# Patient Record
Sex: FEMALE | Race: WHITE | NOT HISPANIC OR LATINO | Employment: FULL TIME | URBAN - METROPOLITAN AREA
[De-identification: names, ages, dates, MRNs, and addresses within clinical notes are randomized per-mention and may not be internally consistent; named-entity substitution may affect disease eponyms.]

---

## 2017-01-06 ENCOUNTER — HOSPITAL ENCOUNTER (OUTPATIENT)
Dept: ULTRASOUND IMAGING | Facility: CLINIC | Age: 32
Discharge: HOME | End: 2017-01-06
Payer: COMMERCIAL

## 2017-01-06 DIAGNOSIS — N63.0 BREAST LUMP: ICD-10-CM

## 2017-01-06 PROCEDURE — 76642 ULTRASOUND BREAST LIMITED: CPT

## 2017-01-10 ENCOUNTER — GENERIC CONVERSION - ENCOUNTER (OUTPATIENT)
Dept: OTHER | Facility: OTHER | Age: 32
End: 2017-01-10

## 2017-01-11 ENCOUNTER — GENERIC CONVERSION - ENCOUNTER (OUTPATIENT)
Dept: OTHER | Facility: OTHER | Age: 32
End: 2017-01-11

## 2017-01-12 ENCOUNTER — GENERIC CONVERSION - ENCOUNTER (OUTPATIENT)
Dept: OTHER | Facility: OTHER | Age: 32
End: 2017-01-12

## 2017-02-16 ENCOUNTER — ALLSCRIPTS OFFICE VISIT (OUTPATIENT)
Dept: OTHER | Facility: OTHER | Age: 32
End: 2017-02-16

## 2017-09-08 ENCOUNTER — ALLSCRIPTS OFFICE VISIT (OUTPATIENT)
Dept: OTHER | Facility: OTHER | Age: 32
End: 2017-09-08

## 2017-11-03 ENCOUNTER — ALLSCRIPTS OFFICE VISIT (OUTPATIENT)
Dept: OTHER | Facility: OTHER | Age: 32
End: 2017-11-03

## 2017-11-08 NOTE — PROGRESS NOTES
Assessment  1  Acute upper respiratory infection (465 9) (J06 9)   2  Acute gastroenteritis (558 9) (K52 9)    Plan  Acute upper respiratory infection    · Symbicort 160-4 5 MCG/ACT Inhalation Aerosol; INHALE 2 PUFFS TWICE DAILY  RINSE MOUTH AFTER USE    Discussion/Summary    38-year-old female presenting today for GI and upper respiratory symptoms with GI symptoms starting 1st  It appears she may have acute viral gastroenteritis with recent Co infection of a viral upper respiratory infection  She does smoke and has some lower respiratory symptoms but nothing concerning for pneumonia  No symptoms consistent with influenza  Low-grade temp of 99 4 degrees Fahrenheit today without any fever reducers  At this point I recommended avoiding antibiotics as there is no clear sign on exam of infection needing them, especially with her GI symptoms I would like to avoid at present  I recommended rest, increase clear fluids for hydration and following a BRAT diet as tolerated  For respiratory symptoms I provided her with a sample of Symbicort with 1st dose in office to ensure proper use to help with cough and chest congestion/tightness  I recommended she continue Mucinex DM  We will see how she does through the course of the weekend or early next week with work excuse provided to her  She will call if symptoms persist or worsen and I will determine if any further treatment is needed at that time  Possible side effects of new medications were reviewed with the patient/guardian today  The treatment plan was reviewed with the patient/guardian  The patient/guardian understands and agrees with the treatment plan      Chief Complaint  Pt c/o cough, congestions, diarrhea and vomiting, and headache x 2 days  History of Present Illness  HPI: 34y/o female here today for GI sxs and URI sxs  states started with GI sxs, N/V, diarrhea for past 2 days  And now chest heaviness, chest congestion  not coughing much   fever at 102  nasal congestion and pounding headache  pt taking tylenol/ibuprofen, mucinex  URI sxs since yesterday  Fever about 3 days ago  Review of Systems    Constitutional: as noted in HPI    ENT: as noted in HPI  Cardiovascular: no complaints of slow or fast heart rate, no chest pain, no palpitations, no leg claudication or lower extremity edema  Respiratory: as noted in HPI  Gastrointestinal: as noted in HPI  Active Problems  1  Abnormal thyroid function test (794 5) (R94 6)   2  Atypical nevus (216 9) (D22 9)   3  Atypical nevus (216 9) (D22 9)   4  Generalized anxiety disorder (300 02) (F41 1)   5  GERD without esophagitis (530 81) (K21 9)   6  Insomnia (780 52) (G47 00)   7  Left breast lump (611 72) (N63 20)   8  Lumbago (724 2) (M54 5)   9  Screening for depression (V79 0) (Z13 89)    Past Medical History  1  History of Anxiety (300 00) (F41 9)   2  History of Flank pain (789 09) (R10 9)   3  History of Flu vaccine need (V04 81) (Z23)   4  History of acute bacterial sinusitis (V12 69) (Z87 09)   5  History of acute bronchitis (V12 69) (Z87 09)   6  History of acute sinusitis (V12 69) (Z87 09)   7  History of urinary tract infection (V13 02) (Z87 440)   8  History of Panic attacks (300 01) (F41 0)    Family History  Mother    1  No pertinent family history  Father    2  No pertinent family history    Social History   · Current every day smoker (305 1) (F17 200)  The social history was reviewed and updated today  Surgical History  1  History of Hallux Valgus (Bunion) Correction   2  History of Tonsillectomy    Current Meds   1  ClonazePAM 0 5 MG Oral Tablet; TAKE 1 AND 1/2 TABLETS EVERY 12 HOURS AS   NEEDED; Therapy: 10BNL3593 to (Pura Yañez)  Requested for: 75TKR3143; Last   Rx:22Oct2017 Ordered   2  Escitalopram Oxalate 20 MG Oral Tablet; Take 1 tablet daily; Therapy: 12EOE3923 to (August Mis)  Requested for: 67Tsf4404; Last   Rx:92Dfg3522 Ordered   3   Omeprazole 20 MG Oral Capsule Delayed Release; TAKE 1 CAPSULE DAILY EVERY   MORNING BEFORE BREAKFAST; Therapy: 54QCV6043 to (468 2603)  Requested for: 01Sep2016; Last   Rx:60Vre5437 Ordered   4  TraZODone HCl - 50 MG Oral Tablet; TAKE 1 TABLET AT BEDTIME AS NEEDED; Therapy: 29Edx3284 to (Evaluate:72Wdu8989)  Requested for: 95Jio9160; Last   Rx:61Whd6825 Ordered    The medication list was reviewed and updated today  Allergies  1  Latex Exam Gloves MISC   2  Levaquin TABS    Vitals   Recorded: 97SJV0681 01:36PM   Temperature 99 4 F, Tympanic   Heart Rate 96   Respiration Quality Normal   Respiration 17   Systolic 143, LUE, Sitting   Diastolic 74, LUE, Sitting   Height 5 ft 3 2 in   Weight 124 lb 9 oz   BMI Calculated 21 93   BSA Calculated 1 58   O2 Saturation 98, RA   LMP 16Gky2417   Pain Scale 6     Physical Exam    Constitutional   General appearance: Abnormal   acutely ill,-- comfortable,-- within normal limits of ideal weight,-- appears tired-- and-- appearance reflects stated age  Eyes   Conjunctiva and lids: No swelling, erythema or discharge  Ears, Nose, Mouth, and Throat   External inspection of ears and nose: Normal     Otoscopic examination: Tympanic membranes translucent with normal light reflex  Canals patent without erythema  Nasal mucosa, septum, and turbinates: Abnormal   normal nasal turbinates  There was a mucoid discharge from both nares  The bilateral nasal mucosa was boggy  Oropharynx: Abnormal  -- PND  Pulmonary   Respiratory effort: Abnormal  -- dry cough  Auscultation of lungs: Abnormal  -- mild distant BS throughout, mild coarse BS  Cardiovascular   Auscultation of heart: Normal rate and rhythm, normal S1 and S2, without murmurs  Abdomen   Abdomen: Abnormal   The abdomen was flat  Bowel sounds were normal  There was mild tenderness that was diffuse  The abdomen was not firm  No rebound tenderness  No guarding  Lymphatic   Palpation of lymph nodes in neck: No lymphadenopathy  Psychiatric   Orientation to person, place, and time: Normal     Mood and affect: Normal     Additional Exam:  vitals reviewed          Signatures   Electronically signed by : Diogo Franks [facility]; Nov  3 2017  2:11PM EST                       (Author)    Electronically signed by : Yeimi Baptiste DO; Nov 8 2017  4:21AM EST                       (Co-author)

## 2017-12-29 ENCOUNTER — ALLSCRIPTS OFFICE VISIT (OUTPATIENT)
Dept: OTHER | Facility: OTHER | Age: 32
End: 2017-12-29

## 2018-01-03 NOTE — PROGRESS NOTES
Assessment   1  Fever, unspecified fever cause (780 60) (R50 9)   2  Cough (786 2) (R05)    Plan   Cough, Fever, unspecified fever cause    · Oseltamivir Phosphate 75 MG Oral Capsule (Tamiflu); TAKE 1 CAPSULE TWICE    DAILY    Discussion/Summary      58-year-old female here today for acute constitutional and upper respiratory symptoms that sound consistent with influenza  Based on clinical suspicion I will start her on Tamiflu immediately  Patient revealed to me that she has been trying to get pregnant for many years and tried testing for pregnancy yesterday and saw a âfaint lineâ  I will treat patient as if she is pregnant, and discussed appropriate symptomatic treatment to include Tylenol or acetaminophen for pain or temperature control and also to consider trying Mucinex DM for cough and mucus  I advised against any other over-the-counter cold medications if there is a chance that she might be pregnant  Rest and fluids for hydration were advised  Patient to monitor symptoms and call with any concerns  Patient will have OB care if she is pregnant as she states that she has been having fertility issues and issues getting pregnant for long period of time  I told her to call with any concerns  Possible side effects of new medications were reviewed with the patient/guardian today  The treatment plan was reviewed with the patient/guardian  The patient/guardian understands and agrees with the treatment plan      Chief Complaint   Pt c/o body aches, chills, cough, an congestion x 1 day  History of Present Illness   HPI: 32y/o female here today for acute URI and constitutional sxs since yesterday  feeling like hit by a [employer] truck  body aches, weakness, fatigue  sore throat, nasal congestion, runny nose, sneezing, cough  Vomiting x 1  temp < 100  taking robitussin DM  she states she has been trying to get pregnant for many years and just checked pregnancy test yesterday and states saw a faint line   She has OB care and is on prenatals  Review of Systems        Constitutional: as noted in HPI       ENT: as noted in HPI  Cardiovascular: no complaints of slow or fast heart rate, no chest pain, no palpitations, no leg claudication or lower extremity edema  Respiratory: as noted in HPI  Gastrointestinal: as noted in HPI  Active Problems   1  Abnormal thyroid function test (794 5) (R94 6)   2  Generalized anxiety disorder (300 02) (F41 1)   3  GERD without esophagitis (530 81) (K21 9)   4  Insomnia (780 52) (G47 00)   5  Lumbago (724 2) (M54 5)    Past Medical History   1  History of Acute gastroenteritis (558 9) (K52 9)   2  History of Acute upper respiratory infection (465 9) (J06 9)   3  History of Anxiety (300 00) (F41 9)   4  History of Flank pain (789 09) (R10 9)   5  History of Flu vaccine need (V04 81) (Z23)   6  History of acute bacterial sinusitis (V12 69) (Z87 09)   7  History of acute bronchitis (V12 69) (Z87 09)   8  History of acute sinusitis (V12 69) (Z87 09)   9  History of atypical nevus (V13 3) (Z87 2)   10  History of atypical nevus (V13 3) (Z87 2)   11  History of lump of left breast (V13 89) (Z87 898)   12  History of urinary tract infection (V13 02) (Z87 440)   13  History of Panic attacks (300 01) (F41 0)   14  History of Screening for depression (V79 0) (Z13 89)    Family History   Mother    1  No pertinent family history  Father    2  No pertinent family history    Social History    · Current every day smoker (305 1) (F17 200)  The social history was reviewed and updated today  Surgical History   1  History of Hallux Valgus (Bunion) Correction   2  History of Tonsillectomy    Current Meds    1  ClonazePAM 0 5 MG Oral Tablet; TAKE 1 AND 1/2 TABLET BY MOUTH EVERY 12     HOURS AS NEEDED; Therapy: 48MRQ1858 to (Evaluate:32Qlk3473)  Requested for: 02Dec2017; Last     Rx:28Nov2017 Ordered   2  Escitalopram Oxalate 20 MG Oral Tablet; Take 1 tablet daily;      Therapy: 15DXZ7118 to (Saroj Valles)  Requested for: 08Sep2017; Last     Rx:22Lhe8163 Ordered   3  Multi For Her Oral Tablet; TAKE 1 TABLET DAILY; Therapy: 61XBO2406 to Recorded   4  TraZODone HCl - 50 MG Oral Tablet; TAKE 1 TABLET AT BEDTIME AS NEEDED; Therapy: 08Sep2017 to (Evaluate:60Bwd9415)  Requested for: 23Eix4706; Last     Rx:28Noq1831 Ordered     The medication list was reviewed and updated today  Allergies   1  Latex Exam Gloves MISC   2  Levaquin TABS    Vitals    Recorded: 31FXB5410 02:54PM   Temperature 98 9 F, Tympanic   Heart Rate 72   Respiration Quality Normal   Respiration 16   Systolic 783, LUE, Sitting   Diastolic 78, LUE, Sitting   Height 5 ft 3 in   Weight 124 lb 5 oz   BMI Calculated 22 02   BSA Calculated 1 58   O2 Saturation 99, RA   Pain Scale 7     Physical Exam        Constitutional      General appearance: Abnormal   acutely ill,-- uncomfortable,-- within normal limits of ideal weight,-- appears tired-- and-- appearance reflects stated age  Ears, Nose, Mouth, and Throat      External inspection of ears and nose: Normal        Otoscopic examination: Tympanic membranes translucent with normal light reflex  Canals patent without erythema  Nasal mucosa, septum, and turbinates: Abnormal   normal nasal turbinates  There was clear rhinorrhea from both nares  The bilateral nasal mucosa was boggy, but-- not edematous,-- not excoriated-- and-- not red  Oropharynx: Normal with no erythema, edema, exudate or lesions  Pulmonary      Respiratory effort: Abnormal  -- dry, hacking cough  Auscultation of lungs: Clear to auscultation  Cardiovascular      Auscultation of heart: Normal rate and rhythm, normal S1 and S2, without murmurs  Lymphatic      Palpation of lymph nodes in neck: No lymphadenopathy  Psychiatric      Orientation to person, place, and time: Normal        Mood and affect: Normal        Additional Exam:  vitals reviewed - afebrile  Signatures    Electronically signed by : Acacia Blanco, [facility]; Dec 29 2017  5:17PM EST                       (Author)     Electronically signed by : Larry Goldstein DO; Jan 2 2018  8:56PM EST                       (Co-author)

## 2018-01-12 VITALS
HEIGHT: 65 IN | RESPIRATION RATE: 16 BRPM | HEART RATE: 78 BPM | DIASTOLIC BLOOD PRESSURE: 72 MMHG | TEMPERATURE: 97.5 F | WEIGHT: 121.19 LBS | SYSTOLIC BLOOD PRESSURE: 126 MMHG | BODY MASS INDEX: 20.19 KG/M2

## 2018-01-12 VITALS
RESPIRATION RATE: 16 BRPM | SYSTOLIC BLOOD PRESSURE: 116 MMHG | DIASTOLIC BLOOD PRESSURE: 70 MMHG | WEIGHT: 123.44 LBS | TEMPERATURE: 98.6 F | HEART RATE: 92 BPM | BODY MASS INDEX: 21.87 KG/M2 | OXYGEN SATURATION: 98 % | HEIGHT: 63 IN

## 2018-01-13 VITALS
HEIGHT: 63 IN | BODY MASS INDEX: 22.07 KG/M2 | RESPIRATION RATE: 17 BRPM | HEART RATE: 96 BPM | TEMPERATURE: 99.4 F | WEIGHT: 124.56 LBS | DIASTOLIC BLOOD PRESSURE: 74 MMHG | SYSTOLIC BLOOD PRESSURE: 120 MMHG | OXYGEN SATURATION: 98 %

## 2018-01-14 NOTE — MISCELLANEOUS
To whom it May concern: This letter is to notify you that Martin Casiano is a current patient at our office and was evaluated on 11/03 for upper respiratory and GI symptoms diagnosed as viral gastroenteritis and acute upper  respiratory infection  She is considered contagious and we ask that you excuse her from 11/01 at onset of symptoms through 11/3 during this time of infection  Rest and medication treatment has been discussed with patient  She should be cleared to return  back to work as of 11/6  Thank you for your attention with this matter        Sincerely,            Javed Lucas PA-C      Electronically signed Daniel Hobson [facility]  Nov  3 2017  2:02PM EST        Electronically signed by:Geni Raymond [facility]  Nov  3 2017  2:02PM EST

## 2018-01-15 NOTE — RESULT NOTES
Message   Patient was called twice with no response  Please call patient, informed her that her recent breast ultrasound was positive for complicated cysts  Please check to see that she received these results  At this time, recommendations were made to repeat ultrasound in 6 months  Thank you     Verified Results  *US BREAST LEFT LIMITED (DIAGNOSTIC) [id number] 08:42AM Orlando Lindsay Order Number: [id number]    - Patient Instructions: To schedule this appointment, please contact Central Scheduling at [phone]  Test Name Result Flag Reference   US BREAST LEFT LIMITED (Report)     Reason for exam: clinical finding  24-year-old female with left breast pain and lower outer palpable   abnormality  US Breast Left Limited: January 6, 2017 - Check In #: [de-identified]   Technologist: Edgar Pope RDMS   No prior studies available for comparison  A uniformly echogenic layer of fibroglandular tissue  Gray scale   sonography of the left breast was performed in multiple    projections using real time imaging  Evaluation of the left breast in the area of pain reveals the    presence of dense fibroglandular tissue  There is a 0 4 x 0 3 x 0 5 cm simple cyst at the 2 o'clock    position, 2 cm from the nipple  A 2nd subcentimeter simple cyst is identified at the 3 o'clock    position, 5 cm from the nipple  At the 3 o'clock position, 3 cm the nipple, there is a minimally    complicated 0 5 x 0 6 x 0 3 cm cyst      At the 5 o'clock position, 5 cm from the nipple, there is a    well-defined hypoechoic nodule with a small peripheral cystic    space, measuring 0 7 x 0 4 x 0 4 cm  There is no internal    vascularity and mild posterior enhancement  The differential    diagnosis includes solid nodule such as fibroadenoma or    complicated cyst, among other entities  At the 6 o'clock position, 2 cm from the nipple, there is a    probable complicated cyst measuring 0 5 x 0 4 x 0 7 cm       No other cystic or solid nodules are identified  1  There is a 7 mm hypoechoic nodule at the 5 o'clock position    of the left breast, 5 cm from nipple, probable complicated cyst     This should be reevaluated with repeat ultrasound in 6 months  2  Probable complicated cyst is seen at the 6 o'clock position,    2 cm from the nipple which also should be reevaluated with repeat   ultrasound in 6 months  3  Management of any clinical symptoms and findings must be    based on clinical grounds  ASSESSMENT: BiRad:3 - Probably Benign     Recommendation:   Clinical management  Ultrasound in 6 months       Transcription Location: [facility] 98: [id number]     Risk Value(s):   Nathan-Magdy 10 Year: 0 674%, Rogerer-Magdy Lifetime: 11 857%,    Myriad Table: 1 5%   Signed by:   Florecita Rai MD   1/6/17

## 2018-01-23 VITALS
WEIGHT: 124.31 LBS | RESPIRATION RATE: 16 BRPM | BODY MASS INDEX: 22.03 KG/M2 | OXYGEN SATURATION: 99 % | TEMPERATURE: 98.9 F | SYSTOLIC BLOOD PRESSURE: 116 MMHG | HEIGHT: 63 IN | DIASTOLIC BLOOD PRESSURE: 78 MMHG | HEART RATE: 72 BPM

## 2018-01-23 NOTE — MISCELLANEOUS
To Whom this may concern: This letter is to notify you that Rodolfo Robin was seen in our office on 12/29/17 and will be starting treatment for Influenza  She will start treatment on 12/29 and have advised she rest over  the weekend through 1/1/18  She should be clear to return to work pending improvement of symptoms on 1/2/18         Sincerely,            Lala Conrad PA-C      Electronically signed by:eGni Celaya [facility]  Dec 29 2017  3:22PM EST

## 2018-02-11 DIAGNOSIS — F41.1 GAD (GENERALIZED ANXIETY DISORDER): Primary | ICD-10-CM

## 2018-02-15 RX ORDER — CLONAZEPAM 0.5 MG/1
TABLET ORAL
Qty: 90 TABLET | Refills: 0 | Status: SHIPPED | OUTPATIENT
Start: 2018-02-15 | End: 2018-03-05 | Stop reason: SDUPTHER

## 2018-02-15 NOTE — TELEPHONE ENCOUNTER
Pt's written rx for Clonazepam 0 5 MG written on 2/15/18 was shredded since rx was called into the [facility] in [place]

## 2018-02-15 NOTE — TELEPHONE ENCOUNTER
Pt's rx Clonazepam 0 5 MG I called the [facility] in [address] per pt's request and left a verbal on the rx line on 2/15/18 at 12:11pm  I did leave all information and our office's call back number

## 2018-03-05 DIAGNOSIS — F41.1 GAD (GENERALIZED ANXIETY DISORDER): ICD-10-CM

## 2018-03-05 DIAGNOSIS — F41.1 GENERALIZED ANXIETY DISORDER: Primary | ICD-10-CM

## 2018-03-09 RX ORDER — CLONAZEPAM 0.5 MG/1
0.75 TABLET ORAL EVERY 12 HOURS PRN
Qty: 90 TABLET | Refills: 0 | Status: SHIPPED | OUTPATIENT
Start: 2018-03-09 | End: 2018-03-17 | Stop reason: SDUPTHER

## 2018-03-17 DIAGNOSIS — F41.1 GAD (GENERALIZED ANXIETY DISORDER): ICD-10-CM

## 2018-03-21 RX ORDER — CLONAZEPAM 0.5 MG/1
0.75 TABLET ORAL EVERY 12 HOURS PRN
Qty: 90 TABLET | Refills: 0 | Status: SHIPPED | OUTPATIENT
Start: 2018-03-21 | End: 2018-04-13 | Stop reason: SDUPTHER

## 2018-03-23 DIAGNOSIS — F41.1 GENERALIZED ANXIETY DISORDER: Primary | ICD-10-CM

## 2018-03-23 RX ORDER — ESCITALOPRAM OXALATE 20 MG/1
TABLET ORAL
Qty: 30 TABLET | Refills: 5 | Status: SHIPPED | OUTPATIENT
Start: 2018-03-23 | End: 2018-04-13 | Stop reason: SDUPTHER

## 2018-04-05 ENCOUNTER — TELEPHONE (OUTPATIENT)
Dept: FAMILY MEDICINE CLINIC | Facility: CLINIC | Age: 33
End: 2018-04-05

## 2018-04-05 NOTE — TELEPHONE ENCOUNTER
----- Message from Slime Andrade sent at 4/5/2018  1:57 AM EDT -----  Regarding: Visit Follow-Up Question  Contact: [phone]  I'm waiting for my health insurance to kick in before I can make an appointment  Unfortunately paying 120  00 per office visit is not always easy   I was fired from my last job for being out with the GI bug and influenza which I was treated at the office for and had dr notes  I have a balance there now that I'm trying to take care of

## 2018-04-13 DIAGNOSIS — F41.1 GENERALIZED ANXIETY DISORDER: ICD-10-CM

## 2018-04-13 DIAGNOSIS — F41.1 GAD (GENERALIZED ANXIETY DISORDER): ICD-10-CM

## 2018-04-15 DIAGNOSIS — F41.1 GAD (GENERALIZED ANXIETY DISORDER): ICD-10-CM

## 2018-04-17 RX ORDER — ESCITALOPRAM OXALATE 20 MG/1
20 TABLET ORAL DAILY
Qty: 30 TABLET | Refills: 0 | Status: SHIPPED | OUTPATIENT
Start: 2018-04-17 | End: 2018-05-11

## 2018-04-17 RX ORDER — CLONAZEPAM 0.5 MG/1
TABLET ORAL
Qty: 90 TABLET | Refills: 0 | Status: SHIPPED | OUTPATIENT
Start: 2018-04-17 | End: 2018-05-11 | Stop reason: SDUPTHER

## 2018-04-17 RX ORDER — CLONAZEPAM 0.5 MG/1
0.75 TABLET ORAL EVERY 12 HOURS PRN
Qty: 90 TABLET | Refills: 0 | Status: SHIPPED | OUTPATIENT
Start: 2018-04-17 | End: 2018-05-11 | Stop reason: SDUPTHER

## 2018-05-11 ENCOUNTER — OFFICE VISIT (OUTPATIENT)
Dept: FAMILY MEDICINE CLINIC | Facility: CLINIC | Age: 33
End: 2018-05-11
Payer: COMMERCIAL

## 2018-05-11 VITALS
DIASTOLIC BLOOD PRESSURE: 74 MMHG | HEIGHT: 63 IN | RESPIRATION RATE: 17 BRPM | OXYGEN SATURATION: 98 % | TEMPERATURE: 98.1 F | SYSTOLIC BLOOD PRESSURE: 118 MMHG | WEIGHT: 121.5 LBS | HEART RATE: 77 BPM | BODY MASS INDEX: 21.53 KG/M2

## 2018-05-11 DIAGNOSIS — G89.29 CHRONIC BILATERAL LOW BACK PAIN WITH LEFT-SIDED SCIATICA: ICD-10-CM

## 2018-05-11 DIAGNOSIS — F41.1 GENERALIZED ANXIETY DISORDER: Primary | ICD-10-CM

## 2018-05-11 DIAGNOSIS — M54.42 CHRONIC BILATERAL LOW BACK PAIN WITH LEFT-SIDED SCIATICA: ICD-10-CM

## 2018-05-11 PROBLEM — M54.50 LOW BACK PAIN: Status: ACTIVE | Noted: 2017-02-16

## 2018-05-11 PROCEDURE — 99214 OFFICE O/P EST MOD 30 MIN: CPT | Performed by: FAMILY MEDICINE

## 2018-05-11 RX ORDER — OMEPRAZOLE 20 MG/1
1 CAPSULE, DELAYED RELEASE ORAL DAILY
COMMUNITY
Start: 2016-06-30 | End: 2018-06-07 | Stop reason: SDUPTHER

## 2018-05-11 RX ORDER — CLONAZEPAM 0.5 MG/1
0.75 TABLET ORAL EVERY 12 HOURS PRN
Qty: 90 TABLET | Refills: 0 | Status: SHIPPED | OUTPATIENT
Start: 2018-05-11 | End: 2018-06-19 | Stop reason: SDUPTHER

## 2018-05-11 RX ORDER — CYCLOBENZAPRINE HCL 5 MG
5 TABLET ORAL 2 TIMES DAILY
COMMUNITY
Start: 2017-02-13 | End: 2018-05-11 | Stop reason: SDUPTHER

## 2018-05-11 RX ORDER — CYCLOBENZAPRINE HCL 5 MG
5 TABLET ORAL 2 TIMES DAILY
Qty: 60 TABLET | Refills: 1 | Status: SHIPPED | OUTPATIENT
Start: 2018-05-11 | End: 2018-07-09 | Stop reason: SDUPTHER

## 2018-05-11 NOTE — PROGRESS NOTES
Assessment/Plan:   1  Generalized anxiety disorder  Reviewed patient's symptoms with her  It appears that her anxiety disorder has not been very well controlled  Will discontinue her citalopram   Will switch her to Zoloft 50 mg daily  She was instructed on the proper weaning schedule for her previous medication  She may continue with her clonazepam as well  - sertraline (ZOLOFT) 50 mg tablet; Take 1 tablet (50 mg total) by mouth daily  Dispense: 30 tablet; Refill: 1  - clonazePAM (KlonoPIN) 0 5 mg tablet; Take 1 5 tablets (0 75 mg total) by mouth every 12 (twelve) hours as needed for anxiety  Dispense: 90 tablet; Refill: 0    2  Chronic bilateral low back pain with left-sided sciatica  Patient's symptoms have been persistent  She believes that lifting may be a contributing factor to this problem  She has been using her Flexeril for symptom relief  At this time, will refer patient to PT for further evaluation  Follow-up in 6 months  - cyclobenzaprine (FLEXERIL) 5 mg tablet; Take 1 tablet (5 mg total) by mouth 2 (two) times a day  Dispense: 60 tablet; Refill: 1  - Ambulatory referral to Physical Therapy; Future     Diagnoses and all orders for this visit:    Generalized anxiety disorder    Other orders  -     cyclobenzaprine (FLEXERIL) 5 mg tablet; Take 5 mg by mouth 2 (two) times a day  -     omeprazole (PRILOSEC) 20 mg delayed release capsule; Take 1 capsule by mouth daily  -     cyclobenzaprine (FLEXERIL) 5 mg tablet; Take 1 tablet (5 mg total) by mouth 2 (two) times a day          Subjective:    Chief Complaint   Patient presents with    Follow-up        Patient ID: Dottie Haines is a 26 y o  female  Patient is a 68-year-old female presents today for follow-up on her chronic conditions  She has generalized anxiety disorder as well as chronic low back pain mainly on the left  She states that she has been taking her medications regularly and tolerating them very well    Denies adverse reactions with her medications  She does not believe that her citalopram has been effective for her  She would like to consider other treatment options as she believes that this medication has lost its effectiveness  She denies SI or HI today  Review of Systems   Constitutional: Negative for activity change, chills, fatigue and fever  HENT: Negative for congestion, ear pain, sinus pressure and sore throat  Eyes: Negative for redness, itching and visual disturbance  Respiratory: Negative for cough and shortness of breath  Cardiovascular: Negative for chest pain and palpitations  Gastrointestinal: Negative for abdominal pain, diarrhea and nausea  Endocrine: Negative for cold intolerance and heat intolerance  Genitourinary: Negative for dysuria, flank pain and frequency  Musculoskeletal: Negative for arthralgias, back pain, gait problem and myalgias  Skin: Negative for color change  Allergic/Immunologic: Negative for environmental allergies  Neurological: Negative for dizziness, numbness and headaches  Psychiatric/Behavioral: Negative for behavioral problems and sleep disturbance  The following portions of the patient's history were reviewed and updated as appropriate : past family history, past medical history, past social history and past surgical history  Objective:    Vitals:    05/11/18 1532   BP: 118/74   BP Location: Left arm   Patient Position: Sitting   Cuff Size: Adult   Pulse: 77   Resp: 17   Temp: 98 1 °F (36 7 °C)   TempSrc: Tympanic   SpO2: 98%   Weight: 55 1 kg (121 lb 8 oz)   Height: 5' 2 5" (1 588 m)        Physical Exam   Constitutional: She is oriented to person, place, and time  She appears well-developed and well-nourished  HENT:   Head: Normocephalic and atraumatic  Nose: Nose normal    Mouth/Throat: No oropharyngeal exudate  Eyes: Pupils are equal, round, and reactive to light  Right eye exhibits no discharge  Left eye exhibits no discharge     Neck: Normal range of motion  Neck supple  No tracheal deviation present  Cardiovascular: Normal rate, regular rhythm and intact distal pulses  Exam reveals no gallop and no friction rub  No murmur heard  Pulses:       Dorsalis pedis pulses are 2+ on the right side, and 2+ on the left side  Posterior tibial pulses are 2+ on the right side, and 2+ on the left side  Pulmonary/Chest: Effort normal and breath sounds normal  No respiratory distress  She has no wheezes  She has no rales  Abdominal: Soft  Bowel sounds are normal  She exhibits no distension  There is no tenderness  There is no rebound and no guarding  Musculoskeletal: Normal range of motion  She exhibits no edema  Lymphadenopathy:        Head (right side): No submental and no submandibular adenopathy present  Head (left side): No submental and no submandibular adenopathy present  She has no cervical adenopathy  Right cervical: No superficial cervical, no deep cervical and no posterior cervical adenopathy present  Left cervical: No superficial cervical, no deep cervical and no posterior cervical adenopathy present  Neurological: She is alert and oriented to person, place, and time  No cranial nerve deficit or sensory deficit  Skin: Skin is warm, dry and intact  Psychiatric: Her speech is normal and behavior is normal  Judgment normal  Her mood appears not anxious  Cognition and memory are normal  She does not exhibit a depressed mood  Vitals reviewed

## 2018-05-16 ENCOUNTER — OFFICE VISIT (OUTPATIENT)
Dept: FAMILY MEDICINE CLINIC | Facility: CLINIC | Age: 33
End: 2018-05-16
Payer: COMMERCIAL

## 2018-05-16 VITALS
BODY MASS INDEX: 21.41 KG/M2 | SYSTOLIC BLOOD PRESSURE: 110 MMHG | WEIGHT: 125.4 LBS | HEART RATE: 66 BPM | RESPIRATION RATE: 16 BRPM | DIASTOLIC BLOOD PRESSURE: 70 MMHG | OXYGEN SATURATION: 97 % | HEIGHT: 64 IN | TEMPERATURE: 99.2 F

## 2018-05-16 DIAGNOSIS — R39.9 URINARY TRACT INFECTION SYMPTOMS: Primary | ICD-10-CM

## 2018-05-16 LAB
SL AMB  POCT GLUCOSE, UA: NEGATIVE
SL AMB LEUKOCYTE ESTERASE,UA: NEGATIVE
SL AMB POCT BILIRUBIN,UA: NEGATIVE
SL AMB POCT BLOOD,UA: NEGATIVE
SL AMB POCT CLARITY,UA: CLEAR
SL AMB POCT COLOR,UA: YELLOW
SL AMB POCT KETONES,UA: NEGATIVE
SL AMB POCT NITRITE,UA: NEGATIVE
SL AMB POCT PH,UA: 8.5
SL AMB POCT SPECIFIC GRAVITY,UA: 1.01
SL AMB POCT URINE PROTEIN: NEGATIVE
SL AMB POCT UROBILINOGEN: 0.2

## 2018-05-16 PROCEDURE — 81003 URINALYSIS AUTO W/O SCOPE: CPT | Performed by: PHYSICIAN ASSISTANT

## 2018-05-16 PROCEDURE — 87086 URINE CULTURE/COLONY COUNT: CPT | Performed by: PHYSICIAN ASSISTANT

## 2018-05-16 PROCEDURE — 99213 OFFICE O/P EST LOW 20 MIN: CPT | Performed by: PHYSICIAN ASSISTANT

## 2018-05-16 RX ORDER — SULFAMETHOXAZOLE AND TRIMETHOPRIM 800; 160 MG/1; MG/1
1 TABLET ORAL EVERY 12 HOURS SCHEDULED
Qty: 14 TABLET | Refills: 0 | Status: SHIPPED | OUTPATIENT
Start: 2018-05-16 | End: 2018-05-23

## 2018-05-16 RX ORDER — ESCITALOPRAM OXALATE 20 MG/1
TABLET ORAL
COMMUNITY
Start: 2018-05-16 | End: 2018-06-07

## 2018-05-16 NOTE — PROGRESS NOTES
Assessment/Plan:      Diagnoses and all orders for this visit:    Urinary tract infection symptoms  -     sulfamethoxazole-trimethoprim (BACTRIM DS) 800-160 mg per tablet; Take 1 tablet by mouth every 12 (twelve) hours for 7 days  -     POCT urine dip auto non-scope  -     Urine culture    Other orders  -     escitalopram (LEXAPRO) 20 mg tablet;         10-year-old female presenting today for concern of urinary symptoms as well as some lower pelvic discomfort wrapping to her low back for the past few days  Her exam is relatively unremarkable aside from some mild pelvic tenderness to palpation  She is due for her menstrual cycle in about 8 days so I do question  Mittels[place]merz versus urinary infection  She has no risk for PID or STD no GI symptoms  There is no blood in her urine dip to consider kidney stone  Also question cystitis  She has a history of UTI that feel similar, she states, so I will place her on a course of Bactrim  She was advised Tylenol or Motrin as needed for the discomfort as well as heating pad  She was advised to increase water intake and could also consider probiotic  We will send out urine for culture to confirm we will call her with results  In the meantime she was encouraged to call sooner with any new or worsening symptoms  Chief Complaint   Patient presents with    Urinary Tract Infection     Kidney stones according to pt        Subjective:     Patient ID: Monae Heath is a 26 y o  female     34y/o female here today for frequent urination past few days, lower pelvic discomfort wrapping around to her low back  Also burning with urination  NO GI sxs, no N/V, no fevers, no gross blood  States hx of UTI and similar sxs  No vaginal sxs, abnormal discharge or vaginal pain  Review of Systems   Constitutional: Negative  Respiratory: Negative  Cardiovascular: Negative  Gastrointestinal: Negative  Genitourinary:        As in HPI   Psychiatric/Behavioral: Negative  The following portions of the patient's history were reviewed and updated as appropriate: allergies, current medications, past family history, past medical history, past social history, past surgical history and problem list       Objective:     Physical Exam   Constitutional: She is oriented to person, place, and time  She appears well-developed and well-nourished  No distress  Neck: Neck supple  Cardiovascular: Normal rate, regular rhythm and normal heart sounds  Pulmonary/Chest: Effort normal and breath sounds normal    Abdominal: Normal appearance and bowel sounds are normal  There is tenderness (mild) in the suprapubic area  There is no rebound, no guarding and no CVA tenderness  Musculoskeletal:   Normal gait and gross normal ROM   Neurological: She is alert and oriented to person, place, and time  Psychiatric: She has a normal mood and affect  Vitals reviewed        Vitals:    05/16/18 1439   BP: 110/70   Pulse: 66   Resp: 16   Temp: 99 2 °F (37 3 °C)   TempSrc: Tympanic   SpO2: 97%   Weight: 56 9 kg (125 lb 6 4 oz)   Height: 5' 4 17" (1 63 m)

## 2018-05-16 NOTE — LETTER
May 16, 2018     Patient: Soraya Talavera   YOB: 1985   Date of Visit: 5/16/2018       To Whom it May Concern:    Soraya Talavera is under my professional care  She was seen in my office on 5/16/2018  She may return to work on 5/17/18  If you have any questions or concerns, please don't hesitate to call           Sincerely,          Dipak Denny PA-C        CC: No Recipients

## 2018-05-17 LAB — BACTERIA UR CULT: NORMAL

## 2018-05-18 ENCOUNTER — TELEPHONE (OUTPATIENT)
Dept: FAMILY MEDICINE CLINIC | Facility: CLINIC | Age: 33
End: 2018-05-18

## 2018-05-18 DIAGNOSIS — R10.2 PELVIC PAIN: Primary | ICD-10-CM

## 2018-05-18 DIAGNOSIS — Z91.89 AT RISK FOR COMPLICATION OF PREGNANCY: ICD-10-CM

## 2018-05-18 NOTE — TELEPHONE ENCOUNTER
I spoke to patient in detail regarding her symptoms  She states she has no better on the Bactrim which she has been taking for 3 days  I explained her culture did not show any clear bacteria but just mixed contaminants from genital elizabeth  She states that her and her  have been trying to get pregnant for years and he has a problem with low sperm count  She is due for her  Now in about 6 days but she has been sexually active  I will order the ultrasound to further evaluate her ongoing left lower pelvic pain and low back discomfort  I also will check an HCG quant level for her  She will get this done at [facility] end over the weekend and she will call [facility]'s central scheduling to schedule appointment for the ultrasound  We will keep in touch  Patient was  Encouraged to call sooner with any concerns

## 2018-05-18 NOTE — TELEPHONE ENCOUNTER
----- Message from Darshan Bennett sent at 5/18/2018 12:04 PM EDT -----  Regarding: RE: RE: Test Results Question  Contact: [phone]    I have pain still that's more on my left side ovary area and still radiates to my back   I did call obgyn they can't see me until June 8th but suggested I get a ultrasound done   They are mailing me a script   I'm very uncomfortable and working is making it worse at this point   I just hope to figure out what's going on soon   ----- Message -----  From: Gordon Painting  Sent: 5/18/18 9:24 AM  To: Darshan Bennett  Subject: RE: Test Results Question    Reymundo Hernández,    Mixed contaminants often times happens when the patient does not appropriately collect the urine sample and normal genital elizabeth (normal bacteria) fall in to the urine with the sample causing contamination  A true urine culture is not able to be run  This happens a lot  We just dont know if you had UTI or not  Court Sous, how are you feeling since being seen last?    Thanks,  Chele Bowman      ----- Message -----     From: Darshan Bennett     Sent: 5/17/2018  8:18 PM EDT       To: Burton Delgado PA-C  Subject: Test Results Question    I have a question about URINE CULTURE resulted on 5/17/18 at 6:13 PM  What does this mean negative or positive?

## 2018-05-18 NOTE — TELEPHONE ENCOUNTER
Mixed contaminants often times happens when the patient does not appropriately collect the urine sample and normal genital elizabeth (normal bacteria) fall in to the urine with the sample contaminating  A true urine culture is not able to be run  This happens a lot  We just dont know if she had UTI or not   Carmen Bahena, how are you feeling since being seen last?

## 2018-05-18 NOTE — TELEPHONE ENCOUNTER
----- Message from Elen Galindo sent at 5/17/2018  8:18 PM EDT -----  Regarding: Test Results Question  Contact: [phone]  I have a question about URINE CULTURE resulted on 5/17/18 at 6:13 PM  What does this mean negative or positive?

## 2018-05-19 ENCOUNTER — APPOINTMENT (OUTPATIENT)
Dept: LAB | Facility: MEDICAL CENTER | Age: 33
End: 2018-05-19
Payer: COMMERCIAL

## 2018-05-19 ENCOUNTER — TRANSCRIBE ORDERS (OUTPATIENT)
Dept: ADMINISTRATIVE | Facility: HOSPITAL | Age: 33
End: 2018-05-19

## 2018-05-19 DIAGNOSIS — R10.2 PELVIC PAIN: ICD-10-CM

## 2018-05-19 LAB — B-HCG SERPL-ACNC: <2 MIU/ML

## 2018-05-19 PROCEDURE — 36415 COLL VENOUS BLD VENIPUNCTURE: CPT

## 2018-05-19 PROCEDURE — 84702 CHORIONIC GONADOTROPIN TEST: CPT

## 2018-05-22 ENCOUNTER — TELEPHONE (OUTPATIENT)
Dept: FAMILY MEDICINE CLINIC | Facility: CLINIC | Age: 33
End: 2018-05-22

## 2018-05-22 NOTE — TELEPHONE ENCOUNTER
FYI: Patient was given results of pregnancy test per Pranay Moore  Patient has her US scheduled this Friday 5/25/18 at 10:00 am at [facility]

## 2018-05-25 ENCOUNTER — HOSPITAL ENCOUNTER (OUTPATIENT)
Dept: ULTRASOUND IMAGING | Facility: MEDICAL CENTER | Age: 33
Discharge: HOME | End: 2018-05-25
Payer: COMMERCIAL

## 2018-05-25 DIAGNOSIS — R10.2 PELVIC PAIN: ICD-10-CM

## 2018-05-25 PROCEDURE — 76700 US EXAM ABDOM COMPLETE: CPT

## 2018-05-25 PROCEDURE — 76856 US EXAM PELVIC COMPLETE: CPT

## 2018-05-25 PROCEDURE — 76830 TRANSVAGINAL US NON-OB: CPT

## 2018-05-31 ENCOUNTER — TELEPHONE (OUTPATIENT)
Dept: FAMILY MEDICINE CLINIC | Facility: CLINIC | Age: 33
End: 2018-05-31

## 2018-06-01 NOTE — TELEPHONE ENCOUNTER
I spoke to patient directly regarding the ultrasounds that were unremarkable and not suggestive of any abnormality that would be causing her symptoms  She still has some mild pain that is fairly constant and at times intermittently worsens like a sharp stabbing pain  She has no other associated urinary or GI symptoms and she cannot attribute the worsening pain with movement  She had no blood in her urine dip at her last appointment so I was not concerned about a stone and she still does not visualize any gross blood  It is unclear what exactly could be causing her pain but I do also question musculoskeletal   She has no other GI symptoms  Patient has an appointment with Dr Itz Hernandez  Next Friday  I told patient to schedule follow-up with me after if gyn does not feel this is related and she needs further workup  Patient requested we fax office note and ultrasound results to her  **MA: please fax my OV note, urine/HCG testing and abdominal and pelvic U/S results to Dr Itz Hernandez in [place]   Thanks

## 2018-06-04 ENCOUNTER — TELEPHONE (OUTPATIENT)
Dept: FAMILY MEDICINE CLINIC | Facility: CLINIC | Age: 33
End: 2018-06-04

## 2018-06-04 NOTE — TELEPHONE ENCOUNTER
Pt called and stated she started zolft yesterday and started to feel dizzy and nausea, feels like she will pass out  She is currently at work, and works in a nursing home  Please advise  Thank you!

## 2018-06-05 NOTE — TELEPHONE ENCOUNTER
Spoke with patient, medication adjustments were made  Please write her a work note for yesterday June 4th as well as June 5th  Please call her once completed and she will     Thank you

## 2018-06-07 ENCOUNTER — OFFICE VISIT (OUTPATIENT)
Dept: FAMILY MEDICINE CLINIC | Facility: CLINIC | Age: 33
End: 2018-06-07
Payer: COMMERCIAL

## 2018-06-07 VITALS
OXYGEN SATURATION: 98 % | TEMPERATURE: 99.3 F | HEART RATE: 94 BPM | DIASTOLIC BLOOD PRESSURE: 78 MMHG | WEIGHT: 123.8 LBS | HEIGHT: 64 IN | BODY MASS INDEX: 21.13 KG/M2 | SYSTOLIC BLOOD PRESSURE: 120 MMHG

## 2018-06-07 DIAGNOSIS — R51.9 ACUTE INTRACTABLE HEADACHE, UNSPECIFIED HEADACHE TYPE: Primary | ICD-10-CM

## 2018-06-07 DIAGNOSIS — R42 DIZZINESS: ICD-10-CM

## 2018-06-07 DIAGNOSIS — R11.0 NAUSEA: ICD-10-CM

## 2018-06-07 PROCEDURE — 99214 OFFICE O/P EST MOD 30 MIN: CPT | Performed by: PHYSICIAN ASSISTANT

## 2018-06-07 RX ORDER — OMEPRAZOLE 20 MG/1
20 CAPSULE, DELAYED RELEASE ORAL DAILY
Qty: 30 CAPSULE | Refills: 0 | Status: SHIPPED | OUTPATIENT
Start: 2018-06-07 | End: 2018-07-04 | Stop reason: SDUPTHER

## 2018-06-07 RX ORDER — ONDANSETRON 8 MG/1
8 TABLET, ORALLY DISINTEGRATING ORAL EVERY 8 HOURS PRN
Qty: 30 TABLET | Refills: 0 | Status: SHIPPED | OUTPATIENT
Start: 2018-06-07 | End: 2018-12-04

## 2018-06-07 RX ORDER — MECLIZINE HYDROCHLORIDE 25 MG/1
25 TABLET ORAL EVERY 8 HOURS PRN
Qty: 30 TABLET | Refills: 0 | Status: SHIPPED | OUTPATIENT
Start: 2018-06-07 | End: 2018-12-04

## 2018-06-07 RX ORDER — IBUPROFEN 600 MG/1
600 TABLET ORAL EVERY 6 HOURS PRN
Qty: 30 TABLET | Refills: 0 | Status: SHIPPED | OUTPATIENT
Start: 2018-06-07 | End: 2018-12-04

## 2018-06-07 RX ORDER — FLUTICASONE PROPIONATE 50 MCG
2 SPRAY, SUSPENSION (ML) NASAL DAILY
Qty: 16 G | Refills: 1 | Status: SHIPPED | OUTPATIENT
Start: 2018-06-07 | End: 2018-08-23 | Stop reason: SDUPTHER

## 2018-06-07 NOTE — LETTER
June 7, 2018     Patient: Julius Rodriguez   YOB: 1985   Date of Visit: 6/7/2018       To Whom it May Concern:    Julius Rodriguez is under my professional care  She was seen in my office on 6/7/2018  She may return to work on 6/9/18  If you have any questions or concerns, please don't hesitate to call           Sincerely,          Audie Chin PA-C        CC: No Recipients

## 2018-06-07 NOTE — PROGRESS NOTES
Assessment/Plan:      Diagnoses and all orders for this visit:    Acute intractable headache, unspecified headache type  -     ibuprofen (MOTRIN) 600 mg tablet; Take 1 tablet (600 mg total) by mouth every 6 (six) hours as needed for headaches Take with food  -     fluticasone (FLONASE) 50 mcg/act nasal spray; 2 sprays into each nostril daily For nasal congestion    Nausea  -     omeprazole (PRILOSEC) 20 mg delayed release capsule; Take 1 capsule (20 mg total) by mouth daily  -     ondansetron (ZOFRAN-ODT) 8 mg disintegrating tablet; Take 1 tablet (8 mg total) by mouth every 8 (eight) hours as needed for nausea or vomiting  -     meclizine (ANTIVERT) 25 mg tablet; Take 1 tablet (25 mg total) by mouth every 8 (eight) hours as needed for dizziness    Dizziness  -     meclizine (ANTIVERT) 25 mg tablet; Take 1 tablet (25 mg total) by mouth every 8 (eight) hours as needed for dizziness        33-year-old female here today for multiple symptoms for the past 3 days including dizziness / lightheadedness, frontal headache, nausea for the past of unclear etiology  Her exam is relatively unremarkable today aside from some mild epigastric tenderness  She had discontinued her Lexapro 20 mg 3 days ago after taking it every other day to wean off to be switched to Zoloft  She had been on the Lexapro for years  She took 25 mg Zoloft for the past 3 days  I am uncertain at this time if her symptoms could be from discontinuing the Lexapro or if it could be from side effects to the   Zoloft  She also had complained of some nasal congestion and runny nose as well as an intermittent dry cough so it is unclear if this could be sinus related or if she could be having a migraine causing the dizziness and the nausea as secondary symptoms  Her ENT exam was unremarkable and lungs were clear  At this time being that there is an unclear etiology I have advised she discontinue all antidepressants for now    I will treat symptomatically starting with ibuprofen 600 mg Q 6-8 hours with food to help headache and pain but I will also have her use omeprazole 20 mg a day to protect her stomach  I will also have her start Zofran and meclizine for nausea and dizziness  I will also put her on fluticasone nasal spray to help with nasal congestion if this could be headache secondary to sinus  Patient was advised to rest and hydrate as much as possible  She should monitor her symptoms over the next few days with work excuse given  She is to call or follow up with any worsening or new concerning symptoms  Otherwise if she is getting better she should call sometime next week for an update to determine if a different antidepressant should be started  She also has ongoing lower pelvic pain that has been ongoing for months and had a relatively normal ultrasound of the abdomen and pelvis  She has appointment with her gynecologist tomorrow  Chief Complaint   Patient presents with    Dizziness    Headache    Nausea     X 1 week       Subjective:     Patient ID: Toya Lu is a [address] y o  female     32y/o female here today for multiple sxs for past 3 days  states had stopped the lexapro after taking it every other day to ween off for 2 weeks then this past Sunday started 1/2 tab zoloft  Since then has felt dizzy, lightheaded, nausea, feeling in space, dry heavy  No diarrhea/constipation or blood in the stool  Stomach is upset overall  She also has a pounding headache  She denies drug or ETOH use or change in her routine  She had been on lexapro for years  She does admit to occasional cough, runny nose and some nasal congestion  Headache in frontal region  Review of Systems   Constitutional: Positive for activity change, appetite change and fatigue  Negative for fever  Respiratory: Negative  Cardiovascular: Negative  Gastrointestinal:        Ongoing lower abdominal pain that is pre-existing   Epigastric discomfort new   Genitourinary: Negative  Neurological: Positive for dizziness and light-headedness  Psychiatric/Behavioral: Negative  The following portions of the patient's history were reviewed and updated as appropriate: allergies, current medications, past family history, past medical history, past social history, past surgical history and problem list       Objective:     Physical Exam   Constitutional: She is oriented to person, place, and time  She appears well-developed  She appears ill  No distress  fatigued   HENT:   Head: Normocephalic  Right Ear: Tympanic membrane normal    Left Ear: Tympanic membrane normal    Nose: Nose normal    Mouth/Throat: Oropharynx is clear and moist    Neck: Neck supple  Cardiovascular: Normal rate, regular rhythm, normal heart sounds and normal pulses  Pulmonary/Chest: Effort normal and breath sounds normal    Abdominal: Normal appearance and bowel sounds are normal  There is tenderness (mild, epigastric)  Lymphadenopathy:     She has no cervical adenopathy  Neurological: She is alert and oriented to person, place, and time  No cranial nerve deficit or sensory deficit  Coordination and gait normal    Psychiatric: She has a normal mood and affect  Vitals reviewed        Vitals:    06/07/18 1504   BP: 120/78   BP Location: Left arm   Patient Position: Sitting   Pulse: 94   Temp: 99 3 °F (37 4 °C)   TempSrc: Tympanic   SpO2: 98%   Weight: 56 2 kg (123 lb 12 8 oz)   Height: 5' 4 3" (1 633 m)

## 2018-06-08 ENCOUNTER — HOSPITAL ENCOUNTER (EMERGENCY)
Facility: HOSPITAL | Age: 33
Discharge: HOME | End: 2018-06-09
Attending: EMERGENCY MEDICINE | Admitting: EMERGENCY MEDICINE
Payer: COMMERCIAL

## 2018-06-08 ENCOUNTER — APPOINTMENT (EMERGENCY)
Dept: CT IMAGING | Facility: HOSPITAL | Age: 33
End: 2018-06-08
Payer: COMMERCIAL

## 2018-06-08 DIAGNOSIS — R42 VERTIGO: ICD-10-CM

## 2018-06-08 DIAGNOSIS — R07.9 CHEST PAIN: Primary | ICD-10-CM

## 2018-06-08 LAB
BASOPHILS # BLD AUTO: 0.04 THOUSANDS/ΜL (ref 0–0.1)
BASOPHILS NFR BLD AUTO: 1 % (ref 0–1)
BILIRUB UR QL STRIP: NEGATIVE
CLARITY UR: CLEAR
COLOR UR: YELLOW
COLOR, POC: YELLOW
EOSINOPHIL # BLD AUTO: 0.19 THOUSAND/ΜL (ref 0–0.61)
EOSINOPHIL NFR BLD AUTO: 2 % (ref 0–6)
ERYTHROCYTE [DISTWIDTH] IN BLOOD BY AUTOMATED COUNT: 12.3 % (ref 11.6–15.1)
EXT PREG TEST URINE: NEGATIVE
GLUCOSE UR STRIP-MCNC: NEGATIVE MG/DL
HCT VFR BLD AUTO: 36.5 % (ref 34.8–46.1)
HGB BLD-MCNC: 12 G/DL (ref 11.5–15.4)
HGB UR QL STRIP.AUTO: NEGATIVE
KETONES UR STRIP-MCNC: NEGATIVE MG/DL
LEUKOCYTE ESTERASE UR QL STRIP: NEGATIVE
LYMPHOCYTES # BLD AUTO: 3.84 THOUSANDS/ΜL (ref 0.6–4.47)
LYMPHOCYTES NFR BLD AUTO: 45 % (ref 14–44)
MCH RBC QN AUTO: 30.2 PG (ref 26.8–34.3)
MCHC RBC AUTO-ENTMCNC: 32.9 G/DL (ref 31.4–37.4)
MCV RBC AUTO: 92 FL (ref 82–98)
MONOCYTES # BLD AUTO: 0.59 THOUSAND/ΜL (ref 0.17–1.22)
MONOCYTES NFR BLD AUTO: 7 % (ref 4–12)
NEUTROPHILS # BLD AUTO: 3.84 THOUSANDS/ΜL (ref 1.85–7.62)
NEUTS SEG NFR BLD AUTO: 45 % (ref 43–75)
NITRITE UR QL STRIP: NEGATIVE
NRBC BLD AUTO-RTO: 0 /100 WBCS
PH UR STRIP.AUTO: 7 [PH] (ref 4.5–8)
PLATELET # BLD AUTO: 222 THOUSANDS/UL (ref 149–390)
PMV BLD AUTO: 9.1 FL (ref 8.9–12.7)
PROT UR STRIP-MCNC: NEGATIVE MG/DL
RBC # BLD AUTO: 3.97 MILLION/UL (ref 3.81–5.12)
SP GR UR STRIP.AUTO: 1.01 (ref 1–1.03)
UROBILINOGEN UR QL STRIP.AUTO: 0.2 E.U./DL
WBC # BLD AUTO: 8.5 THOUSAND/UL (ref 4.31–10.16)

## 2018-06-08 PROCEDURE — 96374 THER/PROPH/DIAG INJ IV PUSH: CPT

## 2018-06-08 PROCEDURE — 36415 COLL VENOUS BLD VENIPUNCTURE: CPT | Performed by: EMERGENCY MEDICINE

## 2018-06-08 PROCEDURE — 85025 COMPLETE CBC W/AUTO DIFF WBC: CPT | Performed by: EMERGENCY MEDICINE

## 2018-06-08 PROCEDURE — 96361 HYDRATE IV INFUSION ADD-ON: CPT

## 2018-06-08 PROCEDURE — 70450 CT HEAD/BRAIN W/O DYE: CPT

## 2018-06-08 PROCEDURE — 96375 TX/PRO/DX INJ NEW DRUG ADDON: CPT

## 2018-06-08 PROCEDURE — 80053 COMPREHEN METABOLIC PANEL: CPT | Performed by: EMERGENCY MEDICINE

## 2018-06-08 PROCEDURE — 84484 ASSAY OF TROPONIN QUANT: CPT | Performed by: EMERGENCY MEDICINE

## 2018-06-08 PROCEDURE — 93005 ELECTROCARDIOGRAM TRACING: CPT

## 2018-06-08 PROCEDURE — 81003 URINALYSIS AUTO W/O SCOPE: CPT

## 2018-06-08 PROCEDURE — 81025 URINE PREGNANCY TEST: CPT | Performed by: EMERGENCY MEDICINE

## 2018-06-08 RX ORDER — METOCLOPRAMIDE HYDROCHLORIDE 5 MG/ML
10 INJECTION INTRAMUSCULAR; INTRAVENOUS ONCE
Status: COMPLETED | OUTPATIENT
Start: 2018-06-08 | End: 2018-06-08

## 2018-06-08 RX ORDER — DIPHENHYDRAMINE HYDROCHLORIDE 50 MG/ML
12.5 INJECTION INTRAMUSCULAR; INTRAVENOUS ONCE
Status: COMPLETED | OUTPATIENT
Start: 2018-06-08 | End: 2018-06-08

## 2018-06-08 RX ORDER — KETOROLAC TROMETHAMINE 30 MG/ML
15 INJECTION, SOLUTION INTRAMUSCULAR; INTRAVENOUS ONCE
Status: COMPLETED | OUTPATIENT
Start: 2018-06-08 | End: 2018-06-08

## 2018-06-08 RX ADMIN — SODIUM CHLORIDE 1000 ML: 0.9 INJECTION, SOLUTION INTRAVENOUS at 23:58

## 2018-06-08 RX ADMIN — DIPHENHYDRAMINE HYDROCHLORIDE 12.5 MG: 50 INJECTION, SOLUTION INTRAMUSCULAR; INTRAVENOUS at 23:58

## 2018-06-08 RX ADMIN — KETOROLAC TROMETHAMINE 15 MG: 30 INJECTION, SOLUTION INTRAMUSCULAR at 23:58

## 2018-06-08 RX ADMIN — METOCLOPRAMIDE 10 MG: 5 INJECTION, SOLUTION INTRAMUSCULAR; INTRAVENOUS at 23:58

## 2018-06-09 VITALS
RESPIRATION RATE: 18 BRPM | HEART RATE: 81 BPM | DIASTOLIC BLOOD PRESSURE: 61 MMHG | BODY MASS INDEX: 21.11 KG/M2 | OXYGEN SATURATION: 100 % | SYSTOLIC BLOOD PRESSURE: 128 MMHG | WEIGHT: 124.12 LBS | TEMPERATURE: 97.9 F

## 2018-06-09 DIAGNOSIS — G47.00 INSOMNIA, UNSPECIFIED TYPE: Primary | ICD-10-CM

## 2018-06-09 LAB
ALBUMIN SERPL BCP-MCNC: 3.8 G/DL (ref 3.5–5)
ALP SERPL-CCNC: 55 U/L (ref 46–116)
ALT SERPL W P-5'-P-CCNC: 18 U/L (ref 12–78)
ANION GAP SERPL CALCULATED.3IONS-SCNC: 10 MMOL/L (ref 4–13)
AST SERPL W P-5'-P-CCNC: 8 U/L (ref 5–45)
BILIRUB SERPL-MCNC: 0.21 MG/DL (ref 0.2–1)
BUN SERPL-MCNC: 10 MG/DL (ref 5–25)
CALCIUM SERPL-MCNC: 9.1 MG/DL (ref 8.3–10.1)
CHLORIDE SERPL-SCNC: 106 MMOL/L (ref 100–108)
CO2 SERPL-SCNC: 26 MMOL/L (ref 21–32)
CREAT SERPL-MCNC: 0.72 MG/DL (ref 0.6–1.3)
GFR SERPL CREATININE-BSD FRML MDRD: 109 ML/MIN/1.73SQ M
GLUCOSE SERPL-MCNC: 106 MG/DL (ref 65–140)
POTASSIUM SERPL-SCNC: 3.9 MMOL/L (ref 3.5–5.3)
PROT SERPL-MCNC: 7.3 G/DL (ref 6.4–8.2)
SODIUM SERPL-SCNC: 142 MMOL/L (ref 136–145)
TROPONIN I SERPL-MCNC: <0.02 NG/ML
TROPONIN I SERPL-MCNC: <0.02 NG/ML

## 2018-06-09 PROCEDURE — 96375 TX/PRO/DX INJ NEW DRUG ADDON: CPT

## 2018-06-09 PROCEDURE — 36415 COLL VENOUS BLD VENIPUNCTURE: CPT | Performed by: EMERGENCY MEDICINE

## 2018-06-09 PROCEDURE — 93005 ELECTROCARDIOGRAM TRACING: CPT

## 2018-06-09 PROCEDURE — 84484 ASSAY OF TROPONIN QUANT: CPT | Performed by: EMERGENCY MEDICINE

## 2018-06-09 PROCEDURE — 99285 EMERGENCY DEPT VISIT HI MDM: CPT

## 2018-06-09 PROCEDURE — 96361 HYDRATE IV INFUSION ADD-ON: CPT

## 2018-06-09 RX ORDER — DIAZEPAM 5 MG/ML
2 INJECTION, SOLUTION INTRAMUSCULAR; INTRAVENOUS ONCE
Status: COMPLETED | OUTPATIENT
Start: 2018-06-09 | End: 2018-06-09

## 2018-06-09 RX ADMIN — Medication 2 MG: at 00:46

## 2018-06-09 NOTE — ED PROCEDURE NOTE
PROCEDURE  ECG 12 Lead Documentation  Date/Time: 6/8/2018 11:32 PM  Performed by: Andi Shah  Authorized by: Andi Shah     Indications / Diagnosis:   cp  ECG reviewed by me, the ED Provider: yes    Patient location:  ED  Previous ECG:     Previous ECG:  Unavailable  Interpretation:     Interpretation: non-specific    Rate:     ECG rate:  80    ECG rate assessment: normal    Rhythm:     Rhythm: sinus rhythm    Ectopy:     Ectopy: none    QRS:     QRS axis:  Normal  Conduction:     Conduction: normal    ST segments:     ST segments:  Abnormal    Elevation:  AVF    Depression:  I and II  T waves:     T waves: normal           Claudean Brandt, DO  06/08/18 1631

## 2018-06-09 NOTE — ED PROCEDURE NOTE
PROCEDURE  ECG 12 Lead Documentation  Date/Time: 6/9/2018 3:04 AM  Performed by: Alex Carrera  Authorized by: Alex Carrera     Indications / Diagnosis:  Chest pain, repeat  Patient location:  ED  Previous ECG:     Previous ECG:  Compared to current    Comparison ECG info:  Current EKG is improved  ST depressions in lead 1 and 2 are now gone      Similarity:  Changes noted  Interpretation:     Interpretation: normal    Rate:     ECG rate assessment: normal    Rhythm:     Rhythm: sinus rhythm    Ectopy:     Ectopy: none    QRS:     QRS axis:  Normal    QRS intervals:  Normal  Conduction:     Conduction: normal    ST segments:     ST segments:  Normal  T waves:     T waves: normal           Mook Medellin DO  06/09/18 [id number]

## 2018-06-09 NOTE — ED PROVIDER NOTES
History  Chief Complaint   Patient presents with    Chest Pain     pt reports dizziness, chest pressure since yesterday, intermittent numbness/tingling in left arm  was seen at family doctor yesterday for same, symptoms worse now  75-year-old female with past medical history significant for anxiety, seasonal allergies, depression presents to the emergency department with a 2 day history of dizziness which she does describe his vertigo, generalized weakness, frontal headache and episodes of chest pain which seemed to occur when she has her vertigo symptoms  Patient states that the vertigo is definitely worse with any motion or eye movement  She has episodes of dry heaving when she gets the episodes of vertigo  She states she has chest tightness when she gets the episodes of vertigo and numbness down the left arm  The headache has been constant since yesterday and is frontal   No prior history of migraine headaches  No recent URI  No fevers or chills  No neck stiffness  She recently switched from Lexapro to Zoloft and states since starting the Zoloft she has not been feeling well  She saw her family doctor yesterday for the symptoms and was attributed to the possibility of allergies verses medication side effects  She was given Antivert and Zofran  She states she has been taking these medications and feels her symptoms are worse          History provided by:  Patient   used: No    Chest Pain   Pain location:  L chest  Pain quality: tightness    Pain radiates to:  L arm  Pain radiates to the back: no    Pain severity:  Unable to specify  Onset quality:  Gradual  Duration:  1 day  Timing:  Intermittent  Progression:  Unchanged  Chronicity:  New  Context: at rest    Context comment:  Associated with vertigo  Relieved by:  Nothing  Worsened by:  Certain positions  Associated symptoms: dizziness, headache, nausea and weakness    Associated symptoms: no abdominal pain, no altered mental status, no anorexia, no anxiety, no back pain, no claudication, no cough, no diaphoresis, no dysphagia, no fatigue, no fever, no heartburn, no lower extremity edema, no near-syncope, no numbness, no orthopnea, no palpitations, no PND, no shortness of breath, no syncope and not vomiting    Headaches:     Severity:  Unable to specify    Onset quality:  Gradual    Duration:  1 day    Timing:  Constant    Progression:  Worsening    Chronicity:  New  Nausea: Onset quality:  Gradual    Duration:  1 day    Timing:  Intermittent  Weakness:     Severity:  Unable to specify    Onset quality:  Gradual    Duration:  2 days    Chronicity:  New    Timing:  Constant    Progression:  Unchanged  Risk factors: smoking    Risk factors: no birth control, no coronary artery disease, no diabetes mellitus, no high cholesterol, no hypertension, no immobilization, not obese, not pregnant, no prior DVT/PE and no surgery    Dizziness   Quality:  Vertigo  Severity:  Unable to specify  Onset quality:  Gradual  Duration:  1 day  Timing:  Intermittent  Progression:  Unchanged  Chronicity:  New  Context: eye movement and head movement    Relieved by:  Nothing  Worsened by:  Eye movement and movement  Ineffective treatments: Antivert  Associated symptoms: chest pain, headaches, nausea and weakness    Associated symptoms: no palpitations, no shortness of breath, no syncope and no vomiting    Risk factors: new medications    Risk factors: no anemia, no heart disease, no hx of stroke, no hx of vertigo, no Meniere's disease and no multiple medications        Prior to Admission Medications   Prescriptions Last Dose Informant Patient Reported? Taking?    Multiple Vitamins-Minerals (MULTIVITAMIN ADULT PO)  Self Yes No   Sig: Take 1 tablet by mouth daily   clonazePAM (KlonoPIN) 0 5 mg tablet   No No   Sig: Take 1 5 tablets (0 75 mg total) by mouth every 12 (twelve) hours as needed for anxiety   cyclobenzaprine (FLEXERIL) 5 mg tablet   No No   Sig: Take 1 tablet (5 mg total) by mouth 2 (two) times a day   fluticasone (FLONASE) 50 mcg/act nasal spray   No No   Si sprays into each nostril daily For nasal congestion   ibuprofen (MOTRIN) 600 mg tablet   No No   Sig: Take 1 tablet (600 mg total) by mouth every 6 (six) hours as needed for headaches Take with food   meclizine (ANTIVERT) 25 mg tablet   No No   Sig: Take 1 tablet (25 mg total) by mouth every 8 (eight) hours as needed for dizziness   omeprazole (PRILOSEC) 20 mg delayed release capsule   No No   Sig: Take 1 capsule (20 mg total) by mouth daily   ondansetron (ZOFRAN-ODT) 8 mg disintegrating tablet   No No   Sig: Take 1 tablet (8 mg total) by mouth every 8 (eight) hours as needed for nausea or vomiting      Facility-Administered Medications: None       Past Medical History:   Diagnosis Date    Psychiatric disorder        Past Surgical History:   Procedure Laterality Date    NO PAST SURGERIES         History reviewed  No pertinent family history  I have reviewed and agree with the history as documented  Social History   Substance Use Topics    Smoking status: Current Every Day Smoker     Types: Cigarettes    Smokeless tobacco: Never Used    Alcohol use No        Review of Systems   Constitutional: Negative for chills, diaphoresis, fatigue and fever  HENT: Negative  Negative for congestion, rhinorrhea, sore throat and trouble swallowing  Eyes: Negative  Negative for discharge, redness and itching  Respiratory: Negative  Negative for apnea, cough, chest tightness, shortness of breath and wheezing  Cardiovascular: Positive for chest pain  Negative for palpitations, orthopnea, claudication, leg swelling, syncope, PND and near-syncope  Gastrointestinal: Positive for nausea  Negative for abdominal pain, anorexia, heartburn and vomiting  Endocrine: Negative  Genitourinary: Negative  Negative for flank pain, frequency and urgency  Musculoskeletal: Negative  Negative for back pain  Skin: Negative  Allergic/Immunologic: Negative  Neurological: Positive for dizziness, weakness and headaches  Negative for tremors, seizures, syncope, facial asymmetry, speech difficulty, light-headedness and numbness  Hematological: Negative  All other systems reviewed and are negative  Physical Exam  Physical Exam   Constitutional: She is oriented to person, place, and time  She appears well-developed and well-nourished  Non-toxic appearance  She does not have a sickly appearance  She does not appear ill  No distress  HENT:   Head: Normocephalic and atraumatic  Right Ear: Tympanic membrane and external ear normal    Left Ear: Tympanic membrane and external ear normal    Nose: Nose normal    Mouth/Throat: Oropharynx is clear and moist  No oropharyngeal exudate  Eyes: Conjunctivae and EOM are normal  Pupils are equal, round, and reactive to light  Right eye exhibits no discharge  Left eye exhibits no discharge  No scleral icterus  No nystagmus   Neck: Normal range of motion  Neck supple  No JVD present  Cardiovascular: Normal rate, regular rhythm and normal heart sounds  Exam reveals no gallop and no friction rub  No murmur heard  Pulmonary/Chest: Effort normal and breath sounds normal  No stridor  No respiratory distress  She has no wheezes  She has no rales  She exhibits no tenderness  Abdominal: Soft  Bowel sounds are normal  She exhibits no distension and no mass  There is no tenderness  No hernia  Musculoskeletal: Normal range of motion  She exhibits no edema, tenderness or deformity  Lymphadenopathy:     She has no cervical adenopathy  Neurological: She is alert and oriented to person, place, and time  She has normal reflexes  She displays normal reflexes  No cranial nerve deficit  She exhibits normal muscle tone  Coordination normal    No truncal ataxia  Skin: Skin is warm and dry  No rash noted  She is not diaphoretic  No erythema  No pallor     Psychiatric: She has a normal mood and affect  Nursing note and vitals reviewed  Vital Signs  ED Triage Vitals   Temperature Pulse Respirations Blood Pressure SpO2   06/08/18 2307 06/08/18 2303 06/08/18 2303 06/08/18 2303 06/08/18 2303   97 9 °F (36 6 °C) 90 16 130/66 100 %      Temp Source Heart Rate Source Patient Position - Orthostatic VS BP Location FiO2 (%)   06/08/18 2307 06/08/18 2303 06/08/18 2303 06/08/18 2303 --   Oral Monitor Lying Right arm       Pain Score       06/09/18 0105       3           Vitals:    06/08/18 2303 06/09/18 0105 06/09/18 0205 06/09/18 0344   BP: 130/66 126/61 131/66 128/61   Pulse: 90 78 84 81   Patient Position - Orthostatic VS: Lying          Visual Acuity      ED Medications  Medications   sodium chloride 0 9 % bolus 1,000 mL (0 mL Intravenous Stopped 6/9/18 0135)   ketorolac (TORADOL) injection 15 mg (15 mg Intravenous Given 6/8/18 2358)   metoclopramide (REGLAN) injection 10 mg (10 mg Intravenous Given 6/8/18 2358)   diphenhydrAMINE (BENADRYL) injection 12 5 mg (12 5 mg Intravenous Given 6/8/18 2358)   diazepam (VALIUM) injection 2 mg (2 mg Intravenous Given 6/9/18 0046)       Diagnostic Studies  Results Reviewed     Procedure Component Value Units Date/Time    Troponin I [[id number]]  (Normal) Collected:  06/09/18 0255    Lab Status:  Final result Specimen:  Blood from Arm, Right Updated:  06/09/18 0322     Troponin I <0 02 ng/mL     Narrative:         [employer] Chemistry analyzer 99% cutoff is > 0 04 ng/mL in network labs    o cTnI 99% cutoff is useful only when applied to patients in the clinical setting of myocardial ischemia  o cTnI 99% cutoff should be interpreted in the context of clinical history, ECG findings and possibly cardiac imaging to establish correct diagnosis  o cTnI 99% cutoff may be suggestive but clearly not indicative of a coronary event without the clinical setting of myocardial ischemia      Troponin I [[id number]]  (Normal) Collected:  06/08/18 2344    Lab Status:  Final result Specimen:  Blood from Arm, Right Updated:  06/09/18 0015     Troponin I <0 02 ng/mL     Narrative:         [employer] Chemistry analyzer 99% cutoff is > 0 04 ng/mL in network labs    o cTnI 99% cutoff is useful only when applied to patients in the clinical setting of myocardial ischemia  o cTnI 99% cutoff should be interpreted in the context of clinical history, ECG findings and possibly cardiac imaging to establish correct diagnosis  o cTnI 99% cutoff may be suggestive but clearly not indicative of a coronary event without the clinical setting of myocardial ischemia  Comprehensive metabolic panel [[id number]] Collected:  06/08/18 2344    Lab Status:  Final result Specimen:  Blood from Arm, Right Updated:  06/09/18 0013     Sodium 142 mmol/L      Potassium 3 9 mmol/L      Chloride 106 mmol/L      CO2 26 mmol/L      Anion Gap 10 mmol/L      BUN 10 mg/dL      Creatinine 0 72 mg/dL      Glucose 106 mg/dL      Calcium 9 1 mg/dL      AST 8 U/L      ALT 18 U/L      Alkaline Phosphatase 55 U/L      Total Protein 7 3 g/dL      Albumin 3 8 g/dL      Total Bilirubin 0 21 mg/dL      eGFR 109 ml/min/1 73sq m     Narrative:         National Kidney Disease Education Program recommendations are as follows:  GFR calculation is accurate only with a steady state creatinine  Chronic Kidney disease less than 60 ml/min/1 73 sq  meters  Kidney failure less than 15 ml/min/1 73 sq  meters      POCT urinalysis dipstick [[id number]]  (Normal) Resulted:  06/08/18 2358    Lab Status:  Final result Specimen:  Urine Updated:  06/08/18 2358     Color, UA yellow    POCT pregnancy, urine [[id number]]  (Normal) Resulted:  06/08/18 2358    Lab Status:  Final result Updated:  06/08/18 2358     EXT PREG TEST UR (Ref: Negative) negative    ED Urine Macroscopic [[id number]] Collected:  06/08/18 2356    Lab Status:  Final result Specimen:  Urine Updated:  06/08/18 2353     Color, UA Yellow     Clarity, UA Clear     pH, UA 7 0     Leukocytes, UA Negative Nitrite, UA Negative     Protein, UA Negative mg/dl      Glucose, UA Negative mg/dl      Ketones, UA Negative mg/dl      Urobilinogen, UA 0 2 E U /dl      Bilirubin, UA Negative     Blood, UA Negative     Specific Gravity, UA 1 010    Narrative:       CLINITEK RESULT    CBC and differential [[id number]]  (Abnormal) Collected:  06/08/18 2344    Lab Status:  Final result Specimen:  Blood from Arm, Right Updated:  06/08/18 2350     WBC 8 50 Thousand/uL      RBC 3 97 Million/uL      Hemoglobin 12 0 g/dL      Hematocrit 36 5 %      MCV 92 fL      MCH 30 2 pg      MCHC 32 9 g/dL      RDW 12 3 %      MPV 9 1 fL      Platelets 692 Thousands/uL      nRBC 0 /100 WBCs      Neutrophils Relative 45 %      Lymphocytes Relative 45 (H) %      Monocytes Relative 7 %      Eosinophils Relative 2 %      Basophils Relative 1 %      Neutrophils Absolute 3 84 Thousands/µL      Lymphocytes Absolute 3 84 Thousands/µL      Monocytes Absolute 0 59 Thousand/µL      Eosinophils Absolute 0 19 Thousand/µL      Basophils Absolute 0 04 Thousands/µL                  CT head without contrast   Final Result by Jak Hermosillo MD (06/09 0015)      No acute intracranial abnormality  Workstation performed: [id number]                    Procedures  Procedures       Phone Contacts  ED Phone Contact    ED Course  ED Course as of Jun 09 1405   Sat Jun 09, 2018   0027 Patient states headache is starting to ease up but she still has episodes of vertigo  She was updated regarding results of testing thus far  Because she has had chest pain and abnormal EKG will do delta troponin and EKG  Patient is aware that she will be here for a few more hours  Will try Valium for vertigo and reassess            HEART Risk Score      Most Recent Value   History  0 Filed at: 06/08/2018 2346   ECG  1 Filed at: 06/08/2018 2346   Age  0 Filed at: 06/08/2018 2346   Risk Factors  0 Filed at: 06/08/2018 2346   Troponin  0 Filed at: 06/08/2018 2346   Heart Score Risk Calculator   History  0 Filed at: 06/08/2018 2346   ECG  1 Filed at: 06/08/2018 2346   Age  0 Filed at: 06/08/2018 2346   Risk Factors  0 Filed at: 06/08/2018 2346   Troponin  0 Filed at: 06/08/2018 2346   HEART Score  1 Filed at: 06/08/2018 2346   HEART Score  1 Filed at: 06/08/2018 2346                            [facility]  Number of Diagnoses or Management Options  Diagnosis management comments: 33-year-old female presents to the emergency department with a 1 day history of vertigo, headache   She states that when she gets episodes of vertigo she has dry heaving and chest tightness associated with left arm numbness  No recent URI  The headache has been constant  No fevers  She recently has been weaning off Lexapro and starting Zoloft and states since starting the Zoloft she has not been feeling well  She has been taking Motrin, Antivert and Zofran without relief  On exam she is awake and alert and in no acute distress  Her exam here is normal including her cerebellar exam   Given that her symptoms are worsening will check CBC, CMP to rule out infections/anemia/electrolyte abnormality  Will check urine to rule out UTI/pregnancy  Will CT head given new onset headache and vertigo  Will check troponin  EKG is abnormal without an old EKG to compare to  If all other studies are negative will do delta troponin and EKG   Will treat with migraine cocktail and reassess         Amount and/or Complexity of Data Reviewed  Clinical lab tests: reviewed and ordered  Tests in the radiology section of CPT®: ordered and reviewed  Review and summarize past medical records: yes  Independent visualization of images, tracings, or specimens: yes      CritCare Time    Disposition  Final diagnoses:   Chest pain   Vertigo     Time reflects when diagnosis was documented in both MDM as applicable and the Disposition within this note     Time User Action Codes Description Comment    6/9/2018  3:35 AM Billy Morenoer Add [R07 9] Chest pain     6/9/2018  3:35 AM Edgar Sellers Add [R42] Vertigo       ED Disposition     ED Disposition Condition Comment    Discharge  Dwmaureen Breaker discharge to home/self care  Condition at discharge: Good        Follow-up Information     Follow up With Specialties Details Why Contact Yony Chairez DO Family Medicine Schedule an appointment as soon as possible for a visit in 2 days If symptoms persist [address] [facility]  [address] [facility]  [phone]            Discharge Medication List as of 6/9/2018  3:35 AM      CONTINUE these medications which have NOT CHANGED    Details   clonazePAM (KlonoPIN) 0 5 mg tablet Take 1 5 tablets (0 75 mg total) by mouth every 12 (twelve) hours as needed for anxiety, Starting Fri 5/11/2018, Print      cyclobenzaprine (FLEXERIL) 5 mg tablet Take 1 tablet (5 mg total) by mouth 2 (two) times a day, Starting Fri 5/11/2018, Normal      fluticasone (FLONASE) 50 mcg/act nasal spray 2 sprays into each nostril daily For nasal congestion, Starting Thu 6/7/2018, Normal      ibuprofen (MOTRIN) 600 mg tablet Take 1 tablet (600 mg total) by mouth every 6 (six) hours as needed for headaches Take with food, Starting Thu 6/7/2018, Normal      meclizine (ANTIVERT) 25 mg tablet Take 1 tablet (25 mg total) by mouth every 8 (eight) hours as needed for dizziness, Starting Thu 6/7/2018, Normal      Multiple Vitamins-Minerals (MULTIVITAMIN ADULT PO) Take 1 tablet by mouth daily, Starting Fri 12/29/2017, Historical Med      omeprazole (PRILOSEC) 20 mg delayed release capsule Take 1 capsule (20 mg total) by mouth daily, Starting Thu 6/7/2018, Normal      ondansetron (ZOFRAN-ODT) 8 mg disintegrating tablet Take 1 tablet (8 mg total) by mouth every 8 (eight) hours as needed for nausea or vomiting, Starting Thu 6/7/2018, Normal           No discharge procedures on file      ED Provider  Electronically Signed by           Deisy Morales DO  06/09/18 8494

## 2018-06-09 NOTE — ED RE-EVALUATION NOTE
Patient signed out to me earlier  I was asked to follow up on her repeat troponin and EKG  EKG shows improvement of her ST depressions that she had in leads 1 and 2  Patient has no chest pain at this time  She has slight dizziness still but overall is improved  I discussed with her about follow-up with her doctor and ENT if symptoms are persisting  She agrees with this plan of care  She does feel comfortable going home       Ileana Ross DO  06/09/18 0709

## 2018-06-09 NOTE — DISCHARGE INSTRUCTIONS
Chest Pain   WHAT YOU NEED TO KNOW:   Chest pain can be caused by a range of conditions, from not serious to life-threatening  Chest pain can be a symptom of a digestive problem, such as acid reflux or a stomach ulcer  An anxiety attack or a strong emotion, such as anger, can also cause chest pain  Infection, inflammation, or a fracture in the bones or cartilage in your chest can cause pain or discomfort  Sometimes chest pain or pressure is caused by poor blood flow to your heart (angina)  Chest pain may also be caused by life-threatening conditions such as a heart attack or blood clot in your lungs  DISCHARGE INSTRUCTIONS:   Call 911 if:   · You have any of the following signs of a heart attack:      ¨ Squeezing, pressure, or pain in your chest that lasts longer than 5 minutes or returns    ¨ Discomfort or pain in your back, neck, jaw, stomach, or arm     ¨ Trouble breathing    ¨ Nausea or vomiting    ¨ Lightheadedness or a sudden cold sweat, especially with chest pain or trouble breathing    Return to the emergency department if:   · You have chest discomfort that gets worse, even with medicine  · You cough or vomit blood  · Your bowel movements are black or bloody  · You cannot stop vomiting, or it hurts to swallow  Contact your healthcare provider if:   · You have questions or concerns about your condition or care  Medicines:   · Medicines  may be given to treat the cause of your chest pain  Examples include pain medicine, anxiety medicine, or medicines to increase blood flow to your heart  · Do not take certain medicines without asking your healthcare provider first   These include NSAIDs, herbal or vitamin supplements, or hormones (estrogen or progestin)  · Take your medicine as directed  Contact your healthcare provider if you think your medicine is not helping or if you have side effects  Tell him or her if you are allergic to any medicine   Keep a list of the medicines, vitamins, and herbs you take  Include the amounts, and when and why you take them  Bring the list or the pill bottles to follow-up visits  Carry your medicine list with you in case of an emergency  Follow up with your healthcare provider within 72 hours, or as directed: You may need to return for more tests to find the cause of your chest pain  You may be referred to a specialist, such as a cardiologist or gastroenterologist  Write down your questions so you remember to ask them during your visits  Healthy living tips: The following are general healthy guidelines  If your chest pain is caused by a heart problem, your healthcare provider will give you specific guidelines to follow  · Do not smoke  Nicotine and other chemicals in cigarettes and cigars can cause lung and heart damage  Ask your healthcare provider for information if you currently smoke and need help to quit  E-cigarettes or smokeless tobacco still contain nicotine  Talk to your healthcare provider before you use these products  · Eat a variety of healthy, low-fat foods  Healthy foods include fruits, vegetables, whole-grain breads, low-fat dairy products, beans, lean meats, and fish  Ask for more information about a heart healthy diet  · Ask about activity  Your healthcare provider will tell you which activities to limit or avoid  Ask when you can drive, return to work, and have sex  Ask about the best exercise plan for you  · Maintain a healthy weight  Ask your healthcare provider how much you should weigh  Ask him or her to help you create a weight loss plan if you are overweight  · Get the flu and pneumonia vaccines  All adults should get the influenza (flu) vaccine  Get it every year as soon as it becomes available  The pneumococcal vaccine is given to adults aged 72 years or older  The vaccine is given every 5 years to prevent pneumococcal disease, such as pneumonia    © 2017 [employer] Information is for End User's use only and may not be sold, redistributed or otherwise used for commercial purposes  All illustrations and images included in CareNotes® are the copyrighted property of A D A M [employer] Inc  or Genaro Mina  The above information is an  only  It is not intended as medical advice for individual conditions or treatments  Talk to your doctor, nurse or pharmacist before following any medical regimen to see if it is safe and effective for you  Chest Pain   WHAT YOU NEED TO KNOW:   What causes chest pain? Chest pain can be caused by a range of conditions, from not serious to life-threatening  Chest pain can be a symptom of a digestive problem, such as acid reflux or a stomach ulcer  An anxiety attack or a strong emotion, such as anger, can also cause chest pain  Infection, inflammation, or a fracture in the bones or cartilage in your chest can cause pain or discomfort  Sometimes chest pain or pressure is caused by poor blood flow to your heart (angina)  Chest pain may also be caused by life-threatening conditions such as a heart attack or blood clot in your lungs  What other symptoms might I have with chest pain? · A burning feeling behind your breastbone    · A racing or slow heartbeat     · Fever or sweating     · Nausea or vomiting     · Shortness of breath     · Discomfort or pressure that spreads from your chest to your back, jaw, or arm     · Feeling weak, tired, or faint  How is the cause of chest pain diagnosed? Your healthcare provider will examine you  Describe your chest pain in as much detail as possible  Tell him or her where your pain is and when it began  Tell the provider if you notice anything that makes the pain worse or better  Tell him or her if it is constant or comes and goes  Your healthcare provider will ask about any medicines you use and medical conditions you have  He or she will also examine you   You may also need any of the following tests:  · An EKG  is a test that records your heart's electrical activity  · Blood tests  check for heart damage and signs of a heart attack  · An echocardiogram  uses sound waves to see if blood is flowing normally through your heart  · An ultrasound, x-ray, CT, or MRI scan  may show the cause of your chest pain  You may be given contrast liquid to help your heart show up better in the pictures  Tell the healthcare provider if you have ever had an allergic reaction to contrast liquid  Do not enter the MRI room with anything metal  Metal can cause serious injury  Tell the healthcare provider if you have any metal in or on your body  · An endoscopy  may be done to check for ulcers or problem with your esophagus  How is chest pain treated? Medicines may be given to treat the cause of your chest pain  Examples include pain medicine, anxiety medicine, or medicines to increase blood flow to your heart  Do not  take certain medicines without asking your healthcare provider first  These include NSAIDs, herbal or vitamin supplements, or hormones (estrogen or progestin)  What are some healthy living tips? The following are general healthy guidelines  If your chest pain is caused by a heart problem, your healthcare provider will give you specific guidelines to follow  · Do not smoke  Nicotine and other chemicals in cigarettes and cigars can cause lung and heart damage  Ask your healthcare provider for information if you currently smoke and need help to quit  E-cigarettes or smokeless tobacco still contain nicotine  Talk to your healthcare provider before you use these products  · Eat a variety of healthy, low-fat foods  Healthy foods include fruits, vegetables, whole-grain breads, low-fat dairy products, beans, lean meats, and fish  Ask for more information about a heart healthy diet  · Ask about activity  Your healthcare provider will tell you which activities to limit or avoid   Ask when you can drive, return to work, and have sex  Ask about the best exercise plan for you  · Maintain a healthy weight  Ask your healthcare provider how much you should weigh  Ask him or her to help you create a weight loss plan if you are overweight  Call 911 if:   · You have any of the following signs of a heart attack:      ¨ Squeezing, pressure, or pain in your chest that lasts longer than 5 minutes or returns    ¨ Discomfort or pain in your back, neck, jaw, stomach, or arm     ¨ Trouble breathing    ¨ Nausea or vomiting    ¨ Lightheadedness or a sudden cold sweat, especially with chest pain or trouble breathing    When should I seek immediate care? · You have chest discomfort that gets worse, even with medicine  · You cough or vomit blood  · Your bowel movements are black or bloody  · You cannot stop vomiting, or it hurts to swallow  When should I contact my healthcare provider? · You have questions or concerns about your condition or care  CARE AGREEMENT:   You have the right to help plan your care  Learn about your health condition and how it may be treated  Discuss treatment options with your caregivers to decide what care you want to receive  You always have the right to refuse treatment  The above information is an  only  It is not intended as medical advice for individual conditions or treatments  Talk to your doctor, nurse or pharmacist before following any medical regimen to see if it is safe and effective for you  © 2017 [address] Information is for End User's use only and may not be sold, redistributed or otherwise used for commercial purposes  All illustrations and images included in CareNotes® are the copyrighted property of [employer]  or Genaro Keeley  Dizziness   WHAT YOU NEED TO KNOW:   Dizziness is a feeling of being off balance or unsteady  Common causes of dizziness are an inner ear fluid imbalance or a lack of oxygen in your blood   Dizziness may be acute (lasts 3 days or less) or chronic (lasts longer than 3 days)  You may have dizzy spells that last from seconds to a few hours  DISCHARGE INSTRUCTIONS:   Return to the emergency department if:   · You have a headache and a stiff neck  · You have shaking chills and a fever  · You vomit over and over with no relief  · Your vomit or bowel movements are red or black  · You have pain in your chest, back, or abdomen  · You have numbness, especially in your face, arms, or legs  · You have trouble moving your arms or legs  · You are confused  Contact your healthcare provider if:   · You have a fever  · Your symptoms do not get better with treatment  · You have questions or concerns about your condition or care  Manage your symptoms:   · Do not drive  or operate heavy machinery when you are dizzy  · Get up slowly  from sitting or lying down  · Drink plenty of liquids  Liquids help prevent dehydration  Ask how much liquid to drink each day and which liquids are best for you  Follow up with your healthcare provider as directed:  Write down your questions so you remember to ask them during your visits  © 2017 [address] [facility] Information is for End User's use only and may not be sold, redistributed or otherwise used for commercial purposes  All illustrations and images included in CareNotes® are the copyrighted property of A D A M , Inc  or Genaro Mina  The above information is an  only  It is not intended as medical advice for individual conditions or treatments  Talk to your doctor, nurse or pharmacist before following any medical regimen to see if it is safe and effective for you  Lightheadedness   AMBULATORY CARE:   Lightheadedness  is the feeling that you may faint, but you do not  Your heartbeat may be fast or feel like it flutters   Lightheadedness may occur when you take certain medicines, such as medicine to lower your blood pressure  Dehydration, low sodium, low blood sugar, an abnormal heart rhythm, and anxiety are other common causes  Seek care immediately if:   · You have sudden chest pain  · You have trouble breathing or shortness of breath  · You have vision changes, are sweating, and have nausea while you are sitting or lying down  · You feel flushed and your heart is fluttering  · You pass out  Contact your healthcare provider if:   · You feel lightheaded often  · Your heart beats faster or slower than usual      · You have questions or concerns about your condition or care  Manage your symptoms:  Talk with your healthcare provider about these and other ways to manage your symptoms:  · Lie down  when you feel lightheaded, your throat gets tight, or your vision changes  Raise your legs above the level of your heart  · Stand up slowly  Sit on the side of the bed or couch for a few minutes before you stand up  · Take slow, deep breaths when you feel lightheaded  This can help decrease the feeling that you might faint  · Ask if you need to avoid hot baths and saunas  These may make your symptoms worse  Watch for signs of low blood sugar: These include hunger, nervousness, sweating, and fast or fluttery heartbeats  Talk with your healthcare provider about ways to keep your blood sugar level steady  Check your blood pressure often:  You should do this especially if you take medicine to lower your blood pressure  Check your blood pressure when you are lying down and when you are standing  Ask how often to check during the day  Keep a record of your blood pressure numbers  Your healthcare provider may use the record to help plan your treatment  Keep a record of your lightheadedness episodes:  Include your symptoms and your activity before and after the episode  The record can help your healthcare provider find the cause of your lightheadedness and help you manage episodes    Follow up with your healthcare provider as directed: You may need more tests to help find the cause of your lightheadedness  The tests will help healthcare providers plan the best treatment for you  Write down your questions so you remember to ask them during your visits  © 2017 [address] Information is for End User's use only and may not be sold, redistributed or otherwise used for commercial purposes  All illustrations and images included in CareNotes® are the copyrighted property of A D A M [employer] Inc  or Genaro Mina  The above information is an  only  It is not intended as medical advice for individual conditions or treatments  Talk to your doctor, nurse or pharmacist before following any medical regimen to see if it is safe and effective for you  Vertigo   AMBULATORY CARE:   Vertigo  is a condition that causes you to feel dizzy  You may feel that you or everything around you is moving or spinning  You may also feel like you are being pulled down or toward your side  Common symptoms that may happen with vertigo:   · Nausea or vomiting    · Trouble with your balance    · Sensitivity to light or sound    · Weakness, slurred speech, problems seeing or moving, or increased sleepiness    · Facial weakness and headache    · Hearing loss, ear fullness or pain, or hearing ringing sounds    · Fast, uncontrolled movement of your eyes  Seek care immediately if:   · You have a headache and a stiff neck  · You have shaking chills and a fever  · You vomit over and over with no relief  · You have blood, pus, or fluid coming out of your ears  · You are confused  Contact your healthcare provider if:   · Your symptoms do not get better with treatment  · You have questions about your condition or care  Treatment for vertigo  may include resting in bed or avoid certain activities for a time  You may need to decrease or stop taking medicines that are causing your vertigo  Medicines may also be prescribed to help relieve your symptoms  Manage your symptoms:   · Do not drive , walk without help, or operate heavy machinery when you are dizzy  · Move slowly  when you move from one position to another position  Get up slowly from sitting or lying down  Sit or lie down right away if you feel dizzy  · Drink plenty of liquids  Liquids help prevent dehydration  Ask how much liquid to drink each day and which liquids are best for you  · Vestibular and balance rehabilitation therapy (VBRT)  is used to teach you exercises to improve your balance and strength  These exercises may help decrease your vertigo and improve your balance  Ask for more information about this therapy  Follow up with your healthcare provider as directed:  Write down your questions so you remember to ask them during your visits  © 2017 [address] [facility] Information is for End User's use only and may not be sold, redistributed or otherwise used for commercial purposes  All illustrations and images included in CareNotes® are the copyrighted property of A D A M , Inc  or Genaro Mina  The above information is an  only  It is not intended as medical advice for individual conditions or treatments  Talk to your doctor, nurse or pharmacist before following any medical regimen to see if it is safe and effective for you

## 2018-06-10 LAB
ATRIAL RATE: 72 BPM
ATRIAL RATE: 80 BPM
P AXIS: 54 DEGREES
P AXIS: 66 DEGREES
PR INTERVAL: 134 MS
PR INTERVAL: 144 MS
QRS AXIS: 70 DEGREES
QRS AXIS: 75 DEGREES
QRSD INTERVAL: 82 MS
QRSD INTERVAL: 84 MS
QT INTERVAL: 350 MS
QT INTERVAL: 372 MS
QTC INTERVAL: 403 MS
QTC INTERVAL: 407 MS
T WAVE AXIS: 20 DEGREES
T WAVE AXIS: 35 DEGREES
VENTRICULAR RATE: 72 BPM
VENTRICULAR RATE: 80 BPM

## 2018-06-10 PROCEDURE — 93010 ELECTROCARDIOGRAM REPORT: CPT | Performed by: INTERNAL MEDICINE

## 2018-06-12 PROBLEM — G47.00 INSOMNIA: Status: ACTIVE | Noted: 2017-09-08

## 2018-06-12 RX ORDER — TRAZODONE HYDROCHLORIDE 50 MG/1
TABLET ORAL
Qty: 30 TABLET | Refills: 2 | Status: SHIPPED | OUTPATIENT
Start: 2018-06-12 | End: 2018-09-05 | Stop reason: SDUPTHER

## 2018-06-14 ENCOUNTER — OFFICE VISIT (OUTPATIENT)
Dept: FAMILY MEDICINE CLINIC | Facility: CLINIC | Age: 33
End: 2018-06-14
Payer: COMMERCIAL

## 2018-06-14 VITALS
WEIGHT: 123.4 LBS | HEART RATE: 107 BPM | TEMPERATURE: 99.2 F | OXYGEN SATURATION: 98 % | DIASTOLIC BLOOD PRESSURE: 72 MMHG | BODY MASS INDEX: 20.98 KG/M2 | SYSTOLIC BLOOD PRESSURE: 120 MMHG

## 2018-06-14 DIAGNOSIS — H81.13 BENIGN PAROXYSMAL POSITIONAL VERTIGO DUE TO BILATERAL VESTIBULAR DISORDER: ICD-10-CM

## 2018-06-14 DIAGNOSIS — F41.1 GENERALIZED ANXIETY DISORDER: Primary | ICD-10-CM

## 2018-06-14 PROCEDURE — 99214 OFFICE O/P EST MOD 30 MIN: CPT | Performed by: FAMILY MEDICINE

## 2018-06-14 RX ORDER — DULOXETIN HYDROCHLORIDE 30 MG/1
30 CAPSULE, DELAYED RELEASE ORAL DAILY
Qty: 30 CAPSULE | Refills: 1 | Status: SHIPPED | OUTPATIENT
Start: 2018-06-14 | End: 2018-07-10 | Stop reason: SDUPTHER

## 2018-06-14 NOTE — PROGRESS NOTES
Assessment/Plan:   1  Generalized anxiety disorder  Reviewed patient's symptoms today  Her anxiety has been uncontrolled  She is hesitant to start her Zoloft again  She has been using her Cymbalta b i d  and this has been helping her symptoms  At this time, will switch her off SSRI treatment  Will switch her to Cymbalta 30 mg daily  Follow-up in 1 month to reassess symptom control  If any of her symptoms should worsen, she was advised to call  - DULoxetine (CYMBALTA) 30 mg delayed release capsule; Take 1 capsule (30 mg total) by mouth daily  Dispense: 30 capsule; Refill: 1    2  Benign paroxysmal positional vertigo due to bilateral vestibular disorder  The patient's dizziness appears likely secondary to vertigo  After interviewing the patient, it appears that her symptoms are highly positional   She may continue with her meclizine for symptom relief  Will refer patient to PT for vestibular therapy  Follow-up in 1 month  - Ambulatory referral to Physical Therapy; Future     Diagnoses and all orders for this visit:    Generalized anxiety disorder  -     DULoxetine (CYMBALTA) 30 mg delayed release capsule; Take 1 capsule (30 mg total) by mouth daily    Benign paroxysmal positional vertigo due to bilateral vestibular disorder  -     Ambulatory referral to Physical Therapy; Future          Subjective:    Chief Complaint   Patient presents with    Follow-up     ER follow up        Patient ID: Julius Rodriguez is a 26 y o  female  Patient is a 58-year-old female presents today for an ED follow-up  She states that she was in the ED yesterday secondary to acute onset chest pain  She states that she was noticing persistent dizziness for the past week  She has been weaning off her Lexapro and started her Zoloft  She states that her symptoms started during this process  She did discontinue her Zoloft 1 week ago however has been having persistent symptoms  Her anxiety has been uncontrolled    She would like to consider starting some Venofer anxiety  She describes this dizziness as positional   She notices this when she turns her head or gets up from seated position  Has been taking meclizine with minimal relief  Review of Systems   Constitutional: Negative for activity change, chills, fatigue and fever  HENT: Negative for congestion, ear pain, sinus pressure and sore throat  Eyes: Negative for redness, itching and visual disturbance  Respiratory: Negative for cough and shortness of breath  Cardiovascular: Negative for chest pain and palpitations  Gastrointestinal: Negative for abdominal pain, diarrhea and nausea  Endocrine: Negative for cold intolerance and heat intolerance  Genitourinary: Negative for dysuria, flank pain and frequency  Musculoskeletal: Negative for arthralgias, back pain, gait problem and myalgias  Skin: Negative for color change  Allergic/Immunologic: Negative for environmental allergies  Neurological: Positive for dizziness  Negative for numbness and headaches  Psychiatric/Behavioral: Negative for behavioral problems and sleep disturbance  The following portions of the patient's history were reviewed and updated as appropriate : past family history, past medical history, past social history and past surgical history        Current Outpatient Prescriptions:     clonazePAM (KlonoPIN) 0 5 mg tablet, Take 1 5 tablets (0 75 mg total) by mouth every 12 (twelve) hours as needed for anxiety, Disp: 90 tablet, Rfl: 0    cyclobenzaprine (FLEXERIL) 5 mg tablet, Take 1 tablet (5 mg total) by mouth 2 (two) times a day, Disp: 60 tablet, Rfl: 1    fluticasone (FLONASE) 50 mcg/act nasal spray, 2 sprays into each nostril daily For nasal congestion, Disp: 16 g, Rfl: 1    ibuprofen (MOTRIN) 600 mg tablet, Take 1 tablet (600 mg total) by mouth every 6 (six) hours as needed for headaches Take with food, Disp: 30 tablet, Rfl: 0    meclizine (ANTIVERT) 25 mg tablet, Take 1 tablet (25 mg total) by mouth every 8 (eight) hours as needed for dizziness, Disp: 30 tablet, Rfl: 0    Multiple Vitamins-Minerals (MULTIVITAMIN ADULT PO), Take 1 tablet by mouth daily, Disp: , Rfl:     omeprazole (PRILOSEC) 20 mg delayed release capsule, Take 1 capsule (20 mg total) by mouth daily, Disp: 30 capsule, Rfl: 0    ondansetron (ZOFRAN-ODT) 8 mg disintegrating tablet, Take 1 tablet (8 mg total) by mouth every 8 (eight) hours as needed for nausea or vomiting, Disp: 30 tablet, Rfl: 0    traZODone (DESYREL) 50 mg tablet, TAKE 1 TABLET AT BEDTIME AS NEEDED , Disp: 30 tablet, Rfl: 2    DULoxetine (CYMBALTA) 30 mg delayed release capsule, Take 1 capsule (30 mg total) by mouth daily, Disp: 30 capsule, Rfl: 1    Objective:    Vitals:    06/14/18 1354   BP: 120/72   BP Location: Left arm   Patient Position: Sitting   Pulse: (!) 107   Temp: 99 2 °F (37 3 °C)   TempSrc: Tympanic   SpO2: 98%   Weight: 56 kg (123 lb 6 4 oz)        Physical Exam   Constitutional: Vital signs are normal  She appears well-developed and well-nourished  She is cooperative  She does not appear ill  No distress  She is sedated  HENT:   Head: Normocephalic and atraumatic  Right Ear: Hearing and external ear normal    Left Ear: Hearing and external ear normal    Nose: Nose normal  No nasal deformity or septal deviation  Mouth/Throat: Oropharynx is clear and moist and mucous membranes are normal    Eyes: EOM and lids are normal  Pupils are equal, round, and reactive to light  Neck: Trachea normal and normal range of motion  Neck supple  No edema and no erythema present  No thyromegaly present  Cardiovascular: Normal rate and regular rhythm  Exam reveals no gallop and no friction rub  No murmur heard  Pulmonary/Chest: Effort normal and breath sounds normal  No respiratory distress  She has no wheezes  She has no rales  Abdominal: Normal appearance  There is no guarding  Musculoskeletal: Normal range of motion          Right shoulder: She exhibits no pain  Neurological: She is alert  She has normal strength  No cranial nerve deficit or sensory deficit  Skin: Skin is warm and dry  Psychiatric: She has a normal mood and affect   Her speech is normal and behavior is normal  Judgment and thought content normal  Cognition and memory are normal

## 2018-06-19 DIAGNOSIS — F41.1 GENERALIZED ANXIETY DISORDER: ICD-10-CM

## 2018-06-19 RX ORDER — CLONAZEPAM 0.5 MG/1
0.75 TABLET ORAL EVERY 12 HOURS PRN
Qty: 90 TABLET | Refills: 2 | Status: SHIPPED | OUTPATIENT
Start: 2018-06-19 | End: 2018-07-10 | Stop reason: SDUPTHER

## 2018-06-19 RX ORDER — CLONAZEPAM 0.5 MG/1
TABLET ORAL
Qty: 90 TABLET | Refills: 0 | Status: SHIPPED | OUTPATIENT
Start: 2018-06-19 | End: 2018-10-16 | Stop reason: SDUPTHER

## 2018-07-04 DIAGNOSIS — R11.0 NAUSEA: ICD-10-CM

## 2018-07-05 RX ORDER — OMEPRAZOLE 20 MG/1
CAPSULE, DELAYED RELEASE ORAL
Qty: 30 CAPSULE | Refills: 0 | Status: SHIPPED | OUTPATIENT
Start: 2018-07-05 | End: 2020-02-25

## 2018-07-09 DIAGNOSIS — G89.29 CHRONIC BILATERAL LOW BACK PAIN WITH LEFT-SIDED SCIATICA: ICD-10-CM

## 2018-07-09 DIAGNOSIS — M54.42 CHRONIC BILATERAL LOW BACK PAIN WITH LEFT-SIDED SCIATICA: ICD-10-CM

## 2018-07-09 RX ORDER — CYCLOBENZAPRINE HCL 5 MG
TABLET ORAL
Qty: 60 TABLET | Refills: 1 | Status: SHIPPED | OUTPATIENT
Start: 2018-07-09 | End: 2018-09-13 | Stop reason: SDUPTHER

## 2018-07-10 DIAGNOSIS — F41.1 GENERALIZED ANXIETY DISORDER: ICD-10-CM

## 2018-07-10 RX ORDER — CLONAZEPAM 0.5 MG/1
0.75 TABLET ORAL EVERY 12 HOURS PRN
Qty: 90 TABLET | Refills: 0 | Status: SHIPPED | OUTPATIENT
Start: 2018-07-10 | End: 2018-08-20 | Stop reason: SDUPTHER

## 2018-07-10 RX ORDER — DULOXETIN HYDROCHLORIDE 30 MG/1
30 CAPSULE, DELAYED RELEASE ORAL DAILY
Qty: 30 CAPSULE | Refills: 0 | Status: SHIPPED | OUTPATIENT
Start: 2018-07-10 | End: 2018-12-04 | Stop reason: SDUPTHER

## 2018-07-24 ENCOUNTER — TELEPHONE (OUTPATIENT)
Dept: FAMILY MEDICINE CLINIC | Facility: CLINIC | Age: 33
End: 2018-07-24

## 2018-07-24 ENCOUNTER — OFFICE VISIT (OUTPATIENT)
Dept: FAMILY MEDICINE CLINIC | Facility: CLINIC | Age: 33
End: 2018-07-24
Payer: COMMERCIAL

## 2018-07-24 VITALS
HEART RATE: 85 BPM | WEIGHT: 125.3 LBS | OXYGEN SATURATION: 98 % | RESPIRATION RATE: 15 BRPM | BODY MASS INDEX: 21.39 KG/M2 | DIASTOLIC BLOOD PRESSURE: 70 MMHG | SYSTOLIC BLOOD PRESSURE: 112 MMHG | TEMPERATURE: 98.8 F | HEIGHT: 64 IN

## 2018-07-24 DIAGNOSIS — K52.9 GASTROENTERITIS: Primary | ICD-10-CM

## 2018-07-24 DIAGNOSIS — M79.675 GREAT TOE PAIN, LEFT: ICD-10-CM

## 2018-07-24 PROCEDURE — 99214 OFFICE O/P EST MOD 30 MIN: CPT | Performed by: FAMILY MEDICINE

## 2018-07-24 RX ORDER — DICYCLOMINE HCL 20 MG
20 TABLET ORAL 3 TIMES DAILY PRN
Qty: 30 TABLET | Refills: 0 | Status: SHIPPED | OUTPATIENT
Start: 2018-07-24 | End: 2020-04-28 | Stop reason: SDUPTHER

## 2018-07-24 NOTE — TELEPHONE ENCOUNTER
I called and spoke to the  at Dr Cuevas's office in [place]  I informed them that Lety Meade would be a new pt she is being seen for to check her left toe it seems to be drifting apart form her other toes  Pt is scheduled for an appointment on 8/15/2018 at 4:15 pm she is to arrive at 4 pm to our office where she will be seeing Dr Cuevas   Confirmed that she does not need an insurance referral

## 2018-07-24 NOTE — PROGRESS NOTES
Assessment/Plan:   1  Gastroenteritis  Symptoms appear likely secondary to gastroenteritis  At this time, continue with supportive care  Maintain hydration  She may start a very bland diet and slowly advance as tolerated  She is given a prescription for dicyclomine to take t i d  p r n  for symptom relief  She also has Zofran at home which she may for nausea  If her symptoms are persisting past 1 full week, will consider stool studies  - dicyclomine (BENTYL) 20 mg tablet; Take 1 tablet (20 mg total) by mouth 3 (three) times a day as needed (for abdominal cramping)  Dispense: 30 tablet; Refill: 0    2  Great toe pain, left  Reviewed patient's symptoms today  At this time, she previously did have left hallux surgery per due to the pain that she is having, will refer patient to Podiatry for further evaluation   - Ambulatory referral to Podiatry; Future     There are no diagnoses linked to this encounter  Subjective:    Chief Complaint   Patient presents with    Abdominal Pain     Pt c/o of periumbilical abd pain x3 days  Pt also has nausea and vomiting followed by diarrhea  Pt has a loss of appetite followed by a headache  Patient ID: Dar Clarke is a 26 y o  female  Patient is a 70-year-old female presents today with a CC of abdominal discomfort as well as nausea vomiting  She states that she has noticed this for the past 3 days  His symptoms started gradually  She denies any sick contacts  She denies any other specific cause for symptoms  She has been having frequent loose bowel movements  Denies melena hematochezia  She also has had associated nausea with 2 episodes of vomiting  She has been feeling moderate fatigue  Denies any fevers or chills  She states that she has not been taking anything for her current symptoms  Patient also has complaints today of left great toe pain  She states that she previously had bunion corrective surgery when she was younger    She has been having moderate pain in this area  She believes that her great toe is moving medially  Review of Systems   Constitutional: Positive for fatigue  Negative for activity change, chills and fever  HENT: Negative for congestion, ear pain, sinus pressure and sore throat  Eyes: Negative for redness, itching and visual disturbance  Respiratory: Negative for cough and shortness of breath  Cardiovascular: Negative for chest pain and palpitations  Gastrointestinal: Positive for abdominal pain  Negative for diarrhea and nausea  Endocrine: Negative for cold intolerance and heat intolerance  Genitourinary: Negative for dysuria, flank pain and frequency  Musculoskeletal: Negative for arthralgias, back pain, gait problem and myalgias  Skin: Negative for color change  Allergic/Immunologic: Negative for environmental allergies  Neurological: Positive for headaches  Negative for dizziness and numbness  Psychiatric/Behavioral: Negative for behavioral problems and sleep disturbance  The following portions of the patient's history were reviewed and updated as appropriate : past family history, past medical history, past social history and past surgical history        Current Outpatient Prescriptions:     clonazePAM (KlonoPIN) 0 5 mg tablet, TAKE 1 AND 1/2 TABLETS BY MOUTH EVERY 12 HOURS AS NEEDED FOR ANXIETY, Disp: 90 tablet, Rfl: 0    clonazePAM (KlonoPIN) 0 5 mg tablet, Take 1 5 tablets (0 75 mg total) by mouth every 12 (twelve) hours as needed for anxiety, Disp: 90 tablet, Rfl: 0    cyclobenzaprine (FLEXERIL) 5 mg tablet, TAKE 1 TABLET BY MOUTH TWICE A DAY, Disp: 60 tablet, Rfl: 1    DULoxetine (CYMBALTA) 30 mg delayed release capsule, Take 1 capsule (30 mg total) by mouth daily, Disp: 30 capsule, Rfl: 0    fluticasone (FLONASE) 50 mcg/act nasal spray, 2 sprays into each nostril daily For nasal congestion, Disp: 16 g, Rfl: 1    ibuprofen (MOTRIN) 600 mg tablet, Take 1 tablet (600 mg total) by mouth every 6 (six) hours as needed for headaches Take with food, Disp: 30 tablet, Rfl: 0    meclizine (ANTIVERT) 25 mg tablet, Take 1 tablet (25 mg total) by mouth every 8 (eight) hours as needed for dizziness, Disp: 30 tablet, Rfl: 0    Multiple Vitamins-Minerals (MULTIVITAMIN ADULT PO), Take 1 tablet by mouth daily, Disp: , Rfl:     omeprazole (PriLOSEC) 20 mg delayed release capsule, TAKE 1 CAPSULE BY MOUTH EVERY DAY, Disp: 30 capsule, Rfl: 0    ondansetron (ZOFRAN-ODT) 8 mg disintegrating tablet, Take 1 tablet (8 mg total) by mouth every 8 (eight) hours as needed for nausea or vomiting, Disp: 30 tablet, Rfl: 0    traZODone (DESYREL) 50 mg tablet, TAKE 1 TABLET AT BEDTIME AS NEEDED , Disp: 30 tablet, Rfl: 2    Objective:    Vitals:    07/24/18 1516   BP: 112/70   BP Location: Right arm   Patient Position: Sitting   Cuff Size: Adult   Pulse: 85   Resp: 15   Temp: 98 8 °F (37 1 °C)   TempSrc: Tympanic   SpO2: 98%   Weight: 56 8 kg (125 lb 4 8 oz)   Height: 5' 4" (1 626 m)        Physical Exam   Constitutional: She is oriented to person, place, and time  She appears well-developed and well-nourished  HENT:   Head: Normocephalic and atraumatic  Nose: Nose normal    Mouth/Throat: No oropharyngeal exudate  Eyes: Pupils are equal, round, and reactive to light  Right eye exhibits no discharge  Left eye exhibits no discharge  Neck: Normal range of motion  Neck supple  No tracheal deviation present  Cardiovascular: Normal rate, regular rhythm and intact distal pulses  Exam reveals no gallop and no friction rub  No murmur heard  Pulses:       Dorsalis pedis pulses are 2+ on the right side, and 2+ on the left side  Posterior tibial pulses are 2+ on the right side, and 2+ on the left side  Pulmonary/Chest: Effort normal and breath sounds normal  No respiratory distress  She has no wheezes  She has no rales  Abdominal: Soft  Bowel sounds are normal  She exhibits no distension   There is no tenderness  There is no rebound and no guarding  Musculoskeletal: Normal range of motion  She exhibits no edema  Lymphadenopathy:        Head (right side): No submental and no submandibular adenopathy present  Head (left side): No submental and no submandibular adenopathy present  She has no cervical adenopathy  Right cervical: No superficial cervical, no deep cervical and no posterior cervical adenopathy present  Left cervical: No superficial cervical, no deep cervical and no posterior cervical adenopathy present  Neurological: She is alert and oriented to person, place, and time  No cranial nerve deficit or sensory deficit  Skin: Skin is warm, dry and intact  Psychiatric: Her speech is normal and behavior is normal  Judgment normal  Her mood appears not anxious  Cognition and memory are normal  She does not exhibit a depressed mood  Vitals reviewed

## 2018-07-29 DIAGNOSIS — R11.0 NAUSEA: ICD-10-CM

## 2018-07-31 RX ORDER — OMEPRAZOLE 20 MG/1
CAPSULE, DELAYED RELEASE ORAL
Qty: 30 CAPSULE | Refills: 0 | OUTPATIENT
Start: 2018-07-31

## 2018-07-31 NOTE — TELEPHONE ENCOUNTER
Received auto refill request from her pharmacy for omeprazole I gave pt when she was having issues last month  This was prescribed short term  I just wanted to make sure pt still doesn't feel she needs it   Otherwise I wont prescribe it

## 2018-08-01 ENCOUNTER — TELEPHONE (OUTPATIENT)
Dept: FAMILY MEDICINE CLINIC | Facility: CLINIC | Age: 33
End: 2018-08-01

## 2018-08-01 NOTE — TELEPHONE ENCOUNTER
Pt called back  Asked her if she felt she still needed the omeprazole and if she is feeling better  Pt stated she doesn't feel she needs it  Dr Darlin Rolle prescribed previously and then last time she was in [place] gave for temp  She said she had insurance at that time that wouldn't cover it  Since she just changed insurances now and it is covered again she was going to get it filled  Advised if she needs it she will need to come back in for a follow up  Pt said she doesn't need an appointment at this time or the rx

## 2018-08-07 DIAGNOSIS — F41.1 GENERALIZED ANXIETY DISORDER: ICD-10-CM

## 2018-08-07 RX ORDER — DULOXETIN HYDROCHLORIDE 30 MG/1
30 CAPSULE, DELAYED RELEASE ORAL DAILY
Qty: 30 CAPSULE | Refills: 0 | Status: CANCELLED | OUTPATIENT
Start: 2018-08-07

## 2018-08-07 RX ORDER — CLONAZEPAM 0.5 MG/1
0.75 TABLET ORAL EVERY 12 HOURS PRN
Qty: 90 TABLET | Refills: 0 | Status: CANCELLED | OUTPATIENT
Start: 2018-08-07

## 2018-08-08 DIAGNOSIS — F41.1 GENERALIZED ANXIETY DISORDER: ICD-10-CM

## 2018-08-09 RX ORDER — DULOXETIN HYDROCHLORIDE 30 MG/1
CAPSULE, DELAYED RELEASE ORAL
Qty: 30 CAPSULE | Refills: 1 | Status: SHIPPED | OUTPATIENT
Start: 2018-08-09 | End: 2018-10-03 | Stop reason: SDUPTHER

## 2018-08-13 ENCOUNTER — OFFICE VISIT (OUTPATIENT)
Dept: FAMILY MEDICINE CLINIC | Facility: CLINIC | Age: 33
End: 2018-08-13
Payer: COMMERCIAL

## 2018-08-13 VITALS
HEART RATE: 75 BPM | WEIGHT: 126.2 LBS | TEMPERATURE: 97.9 F | SYSTOLIC BLOOD PRESSURE: 126 MMHG | BODY MASS INDEX: 21.54 KG/M2 | DIASTOLIC BLOOD PRESSURE: 76 MMHG | OXYGEN SATURATION: 98 % | RESPIRATION RATE: 16 BRPM | HEIGHT: 64 IN

## 2018-08-13 DIAGNOSIS — M54.42 CHRONIC LEFT-SIDED LOW BACK PAIN WITH LEFT-SIDED SCIATICA: Primary | ICD-10-CM

## 2018-08-13 DIAGNOSIS — G89.29 CHRONIC LEFT-SIDED LOW BACK PAIN WITH LEFT-SIDED SCIATICA: Primary | ICD-10-CM

## 2018-08-13 PROCEDURE — 99214 OFFICE O/P EST MOD 30 MIN: CPT | Performed by: FAMILY MEDICINE

## 2018-08-13 RX ORDER — NAPROXEN 500 MG/1
1 TABLET ORAL
COMMUNITY
Start: 2018-08-11 | End: 2018-12-04

## 2018-08-13 RX ORDER — PREDNISONE 20 MG/1
60 TABLET ORAL DAILY
Qty: 15 TABLET | Refills: 0 | Status: SHIPPED | OUTPATIENT
Start: 2018-08-13 | End: 2018-12-04

## 2018-08-13 NOTE — PROGRESS NOTES
Assessment/Plan:   1  Chronic left-sided low back pain with left-sided sciatica  Unclear as to the exact cause of patient's symptoms today  There was no injury to elicit her severe back pain  At this time, will check x-ray of lumbar spine to further rule out her chronic back pain  She was advised the different treatment options  Will place patient on light like duty with minimal weight restrictions 20 lb  Will start treatment with prednisone burst   She was advised to take her Flexeril at bedtime  Will consider further evaluation with physical therapy if her symptoms are worsening or persisting  Follow-up in 2 weeks  - XR spine lumbar minimum 4 views non injury; Future  - predniSONE 20 mg tablet; Take 3 tablets (60 mg total) by mouth daily  Dispense: 15 tablet; Refill: 0     There are no diagnoses linked to this encounter  Subjective:    Chief Complaint   Patient presents with    Follow-up     Pt presents for a f/u due to LBP  Pt was seen at the urgent care 8/11  Pt states she was working in yard on Friday Saturday woke up with LBP  [facility] prescribed Flexirol and Naproxen with no relief  Pt denies any injury  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  She will like to be on light duty until LBP is relieved  Patient ID: Abraham Negrete is a 26 y o  female  Patient is a 25-year-old female presents today with the CC of left-sided low back pain  She states she developed this pain this past Friday into Saturday  She was mowing her grass on Friday however denies any specific injury  She describes pain as a sharp burning pain  She does have radiation of the pain down her left leg  She states the pain is constant however is exacerbated  She was seen at urgent care and was placed on naproxen however this has been effective for her  She states that she does take her Flexeril however this caused significant sedation for as well          Review of Systems Constitutional: Negative for activity change, chills, fatigue and fever  HENT: Negative for congestion, ear pain, sinus pressure and sore throat  Eyes: Negative for redness, itching and visual disturbance  Respiratory: Negative for cough and shortness of breath  Cardiovascular: Negative for chest pain and palpitations  Gastrointestinal: Negative for abdominal pain, diarrhea and nausea  Endocrine: Negative for cold intolerance and heat intolerance  Genitourinary: Negative for dysuria, flank pain and frequency  Musculoskeletal: Negative for arthralgias, back pain, gait problem and myalgias  Skin: Negative for color change  Allergic/Immunologic: Negative for environmental allergies  Neurological: Negative for dizziness, numbness and headaches  Psychiatric/Behavioral: Negative for behavioral problems and sleep disturbance  The following portions of the patient's history were reviewed and updated as appropriate : past family history, past medical history, past social history and past surgical history        Current Outpatient Prescriptions:     clonazePAM (KlonoPIN) 0 5 mg tablet, TAKE 1 AND 1/2 TABLETS BY MOUTH EVERY 12 HOURS AS NEEDED FOR ANXIETY, Disp: 90 tablet, Rfl: 0    cyclobenzaprine (FLEXERIL) 5 mg tablet, TAKE 1 TABLET BY MOUTH TWICE A DAY, Disp: 60 tablet, Rfl: 1    dicyclomine (BENTYL) 20 mg tablet, Take 1 tablet (20 mg total) by mouth 3 (three) times a day as needed (for abdominal cramping), Disp: 30 tablet, Rfl: 0    DULoxetine (CYMBALTA) 30 mg delayed release capsule, Take 1 capsule (30 mg total) by mouth daily, Disp: 30 capsule, Rfl: 0    fluticasone (FLONASE) 50 mcg/act nasal spray, 2 sprays into each nostril daily For nasal congestion, Disp: 16 g, Rfl: 1    ibuprofen (MOTRIN) 600 mg tablet, Take 1 tablet (600 mg total) by mouth every 6 (six) hours as needed for headaches Take with food, Disp: 30 tablet, Rfl: 0    Multiple Vitamins-Minerals (MULTIVITAMIN ADULT PO), Take 1 tablet by mouth daily, Disp: , Rfl:     traZODone (DESYREL) 50 mg tablet, TAKE 1 TABLET AT BEDTIME AS NEEDED , Disp: 30 tablet, Rfl: 2    clonazePAM (KlonoPIN) 0 5 mg tablet, Take 1 5 tablets (0 75 mg total) by mouth every 12 (twelve) hours as needed for anxiety, Disp: 90 tablet, Rfl: 0    DULoxetine (CYMBALTA) 30 mg delayed release capsule, TAKE 1 CAPSULE BY MOUTH EVERY DAY, Disp: 30 capsule, Rfl: 1    meclizine (ANTIVERT) 25 mg tablet, Take 1 tablet (25 mg total) by mouth every 8 (eight) hours as needed for dizziness, Disp: 30 tablet, Rfl: 0    naproxen (NAPROSYN) 500 mg tablet, Take 1 tablet by mouth 2 (two) times a day, Disp: , Rfl:     omeprazole (PriLOSEC) 20 mg delayed release capsule, TAKE 1 CAPSULE BY MOUTH EVERY DAY, Disp: 30 capsule, Rfl: 0    ondansetron (ZOFRAN-ODT) 8 mg disintegrating tablet, Take 1 tablet (8 mg total) by mouth every 8 (eight) hours as needed for nausea or vomiting, Disp: 30 tablet, Rfl: 0    Objective:    Vitals:    08/13/18 1548   BP: 126/76   BP Location: Left arm   Patient Position: Sitting   Cuff Size: Adult   Pulse: 75   Resp: 16   Temp: 97 9 °F (36 6 °C)   TempSrc: Tympanic   SpO2: 98%   Weight: 57 2 kg (126 lb 3 2 oz)   Height: 5' 4" (1 626 m)        Physical Exam   Constitutional: She is oriented to person, place, and time  Vital signs are normal  She appears well-developed and well-nourished  She is cooperative  She does not appear ill  No distress  She is sedated  HENT:   Head: Normocephalic and atraumatic  Right Ear: Hearing and external ear normal    Left Ear: Hearing and external ear normal    Nose: Nose normal  No nasal deformity or septal deviation  Mouth/Throat: Oropharynx is clear and moist and mucous membranes are normal    Eyes: EOM and lids are normal  Pupils are equal, round, and reactive to light  Neck: Trachea normal and normal range of motion  Neck supple  No edema and no erythema present  No thyromegaly present     Cardiovascular: Normal rate  Pulmonary/Chest: Effort normal  No respiratory distress  Abdominal: Normal appearance  There is no guarding  Musculoskeletal:        Right shoulder: She exhibits no pain  Lumbar back: She exhibits decreased range of motion, tenderness, bony tenderness, pain and spasm  She exhibits no swelling, no edema, no deformity and no laceration  Neurological: She is alert and oriented to person, place, and time  She has normal strength and normal reflexes  No cranial nerve deficit or sensory deficit  Coordination normal    Skin: Skin is warm and dry  Psychiatric: She has a normal mood and affect   Her speech is normal and behavior is normal  Judgment and thought content normal  Cognition and memory are normal

## 2018-08-19 DIAGNOSIS — F41.1 GENERALIZED ANXIETY DISORDER: ICD-10-CM

## 2018-08-19 RX ORDER — CLONAZEPAM 0.5 MG/1
0.75 TABLET ORAL EVERY 12 HOURS PRN
Qty: 90 TABLET | Refills: 0 | OUTPATIENT
Start: 2018-08-19

## 2018-08-20 DIAGNOSIS — F41.1 GENERALIZED ANXIETY DISORDER: ICD-10-CM

## 2018-08-20 RX ORDER — CLONAZEPAM 0.5 MG/1
0.75 TABLET ORAL EVERY 12 HOURS PRN
Qty: 90 TABLET | Refills: 0 | Status: SHIPPED | OUTPATIENT
Start: 2018-08-20 | End: 2018-09-09 | Stop reason: SDUPTHER

## 2018-08-20 NOTE — TELEPHONE ENCOUNTER
From: Wendy Hamm  Sent: 8/20/2018 1:09 AM EDT  Subject: Medication Renewal Request    Wendy Hamm would like a refill of the following medications:     clonazePAM (KlonoPIN) 0 5 mg tablet Wilfrid Saenz DO]    Preferred pharmacy: [facility]ARMACY #[id number] : [id number]    Comment:  I have requested this 3 times ? now I'm out of the medication

## 2018-08-23 DIAGNOSIS — R51.9 ACUTE INTRACTABLE HEADACHE, UNSPECIFIED HEADACHE TYPE: ICD-10-CM

## 2018-08-23 RX ORDER — FLUTICASONE PROPIONATE 50 MCG
2 SPRAY, SUSPENSION (ML) NASAL DAILY
Qty: 1 BOTTLE | Refills: 1 | Status: SHIPPED | OUTPATIENT
Start: 2018-08-23 | End: 2018-10-16 | Stop reason: SDUPTHER

## 2018-09-05 DIAGNOSIS — G47.00 INSOMNIA, UNSPECIFIED TYPE: ICD-10-CM

## 2018-09-05 RX ORDER — TRAZODONE HYDROCHLORIDE 50 MG/1
TABLET ORAL
Qty: 30 TABLET | Refills: 2 | Status: SHIPPED | OUTPATIENT
Start: 2018-09-05 | End: 2018-11-13 | Stop reason: SDUPTHER

## 2018-09-09 DIAGNOSIS — F41.1 GENERALIZED ANXIETY DISORDER: ICD-10-CM

## 2018-09-10 RX ORDER — CLONAZEPAM 0.5 MG/1
0.75 TABLET ORAL EVERY 12 HOURS PRN
Qty: 90 TABLET | Refills: 0 | Status: SHIPPED | OUTPATIENT
Start: 2018-09-14 | End: 2018-10-08 | Stop reason: SDUPTHER

## 2018-09-10 NOTE — TELEPHONE ENCOUNTER
From: Piotr Cazares  Sent: 9/9/2018 2:32 PM EDT  Subject: Medication Renewal Request    Piotr Cazares would like a refill of the following medications:     clonazePAM (KlonoPIN) 0 5 mg tablet Sunday DO Geovanni]    Preferred pharmacy: [facility]/PHARMACY #[id number] : [id number]    Comment:

## 2018-09-13 DIAGNOSIS — G89.29 CHRONIC BILATERAL LOW BACK PAIN WITH LEFT-SIDED SCIATICA: ICD-10-CM

## 2018-09-13 DIAGNOSIS — M54.42 CHRONIC BILATERAL LOW BACK PAIN WITH LEFT-SIDED SCIATICA: ICD-10-CM

## 2018-09-13 RX ORDER — CYCLOBENZAPRINE HCL 5 MG
TABLET ORAL
Qty: 60 TABLET | Refills: 1 | Status: SHIPPED | OUTPATIENT
Start: 2018-09-13 | End: 2020-02-25 | Stop reason: SDUPTHER

## 2018-09-23 ENCOUNTER — OFFICE VISIT (OUTPATIENT)
Dept: FAMILY MEDICINE CLINIC | Facility: CLINIC | Age: 33
End: 2018-09-23
Payer: COMMERCIAL

## 2018-09-23 VITALS
OXYGEN SATURATION: 98 % | SYSTOLIC BLOOD PRESSURE: 122 MMHG | DIASTOLIC BLOOD PRESSURE: 72 MMHG | HEIGHT: 63 IN | TEMPERATURE: 99.1 F | BODY MASS INDEX: 22.54 KG/M2 | WEIGHT: 127.2 LBS | RESPIRATION RATE: 15 BRPM | HEART RATE: 98 BPM

## 2018-09-23 DIAGNOSIS — B00.9 HERPES SIMPLEX INFECTION: Primary | ICD-10-CM

## 2018-09-23 DIAGNOSIS — J06.9 VIRAL UPPER RESPIRATORY INFECTION: ICD-10-CM

## 2018-09-23 PROCEDURE — 99213 OFFICE O/P EST LOW 20 MIN: CPT | Performed by: PHYSICIAN ASSISTANT

## 2018-09-23 RX ORDER — VALACYCLOVIR HYDROCHLORIDE 1 G/1
TABLET, FILM COATED ORAL
Qty: 12 TABLET | Refills: 3 | Status: SHIPPED | OUTPATIENT
Start: 2018-09-23 | End: 2018-12-04

## 2018-09-23 RX ORDER — ACYCLOVIR 50 MG/G
OINTMENT TOPICAL EVERY 4 HOURS
Qty: 15 G | Refills: 3 | Status: SHIPPED | OUTPATIENT
Start: 2018-09-23

## 2018-09-23 NOTE — PROGRESS NOTES
Assessment/Plan:         Diagnoses and all orders for this visit:    Herpes simplex infection  -     acyclovir (ZOVIRAX) 5 % ointment; Apply topically every 4 (four) hours  -     valACYclovir (VALTREX) 1,000 mg tablet; Take 2 tablets every 12 hours for two doses at first onset of cold sores  Viral upper respiratory infection       Discussed condition with patient in detail  She appears to have cold sores on the right side of her mouth and face and has been getting them intermittently and also appears to have an acute viral upper respiratory infection  I will prescribe her topical acyclovir ointment as well as Valtrex to take intermittently in the event of recurrent outbreaks  She is to hydrate, rest, observe, and discussed over-the-counter cold medications with her  She is to follow up if symptoms persist/ worsen  Chief Complaint   Patient presents with    Mouth Lesions     Pt c/o mouth sores x4 days  Pt states she does mild pain in her mouth       Subjective:      Patient ID: Carlos Mclain is a 26 y o  female  Pt presents 4 days after breaking out with cold sores around her mouth/right side of face  She reports she has been getting them frequently but has been sick frequently this year  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  She also reports over the past day symptoms of fatigue, chills, body aches, ST and B/L ear pain, cough, congestion  Denies N/V/D  She has taken Robitussin  The following portions of the patient's history were reviewed and updated as appropriate: She  has a past medical history of Psychiatric disorder    She   Patient Active Problem List    Diagnosis Date Noted    Herpes simplex infection 09/23/2018    Benign paroxysmal positional vertigo due to bilateral vestibular disorder 06/14/2018    Insomnia 09/08/2017    Chronic left-sided low back pain with left-sided sciatica 02/16/2017    Left breast lump 12/08/2016    GERD without esophagitis 11/23/2015  Generalized anxiety disorder 10/22/2015     She  has a past surgical history that includes No past surgeries  Her family history is not on file  She  reports that she has been smoking Cigarettes  She has never used smokeless tobacco  She reports that she does not drink alcohol or use drugs    Current Outpatient Prescriptions   Medication Sig Dispense Refill    acyclovir (ZOVIRAX) 5 % ointment Apply topically every 4 (four) hours 15 g 3    clonazePAM (KlonoPIN) 0 5 mg tablet TAKE 1 AND 1/2 TABLETS BY MOUTH EVERY 12 HOURS AS NEEDED FOR ANXIETY 90 tablet 0    clonazePAM (KlonoPIN) 0 5 mg tablet Take 1 5 tablets (0 75 mg total) by mouth every 12 (twelve) hours as needed for anxiety 90 tablet 0    cyclobenzaprine (FLEXERIL) 5 mg tablet TAKE 1 TABLET BY MOUTH TWICE A DAY 60 tablet 1    dicyclomine (BENTYL) 20 mg tablet Take 1 tablet (20 mg total) by mouth 3 (three) times a day as needed (for abdominal cramping) 30 tablet 0    DULoxetine (CYMBALTA) 30 mg delayed release capsule Take 1 capsule (30 mg total) by mouth daily 30 capsule 0    DULoxetine (CYMBALTA) 30 mg delayed release capsule TAKE 1 CAPSULE BY MOUTH EVERY DAY 30 capsule 1    fluticasone (FLONASE) 50 mcg/act nasal spray 2 SPRAYS INTO EACH NOSTRIL DAILY FOR NASAL CONGESTION 1 Bottle 1    ibuprofen (MOTRIN) 600 mg tablet Take 1 tablet (600 mg total) by mouth every 6 (six) hours as needed for headaches Take with food 30 tablet 0    meclizine (ANTIVERT) 25 mg tablet Take 1 tablet (25 mg total) by mouth every 8 (eight) hours as needed for dizziness 30 tablet 0    Multiple Vitamins-Minerals (MULTIVITAMIN ADULT PO) Take 1 tablet by mouth daily      naproxen (NAPROSYN) 500 mg tablet Take 1 tablet by mouth 2 (two) times a day      omeprazole (PriLOSEC) 20 mg delayed release capsule TAKE 1 CAPSULE BY MOUTH EVERY DAY 30 capsule 0    ondansetron (ZOFRAN-ODT) 8 mg disintegrating tablet Take 1 tablet (8 mg total) by mouth every 8 (eight) hours as needed for nausea or vomiting 30 tablet 0    predniSONE 20 mg tablet Take 3 tablets (60 mg total) by mouth daily 15 tablet 0    traZODone (DESYREL) 50 mg tablet TAKE 1 TABLET BY MOUTH EVERY DAY AT BEDTIME AS NEEDED 30 tablet 2    valACYclovir (VALTREX) 1,000 mg tablet Take 2 tablets every 12 hours for two doses at first onset of cold sores  12 tablet 3     No current facility-administered medications for this visit        Current Outpatient Prescriptions on File Prior to Visit   Medication Sig    clonazePAM (KlonoPIN) 0 5 mg tablet TAKE 1 AND 1/2 TABLETS BY MOUTH EVERY 12 HOURS AS NEEDED FOR ANXIETY    clonazePAM (KlonoPIN) 0 5 mg tablet Take 1 5 tablets (0 75 mg total) by mouth every 12 (twelve) hours as needed for anxiety    cyclobenzaprine (FLEXERIL) 5 mg tablet TAKE 1 TABLET BY MOUTH TWICE A DAY    dicyclomine (BENTYL) 20 mg tablet Take 1 tablet (20 mg total) by mouth 3 (three) times a day as needed (for abdominal cramping)    DULoxetine (CYMBALTA) 30 mg delayed release capsule Take 1 capsule (30 mg total) by mouth daily    DULoxetine (CYMBALTA) 30 mg delayed release capsule TAKE 1 CAPSULE BY MOUTH EVERY DAY    fluticasone (FLONASE) 50 mcg/act nasal spray 2 SPRAYS INTO EACH NOSTRIL DAILY FOR NASAL CONGESTION    ibuprofen (MOTRIN) 600 mg tablet Take 1 tablet (600 mg total) by mouth every 6 (six) hours as needed for headaches Take with food    meclizine (ANTIVERT) 25 mg tablet Take 1 tablet (25 mg total) by mouth every 8 (eight) hours as needed for dizziness    Multiple Vitamins-Minerals (MULTIVITAMIN ADULT PO) Take 1 tablet by mouth daily    naproxen (NAPROSYN) 500 mg tablet Take 1 tablet by mouth 2 (two) times a day    omeprazole (PriLOSEC) 20 mg delayed release capsule TAKE 1 CAPSULE BY MOUTH EVERY DAY    ondansetron (ZOFRAN-ODT) 8 mg disintegrating tablet Take 1 tablet (8 mg total) by mouth every 8 (eight) hours as needed for nausea or vomiting    predniSONE 20 mg tablet Take 3 tablets (60 mg total) by mouth daily    traZODone (DESYREL) 50 mg tablet TAKE 1 TABLET BY MOUTH EVERY DAY AT BEDTIME AS NEEDED     No current facility-administered medications on file prior to visit  She is allergic to latex; levofloxacin; and quinolones       Review of Systems   Constitutional: Positive for chills and fatigue  Negative for fever  HENT: Positive for congestion and sore throat  Respiratory: Positive for cough  Musculoskeletal: Positive for myalgias  Skin:        Cold sores right side of mouth/face         Objective:      /72 (BP Location: Left arm, Patient Position: Sitting, Cuff Size: Standard)   Pulse 98   Temp 99 1 °F (37 3 °C) (Tympanic)   Resp 15   Ht 5' 3 39" (1 61 m)   Wt 57 7 kg (127 lb 3 2 oz)   LMP 09/01/2018 (Approximate)   SpO2 98%   BMI 22 26 kg/m²          Physical Exam   Constitutional: She is oriented to person, place, and time  She appears well-developed and well-nourished  No distress  HENT:   Right Ear: Hearing, tympanic membrane, external ear and ear canal normal    Left Ear: Hearing, tympanic membrane, external ear and ear canal normal    Nose: Mucosal edema (  Bilateral boggy turbinates) present  Mouth/Throat: Mucous membranes are normal  Posterior oropharyngeal erythema ( PND) present  No oropharyngeal exudate  Neck: Neck supple  Cardiovascular: Normal rate, regular rhythm and normal heart sounds  Pulmonary/Chest: Effort normal and breath sounds normal    Lymphadenopathy:     She has no cervical adenopathy  Neurological: She is alert and oriented to person, place, and time  Skin:   Right perioral region into right cheek with localized area/cluster of cold sores present  There is no active drainage noted  Psychiatric: She has a normal mood and affect  Vitals reviewed

## 2018-09-23 NOTE — LETTER
September 23, 2018     Patient: Kashif Muñoz   YOB: 1985   Date of Visit: 9/23/2018       To Whom it May Concern:    Kashif Muñoz is under my professional care  She was seen in my office on 9/23/2018  She may return to work on 9/25/2018  She is to be excused for dates of 9/22/2018-9/23/2018  If you have any questions or concerns, please don't hesitate to call           Sincerely,          Carlos Velasquez PA-C        CC: No Recipients

## 2018-10-02 DIAGNOSIS — R51.9 ACUTE INTRACTABLE HEADACHE, UNSPECIFIED HEADACHE TYPE: ICD-10-CM

## 2018-10-03 DIAGNOSIS — F41.1 GENERALIZED ANXIETY DISORDER: ICD-10-CM

## 2018-10-03 RX ORDER — DULOXETIN HYDROCHLORIDE 30 MG/1
CAPSULE, DELAYED RELEASE ORAL
Qty: 30 CAPSULE | Refills: 1 | Status: SHIPPED | OUTPATIENT
Start: 2018-10-03 | End: 2018-10-08 | Stop reason: SDUPTHER

## 2018-10-03 RX ORDER — FLUTICASONE PROPIONATE 50 MCG
2 SPRAY, SUSPENSION (ML) NASAL DAILY
Qty: 1 BOTTLE | Refills: 1 | Status: SHIPPED | OUTPATIENT
Start: 2018-10-03 | End: 2020-08-06 | Stop reason: SDUPTHER

## 2018-10-08 DIAGNOSIS — F41.1 GENERALIZED ANXIETY DISORDER: ICD-10-CM

## 2018-10-08 NOTE — TELEPHONE ENCOUNTER
From: Nellie Martinez  Sent: 10/8/2018 11:49 AM EDT  Subject: Medication Renewal Request    Nellie Martinez would like a refill of the following medications:     clonazePAM (KlonoPIN) 0 5 mg tablet [Marilyn Pepe DO]     DULoxetine (CYMBALTA) 30 mg delayed release capsule Bonnie Rodriguez DO]    Preferred pharmacy: [facility]/PHARMACY #[id number] : [id number]    Comment:  I will be scheduling an appointment for my med review and flu shot

## 2018-10-09 RX ORDER — CLONAZEPAM 0.5 MG/1
0.75 TABLET ORAL EVERY 12 HOURS PRN
Qty: 90 TABLET | Refills: 0 | Status: SHIPPED | OUTPATIENT
Start: 2018-10-09 | End: 2018-10-16 | Stop reason: SDUPTHER

## 2018-10-09 RX ORDER — DULOXETIN HYDROCHLORIDE 30 MG/1
30 CAPSULE, DELAYED RELEASE ORAL DAILY
Qty: 90 CAPSULE | Refills: 0 | Status: SHIPPED | OUTPATIENT
Start: 2018-10-09 | End: 2018-10-16 | Stop reason: SDUPTHER

## 2018-10-11 DIAGNOSIS — F41.1 GENERALIZED ANXIETY DISORDER: ICD-10-CM

## 2018-10-11 RX ORDER — CLONAZEPAM 0.5 MG/1
0.75 TABLET ORAL EVERY 12 HOURS PRN
Qty: 90 TABLET | Refills: 0 | Status: SHIPPED | OUTPATIENT
Start: 2018-10-15 | End: 2018-12-04 | Stop reason: SDUPTHER

## 2018-11-13 DIAGNOSIS — G47.00 INSOMNIA, UNSPECIFIED TYPE: ICD-10-CM

## 2018-11-13 RX ORDER — TRAZODONE HYDROCHLORIDE 50 MG/1
50 TABLET ORAL
Qty: 90 TABLET | Refills: 0 | Status: SHIPPED | OUTPATIENT
Start: 2018-11-13 | End: 2019-02-26 | Stop reason: SDUPTHER

## 2018-12-04 ENCOUNTER — OFFICE VISIT (OUTPATIENT)
Dept: FAMILY MEDICINE CLINIC | Facility: CLINIC | Age: 33
End: 2018-12-04
Payer: COMMERCIAL

## 2018-12-04 VITALS
OXYGEN SATURATION: 93 % | RESPIRATION RATE: 16 BRPM | DIASTOLIC BLOOD PRESSURE: 80 MMHG | BODY MASS INDEX: 21.7 KG/M2 | HEIGHT: 64 IN | WEIGHT: 127.1 LBS | SYSTOLIC BLOOD PRESSURE: 116 MMHG | TEMPERATURE: 98.9 F | HEART RATE: 92 BPM

## 2018-12-04 DIAGNOSIS — F41.1 GENERALIZED ANXIETY DISORDER: ICD-10-CM

## 2018-12-04 DIAGNOSIS — R05.9 COUGH: ICD-10-CM

## 2018-12-04 DIAGNOSIS — K52.9 GASTROENTERITIS: ICD-10-CM

## 2018-12-04 DIAGNOSIS — R52 BODY ACHES: ICD-10-CM

## 2018-12-04 DIAGNOSIS — R50.9 FEVER, UNSPECIFIED FEVER CAUSE: Primary | ICD-10-CM

## 2018-12-04 PROCEDURE — 87804 INFLUENZA ASSAY W/OPTIC: CPT | Performed by: PHYSICIAN ASSISTANT

## 2018-12-04 PROCEDURE — 99213 OFFICE O/P EST LOW 20 MIN: CPT | Performed by: PHYSICIAN ASSISTANT

## 2018-12-04 PROCEDURE — 3008F BODY MASS INDEX DOCD: CPT | Performed by: PHYSICIAN ASSISTANT

## 2018-12-04 RX ORDER — CLONAZEPAM 0.5 MG/1
0.75 TABLET ORAL EVERY 12 HOURS PRN
Qty: 90 TABLET | Refills: 0 | Status: CANCELLED | OUTPATIENT
Start: 2018-12-04 | End: 2019-01-03

## 2018-12-04 RX ORDER — OSELTAMIVIR PHOSPHATE 75 MG/1
75 CAPSULE ORAL EVERY 12 HOURS SCHEDULED
Qty: 10 CAPSULE | Refills: 0 | Status: SHIPPED | OUTPATIENT
Start: 2018-12-04 | End: 2018-12-09

## 2018-12-04 RX ORDER — CLONAZEPAM 0.5 MG/1
0.75 TABLET ORAL EVERY 12 HOURS PRN
Qty: 90 TABLET | Refills: 0 | Status: SHIPPED | OUTPATIENT
Start: 2018-12-04 | End: 2018-12-04 | Stop reason: SDUPTHER

## 2018-12-04 RX ORDER — DULOXETIN HYDROCHLORIDE 30 MG/1
30 CAPSULE, DELAYED RELEASE ORAL DAILY
Qty: 30 CAPSULE | Refills: 0 | Status: SHIPPED | OUTPATIENT
Start: 2018-12-04 | End: 2018-12-04 | Stop reason: SDUPTHER

## 2018-12-04 NOTE — PROGRESS NOTES
Assessment/Plan:      Diagnoses and all orders for this visit:    Fever, unspecified fever cause  -     POCT rapid flu A and B  -     oseltamivir (TAMIFLU) 75 mg capsule; Take 1 capsule (75 mg total) by mouth every 12 (twelve) hours for 5 days    Body aches  -     POCT rapid flu A and B  -     oseltamivir (TAMIFLU) 75 mg capsule; Take 1 capsule (75 mg total) by mouth every 12 (twelve) hours for 5 days    Cough  -     POCT rapid flu A and B  -     oseltamivir (TAMIFLU) 75 mg capsule; Take 1 capsule (75 mg total) by mouth every 12 (twelve) hours for 5 days    Other orders  -     Cancel: clonazePAM (KlonoPIN) 0 5 mg tablet; Take 1 5 tablets (0 75 mg total) by mouth every 12 (twelve) hours as needed for anxiety for up to 30 days      Patient is a 38-year-old female presenting today for sudden onset constitutional an URI symptoms  She did have the flu shot  She does not have a fever at present though states that her fever last night was up to 102  Rapid flu test in the office today was negative  However she does work in a medical facility and there is still clinical suspicion for influenza  Therefore I will treat her with a course of Tamiflu 1 capsule twice a day for 5 days  Symptomatic treatment also discussed with patient where she can certainly use DayQuil and NyQuil for symptomatic relief in addition to alternating with Motrin to help with pain and fever control  Rest and fluids, saltwater gargles  Recommended  Germ precautions also discussed and I will keep patient out of work for the rest of the week with work note given  She is to monitor symptoms and should call at any time with any concerns  Chief Complaint   Patient presents with    Cold Like Symptoms    Cough    Fatigue    Generalized Body Aches       Subjective:     Patient ID: Mariza Rhodes is a 26 y o  female     34y/o female here today for acute onset chills, body aches and fatigue  Persistent coughing, sneezing, sore throat and ear pain  Not feeling well past 1-2 days but worsened today  Nasal congestion and runny nose  Some chest tightness, mild SOB with major exertion  Fever at home - almost 102 earlier  Using cough drops  Tried tylenol  Had flu vaccine  Works in nursing home  Review of Systems   Constitutional: Positive for activity change, appetite change, chills, fatigue and fever  HENT: Positive for congestion, ear pain, rhinorrhea, sneezing and sore throat  Respiratory: Positive for cough (dry)  Cardiovascular: Negative  Gastrointestinal: Negative  Psychiatric/Behavioral: Negative  The following portions of the patient's history were reviewed and updated as appropriate: allergies, current medications, past family history, past medical history, past social history, past surgical history and problem list       Objective:     Physical Exam   Constitutional: She is oriented to person, place, and time  She appears ill  HENT:   Head: Normocephalic  Right Ear: Tympanic membrane and ear canal normal    Left Ear: Tympanic membrane and ear canal normal    Nose: Nose normal    Mouth/Throat: Oropharynx is clear and moist    Neck: Neck supple  Cardiovascular: Normal rate, regular rhythm and normal heart sounds  Pulmonary/Chest: Effort normal and breath sounds normal    Lymphadenopathy:     She has no cervical adenopathy  Neurological: She is alert and oriented to person, place, and time  Skin: Skin is intact  Psychiatric: She has a normal mood and affect  Vitals reviewed        Vitals:    12/04/18 1510   BP: 116/80   BP Location: Left arm   Patient Position: Sitting   Cuff Size: Adult   Pulse: 92   Resp: 16   Temp: 98 9 °F (37 2 °C)   TempSrc: Tympanic   SpO2: 93%   Weight: 57 7 kg (127 lb 1 6 oz)   Height: 5' 3 5" (1 613 m)

## 2018-12-04 NOTE — LETTER
December 4, 2018     Patient: Pita Fabian   YOB: 1985   Date of Visit: 12/4/2018       To Whom it May Concern:    Pita Fbaian is under my professional care  She was seen in my office on 12/4/2018  She may return to work on 12/10/18  If you have any questions or concerns, please don't hesitate to call           Sincerely,          Uche Vilchis PA-C        CC: No Recipients

## 2018-12-05 LAB
SL AMB POCT RAPID FLU A: NEGATIVE
SL AMB POCT RAPID FLU B: NEGATIVE

## 2018-12-05 RX ORDER — DULOXETIN HYDROCHLORIDE 30 MG/1
30 CAPSULE, DELAYED RELEASE ORAL DAILY
Qty: 30 CAPSULE | Refills: 0 | Status: SHIPPED | OUTPATIENT
Start: 2018-12-05 | End: 2019-01-31 | Stop reason: SDUPTHER

## 2018-12-05 RX ORDER — CLONAZEPAM 0.5 MG/1
0.75 TABLET ORAL EVERY 12 HOURS PRN
Qty: 90 TABLET | Refills: 0 | Status: SHIPPED | OUTPATIENT
Start: 2018-12-05 | End: 2018-12-27 | Stop reason: SDUPTHER

## 2018-12-05 NOTE — TELEPHONE ENCOUNTER
From: Mariza Rhodes  Sent: 12/4/2018 11:49 PM EST  Subject: Medication Renewal Request    Mariza Rhodes would like a refill of the following medications:     DULoxetine (CYMBALTA) 30 mg delayed release capsule [Marilyn Pepe DO]     clonazePAM (KlonoPIN) 0 5 mg tablet Sandip Tinoco DO]    Preferred pharmacy: [facility]/PHARMACY #[id number] : [id number]    Comment:

## 2018-12-06 DIAGNOSIS — H92.09 EARACHE: Primary | ICD-10-CM

## 2018-12-06 RX ORDER — IBUPROFEN 600 MG/1
600 TABLET ORAL EVERY 6 HOURS PRN
Qty: 30 TABLET | Refills: 2 | Status: SHIPPED | OUTPATIENT
Start: 2018-12-06 | End: 2019-01-31 | Stop reason: SDUPTHER

## 2018-12-27 DIAGNOSIS — F41.1 GENERALIZED ANXIETY DISORDER: ICD-10-CM

## 2018-12-27 RX ORDER — CLONAZEPAM 0.5 MG/1
TABLET ORAL
Qty: 90 TABLET | Refills: 0 | Status: SHIPPED | OUTPATIENT
Start: 2018-12-27 | End: 2019-01-31 | Stop reason: SDUPTHER

## 2019-01-31 DIAGNOSIS — H92.09 EARACHE: ICD-10-CM

## 2019-01-31 DIAGNOSIS — F41.1 GENERALIZED ANXIETY DISORDER: ICD-10-CM

## 2019-01-31 RX ORDER — IBUPROFEN 600 MG/1
600 TABLET ORAL EVERY 6 HOURS PRN
Qty: 30 TABLET | Refills: 0 | Status: SHIPPED | OUTPATIENT
Start: 2019-01-31 | End: 2020-08-06 | Stop reason: SDUPTHER

## 2019-01-31 RX ORDER — CLONAZEPAM 0.5 MG/1
0.75 TABLET ORAL 2 TIMES DAILY PRN
Qty: 90 TABLET | Refills: 0 | Status: SHIPPED | OUTPATIENT
Start: 2019-01-31 | End: 2019-02-26 | Stop reason: SDUPTHER

## 2019-01-31 RX ORDER — DULOXETIN HYDROCHLORIDE 30 MG/1
30 CAPSULE, DELAYED RELEASE ORAL DAILY
Qty: 30 CAPSULE | Refills: 5 | Status: SHIPPED | OUTPATIENT
Start: 2019-01-31 | End: 2019-02-04 | Stop reason: SDUPTHER

## 2019-02-01 NOTE — TELEPHONE ENCOUNTER
From: Britt Ravi  Sent: 1/31/2019 7:34 PM EST  Subject: Medication Renewal Request    Britt Ravi would like a refill of the following medications:     ibuprofen (MOTRIN) 600 mg tablet Britt Leonard PA-C]    Preferred pharmacy: [facility]/PHARMACY #[id number] : [id number]    Comment:      Medication renewals requested in this message routed separately:     DULoxetine (CYMBALTA) 30 mg delayed release capsule [Marilyn Pepe, DO]     clonazePAM (KlonoPIN) 0 5 mg tablet [Ihab Pepe, DO]

## 2019-02-01 NOTE — TELEPHONE ENCOUNTER
From: Martin Escamilla  Sent: 1/31/2019 7:34 PM EST  Subject: Medication Renewal Request    Martin Escamilla would like a refill of the following medications:     DULoxetine (CYMBALTA) 30 mg delayed release capsule [Marilyn Pepe DO]     clonazePAM (KlonoPIN) 0 5 mg tablet José Luis Lara DO]    Preferred pharmacy: [facility]/PHARMACY #[id number] : [id number]    Comment:      Medication renewals requested in this message routed separately:     ibuprofen (MOTRIN) 600 mg tablet GISELLE Kearney

## 2019-02-04 DIAGNOSIS — F41.1 GENERALIZED ANXIETY DISORDER: ICD-10-CM

## 2019-02-04 RX ORDER — DULOXETIN HYDROCHLORIDE 30 MG/1
30 CAPSULE, DELAYED RELEASE ORAL DAILY
Qty: 30 CAPSULE | Refills: 5 | Status: SHIPPED | OUTPATIENT
Start: 2019-02-04 | End: 2019-02-26 | Stop reason: SDUPTHER

## 2019-02-04 NOTE — TELEPHONE ENCOUNTER
Patient ask if this RX can be send to [address] [place] [address], reason is because patient does not have insurance and is more convenient  for her  Next appt 02/06/19  I will call to cancel RX for cvc esteban

## 2019-02-06 ENCOUNTER — TELEPHONE (OUTPATIENT)
Dept: FAMILY MEDICINE CLINIC | Facility: CLINIC | Age: 34
End: 2019-02-06

## 2019-02-06 NOTE — TELEPHONE ENCOUNTER
Negro Aguero called and left a message at 11:06 am asking if her medication Duloxetine 30 mg was sent to the [facility]  Pt has not received  A call from our office and or the pharmacy she would like to make sure it was sent in  Calling [facility] on [address] [place] [address] to see if pt's rx was received  Received and ready for   I will call pt to inform  I called Negro Aguero and left message on her cell phone consent ok that her script is ready for  at [facility] any questions she is to call

## 2019-02-26 ENCOUNTER — OFFICE VISIT (OUTPATIENT)
Dept: FAMILY MEDICINE CLINIC | Facility: CLINIC | Age: 34
End: 2019-02-26

## 2019-02-26 VITALS
BODY MASS INDEX: 23.09 KG/M2 | HEIGHT: 63 IN | TEMPERATURE: 97.6 F | HEART RATE: 90 BPM | RESPIRATION RATE: 16 BRPM | OXYGEN SATURATION: 98 % | SYSTOLIC BLOOD PRESSURE: 102 MMHG | DIASTOLIC BLOOD PRESSURE: 80 MMHG | WEIGHT: 130.3 LBS

## 2019-02-26 DIAGNOSIS — J01.90 ACUTE SINUSITIS, RECURRENCE NOT SPECIFIED, UNSPECIFIED LOCATION: Primary | ICD-10-CM

## 2019-02-26 DIAGNOSIS — G47.00 INSOMNIA, UNSPECIFIED TYPE: ICD-10-CM

## 2019-02-26 DIAGNOSIS — F41.1 GENERALIZED ANXIETY DISORDER: ICD-10-CM

## 2019-02-26 PROCEDURE — 99213 OFFICE O/P EST LOW 20 MIN: CPT | Performed by: FAMILY MEDICINE

## 2019-02-26 RX ORDER — TRAZODONE HYDROCHLORIDE 50 MG/1
50 TABLET ORAL
Qty: 90 TABLET | Refills: 0 | Status: SHIPPED | OUTPATIENT
Start: 2019-02-26 | End: 2020-01-24 | Stop reason: SDUPTHER

## 2019-02-26 RX ORDER — DULOXETIN HYDROCHLORIDE 30 MG/1
30 CAPSULE, DELAYED RELEASE ORAL DAILY
Qty: 30 CAPSULE | Refills: 5 | Status: SHIPPED | OUTPATIENT
Start: 2019-02-26 | End: 2019-12-22 | Stop reason: SDUPTHER

## 2019-02-26 RX ORDER — CLONAZEPAM 0.5 MG/1
0.75 TABLET ORAL 2 TIMES DAILY PRN
Qty: 90 TABLET | Refills: 0 | Status: SHIPPED | OUTPATIENT
Start: 2019-02-26 | End: 2019-03-28 | Stop reason: SDUPTHER

## 2019-02-26 RX ORDER — AMOXICILLIN AND CLAVULANATE POTASSIUM 875; 125 MG/1; MG/1
1 TABLET, FILM COATED ORAL EVERY 12 HOURS SCHEDULED
Qty: 14 TABLET | Refills: 0 | Status: SHIPPED | OUTPATIENT
Start: 2019-02-26 | End: 2019-03-05

## 2019-02-26 NOTE — PROGRESS NOTES
Assessment/Plan:   1  Acute sinusitis, recurrence not specified, unspecified location  Start supportive care  Maintain hydration  Take over-the-counter Mucinex  Will start treatment with Augmentin b i d  For 7 days  Follow up if any symptoms persist   - amoxicillin-clavulanate (AUGMENTIN) 875-125 mg per tablet; Take 1 tablet by mouth every 12 (twelve) hours for 7 days  Dispense: 14 tablet; Refill: 0    2  Generalized anxiety disorder  Patient's symptoms appears stable today  At this time, will continue with her Cymbalta as well as her clonazepam   She was advised that she may highly benefit from seeing a psychologist   As she does not have insurance at this time, will refer her to behavioral health  for further evaluation   - DULoxetine (CYMBALTA) 30 mg delayed release capsule; Take 1 capsule (30 mg total) by mouth daily  Dispense: 30 capsule; Refill: 5  - clonazePAM (KlonoPIN) 0 5 mg tablet; Take 1 5 tablets (0 75 mg total) by mouth 2 (two) times a day as needed for anxiety  Dispense: 90 tablet; Refill: 0  - Ambulatory referral to Social Work; Future    3  Insomnia, unspecified type  Symptoms appears stable today  Will continue with her current treatment of trazodone  Follow up with patient in 6 months  - traZODone (DESYREL) 50 mg tablet; Take 1 tablet (50 mg total) by mouth daily at bedtime as needed for sleep  Dispense: 90 tablet; Refill: 0     Diagnoses and all orders for this visit:    Generalized anxiety disorder  -     DULoxetine (CYMBALTA) 30 mg delayed release capsule; Take 1 capsule (30 mg total) by mouth daily  -     clonazePAM (KlonoPIN) 0 5 mg tablet; Take 1 5 tablets (0 75 mg total) by mouth 2 (two) times a day as needed for anxiety          Subjective:    Chief Complaint   Patient presents with    Follow-up     pt here for her f/u appt and is also complaining of a headache/sneezing runnynose/fever/upset stomach        Patient ID: Aga Izquierdo is a 37 y o  female    Presents today for follow-up on chronic conditions  She has generalized anxiety disorder as well as insomnia  She also takes omeprazole for her reflux  She has been tolerating her medications very well  She denies adverse reactions with her medications  URI    This is a new problem  The current episode started yesterday  The problem has been unchanged  The maximum temperature recorded prior to her arrival was 102 - 102 9 F  Associated symptoms include congestion, coughing, ear pain, headaches, joint pain, rhinorrhea and sinus pain  Pertinent negatives include no abdominal pain, chest pain, diarrhea, dysuria, nausea or sore throat  She has tried acetaminophen and NSAIDs for the symptoms  The treatment provided mild relief  Review of Systems   Constitutional: Negative for activity change, chills, fatigue and fever  HENT: Positive for congestion, ear pain, rhinorrhea and sinus pain  Negative for sinus pressure and sore throat  Eyes: Negative for redness, itching and visual disturbance  Respiratory: Positive for cough  Negative for shortness of breath  Cardiovascular: Negative for chest pain and palpitations  Gastrointestinal: Negative for abdominal pain, diarrhea and nausea  Endocrine: Negative for cold intolerance and heat intolerance  Genitourinary: Negative for dysuria, flank pain and frequency  Musculoskeletal: Positive for joint pain  Negative for arthralgias, back pain, gait problem and myalgias  Skin: Negative for color change  Allergic/Immunologic: Negative for environmental allergies  Neurological: Positive for headaches  Negative for dizziness and numbness  Psychiatric/Behavioral: Negative for behavioral problems and sleep disturbance  The following portions of the patient's history were reviewed and updated as appropriate : past family history, past medical history, past social history and past surgical history        Current Outpatient Medications:     acyclovir (ZOVIRAX) 5 % ointment, Apply topically every 4 (four) hours, Disp: 15 g, Rfl: 3    clonazePAM (KlonoPIN) 0 5 mg tablet, Take 1 5 tablets (0 75 mg total) by mouth 2 (two) times a day as needed for anxiety, Disp: 90 tablet, Rfl: 0    cyclobenzaprine (FLEXERIL) 5 mg tablet, TAKE 1 TABLET BY MOUTH TWICE A DAY, Disp: 60 tablet, Rfl: 1    dicyclomine (BENTYL) 20 mg tablet, Take 1 tablet (20 mg total) by mouth 3 (three) times a day as needed (for abdominal cramping), Disp: 30 tablet, Rfl: 0    DULoxetine (CYMBALTA) 30 mg delayed release capsule, Take 1 capsule (30 mg total) by mouth daily, Disp: 30 capsule, Rfl: 5    fluticasone (FLONASE) 50 mcg/act nasal spray, 2 SPRAYS INTO EACH NOSTRIL DAILY FOR NASAL CONGESTION, Disp: 1 Bottle, Rfl: 1    ibuprofen (MOTRIN) 600 mg tablet, Take 1 tablet (600 mg total) by mouth every 6 (six) hours as needed for mild pain, moderate pain or fever, Disp: 30 tablet, Rfl: 0    Multiple Vitamins-Minerals (MULTIVITAMIN ADULT PO), Take 1 tablet by mouth daily, Disp: , Rfl:     omeprazole (PriLOSEC) 20 mg delayed release capsule, TAKE 1 CAPSULE BY MOUTH EVERY DAY, Disp: 30 capsule, Rfl: 0    traZODone (DESYREL) 50 mg tablet, Take 1 tablet (50 mg total) by mouth daily at bedtime as needed for sleep, Disp: 90 tablet, Rfl: 0    Objective:    Vitals:    02/26/19 1843   BP: 102/80   BP Location: Left arm   Patient Position: Sitting   Cuff Size: Adult   Pulse: 90   Resp: 16   Temp: 97 6 °F (36 4 °C)   TempSrc: Tympanic   SpO2: 98%   Weight: 59 1 kg (130 lb 4 8 oz)   Height: 5' 3 4" (1 61 m)        Physical Exam   Constitutional: She is oriented to person, place, and time  She appears well-developed and well-nourished  HENT:   Head: Normocephalic and atraumatic  Nose: Nose normal    Mouth/Throat: No oropharyngeal exudate  Eyes: Pupils are equal, round, and reactive to light  Right eye exhibits no discharge  Left eye exhibits no discharge  Neck: Normal range of motion  Neck supple   No tracheal deviation present  Cardiovascular: Normal rate, regular rhythm and intact distal pulses  Exam reveals no gallop and no friction rub  No murmur heard  Pulses:       Dorsalis pedis pulses are 2+ on the right side, and 2+ on the left side  Posterior tibial pulses are 2+ on the right side, and 2+ on the left side  Pulmonary/Chest: Effort normal and breath sounds normal  No respiratory distress  She has no wheezes  She has no rales  Abdominal: Soft  Bowel sounds are normal  She exhibits no distension  There is no tenderness  There is no rebound and no guarding  Musculoskeletal: Normal range of motion  She exhibits no edema  Lymphadenopathy:        Head (right side): No submental and no submandibular adenopathy present  Head (left side): No submental and no submandibular adenopathy present  She has no cervical adenopathy  Right cervical: No superficial cervical, no deep cervical and no posterior cervical adenopathy present  Left cervical: No superficial cervical, no deep cervical and no posterior cervical adenopathy present  Neurological: She is alert and oriented to person, place, and time  No cranial nerve deficit or sensory deficit  Skin: Skin is warm, dry and intact  Psychiatric: Her speech is normal and behavior is normal  Judgment normal  Her mood appears not anxious  Cognition and memory are normal  She does not exhibit a depressed mood  Vitals reviewed

## 2019-03-07 DIAGNOSIS — G47.00 INSOMNIA, UNSPECIFIED TYPE: ICD-10-CM

## 2019-03-07 RX ORDER — TRAZODONE HYDROCHLORIDE 50 MG/1
50 TABLET ORAL
Qty: 90 TABLET | Refills: 0 | Status: SHIPPED | OUTPATIENT
Start: 2019-03-07 | End: 2019-08-20 | Stop reason: SDUPTHER

## 2019-03-13 ENCOUNTER — OFFICE VISIT (OUTPATIENT)
Dept: FAMILY MEDICINE CLINIC | Facility: CLINIC | Age: 34
End: 2019-03-13

## 2019-03-13 VITALS
BODY MASS INDEX: 21.73 KG/M2 | WEIGHT: 130.4 LBS | TEMPERATURE: 98.7 F | HEART RATE: 86 BPM | DIASTOLIC BLOOD PRESSURE: 84 MMHG | RESPIRATION RATE: 17 BRPM | SYSTOLIC BLOOD PRESSURE: 112 MMHG | HEIGHT: 65 IN | OXYGEN SATURATION: 99 %

## 2019-03-13 DIAGNOSIS — K52.9 GASTROENTERITIS: Primary | ICD-10-CM

## 2019-03-13 PROCEDURE — 99213 OFFICE O/P EST LOW 20 MIN: CPT | Performed by: PHYSICIAN ASSISTANT

## 2019-03-13 NOTE — LETTER
March 13, 2019     Patient: Casey Araujo   YOB: 1985   Date of Visit: 3/13/2019       To Whom it May Concern:    Casey Araujo is under my professional care  She was seen in my office on 3/13/2019  She may return to work on 3/15/19  If you have any questions or concerns, please don't hesitate to call           Sincerely,          Meet Thompson PA-C        CC: No Recipients

## 2019-03-13 NOTE — PROGRESS NOTES
Assessment/Plan:      Diagnoses and all orders for this visit:    Gastroenteritis      33y/o female here today for acute GI sxs consistent with gastroenteritis  BRAT diet, hydration with clear fluids, rest all advised  Risk for dehydration discussed  Germ precautions discussed, work note given  She has zofran and dicyclomine at home and can use prn  Tylenol prn achiness or pain control  Heating pad to abdomen  Pt to call if sxs persist or worsen despite time and treatment  Chief Complaint   Patient presents with    Vomiting     pt complains of chills/vomiting/diarrhia/fatigue that started yesterday    Abdominal Pain    diarrhia       Subjective:     Patient ID: Gaudencio Ramirez is a 37 y o  female     33y/o female here today for acute GI sxs x 2-3 days  Reports weakness and fatigue, achiness, N/V/D, upset stomach and cramping  She remains sick  No appetite  She is drinking small sips of water, ginger ale  Last vomiting episode this AM  No blood in vomit or diarrhea  Review of Systems   Constitutional: Positive for activity change, appetite change, chills and fatigue  Negative for fever  HENT: Negative  Respiratory: Negative  Cardiovascular: Negative  Gastrointestinal:        As in HPI   Genitourinary: Negative  Neurological: Negative  The following portions of the patient's history were reviewed and updated as appropriate: allergies, current medications, past family history, past medical history, past social history, past surgical history and problem list       Objective:     Physical Exam   Constitutional: She is oriented to person, place, and time  She appears well-developed  She appears ill  fatigue   Neck: Neck supple  Cardiovascular: Normal rate, regular rhythm and normal heart sounds  Pulmonary/Chest: Effort normal and breath sounds normal    Abdominal: Soft  Normal appearance  Bowel sounds are increased  There is generalized tenderness   There is no rigidity, no rebound and no guarding  Lymphadenopathy:     She has no cervical adenopathy  Neurological: She is alert and oriented to person, place, and time  Psychiatric: She has a normal mood and affect  Vitals reviewed        Vitals:    03/13/19 0933   BP: 112/84   BP Location: Left arm   Patient Position: Sitting   Cuff Size: Adult   Pulse: 86   Resp: 17   Temp: 98 7 °F (37 1 °C)   TempSrc: Tympanic   SpO2: 99%   Weight: 59 1 kg (130 lb 6 4 oz)   Height: 5' 4 5" (1 638 m)

## 2019-03-28 DIAGNOSIS — F41.1 GENERALIZED ANXIETY DISORDER: ICD-10-CM

## 2019-03-28 RX ORDER — CLONAZEPAM 0.5 MG/1
0.75 TABLET ORAL 2 TIMES DAILY PRN
Qty: 90 TABLET | Refills: 0 | Status: SHIPPED | OUTPATIENT
Start: 2019-03-28 | End: 2019-05-21 | Stop reason: SDUPTHER

## 2019-04-01 ENCOUNTER — TELEPHONE (OUTPATIENT)
Dept: FAMILY MEDICINE CLINIC | Facility: CLINIC | Age: 34
End: 2019-04-01

## 2019-04-26 DIAGNOSIS — F41.1 GENERALIZED ANXIETY DISORDER: ICD-10-CM

## 2019-04-26 RX ORDER — CLONAZEPAM 0.5 MG/1
0.75 TABLET ORAL 2 TIMES DAILY PRN
Qty: 90 TABLET | Refills: 0 | Status: CANCELLED | OUTPATIENT
Start: 2019-04-26

## 2019-05-21 DIAGNOSIS — F41.1 GENERALIZED ANXIETY DISORDER: ICD-10-CM

## 2019-05-21 RX ORDER — CLONAZEPAM 0.5 MG/1
0.75 TABLET ORAL 2 TIMES DAILY PRN
Qty: 90 TABLET | Refills: 0 | Status: CANCELLED | OUTPATIENT
Start: 2019-05-21

## 2019-05-21 RX ORDER — CLONAZEPAM 0.5 MG/1
0.75 TABLET ORAL 2 TIMES DAILY PRN
Qty: 90 TABLET | Refills: 0 | Status: SHIPPED | OUTPATIENT
Start: 2019-05-21 | End: 2019-06-25 | Stop reason: SDUPTHER

## 2019-05-25 RX ORDER — CLONAZEPAM 0.5 MG/1
0.75 TABLET ORAL 2 TIMES DAILY PRN
Qty: 90 TABLET | Refills: 0 | OUTPATIENT
Start: 2019-05-25

## 2019-06-25 DIAGNOSIS — F41.1 GENERALIZED ANXIETY DISORDER: ICD-10-CM

## 2019-06-26 RX ORDER — CLONAZEPAM 0.5 MG/1
0.75 TABLET ORAL 2 TIMES DAILY PRN
Qty: 90 TABLET | Refills: 0 | Status: SHIPPED | OUTPATIENT
Start: 2019-06-26 | End: 2019-08-20 | Stop reason: SDUPTHER

## 2019-08-20 ENCOUNTER — OFFICE VISIT (OUTPATIENT)
Dept: FAMILY MEDICINE CLINIC | Facility: CLINIC | Age: 34
End: 2019-08-20

## 2019-08-20 VITALS
RESPIRATION RATE: 16 BRPM | SYSTOLIC BLOOD PRESSURE: 110 MMHG | HEART RATE: 70 BPM | WEIGHT: 122.4 LBS | TEMPERATURE: 99 F | DIASTOLIC BLOOD PRESSURE: 72 MMHG | HEIGHT: 65 IN | BODY MASS INDEX: 20.39 KG/M2 | OXYGEN SATURATION: 97 %

## 2019-08-20 DIAGNOSIS — G47.00 INSOMNIA, UNSPECIFIED TYPE: ICD-10-CM

## 2019-08-20 DIAGNOSIS — F41.1 GENERALIZED ANXIETY DISORDER: ICD-10-CM

## 2019-08-20 DIAGNOSIS — M50.30 DEGENERATION OF CERVICAL INTERVERTEBRAL DISC: ICD-10-CM

## 2019-08-20 DIAGNOSIS — R51.9 CHRONIC NONINTRACTABLE HEADACHE, UNSPECIFIED HEADACHE TYPE: Primary | ICD-10-CM

## 2019-08-20 DIAGNOSIS — G89.29 CHRONIC NONINTRACTABLE HEADACHE, UNSPECIFIED HEADACHE TYPE: Primary | ICD-10-CM

## 2019-08-20 PROCEDURE — 99213 OFFICE O/P EST LOW 20 MIN: CPT | Performed by: FAMILY MEDICINE

## 2019-08-20 RX ORDER — GABAPENTIN 100 MG/1
200 CAPSULE ORAL
Qty: 60 CAPSULE | Refills: 2 | Status: SHIPPED | OUTPATIENT
Start: 2019-08-20 | End: 2020-10-03 | Stop reason: ALTCHOICE

## 2019-08-20 RX ORDER — TRAZODONE HYDROCHLORIDE 50 MG/1
50 TABLET ORAL
Qty: 90 TABLET | Refills: 0 | Status: SHIPPED | OUTPATIENT
Start: 2019-08-20 | End: 2020-01-23 | Stop reason: SDUPTHER

## 2019-08-20 RX ORDER — CLONAZEPAM 0.5 MG/1
0.75 TABLET ORAL 2 TIMES DAILY PRN
Qty: 90 TABLET | Refills: 0 | Status: SHIPPED | OUTPATIENT
Start: 2019-08-20 | End: 2019-09-19 | Stop reason: SDUPTHER

## 2019-08-20 NOTE — PROGRESS NOTES
Assessment/Plan:   1  Generalized anxiety disorder  Reviewed patient's symptoms today  At this time, her anxiety appears stable  Will continue with her current treatment of Cymbalta as well as her clonazepam   - clonazePAM (KlonoPIN) 0 5 mg tablet; Take 1 5 tablets (0 75 mg total) by mouth 2 (two) times a day as needed for anxiety  Dispense: 90 tablet; Refill: 0    2  Insomnia, unspecified type  Stable today as well  She denies any recent exacerbations of her sleep  Will continue with her trazodone 50 mg daily  - traZODone (DESYREL) 50 mg tablet; Take 1 tablet (50 mg total) by mouth daily at bedtime as needed for sleep  Dispense: 90 tablet; Refill: 0    3  Chronic nonintractable headache, unspecified headache type  Reviewed patient's symptoms today  At this time, given her chronic cervical DJD, as well as her recent headaches, she may highly benefit from starting treatment with gabapentin 100 mg q h s  P r n   If any symptoms should worsen, she was advised to follow up recall   - gabapentin (NEURONTIN) 100 mg capsule; Take 2 capsules (200 mg total) by mouth daily at bedtime for 30 days  Dispense: 60 capsule; Refill: 2    4  Degeneration of cervical intervertebral disc  Patient has been having persistent problems with her cervical neck pain  Start treatment with gabapentin  She may benefit from seeing a spinal specialist   She was advised to continue to work on receiving insurance  - gabapentin (NEURONTIN) 100 mg capsule; Take 2 capsules (200 mg total) by mouth daily at bedtime for 30 days  Dispense: 60 capsule; Refill: 2     Diagnoses and all orders for this visit:    Generalized anxiety disorder  -     clonazePAM (KlonoPIN) 0 5 mg tablet; Take 1 5 tablets (0 75 mg total) by mouth 2 (two) times a day as needed for anxiety    Insomnia, unspecified type  -     traZODone (DESYREL) 50 mg tablet;  Take 1 tablet (50 mg total) by mouth daily at bedtime as needed for sleep          Subjective:    Chief Complaint Patient presents with    Follow-up     med check         Patient ID: Dottie Haines is a 37 y o  female  HPI    Review of Systems   Constitutional: Negative for activity change, chills, fatigue and fever  HENT: Negative for congestion, ear pain, sinus pressure and sore throat  Eyes: Negative for redness, itching and visual disturbance  Respiratory: Negative for cough and shortness of breath  Cardiovascular: Negative for chest pain and palpitations  Gastrointestinal: Negative for abdominal pain, diarrhea and nausea  Endocrine: Negative for cold intolerance and heat intolerance  Genitourinary: Negative for dysuria, flank pain and frequency  Musculoskeletal: Negative for arthralgias, back pain, gait problem and myalgias  Skin: Negative for color change  Allergic/Immunologic: Negative for environmental allergies  Neurological: Negative for dizziness, numbness and headaches  Psychiatric/Behavioral: Negative for behavioral problems and sleep disturbance  The following portions of the patient's history were reviewed and updated as appropriate : past family history, past medical history, past social history and past surgical history        Current Outpatient Medications:     acyclovir (ZOVIRAX) 5 % ointment, Apply topically every 4 (four) hours, Disp: 15 g, Rfl: 3    clonazePAM (KlonoPIN) 0 5 mg tablet, Take 1 5 tablets (0 75 mg total) by mouth 2 (two) times a day as needed for anxiety, Disp: 90 tablet, Rfl: 0    cyclobenzaprine (FLEXERIL) 5 mg tablet, TAKE 1 TABLET BY MOUTH TWICE A DAY, Disp: 60 tablet, Rfl: 1    dicyclomine (BENTYL) 20 mg tablet, Take 1 tablet (20 mg total) by mouth 3 (three) times a day as needed (for abdominal cramping), Disp: 30 tablet, Rfl: 0    DULoxetine (CYMBALTA) 30 mg delayed release capsule, Take 1 capsule (30 mg total) by mouth daily, Disp: 30 capsule, Rfl: 5    fluticasone (FLONASE) 50 mcg/act nasal spray, 2 SPRAYS INTO EACH NOSTRIL DAILY FOR NASAL CONGESTION, Disp: 1 Bottle, Rfl: 1    ibuprofen (MOTRIN) 600 mg tablet, Take 1 tablet (600 mg total) by mouth every 6 (six) hours as needed for mild pain, moderate pain or fever, Disp: 30 tablet, Rfl: 0    Multiple Vitamins-Minerals (MULTIVITAMIN ADULT PO), Take 1 tablet by mouth daily, Disp: , Rfl:     omeprazole (PriLOSEC) 20 mg delayed release capsule, TAKE 1 CAPSULE BY MOUTH EVERY DAY, Disp: 30 capsule, Rfl: 0    traZODone (DESYREL) 50 mg tablet, Take 1 tablet (50 mg total) by mouth daily at bedtime as needed for sleep, Disp: 90 tablet, Rfl: 0    traZODone (DESYREL) 50 mg tablet, TAKE 1 TABLET (50 MG TOTAL) BY MOUTH DAILY AT BEDTIME AS NEEDED FOR SLEEP, Disp: 90 tablet, Rfl: 0    Objective:    Vitals:    08/20/19 1747   BP: 110/72   BP Location: Right arm   Patient Position: Sitting   Cuff Size: Adult   Pulse: 70   Resp: 16   Temp: 99 °F (37 2 °C)   TempSrc: Tympanic   SpO2: 97%   Weight: 55 5 kg (122 lb 6 4 oz)   Height: 5' 4 5" (1 638 m)        Physical Exam   Constitutional: She is oriented to person, place, and time  She appears well-developed and well-nourished  HENT:   Head: Normocephalic and atraumatic  Nose: Nose normal    Mouth/Throat: No oropharyngeal exudate  Eyes: Pupils are equal, round, and reactive to light  Right eye exhibits no discharge  Left eye exhibits no discharge  Neck: Normal range of motion  Neck supple  No tracheal deviation present  Cardiovascular: Normal rate, regular rhythm and intact distal pulses  Exam reveals no gallop and no friction rub  No murmur heard  Pulses:       Dorsalis pedis pulses are 2+ on the right side, and 2+ on the left side  Posterior tibial pulses are 2+ on the right side, and 2+ on the left side  Pulmonary/Chest: Effort normal and breath sounds normal  No respiratory distress  She has no wheezes  She has no rales  Abdominal: Soft  Bowel sounds are normal  She exhibits no distension  There is no tenderness   There is no rebound and no guarding  Musculoskeletal: Normal range of motion  She exhibits no edema  Lymphadenopathy:        Head (right side): No submental and no submandibular adenopathy present  Head (left side): No submental and no submandibular adenopathy present  She has no cervical adenopathy  Right cervical: No superficial cervical, no deep cervical and no posterior cervical adenopathy present  Left cervical: No superficial cervical, no deep cervical and no posterior cervical adenopathy present  Neurological: She is alert and oriented to person, place, and time  No cranial nerve deficit or sensory deficit  Skin: Skin is warm, dry and intact  Psychiatric: Her speech is normal and behavior is normal  Judgment normal  Her mood appears not anxious  Cognition and memory are normal  She does not exhibit a depressed mood  Vitals reviewed

## 2019-09-19 DIAGNOSIS — F41.1 GENERALIZED ANXIETY DISORDER: ICD-10-CM

## 2019-09-24 RX ORDER — CLONAZEPAM 0.5 MG/1
0.75 TABLET ORAL 2 TIMES DAILY PRN
Qty: 90 TABLET | Refills: 0 | Status: SHIPPED | OUTPATIENT
Start: 2019-09-24 | End: 2019-09-25 | Stop reason: SDUPTHER

## 2019-09-24 NOTE — TELEPHONE ENCOUNTER
PDMP checked, last filled 8/31/2019  See pharmacy note; do not fill until 9/30/2019  Last appt: 8/20/2019  Next appt: none scheduled

## 2019-09-25 DIAGNOSIS — F41.1 GENERALIZED ANXIETY DISORDER: ICD-10-CM

## 2019-09-25 RX ORDER — CLONAZEPAM 0.5 MG/1
0.75 TABLET ORAL 2 TIMES DAILY PRN
Qty: 90 TABLET | Refills: 0 | Status: SHIPPED | OUTPATIENT
Start: 2019-09-25 | End: 2019-10-01 | Stop reason: SDUPTHER

## 2019-09-25 NOTE — TELEPHONE ENCOUNTER
Did this patient want her prescription printed? If not, please place order again and I will authorized to be transmitted electronically

## 2019-10-01 DIAGNOSIS — F41.1 GENERALIZED ANXIETY DISORDER: ICD-10-CM

## 2019-10-01 RX ORDER — CLONAZEPAM 0.5 MG/1
TABLET ORAL
Qty: 90 TABLET | Refills: 0 | Status: SHIPPED | OUTPATIENT
Start: 2019-10-01 | End: 2019-10-31 | Stop reason: SDUPTHER

## 2019-10-01 NOTE — TELEPHONE ENCOUNTER
Please send to [facility] [place] instead of [facility], sent to wrong pharmacy   PDMP checked, pt did not  RX at [facility]

## 2019-10-31 DIAGNOSIS — F41.1 GENERALIZED ANXIETY DISORDER: ICD-10-CM

## 2019-11-01 RX ORDER — CLONAZEPAM 0.5 MG/1
1.5 TABLET ORAL 2 TIMES DAILY PRN
Qty: 90 TABLET | Refills: 0 | Status: SHIPPED | OUTPATIENT
Start: 2019-11-01 | End: 2019-12-01 | Stop reason: SDUPTHER

## 2019-12-01 DIAGNOSIS — F41.1 GENERALIZED ANXIETY DISORDER: ICD-10-CM

## 2019-12-03 RX ORDER — CLONAZEPAM 0.5 MG/1
1.5 TABLET ORAL 2 TIMES DAILY PRN
Qty: 90 TABLET | Refills: 0 | Status: SHIPPED | OUTPATIENT
Start: 2019-12-03 | End: 2019-12-22 | Stop reason: SDUPTHER

## 2019-12-22 DIAGNOSIS — F41.1 GENERALIZED ANXIETY DISORDER: ICD-10-CM

## 2019-12-23 RX ORDER — DULOXETIN HYDROCHLORIDE 30 MG/1
30 CAPSULE, DELAYED RELEASE ORAL DAILY
Qty: 30 CAPSULE | Refills: 0 | Status: SHIPPED | OUTPATIENT
Start: 2019-12-23 | End: 2020-02-19

## 2019-12-23 RX ORDER — CLONAZEPAM 0.5 MG/1
1.5 TABLET ORAL 2 TIMES DAILY PRN
Qty: 90 TABLET | Refills: 0 | Status: SHIPPED | OUTPATIENT
Start: 2019-12-23 | End: 2020-01-23 | Stop reason: SDUPTHER

## 2020-01-23 DIAGNOSIS — F41.1 GENERALIZED ANXIETY DISORDER: ICD-10-CM

## 2020-01-23 DIAGNOSIS — G47.00 INSOMNIA, UNSPECIFIED TYPE: ICD-10-CM

## 2020-01-23 RX ORDER — CLONAZEPAM 0.5 MG/1
1.5 TABLET ORAL 2 TIMES DAILY PRN
Qty: 90 TABLET | Refills: 0 | Status: SHIPPED | OUTPATIENT
Start: 2020-01-23 | End: 2020-02-21 | Stop reason: SDUPTHER

## 2020-01-23 RX ORDER — TRAZODONE HYDROCHLORIDE 50 MG/1
50 TABLET ORAL
Qty: 90 TABLET | Refills: 0 | Status: SHIPPED | OUTPATIENT
Start: 2020-01-23 | End: 2020-02-25 | Stop reason: SDUPTHER

## 2020-01-24 RX ORDER — TRAZODONE HYDROCHLORIDE 50 MG/1
50 TABLET ORAL
Qty: 90 TABLET | Refills: 0 | Status: SHIPPED | OUTPATIENT
Start: 2020-01-24 | End: 2020-04-21

## 2020-02-13 ENCOUNTER — TELEPHONE (OUTPATIENT)
Dept: FAMILY MEDICINE CLINIC | Facility: CLINIC | Age: 35
End: 2020-02-13

## 2020-02-18 DIAGNOSIS — F41.1 GENERALIZED ANXIETY DISORDER: ICD-10-CM

## 2020-02-19 RX ORDER — DULOXETIN HYDROCHLORIDE 30 MG/1
CAPSULE, DELAYED RELEASE ORAL
Qty: 30 CAPSULE | Refills: 0 | Status: SHIPPED | OUTPATIENT
Start: 2020-02-19 | End: 2020-02-25 | Stop reason: SDUPTHER

## 2020-02-21 DIAGNOSIS — F41.1 GENERALIZED ANXIETY DISORDER: ICD-10-CM

## 2020-02-21 RX ORDER — CLONAZEPAM 0.5 MG/1
1.5 TABLET ORAL 2 TIMES DAILY PRN
Qty: 90 TABLET | Refills: 0 | Status: SHIPPED | OUTPATIENT
Start: 2020-02-21 | End: 2020-03-19 | Stop reason: SDUPTHER

## 2020-02-25 ENCOUNTER — OFFICE VISIT (OUTPATIENT)
Dept: FAMILY MEDICINE CLINIC | Facility: CLINIC | Age: 35
End: 2020-02-25
Payer: COMMERCIAL

## 2020-02-25 VITALS
RESPIRATION RATE: 16 BRPM | SYSTOLIC BLOOD PRESSURE: 106 MMHG | BODY MASS INDEX: 19.79 KG/M2 | HEART RATE: 76 BPM | WEIGHT: 118.8 LBS | OXYGEN SATURATION: 98 % | HEIGHT: 65 IN | TEMPERATURE: 97.8 F | DIASTOLIC BLOOD PRESSURE: 64 MMHG

## 2020-02-25 DIAGNOSIS — N80.9 ENDOMETRIOSIS: ICD-10-CM

## 2020-02-25 DIAGNOSIS — M50.30 DEGENERATION OF CERVICAL INTERVERTEBRAL DISC: ICD-10-CM

## 2020-02-25 DIAGNOSIS — F41.1 GENERALIZED ANXIETY DISORDER: Primary | ICD-10-CM

## 2020-02-25 DIAGNOSIS — Z13.0 SCREENING FOR IRON DEFICIENCY ANEMIA: ICD-10-CM

## 2020-02-25 DIAGNOSIS — G89.29 CHRONIC BILATERAL LOW BACK PAIN WITH LEFT-SIDED SCIATICA: ICD-10-CM

## 2020-02-25 DIAGNOSIS — Z13.1 SCREENING FOR DIABETES MELLITUS: ICD-10-CM

## 2020-02-25 DIAGNOSIS — G47.00 INSOMNIA, UNSPECIFIED TYPE: ICD-10-CM

## 2020-02-25 DIAGNOSIS — Z13.220 SCREENING FOR CHOLESTEROL LEVEL: ICD-10-CM

## 2020-02-25 DIAGNOSIS — R92.8 ABNORMAL MAMMOGRAM: ICD-10-CM

## 2020-02-25 DIAGNOSIS — Z13.29 SCREENING FOR THYROID DISORDER: ICD-10-CM

## 2020-02-25 DIAGNOSIS — M54.42 CHRONIC BILATERAL LOW BACK PAIN WITH LEFT-SIDED SCIATICA: ICD-10-CM

## 2020-02-25 DIAGNOSIS — K21.9 GERD WITHOUT ESOPHAGITIS: ICD-10-CM

## 2020-02-25 PROBLEM — R94.6 ABNORMAL FINDING ON THYROID FUNCTION TEST: Status: ACTIVE | Noted: 2020-02-25

## 2020-02-25 PROBLEM — D22.9 ATYPICAL NEVUS: Status: ACTIVE | Noted: 2020-02-25

## 2020-02-25 PROCEDURE — 3008F BODY MASS INDEX DOCD: CPT | Performed by: FAMILY MEDICINE

## 2020-02-25 PROCEDURE — 99215 OFFICE O/P EST HI 40 MIN: CPT | Performed by: FAMILY MEDICINE

## 2020-02-25 RX ORDER — CYCLOBENZAPRINE HCL 5 MG
5 TABLET ORAL 2 TIMES DAILY
Qty: 60 TABLET | Refills: 1 | Status: SHIPPED | OUTPATIENT
Start: 2020-02-25 | End: 2020-03-19 | Stop reason: SDUPTHER

## 2020-02-25 RX ORDER — DULOXETIN HYDROCHLORIDE 60 MG/1
60 CAPSULE, DELAYED RELEASE ORAL DAILY
Qty: 30 CAPSULE | Refills: 2 | Status: SHIPPED | OUTPATIENT
Start: 2020-02-25 | End: 2020-04-21 | Stop reason: SDUPTHER

## 2020-02-25 NOTE — PROGRESS NOTES
Assessment/Plan:   1  Generalized anxiety disorder  Reviewed patient's symptoms today  At this time, her anxiety appears persistent  This appears most likely multifactorial   She is stress work as well as with her chronic pain  Will continue at this time with current treatment of Cymbalta as well as her clonazepam   - DULoxetine (CYMBALTA) 60 mg delayed release capsule; Take 1 capsule (60 mg total) by mouth daily  Dispense: 30 capsule; Refill: 2  - CBC; Future  - Comprehensive metabolic panel; Future  - Lipid Panel with Direct LDL reflex; Future  - TSH, 3rd generation with Free T4 reflex; Future  - Hemoglobin A1C W/Refl To Glycomark(R); Future    2  Chronic bilateral low back pain with left-sided sciatica  Reviewed patient's back pain with her  At this time she has not completed her previous lumbar x-rays  Back pain may likely be secondary to her endometriosis  Continue with regular follow-up with her gyn  She may continue with her Flexeril p r n  For symptom relief  - cyclobenzaprine (FLEXERIL) 5 mg tablet; Take 1 tablet (5 mg total) by mouth 2 (two) times a day  Dispense: 60 tablet; Refill: 1  - CBC; Future  - Comprehensive metabolic panel; Future  - Lipid Panel with Direct LDL reflex; Future  - TSH, 3rd generation with Free T4 reflex; Future  - Hemoglobin A1C W/Refl To Glycomark(R); Future    3  Insomnia, unspecified type  Patient's family may likely be secondary to her anxiety as well as her chronic pelvic pain  At this time, she has tried trazodone however this was ineffective  Continue with routine home monitoring  If any symptoms should worsen, she was advised to call or follow up  - CBC; Future  - Comprehensive metabolic panel; Future  - Lipid Panel with Direct LDL reflex; Future  - TSH, 3rd generation with Free T4 reflex; Future  - Hemoglobin A1C W/Refl To Glycomark(R); Future    4  GERD without esophagitis  Stable today  At this time, continue with dietary trigger avoidance  - CBC;  Future  - Comprehensive metabolic panel; Future  - Lipid Panel with Direct LDL reflex; Future  - TSH, 3rd generation with Free T4 reflex; Future  - Hemoglobin A1C W/Refl To Glycomark(R); Future    5  Endometriosis  Patient has been having persistent pelvic pain  Continue with her regular follow-up with her gyn  She will be scheduled for diagnostic laparoscopy  She has been on multiple medications in the past for this problem  Follow-up with patient's 6 months   - CBC; Future  - Comprehensive metabolic panel; Future  - Lipid Panel with Direct LDL reflex; Future  - TSH, 3rd generation with Free T4 reflex; Future  - Hemoglobin A1C W/Refl To Glycomark(R); Future    6  Degeneration of cervical intervertebral disc  - XR spine cervical complete 4 or 5 vw non injury; Future    7  Abnormal mammogram  - US breast left limited (diagnostic); Future           Diagnoses and all orders for this visit:    GERD without esophagitis    Generalized anxiety disorder    Chronic bilateral low back pain with left-sided sciatica  -     cyclobenzaprine (FLEXERIL) 5 mg tablet; Take 1 tablet (5 mg total) by mouth 2 (two) times a day    Chronic left-sided low back pain with left-sided sciatica    Insomnia, unspecified type          Subjective:       Chief Complaint   Patient presents with    Follow-up     6 month       Patient ID: Pedro Ohara is a 38 y o  female  Patient is a 18-year-old female presents today for follow-up on her chronic conditions  She has generalized anxiety disorder, chronic low back pain, insomnia, GERD as well as endometriosis  She has been taking medications regularly  She denies adverse reactions with her medications  She has been having persistent back problems  She has been following up with her gyn regularly  She will be scheduled next month for diagnostic laparoscopy  It appears that she has been having persistent pelvic pain  This has been severely affecting her sleep        Review of Systems Constitutional: Negative for activity change, chills, fatigue and fever  HENT: Negative for congestion, ear pain, sinus pressure and sore throat  Eyes: Negative for redness, itching and visual disturbance  Respiratory: Negative for cough and shortness of breath  Cardiovascular: Negative for chest pain and palpitations  Gastrointestinal: Negative for abdominal pain, diarrhea and nausea  Endocrine: Negative for cold intolerance and heat intolerance  Genitourinary: Negative for dysuria, flank pain and frequency  Musculoskeletal: Negative for arthralgias, back pain, gait problem and myalgias  Skin: Negative for color change  Allergic/Immunologic: Negative for environmental allergies  Neurological: Negative for dizziness, numbness and headaches  Psychiatric/Behavioral: Negative for behavioral problems and sleep disturbance  The following portions of the patient's history were reviewed and updated as appropriate : past family history, past medical history, past social history and past surgical history      Current Outpatient Medications:     acyclovir (ZOVIRAX) 5 % ointment, Apply topically every 4 (four) hours, Disp: 15 g, Rfl: 3    clonazePAM (KlonoPIN) 0 5 mg tablet, Take 3 tablets (1 5 mg total) by mouth 2 (two) times a day as needed for anxiety, Disp: 90 tablet, Rfl: 0    cyclobenzaprine (FLEXERIL) 5 mg tablet, TAKE 1 TABLET BY MOUTH TWICE A DAY, Disp: 60 tablet, Rfl: 1    dicyclomine (BENTYL) 20 mg tablet, Take 1 tablet (20 mg total) by mouth 3 (three) times a day as needed (for abdominal cramping), Disp: 30 tablet, Rfl: 0    DULoxetine (CYMBALTA) 30 mg delayed release capsule, TAKE 1 CAPSULE BY MOUTH EVERY DAY, Disp: 30 capsule, Rfl: 0    fluticasone (FLONASE) 50 mcg/act nasal spray, 2 SPRAYS INTO EACH NOSTRIL DAILY FOR NASAL CONGESTION, Disp: 1 Bottle, Rfl: 1    ibuprofen (MOTRIN) 600 mg tablet, Take 1 tablet (600 mg total) by mouth every 6 (six) hours as needed for mild pain, moderate pain or fever, Disp: 30 tablet, Rfl: 0    Multiple Vitamins-Minerals (MULTIVITAMIN ADULT PO), Take 1 tablet by mouth daily, Disp: , Rfl:     omeprazole (PriLOSEC) 20 mg delayed release capsule, TAKE 1 CAPSULE BY MOUTH EVERY DAY, Disp: 30 capsule, Rfl: 0    traZODone (DESYREL) 50 mg tablet, Take 1 tablet (50 mg total) by mouth daily at bedtime as needed for sleep, Disp: 90 tablet, Rfl: 0    gabapentin (NEURONTIN) 100 mg capsule, Take 2 capsules (200 mg total) by mouth daily at bedtime for 30 days, Disp: 60 capsule, Rfl: 2         Objective:         Vitals:    02/25/20 1112   BP: 106/64   BP Location: Left arm   Patient Position: Sitting   Cuff Size: Adult   Pulse: 76   Resp: 16   Temp: 97 8 °F (36 6 °C)   TempSrc: Tympanic   SpO2: 98%   Weight: 53 9 kg (118 lb 12 8 oz)   Height: 5' 4 5" (1 638 m)     Physical Exam   Constitutional: She is oriented to person, place, and time  She appears well-developed and well-nourished  HENT:   Head: Normocephalic and atraumatic  Nose: Nose normal    Mouth/Throat: No oropharyngeal exudate  Eyes: Pupils are equal, round, and reactive to light  Right eye exhibits no discharge  Left eye exhibits no discharge  Neck: Normal range of motion  Neck supple  No tracheal deviation present  Cardiovascular: Normal rate, regular rhythm and intact distal pulses  Exam reveals no gallop and no friction rub  No murmur heard  Pulses:       Dorsalis pedis pulses are 2+ on the right side, and 2+ on the left side  Posterior tibial pulses are 2+ on the right side, and 2+ on the left side  Pulmonary/Chest: Effort normal and breath sounds normal  No respiratory distress  She has no wheezes  She has no rales  Abdominal: Soft  Bowel sounds are normal  She exhibits no distension  There is no tenderness  There is no rebound and no guarding  Musculoskeletal: Normal range of motion  She exhibits no edema     Lymphadenopathy:        Head (right side): No submental and no submandibular adenopathy present  Head (left side): No submental and no submandibular adenopathy present  She has no cervical adenopathy  Right cervical: No superficial cervical, no deep cervical and no posterior cervical adenopathy present  Left cervical: No superficial cervical, no deep cervical and no posterior cervical adenopathy present  Neurological: She is alert and oriented to person, place, and time  No cranial nerve deficit or sensory deficit  Skin: Skin is warm, dry and intact  Psychiatric: Her speech is normal and behavior is normal  Judgment normal  Her mood appears not anxious  Cognition and memory are normal  She does not exhibit a depressed mood  Vitals reviewed

## 2020-03-19 DIAGNOSIS — M54.42 CHRONIC BILATERAL LOW BACK PAIN WITH LEFT-SIDED SCIATICA: ICD-10-CM

## 2020-03-19 DIAGNOSIS — F41.1 GENERALIZED ANXIETY DISORDER: ICD-10-CM

## 2020-03-19 DIAGNOSIS — G89.29 CHRONIC BILATERAL LOW BACK PAIN WITH LEFT-SIDED SCIATICA: ICD-10-CM

## 2020-03-19 RX ORDER — CLONAZEPAM 0.5 MG/1
1.5 TABLET ORAL 2 TIMES DAILY PRN
Qty: 90 TABLET | Refills: 0 | Status: SHIPPED | OUTPATIENT
Start: 2020-03-19 | End: 2020-04-21 | Stop reason: SDUPTHER

## 2020-03-19 RX ORDER — CYCLOBENZAPRINE HCL 5 MG
5 TABLET ORAL 2 TIMES DAILY
Qty: 60 TABLET | Refills: 0 | Status: SHIPPED | OUTPATIENT
Start: 2020-03-19 | End: 2020-06-08

## 2020-03-26 ENCOUNTER — PATIENT MESSAGE (OUTPATIENT)
Dept: FAMILY MEDICINE CLINIC | Facility: CLINIC | Age: 35
End: 2020-03-26

## 2020-03-27 ENCOUNTER — TELEPHONE (OUTPATIENT)
Dept: FAMILY MEDICINE CLINIC | Facility: CLINIC | Age: 35
End: 2020-03-27

## 2020-03-27 NOTE — TELEPHONE ENCOUNTER
----- Message from Katy Quesada DO sent at 3/27/2020  8:58 AM EDT -----  Regarding: FW: Non-Urgent Medical Question  Contact: [phone]    Please call patient, yes, she may take the supplements  Thank you  ----- Message -----  From: Caren Sutton LPN  Sent: 6/29/5469   8:43 AM EDT  To:  Katy Quesada DO  Subject: FW: Non-Urgent Medical Question                      ----- Message -----  From: Jerman Poe  Sent: 3/26/2020  11:01 PM EDT  To: [facility]  Subject: Non-Urgent Medical Question                      I was wondering if it would be ok to start taking Turmeric Curcumin 500mg with 50mg of sanjuanita powder with my daily medication I am on ?

## 2020-04-06 ENCOUNTER — OFFICE VISIT (OUTPATIENT)
Dept: URGENT CARE | Facility: MEDICAL CENTER | Age: 35
End: 2020-04-06
Payer: COMMERCIAL

## 2020-04-06 ENCOUNTER — DOCUMENTATION (OUTPATIENT)
Dept: URGENT CARE | Facility: MEDICAL CENTER | Age: 35
End: 2020-04-06

## 2020-04-06 ENCOUNTER — TELEMEDICINE (OUTPATIENT)
Dept: FAMILY MEDICINE CLINIC | Facility: CLINIC | Age: 35
End: 2020-04-06
Payer: COMMERCIAL

## 2020-04-06 DIAGNOSIS — Z20.828 EXPOSURE TO SARS-ASSOCIATED CORONAVIRUS: ICD-10-CM

## 2020-04-06 DIAGNOSIS — Z20.828 EXPOSURE TO SARS-ASSOCIATED CORONAVIRUS: Primary | ICD-10-CM

## 2020-04-06 PROCEDURE — 99213 OFFICE O/P EST LOW 20 MIN: CPT | Performed by: FAMILY MEDICINE

## 2020-04-06 PROCEDURE — 87635 SARS-COV-2 COVID-19 AMP PRB: CPT | Performed by: PHYSICIAN ASSISTANT

## 2020-04-07 LAB — SARS-COV-2 RNA SPEC QL NAA+PROBE: NOT DETECTED

## 2020-04-09 ENCOUNTER — TELEPHONE (OUTPATIENT)
Dept: FAMILY MEDICINE CLINIC | Facility: CLINIC | Age: 35
End: 2020-04-09

## 2020-04-09 DIAGNOSIS — J01.90 ACUTE SINUSITIS, RECURRENCE NOT SPECIFIED, UNSPECIFIED LOCATION: Primary | ICD-10-CM

## 2020-04-09 RX ORDER — DOXYCYCLINE 100 MG/1
100 TABLET ORAL 2 TIMES DAILY
Qty: 14 TABLET | Refills: 0 | Status: SHIPPED | OUTPATIENT
Start: 2020-04-09 | End: 2020-04-16

## 2020-04-19 DIAGNOSIS — G47.00 INSOMNIA, UNSPECIFIED TYPE: ICD-10-CM

## 2020-04-21 DIAGNOSIS — F41.1 GENERALIZED ANXIETY DISORDER: ICD-10-CM

## 2020-04-21 RX ORDER — TRAZODONE HYDROCHLORIDE 50 MG/1
TABLET ORAL
Qty: 90 TABLET | Refills: 0 | Status: SHIPPED | OUTPATIENT
Start: 2020-04-21 | End: 2020-07-15

## 2020-04-22 DIAGNOSIS — F41.1 GENERALIZED ANXIETY DISORDER: ICD-10-CM

## 2020-04-22 RX ORDER — DULOXETIN HYDROCHLORIDE 60 MG/1
60 CAPSULE, DELAYED RELEASE ORAL DAILY
Qty: 30 CAPSULE | Refills: 0 | Status: SHIPPED | OUTPATIENT
Start: 2020-04-22 | End: 2020-04-22

## 2020-04-22 RX ORDER — DULOXETIN HYDROCHLORIDE 60 MG/1
CAPSULE, DELAYED RELEASE ORAL
Qty: 90 CAPSULE | Refills: 0 | Status: SHIPPED | OUTPATIENT
Start: 2020-04-22 | End: 2020-06-14 | Stop reason: SDUPTHER

## 2020-04-22 RX ORDER — CLONAZEPAM 0.5 MG/1
1.5 TABLET ORAL 2 TIMES DAILY PRN
Qty: 90 TABLET | Refills: 0 | Status: SHIPPED | OUTPATIENT
Start: 2020-04-22 | End: 2020-05-23 | Stop reason: SDUPTHER

## 2020-04-28 ENCOUNTER — TELEMEDICINE (OUTPATIENT)
Dept: FAMILY MEDICINE CLINIC | Facility: CLINIC | Age: 35
End: 2020-04-28
Payer: COMMERCIAL

## 2020-04-28 DIAGNOSIS — K52.9 GASTROENTERITIS: ICD-10-CM

## 2020-04-28 PROCEDURE — 99213 OFFICE O/P EST LOW 20 MIN: CPT | Performed by: FAMILY MEDICINE

## 2020-04-28 RX ORDER — DICYCLOMINE HCL 20 MG
20 TABLET ORAL 3 TIMES DAILY PRN
Qty: 30 TABLET | Refills: 0 | Status: SHIPPED | OUTPATIENT
Start: 2020-04-28 | End: 2022-03-04 | Stop reason: SDUPTHER

## 2020-04-28 RX ORDER — ONDANSETRON 4 MG/1
4 TABLET, FILM COATED ORAL EVERY 8 HOURS PRN
Qty: 20 TABLET | Refills: 0 | Status: SHIPPED | OUTPATIENT
Start: 2020-04-28 | End: 2021-02-24 | Stop reason: ALTCHOICE

## 2020-05-07 ENCOUNTER — TELEMEDICINE (OUTPATIENT)
Dept: FAMILY MEDICINE CLINIC | Facility: CLINIC | Age: 35
End: 2020-05-07
Payer: COMMERCIAL

## 2020-05-07 DIAGNOSIS — Z20.828 EXPOSURE TO SARS-ASSOCIATED CORONAVIRUS: ICD-10-CM

## 2020-05-07 DIAGNOSIS — H10.33 ACUTE CONJUNCTIVITIS OF BOTH EYES, UNSPECIFIED ACUTE CONJUNCTIVITIS TYPE: Primary | ICD-10-CM

## 2020-05-07 PROCEDURE — 99214 OFFICE O/P EST MOD 30 MIN: CPT | Performed by: FAMILY MEDICINE

## 2020-05-07 PROCEDURE — U0003 INFECTIOUS AGENT DETECTION BY NUCLEIC ACID (DNA OR RNA); SEVERE ACUTE RESPIRATORY SYNDROME CORONAVIRUS 2 (SARS-COV-2) (CORONAVIRUS DISEASE [COVID-19]), AMPLIFIED PROBE TECHNIQUE, MAKING USE OF HIGH THROUGHPUT TECHNOLOGIES AS DESCRIBED BY CMS-2020-01-R: HCPCS

## 2020-05-07 RX ORDER — TOBRAMYCIN 3 MG/ML
1 SOLUTION/ DROPS OPHTHALMIC
Qty: 5 ML | Refills: 0 | Status: SHIPPED | OUTPATIENT
Start: 2020-05-07

## 2020-05-08 LAB — SARS-COV-2 RNA SPEC QL NAA+PROBE: NOT DETECTED

## 2020-05-11 ENCOUNTER — TELEPHONE (OUTPATIENT)
Dept: FAMILY MEDICINE CLINIC | Facility: CLINIC | Age: 35
End: 2020-05-11

## 2020-05-11 DIAGNOSIS — J20.9 ACUTE BRONCHITIS, UNSPECIFIED ORGANISM: Primary | ICD-10-CM

## 2020-05-11 RX ORDER — DOXYCYCLINE 100 MG/1
100 TABLET ORAL 2 TIMES DAILY
Qty: 14 TABLET | Refills: 0 | Status: SHIPPED | OUTPATIENT
Start: 2020-05-11 | End: 2020-05-18

## 2020-05-23 DIAGNOSIS — F41.1 GENERALIZED ANXIETY DISORDER: ICD-10-CM

## 2020-05-26 RX ORDER — CLONAZEPAM 0.5 MG/1
1.5 TABLET ORAL 2 TIMES DAILY PRN
Qty: 90 TABLET | Refills: 0 | Status: SHIPPED | OUTPATIENT
Start: 2020-05-26 | End: 2020-06-28 | Stop reason: SDUPTHER

## 2020-06-07 DIAGNOSIS — M54.42 CHRONIC BILATERAL LOW BACK PAIN WITH LEFT-SIDED SCIATICA: ICD-10-CM

## 2020-06-07 DIAGNOSIS — G89.29 CHRONIC BILATERAL LOW BACK PAIN WITH LEFT-SIDED SCIATICA: ICD-10-CM

## 2020-06-08 RX ORDER — CYCLOBENZAPRINE HCL 5 MG
TABLET ORAL
Qty: 60 TABLET | Refills: 0 | Status: SHIPPED | OUTPATIENT
Start: 2020-06-08 | End: 2020-07-15

## 2020-06-14 DIAGNOSIS — F41.1 GENERALIZED ANXIETY DISORDER: ICD-10-CM

## 2020-06-15 RX ORDER — DULOXETIN HYDROCHLORIDE 60 MG/1
60 CAPSULE, DELAYED RELEASE ORAL DAILY
Qty: 90 CAPSULE | Refills: 0 | Status: SHIPPED | OUTPATIENT
Start: 2020-06-15 | End: 2020-10-19

## 2020-06-28 DIAGNOSIS — F41.1 GENERALIZED ANXIETY DISORDER: ICD-10-CM

## 2020-06-29 RX ORDER — CLONAZEPAM 0.5 MG/1
1.5 TABLET ORAL 2 TIMES DAILY PRN
Qty: 90 TABLET | Refills: 0 | Status: SHIPPED | OUTPATIENT
Start: 2020-06-29 | End: 2020-08-06 | Stop reason: SDUPTHER

## 2020-07-15 DIAGNOSIS — G47.00 INSOMNIA, UNSPECIFIED TYPE: ICD-10-CM

## 2020-07-15 DIAGNOSIS — G89.29 CHRONIC BILATERAL LOW BACK PAIN WITH LEFT-SIDED SCIATICA: ICD-10-CM

## 2020-07-15 DIAGNOSIS — M54.42 CHRONIC BILATERAL LOW BACK PAIN WITH LEFT-SIDED SCIATICA: ICD-10-CM

## 2020-07-15 RX ORDER — CYCLOBENZAPRINE HCL 5 MG
TABLET ORAL
Qty: 60 TABLET | Refills: 0 | Status: SHIPPED | OUTPATIENT
Start: 2020-07-15 | End: 2020-08-17

## 2020-07-15 RX ORDER — TRAZODONE HYDROCHLORIDE 50 MG/1
TABLET ORAL
Qty: 90 TABLET | Refills: 0 | Status: SHIPPED | OUTPATIENT
Start: 2020-07-15 | End: 2020-09-23 | Stop reason: SDUPTHER

## 2020-08-06 DIAGNOSIS — R51.9 ACUTE INTRACTABLE HEADACHE, UNSPECIFIED HEADACHE TYPE: ICD-10-CM

## 2020-08-06 DIAGNOSIS — H92.09 EARACHE: ICD-10-CM

## 2020-08-06 DIAGNOSIS — F41.1 GENERALIZED ANXIETY DISORDER: ICD-10-CM

## 2020-08-07 RX ORDER — CLONAZEPAM 0.5 MG/1
1.5 TABLET ORAL 2 TIMES DAILY PRN
Qty: 90 TABLET | Refills: 0 | Status: SHIPPED | OUTPATIENT
Start: 2020-08-07 | End: 2020-09-02 | Stop reason: SDUPTHER

## 2020-08-07 RX ORDER — FLUTICASONE PROPIONATE 50 MCG
2 SPRAY, SUSPENSION (ML) NASAL DAILY
Qty: 1 BOTTLE | Refills: 0 | Status: SHIPPED | OUTPATIENT
Start: 2020-08-07 | End: 2020-09-02

## 2020-08-07 RX ORDER — IBUPROFEN 600 MG/1
600 TABLET ORAL EVERY 6 HOURS PRN
Qty: 30 TABLET | Refills: 0 | Status: SHIPPED | OUTPATIENT
Start: 2020-08-07 | End: 2021-07-27 | Stop reason: SDUPTHER

## 2020-08-16 DIAGNOSIS — M54.42 CHRONIC BILATERAL LOW BACK PAIN WITH LEFT-SIDED SCIATICA: ICD-10-CM

## 2020-08-16 DIAGNOSIS — G89.29 CHRONIC BILATERAL LOW BACK PAIN WITH LEFT-SIDED SCIATICA: ICD-10-CM

## 2020-08-17 RX ORDER — CYCLOBENZAPRINE HCL 5 MG
TABLET ORAL
Qty: 60 TABLET | Refills: 0 | Status: SHIPPED | OUTPATIENT
Start: 2020-08-17 | End: 2020-10-04

## 2020-09-02 DIAGNOSIS — F41.1 GENERALIZED ANXIETY DISORDER: ICD-10-CM

## 2020-09-02 DIAGNOSIS — R51.9 ACUTE INTRACTABLE HEADACHE, UNSPECIFIED HEADACHE TYPE: ICD-10-CM

## 2020-09-02 RX ORDER — FLUTICASONE PROPIONATE 50 MCG
2 SPRAY, SUSPENSION (ML) NASAL DAILY
Qty: 16 ML | Refills: 0 | Status: SHIPPED | OUTPATIENT
Start: 2020-09-02 | End: 2020-09-09

## 2020-09-03 RX ORDER — CLONAZEPAM 0.5 MG/1
1.5 TABLET ORAL 2 TIMES DAILY PRN
Qty: 90 TABLET | Refills: 0 | Status: SHIPPED | OUTPATIENT
Start: 2020-09-03 | End: 2020-10-08 | Stop reason: SDUPTHER

## 2020-09-09 DIAGNOSIS — R51.9 ACUTE INTRACTABLE HEADACHE, UNSPECIFIED HEADACHE TYPE: ICD-10-CM

## 2020-09-09 RX ORDER — FLUTICASONE PROPIONATE 50 MCG
2 SPRAY, SUSPENSION (ML) NASAL DAILY
Qty: 48 ML | Refills: 1 | Status: SHIPPED | OUTPATIENT
Start: 2020-09-09 | End: 2020-10-08 | Stop reason: SDUPTHER

## 2020-09-10 ENCOUNTER — TELEMEDICINE (OUTPATIENT)
Dept: FAMILY MEDICINE CLINIC | Facility: CLINIC | Age: 35
End: 2020-09-10
Payer: COMMERCIAL

## 2020-09-10 DIAGNOSIS — J01.90 ACUTE SINUSITIS, RECURRENCE NOT SPECIFIED, UNSPECIFIED LOCATION: Primary | ICD-10-CM

## 2020-09-10 PROCEDURE — 99214 OFFICE O/P EST MOD 30 MIN: CPT | Performed by: FAMILY MEDICINE

## 2020-09-10 RX ORDER — PREDNISONE 20 MG/1
40 TABLET ORAL DAILY
Qty: 10 TABLET | Refills: 0 | Status: SHIPPED | OUTPATIENT
Start: 2020-09-10 | End: 2020-09-15

## 2020-09-10 RX ORDER — ACETAMINOPHEN AND CODEINE PHOSPHATE 120; 12 MG/5ML; MG/5ML
0.35 SOLUTION ORAL DAILY
COMMUNITY
Start: 2020-03-17 | End: 2020-10-04 | Stop reason: ALTCHOICE

## 2020-09-10 RX ORDER — FLUCONAZOLE 150 MG/1
TABLET ORAL
COMMUNITY
Start: 2020-07-20 | End: 2020-10-04 | Stop reason: ALTCHOICE

## 2020-09-10 RX ORDER — TRAMADOL HYDROCHLORIDE 50 MG/1
50 TABLET ORAL EVERY 6 HOURS PRN
COMMUNITY
Start: 2020-07-01 | End: 2020-10-03 | Stop reason: ALTCHOICE

## 2020-09-10 RX ORDER — ACETAMINOPHEN 500 MG
1000 TABLET ORAL EVERY 6 HOURS PRN
COMMUNITY
End: 2020-11-30

## 2020-09-10 RX ORDER — CEPHALEXIN 500 MG/1
CAPSULE ORAL
COMMUNITY
Start: 2020-07-13 | End: 2020-10-04 | Stop reason: ALTCHOICE

## 2020-09-10 RX ORDER — AMOXICILLIN AND CLAVULANATE POTASSIUM 875; 125 MG/1; MG/1
1 TABLET, FILM COATED ORAL EVERY 12 HOURS SCHEDULED
Qty: 14 TABLET | Refills: 0 | Status: SHIPPED | OUTPATIENT
Start: 2020-09-10 | End: 2020-09-17

## 2020-09-10 NOTE — PROGRESS NOTES
Virtual Regular Visit      Assessment/Plan:  1  Acute sinusitis, recurrence not specified, unspecified location  Reviewed patient's symptoms today  At this time, symptoms appear likely secondary to acute sinusitis  Start supportive care  Maintain hydration  Take over-the-counter Mucinex  Will start treatment with Augmentin as well as prednisone burst   Follow-up if any symptoms persist   - amoxicillin-clavulanate (AUGMENTIN) 875-125 mg per tablet; Take 1 tablet by mouth every 12 (twelve) hours for 7 days  Dispense: 14 tablet; Refill: 0  - predniSONE 20 mg tablet; Take 2 tablets (40 mg total) by mouth daily for 5 days  Dispense: 10 tablet; Refill: 0    Problem List Items Addressed This Visit     None               Reason for visit is   Chief Complaint   Patient presents with    Virtual Regular Visit        Encounter provider Terese Agrawal DO    Provider located at [place] [id number]  [address] [place] [address] [address] [place] [address] 83  [phone]      Recent Visits  No visits were found meeting these conditions  Showing recent visits within past 7 days and meeting all other requirements     Today's Visits  Date Type Provider Dept   09/10/20 Telemedicine Marilyn Pepe DO [facility]   Showing today's visits and meeting all other requirements     Future Appointments  No visits were found meeting these conditions  Showing future appointments within next 150 days and meeting all other requirements        The patient was identified by name and date of birth  Britt Ravi was informed that this is a telemedicine visit and that the visit is being conducted through [facility] and patient was informed that this is a secure, HIPAA-compliant platform  She agrees to proceed     My office door was closed  No one else was in the room  She acknowledged consent and understanding of privacy and security of the video platform   The patient has agreed to participate and understands they can discontinue the visit at any time  Patient is aware this is a billable service  Iam Bhat is a 38 y o  female presents today via [employer] for her virtual visit         Sinus Problem   This is a new problem  The current episode started in the past 7 days  The problem is unchanged  The maximum temperature recorded prior to her arrival was 103 - 104 F  The fever has been present for less than 1 day  The pain is mild  Associated symptoms include congestion, coughing, headaches, sinus pressure and a sore throat  Pertinent negatives include no chills, ear pain or shortness of breath  Treatments tried: Flonase and claratin  The treatment provided no relief          Past Medical History:   Diagnosis Date    Psychiatric disorder        Past Surgical History:   Procedure Laterality Date    NO PAST SURGERIES         Current Outpatient Medications   Medication Sig Dispense Refill    fluconazole (DIFLUCAN) 150 mg tablet May repeat after 48 hours if needed      hydrocortisone 2 5 % ointment Apply topically 2 (two) times a day      norethindrone (MICRONOR) 0 35 MG tablet Take 0 35 mg by mouth daily      traMADol (ULTRAM) 50 mg tablet Take 50 mg by mouth every 6 (six) hours as needed      acetaminophen (TYLENOL) 500 mg tablet Take 1,000 mg by mouth every 6 (six) hours as needed      acyclovir (ZOVIRAX) 5 % ointment Apply topically every 4 (four) hours 15 g 3    cephalexin (KEFLEX) 500 mg capsule TAKE 1 CAPSULE BY MOUTH TWICE A DAY FOR 7 DAYS      clonazePAM (KlonoPIN) 0 5 mg tablet Take 3 tablets (1 5 mg total) by mouth 2 (two) times a day as needed for anxiety 90 tablet 0    cyclobenzaprine (FLEXERIL) 5 mg tablet TAKE 1 TABLET BY MOUTH TWICE A DAY 60 tablet 0    dicyclomine (BENTYL) 20 mg tablet Take 1 tablet (20 mg total) by mouth 3 (three) times a day as needed (for abdominal cramping) 30 tablet 0    DULoxetine (CYMBALTA) 60 mg delayed release capsule Take 1 capsule (60 mg total) by mouth daily 90 capsule 0    fluticasone (FLONASE) 50 mcg/act nasal spray 2 SPRAYS INTO EACH NOSTRIL DAILY FOR NASAL CONGESTION 48 mL 1    gabapentin (NEURONTIN) 100 mg capsule Take 2 capsules (200 mg total) by mouth daily at bedtime for 30 days 60 capsule 2    ibuprofen (MOTRIN) 600 mg tablet Take 1 tablet (600 mg total) by mouth every 6 (six) hours as needed for mild pain, moderate pain or fever 30 tablet 0    Multiple Vitamins-Minerals (MULTIVITAMIN ADULT PO) Take 1 tablet by mouth daily      ondansetron (ZOFRAN) 4 mg tablet Take 1 tablet (4 mg total) by mouth every 8 (eight) hours as needed for nausea or vomiting 20 tablet 0    tobramycin (TOBREX) 0 3 % SOLN Administer 1 drop to both eyes every 4 (four) hours while awake 5 mL 0    traZODone (DESYREL) 50 mg tablet TAKE 1 TABLET BY MOUTH AT BEDTIME AS NEEDED FOR SLEEP 90 tablet 0     No current facility-administered medications for this visit  Allergies   Allergen Reactions    Latex Other (See Comments)     Latex allergy-rashes    Levofloxacin Hives    Quinolones Rash       Review of Systems   Constitutional: Negative for activity change, chills, fatigue and fever  HENT: Positive for congestion, sinus pressure and sore throat  Negative for ear pain  Eyes: Negative for redness, itching and visual disturbance  Respiratory: Positive for cough  Negative for shortness of breath  Cardiovascular: Negative for chest pain and palpitations  Gastrointestinal: Negative for abdominal pain, diarrhea and nausea  Endocrine: Negative for cold intolerance and heat intolerance  Genitourinary: Negative for dysuria, flank pain and frequency  Musculoskeletal: Negative for arthralgias, back pain, gait problem and myalgias  Skin: Negative for color change  Allergic/Immunologic: Negative for environmental allergies  Neurological: Positive for headaches  Negative for dizziness and numbness     Psychiatric/Behavioral: Negative for behavioral problems and sleep disturbance  Video Exam    There were no vitals filed for this visit  Physical Exam  Constitutional:       General: She is not in acute distress  Appearance: She is well-developed  She is not ill-appearing or toxic-appearing  HENT:      Head: Normocephalic and atraumatic  Not macrocephalic and not microcephalic  Eyes:      General: Lids are normal       Conjunctiva/sclera: Conjunctivae normal       Pupils: Pupils are equal, round, and reactive to light  Neck:      Musculoskeletal: Normal range of motion  Pulmonary:      Effort: Pulmonary effort is normal  No respiratory distress  Breath sounds: Normal breath sounds  Musculoskeletal: Normal range of motion  Skin:     General: Skin is dry  Findings: No erythema  Neurological:      Cranial Nerves: No cranial nerve deficit  Psychiatric:         Behavior: Behavior normal          Thought Content: Thought content normal          Judgment: Judgment normal                 VIRTUAL VISIT DISCLAIMER    Debbie Paulson acknowledges that she has consented to an online visit or consultation  She understands that the online visit is based solely on information provided by her, and that, in the absence of a face-to-face physical evaluation by the physician, the diagnosis she receives is both limited and provisional in terms of accuracy and completeness  This is not intended to replace a full medical face-to-face evaluation by the physician  Debbie Paulson understands and accepts these terms

## 2020-09-10 NOTE — PROGRESS NOTES
Assessment/Plan:            There are no diagnoses linked to this encounter  Subjective:       No chief complaint on file  Patient ID: Casey Araujo is a 38 y o  female  HPI    Review of Systems    The following portions of the patient's history were reviewed and updated as appropriate : past family history, past medical history, past social history and past surgical history      Current Outpatient Medications:     fluconazole (DIFLUCAN) 150 mg tablet, May repeat after 48 hours if needed, Disp: , Rfl:     hydrocortisone 2 5 % ointment, Apply topically 2 (two) times a day, Disp: , Rfl:     norethindrone (MICRONOR) 0 35 MG tablet, Take 0 35 mg by mouth daily, Disp: , Rfl:     traMADol (ULTRAM) 50 mg tablet, Take 50 mg by mouth every 6 (six) hours as needed, Disp: , Rfl:     acetaminophen (TYLENOL) 500 mg tablet, Take 1,000 mg by mouth every 6 (six) hours as needed, Disp: , Rfl:     acyclovir (ZOVIRAX) 5 % ointment, Apply topically every 4 (four) hours, Disp: 15 g, Rfl: 3    cephalexin (KEFLEX) 500 mg capsule, TAKE 1 CAPSULE BY MOUTH TWICE A DAY FOR 7 DAYS, Disp: , Rfl:     clonazePAM (KlonoPIN) 0 5 mg tablet, Take 3 tablets (1 5 mg total) by mouth 2 (two) times a day as needed for anxiety, Disp: 90 tablet, Rfl: 0    cyclobenzaprine (FLEXERIL) 5 mg tablet, TAKE 1 TABLET BY MOUTH TWICE A DAY, Disp: 60 tablet, Rfl: 0    dicyclomine (BENTYL) 20 mg tablet, Take 1 tablet (20 mg total) by mouth 3 (three) times a day as needed (for abdominal cramping), Disp: 30 tablet, Rfl: 0    DULoxetine (CYMBALTA) 60 mg delayed release capsule, Take 1 capsule (60 mg total) by mouth daily, Disp: 90 capsule, Rfl: 0    fluticasone (FLONASE) 50 mcg/act nasal spray, 2 SPRAYS INTO EACH NOSTRIL DAILY FOR NASAL CONGESTION, Disp: 48 mL, Rfl: 1    gabapentin (NEURONTIN) 100 mg capsule, Take 2 capsules (200 mg total) by mouth daily at bedtime for 30 days, Disp: 60 capsule, Rfl: 2    ibuprofen (MOTRIN) 600 mg tablet, Take 1 tablet (600 mg total) by mouth every 6 (six) hours as needed for mild pain, moderate pain or fever, Disp: 30 tablet, Rfl: 0    Multiple Vitamins-Minerals (MULTIVITAMIN ADULT PO), Take 1 tablet by mouth daily, Disp: , Rfl:     ondansetron (ZOFRAN) 4 mg tablet, Take 1 tablet (4 mg total) by mouth every 8 (eight) hours as needed for nausea or vomiting, Disp: 20 tablet, Rfl: 0    tobramycin (TOBREX) 0 3 % SOLN, Administer 1 drop to both eyes every 4 (four) hours while awake, Disp: 5 mL, Rfl: 0    traZODone (DESYREL) 50 mg tablet, TAKE 1 TABLET BY MOUTH AT BEDTIME AS NEEDED FOR SLEEP, Disp: 90 tablet, Rfl: 0         Objective: There were no vitals filed for this visit  Physical Exam      Total encounter time was  {Time; 15 min - 1 hour:19605}  Where greater than 50% of the time was spent counseling

## 2020-09-11 ENCOUNTER — TELEPHONE (OUTPATIENT)
Dept: FAMILY MEDICINE CLINIC | Facility: CLINIC | Age: 35
End: 2020-09-11

## 2020-09-11 NOTE — TELEPHONE ENCOUNTER
Pt called stating she is feeling better and would like a note to return to work  First day out was 9/8 and will be returning tomorrow 9/12  Would like note faxed to work Attn: Tiffanie Curry [phone]

## 2020-09-12 ENCOUNTER — TELEPHONE (OUTPATIENT)
Dept: FAMILY MEDICINE CLINIC | Facility: CLINIC | Age: 35
End: 2020-09-12

## 2020-09-12 NOTE — TELEPHONE ENCOUNTER
Yes, it would be best for her to be evaluated today at [facility] urgent care or the ED  I will review any encounters that she has   Thank you

## 2020-09-12 NOTE — TELEPHONE ENCOUNTER
Pt called looking for an appointment we were fully booked today said blood is dripping out of Her rectum did advise her to go to e r or urgent care  Told pt after going to one of the 2 make a follow up with dr Melyssa Astorga  She just wanted dr yousif to be notified of what was going on as well

## 2020-09-23 DIAGNOSIS — G47.00 INSOMNIA, UNSPECIFIED TYPE: ICD-10-CM

## 2020-09-24 RX ORDER — TRAZODONE HYDROCHLORIDE 50 MG/1
50 TABLET ORAL
Qty: 90 TABLET | Refills: 0 | Status: SHIPPED | OUTPATIENT
Start: 2020-09-24 | End: 2020-11-30

## 2020-10-02 ENCOUNTER — NURSE TRIAGE (OUTPATIENT)
Dept: OTHER | Facility: OTHER | Age: 35
End: 2020-10-02

## 2020-10-03 ENCOUNTER — TELEMEDICINE (OUTPATIENT)
Dept: FAMILY MEDICINE CLINIC | Facility: CLINIC | Age: 35
End: 2020-10-03
Payer: COMMERCIAL

## 2020-10-03 DIAGNOSIS — M54.42 CHRONIC BILATERAL LOW BACK PAIN WITH LEFT-SIDED SCIATICA: ICD-10-CM

## 2020-10-03 DIAGNOSIS — G89.29 CHRONIC BILATERAL LOW BACK PAIN WITH LEFT-SIDED SCIATICA: ICD-10-CM

## 2020-10-03 DIAGNOSIS — J01.41 ACUTE RECURRENT PANSINUSITIS: Primary | ICD-10-CM

## 2020-10-03 PROCEDURE — 99214 OFFICE O/P EST MOD 30 MIN: CPT | Performed by: FAMILY MEDICINE

## 2020-10-03 RX ORDER — PREDNISONE 10 MG/1
TABLET ORAL
Qty: 20 TABLET | Refills: 0 | Status: SHIPPED | OUTPATIENT
Start: 2020-10-03 | End: 2020-11-19

## 2020-10-03 RX ORDER — SULFAMETHOXAZOLE AND TRIMETHOPRIM 800; 160 MG/1; MG/1
1 TABLET ORAL EVERY 12 HOURS SCHEDULED
Qty: 20 TABLET | Refills: 0 | Status: SHIPPED | OUTPATIENT
Start: 2020-10-03 | End: 2020-10-06 | Stop reason: ALTCHOICE

## 2020-10-04 RX ORDER — CYCLOBENZAPRINE HCL 5 MG
5 TABLET ORAL 2 TIMES DAILY PRN
Qty: 60 TABLET | Refills: 0
Start: 2020-10-04 | End: 2020-10-19

## 2020-10-06 ENCOUNTER — TELEPHONE (OUTPATIENT)
Dept: FAMILY MEDICINE CLINIC | Facility: CLINIC | Age: 35
End: 2020-10-06

## 2020-10-06 DIAGNOSIS — J01.91 ACUTE RECURRENT SINUSITIS, UNSPECIFIED LOCATION: Primary | ICD-10-CM

## 2020-10-07 ENCOUNTER — TELEPHONE (OUTPATIENT)
Dept: FAMILY MEDICINE CLINIC | Facility: CLINIC | Age: 35
End: 2020-10-07

## 2020-10-08 DIAGNOSIS — F41.1 GENERALIZED ANXIETY DISORDER: ICD-10-CM

## 2020-10-08 DIAGNOSIS — R51.9 ACUTE INTRACTABLE HEADACHE, UNSPECIFIED HEADACHE TYPE: ICD-10-CM

## 2020-10-08 DIAGNOSIS — J01.91 ACUTE RECURRENT SINUSITIS, UNSPECIFIED LOCATION: Primary | ICD-10-CM

## 2020-10-08 RX ORDER — DOXYCYCLINE HYCLATE 100 MG/1
100 CAPSULE ORAL EVERY 12 HOURS SCHEDULED
Qty: 14 CAPSULE | Refills: 0 | Status: SHIPPED | OUTPATIENT
Start: 2020-10-08 | End: 2020-10-15

## 2020-10-09 RX ORDER — FLUTICASONE PROPIONATE 50 MCG
2 SPRAY, SUSPENSION (ML) NASAL DAILY
Qty: 48 ML | Refills: 0 | Status: SHIPPED | OUTPATIENT
Start: 2020-10-09 | End: 2020-12-09 | Stop reason: SDUPTHER

## 2020-10-12 RX ORDER — CLONAZEPAM 0.5 MG/1
1.5 TABLET ORAL 2 TIMES DAILY PRN
Qty: 90 TABLET | Refills: 0 | Status: SHIPPED | OUTPATIENT
Start: 2020-10-12 | End: 2020-11-19 | Stop reason: SDUPTHER

## 2020-10-15 ENCOUNTER — HOSPITAL ENCOUNTER (EMERGENCY)
Facility: HOSPITAL | Age: 35
Discharge: HOME | End: 2020-10-16
Attending: EMERGENCY MEDICINE | Admitting: EMERGENCY MEDICINE
Payer: COMMERCIAL

## 2020-10-15 ENCOUNTER — APPOINTMENT (EMERGENCY)
Dept: RADIOLOGY | Facility: HOSPITAL | Age: 35
End: 2020-10-15
Payer: COMMERCIAL

## 2020-10-15 VITALS
OXYGEN SATURATION: 98 % | TEMPERATURE: 98.6 F | HEART RATE: 95 BPM | SYSTOLIC BLOOD PRESSURE: 145 MMHG | RESPIRATION RATE: 16 BRPM | DIASTOLIC BLOOD PRESSURE: 65 MMHG

## 2020-10-15 DIAGNOSIS — U07.1 CLINICAL DIAGNOSIS OF COVID-19: Primary | ICD-10-CM

## 2020-10-15 PROCEDURE — 71045 X-RAY EXAM CHEST 1 VIEW: CPT

## 2020-10-15 PROCEDURE — U0003 INFECTIOUS AGENT DETECTION BY NUCLEIC ACID (DNA OR RNA); SEVERE ACUTE RESPIRATORY SYNDROME CORONAVIRUS 2 (SARS-COV-2) (CORONAVIRUS DISEASE [COVID-19]), AMPLIFIED PROBE TECHNIQUE, MAKING USE OF HIGH THROUGHPUT TECHNOLOGIES AS DESCRIBED BY CMS-2020-01-R: HCPCS | Performed by: PHYSICIAN ASSISTANT

## 2020-10-15 PROCEDURE — 99284 EMERGENCY DEPT VISIT MOD MDM: CPT | Performed by: EMERGENCY MEDICINE

## 2020-10-15 PROCEDURE — 93005 ELECTROCARDIOGRAM TRACING: CPT

## 2020-10-15 PROCEDURE — 99285 EMERGENCY DEPT VISIT HI MDM: CPT

## 2020-10-16 ENCOUNTER — TELEMEDICINE (OUTPATIENT)
Dept: FAMILY MEDICINE CLINIC | Facility: CLINIC | Age: 35
End: 2020-10-16
Payer: COMMERCIAL

## 2020-10-16 DIAGNOSIS — Z20.828 EXPOSURE TO SARS-ASSOCIATED CORONAVIRUS: ICD-10-CM

## 2020-10-16 DIAGNOSIS — J01.90 ACUTE SINUSITIS, RECURRENCE NOT SPECIFIED, UNSPECIFIED LOCATION: Primary | ICD-10-CM

## 2020-10-16 LAB
ATRIAL RATE: 92 BPM
P AXIS: 69 DEGREES
PR INTERVAL: 128 MS
QRS AXIS: 76 DEGREES
QRSD INTERVAL: 76 MS
QT INTERVAL: 344 MS
QTC INTERVAL: 425 MS
T WAVE AXIS: 45 DEGREES
VENTRICULAR RATE: 92 BPM

## 2020-10-16 PROCEDURE — 99214 OFFICE O/P EST MOD 30 MIN: CPT | Performed by: FAMILY MEDICINE

## 2020-10-16 PROCEDURE — 93010 ELECTROCARDIOGRAM REPORT: CPT

## 2020-10-16 RX ORDER — DOXYCYCLINE 100 MG/1
100 TABLET ORAL 2 TIMES DAILY
Qty: 14 TABLET | Refills: 0 | Status: SHIPPED | OUTPATIENT
Start: 2020-10-16 | End: 2020-10-23

## 2020-10-17 DIAGNOSIS — G89.29 CHRONIC BILATERAL LOW BACK PAIN WITH LEFT-SIDED SCIATICA: ICD-10-CM

## 2020-10-17 DIAGNOSIS — F41.1 GENERALIZED ANXIETY DISORDER: ICD-10-CM

## 2020-10-17 DIAGNOSIS — M54.42 CHRONIC BILATERAL LOW BACK PAIN WITH LEFT-SIDED SCIATICA: ICD-10-CM

## 2020-10-17 LAB — SARS-COV-2 RNA SPEC QL NAA+PROBE: NOT DETECTED

## 2020-10-19 ENCOUNTER — TELEPHONE (OUTPATIENT)
Dept: FAMILY MEDICINE CLINIC | Facility: CLINIC | Age: 35
End: 2020-10-19

## 2020-10-19 ENCOUNTER — TELEMEDICINE (OUTPATIENT)
Dept: FAMILY MEDICINE CLINIC | Facility: CLINIC | Age: 35
End: 2020-10-19
Payer: COMMERCIAL

## 2020-10-19 DIAGNOSIS — Z20.828 EXPOSURE TO SARS-ASSOCIATED CORONAVIRUS: ICD-10-CM

## 2020-10-19 DIAGNOSIS — J01.90 ACUTE SINUSITIS, RECURRENCE NOT SPECIFIED, UNSPECIFIED LOCATION: Primary | ICD-10-CM

## 2020-10-19 PROCEDURE — U0003 INFECTIOUS AGENT DETECTION BY NUCLEIC ACID (DNA OR RNA); SEVERE ACUTE RESPIRATORY SYNDROME CORONAVIRUS 2 (SARS-COV-2) (CORONAVIRUS DISEASE [COVID-19]), AMPLIFIED PROBE TECHNIQUE, MAKING USE OF HIGH THROUGHPUT TECHNOLOGIES AS DESCRIBED BY CMS-2020-01-R: HCPCS | Performed by: FAMILY MEDICINE

## 2020-10-19 PROCEDURE — 99214 OFFICE O/P EST MOD 30 MIN: CPT | Performed by: FAMILY MEDICINE

## 2020-10-19 RX ORDER — DULOXETIN HYDROCHLORIDE 60 MG/1
CAPSULE, DELAYED RELEASE ORAL
Qty: 90 CAPSULE | Refills: 0 | Status: SHIPPED | OUTPATIENT
Start: 2020-10-19 | End: 2020-11-30

## 2020-10-19 RX ORDER — CYCLOBENZAPRINE HCL 5 MG
TABLET ORAL
Qty: 60 TABLET | Refills: 0 | Status: SHIPPED | OUTPATIENT
Start: 2020-10-19 | End: 2021-04-26 | Stop reason: SDUPTHER

## 2020-10-20 ENCOUNTER — TELEPHONE (OUTPATIENT)
Dept: FAMILY MEDICINE CLINIC | Facility: CLINIC | Age: 35
End: 2020-10-20

## 2020-10-20 LAB — SARS-COV-2 RNA SPEC QL NAA+PROBE: NOT DETECTED

## 2020-11-19 ENCOUNTER — TELEMEDICINE (OUTPATIENT)
Dept: FAMILY MEDICINE CLINIC | Facility: CLINIC | Age: 35
End: 2020-11-19
Payer: COMMERCIAL

## 2020-11-19 DIAGNOSIS — J01.90 ACUTE SINUSITIS, RECURRENCE NOT SPECIFIED, UNSPECIFIED LOCATION: Primary | ICD-10-CM

## 2020-11-19 DIAGNOSIS — F41.1 GENERALIZED ANXIETY DISORDER: ICD-10-CM

## 2020-11-19 DIAGNOSIS — G47.00 INSOMNIA, UNSPECIFIED TYPE: ICD-10-CM

## 2020-11-19 PROCEDURE — 99214 OFFICE O/P EST MOD 30 MIN: CPT | Performed by: FAMILY MEDICINE

## 2020-11-19 RX ORDER — DOXYCYCLINE 100 MG/1
100 TABLET ORAL 2 TIMES DAILY
Qty: 14 TABLET | Refills: 0 | Status: SHIPPED | OUTPATIENT
Start: 2020-11-19 | End: 2020-11-26

## 2020-11-19 RX ORDER — CLONAZEPAM 0.5 MG/1
1.5 TABLET ORAL 2 TIMES DAILY PRN
Qty: 90 TABLET | Refills: 0 | Status: SHIPPED | OUTPATIENT
Start: 2020-11-19 | End: 2020-12-09 | Stop reason: SDUPTHER

## 2020-11-19 RX ORDER — PREDNISONE 10 MG/1
TABLET ORAL
Qty: 33 TABLET | Refills: 0 | Status: SHIPPED | OUTPATIENT
Start: 2020-11-19 | End: 2021-02-12 | Stop reason: ALTCHOICE

## 2020-11-30 ENCOUNTER — TELEMEDICINE (OUTPATIENT)
Dept: FAMILY MEDICINE CLINIC | Facility: CLINIC | Age: 35
End: 2020-11-30
Payer: COMMERCIAL

## 2020-11-30 DIAGNOSIS — G47.00 INSOMNIA, UNSPECIFIED TYPE: ICD-10-CM

## 2020-11-30 DIAGNOSIS — B96.89 ACUTE BACTERIAL SINUSITIS: Primary | ICD-10-CM

## 2020-11-30 DIAGNOSIS — F41.1 GENERALIZED ANXIETY DISORDER: ICD-10-CM

## 2020-11-30 DIAGNOSIS — J01.90 ACUTE BACTERIAL SINUSITIS: Primary | ICD-10-CM

## 2020-11-30 PROCEDURE — 99213 OFFICE O/P EST LOW 20 MIN: CPT | Performed by: FAMILY MEDICINE

## 2020-11-30 RX ORDER — DOXYCYCLINE HYCLATE 100 MG
100 TABLET ORAL 2 TIMES DAILY
Qty: 14 TABLET | Refills: 0 | Status: SHIPPED | OUTPATIENT
Start: 2020-11-30 | End: 2020-12-07

## 2020-11-30 RX ORDER — TRAZODONE HYDROCHLORIDE 50 MG/1
50 TABLET ORAL
Qty: 90 TABLET | Refills: 0 | Status: SHIPPED | OUTPATIENT
Start: 2020-11-30 | End: 2021-03-01

## 2020-11-30 RX ORDER — DULOXETIN HYDROCHLORIDE 60 MG/1
CAPSULE, DELAYED RELEASE ORAL
Qty: 90 CAPSULE | Refills: 0 | Status: SHIPPED | OUTPATIENT
Start: 2020-11-30 | End: 2021-02-11 | Stop reason: SDUPTHER

## 2020-12-03 ENCOUNTER — TELEPHONE (OUTPATIENT)
Dept: FAMILY MEDICINE CLINIC | Facility: CLINIC | Age: 35
End: 2020-12-03

## 2020-12-09 DIAGNOSIS — R51.9 ACUTE INTRACTABLE HEADACHE, UNSPECIFIED HEADACHE TYPE: ICD-10-CM

## 2020-12-09 DIAGNOSIS — F41.1 GENERALIZED ANXIETY DISORDER: ICD-10-CM

## 2020-12-09 RX ORDER — CLONAZEPAM 0.5 MG/1
1.5 TABLET ORAL 2 TIMES DAILY PRN
Qty: 90 TABLET | Refills: 0 | Status: SHIPPED | OUTPATIENT
Start: 2020-12-09 | End: 2021-01-05 | Stop reason: SDUPTHER

## 2020-12-09 RX ORDER — FLUTICASONE PROPIONATE 50 MCG
2 SPRAY, SUSPENSION (ML) NASAL DAILY
Qty: 48 ML | Refills: 0 | Status: SHIPPED | OUTPATIENT
Start: 2020-12-09 | End: 2021-04-20

## 2020-12-11 ENCOUNTER — TELEMEDICINE (OUTPATIENT)
Dept: FAMILY MEDICINE CLINIC | Facility: CLINIC | Age: 35
End: 2020-12-11
Payer: COMMERCIAL

## 2020-12-11 DIAGNOSIS — J01.91 ACUTE RECURRENT SINUSITIS, UNSPECIFIED LOCATION: Primary | ICD-10-CM

## 2020-12-11 PROCEDURE — 99214 OFFICE O/P EST MOD 30 MIN: CPT | Performed by: FAMILY MEDICINE

## 2020-12-11 RX ORDER — PREDNISONE 20 MG/1
40 TABLET ORAL DAILY
Qty: 10 TABLET | Refills: 0 | Status: SHIPPED | OUTPATIENT
Start: 2020-12-11 | End: 2020-12-16

## 2020-12-11 RX ORDER — CEFDINIR 300 MG/1
300 CAPSULE ORAL EVERY 12 HOURS SCHEDULED
Qty: 14 CAPSULE | Refills: 0 | Status: SHIPPED | OUTPATIENT
Start: 2020-12-11 | End: 2020-12-18

## 2020-12-31 ENCOUNTER — TELEMEDICINE (OUTPATIENT)
Dept: FAMILY MEDICINE CLINIC | Facility: CLINIC | Age: 35
End: 2020-12-31
Payer: COMMERCIAL

## 2020-12-31 DIAGNOSIS — Z20.822 EXPOSURE TO COVID-19 VIRUS: ICD-10-CM

## 2020-12-31 DIAGNOSIS — Z20.822 EXPOSURE TO COVID-19 VIRUS: Primary | ICD-10-CM

## 2020-12-31 PROCEDURE — U0003 INFECTIOUS AGENT DETECTION BY NUCLEIC ACID (DNA OR RNA); SEVERE ACUTE RESPIRATORY SYNDROME CORONAVIRUS 2 (SARS-COV-2) (CORONAVIRUS DISEASE [COVID-19]), AMPLIFIED PROBE TECHNIQUE, MAKING USE OF HIGH THROUGHPUT TECHNOLOGIES AS DESCRIBED BY CMS-2020-01-R: HCPCS | Performed by: NURSE PRACTITIONER

## 2020-12-31 PROCEDURE — 99214 OFFICE O/P EST MOD 30 MIN: CPT | Performed by: NURSE PRACTITIONER

## 2021-01-01 LAB — SARS-COV-2 RNA SPEC QL NAA+PROBE: NOT DETECTED

## 2021-01-05 DIAGNOSIS — F41.1 GENERALIZED ANXIETY DISORDER: ICD-10-CM

## 2021-01-07 DIAGNOSIS — F41.1 GENERALIZED ANXIETY DISORDER: ICD-10-CM

## 2021-01-07 RX ORDER — CLONAZEPAM 0.5 MG/1
1.5 TABLET ORAL 2 TIMES DAILY PRN
Qty: 90 TABLET | Refills: 0 | Status: SHIPPED | OUTPATIENT
Start: 2021-01-07 | End: 2021-01-07 | Stop reason: SDUPTHER

## 2021-01-08 RX ORDER — CLONAZEPAM 0.5 MG/1
1.5 TABLET ORAL 2 TIMES DAILY PRN
Qty: 90 TABLET | Refills: 0 | Status: SHIPPED | OUTPATIENT
Start: 2021-01-08 | End: 2021-02-12 | Stop reason: SDUPTHER

## 2021-02-03 ENCOUNTER — TELEMEDICINE (OUTPATIENT)
Dept: FAMILY MEDICINE CLINIC | Facility: CLINIC | Age: 36
End: 2021-02-03

## 2021-02-03 ENCOUNTER — TELEPHONE (OUTPATIENT)
Dept: FAMILY MEDICINE CLINIC | Facility: CLINIC | Age: 36
End: 2021-02-03

## 2021-02-03 DIAGNOSIS — Z20.822 EXPOSURE TO COVID-19 VIRUS: ICD-10-CM

## 2021-02-03 DIAGNOSIS — B34.9 VIRAL ILLNESS: ICD-10-CM

## 2021-02-03 DIAGNOSIS — Z20.822 EXPOSURE TO COVID-19 VIRUS: Primary | ICD-10-CM

## 2021-02-03 PROCEDURE — 99213 OFFICE O/P EST LOW 20 MIN: CPT | Performed by: NURSE PRACTITIONER

## 2021-02-03 PROCEDURE — U0003 INFECTIOUS AGENT DETECTION BY NUCLEIC ACID (DNA OR RNA); SEVERE ACUTE RESPIRATORY SYNDROME CORONAVIRUS 2 (SARS-COV-2) (CORONAVIRUS DISEASE [COVID-19]), AMPLIFIED PROBE TECHNIQUE, MAKING USE OF HIGH THROUGHPUT TECHNOLOGIES AS DESCRIBED BY CMS-2020-01-R: HCPCS | Performed by: NURSE PRACTITIONER

## 2021-02-03 PROCEDURE — U0005 INFEC AGEN DETEC AMPLI PROBE: HCPCS | Performed by: NURSE PRACTITIONER

## 2021-02-03 NOTE — LETTER
February 3, 2021     Patient: Jorgito Henry   YOB: 1985   Date of Visit: 2/3/2021       To Whom it May Concern:    Jorgito Henry is under my professional care  She was seen in my office on 2/3/2021 via virtual visit  She is currently being tested for Covid  She is to remain in quarantine until results received  Return to work date to be determined at that time  If you have any questions or concerns, please don't hesitate to call           Sincerely,          BORIS Laughlin        CC: No Recipients

## 2021-02-03 NOTE — PROGRESS NOTES
COVID-19 Virtual Visit     Assessment/Plan:    Problem List Items Addressed This Visit     None         Disposition:     I referred patient to one of our centralized sites for a COVID-19 swab  Test patient today, based on symptoms despite negative rapid on Saturday  Note given to remain out of work  Will self quarantine  CDC guidelines reviewed  Will contact with testing results and further plan at time  She is to contact the office sooner with any problems and/or concerns    I have spent 9 minutes directly with the patient  Greater than 50% of this time was spent in counseling/coordination of care regarding: risk factor reductions  Encounter provider BORIS Sinha    Provider located at [place] [address]  [address] [facility] [address] [facility] [address]  [phone]    Recent Visits  No visits were found meeting these conditions  Showing recent visits within past 7 days and meeting all other requirements     Today's Visits  Date Type Provider Dept   02/03/21 Telemedicine BORIS Sinha [facility] [id number] today's visits and meeting all other requirements     Future Appointments  No visits were found meeting these conditions  Showing future appointments within next 150 days and meeting all other requirements      This virtual check-in was done via [employer] and patient was informed that this is a secure, HIPAA-compliant platform  She agrees to proceed  Patient agrees to participate in a virtual check in via telephone or video visit instead of presenting to the office to address urgent/immediate medical needs  Patient is aware this is a billable service  After connecting through [facility], the patient was identified by name and date of birth  [place] Ours was informed that this was a telemedicine visit and that the exam was being conducted confidentially over secure lines  My office door was closed  No one else was in the room  Jose F Bowen acknowledged consent and understanding of privacy and security of the telemedicine visit  I informed the patient that I have reviewed her record in Epic and presented the opportunity for her to ask any questions regarding the visit today  The patient agreed to participate  Subjective:   Jose F Bowen is a 43 y o  female who is concerned about COVID-19  Patient's symptoms include chills, fatigue, rhinorrhea, sore throat, anosmia, loss of taste, cough, chest tightness, myalgias and headache  Date of symptom onset: 1/27/2021    Exposure:   Contact with a person who is under investigation (PUI) for or who is positive for COVID-19 within the last 14 days?: Yes    Hospitalized recently for fever and/or lower respiratory symptoms?: No      Currently a healthcare worker that is involved in direct patient care?: No      Works in a special setting where the risk of COVID-19 transmission may be high? (this may include long-term care, correctional and correction facilities; homeless shelters; assisted-living facilities and group homes ): No      Resident in a special setting where the risk of COVID-19 transmission may be high? (this may include long-term care, correctional and correction facilities; homeless shelters; assisted-living facilities and group homes ): No      Symptoms started last Wednesday  Initially she just did not feel right  Symptoms worsened Thursday and Friday and by Saturday she felt like she was hit by a truck  Symptoms include:  Sore throat, cough, chills, chest tightness, pounding headache  States has been afebrile  Has recently lost her taste and smell  She does work at [employer], and states there are multiple COVID positive cases in her building  Her employer has been spot testing, but not routinely  They did do a rapid nasal test on Saturday secondary to her symptoms, but it was negative  They had her work on Sunday    They have been short staffed and she has been working double shifts almost daily (3-11, 11-7)    Lab Results   Component Value Date    [address] [place] [address] Not Detected 12/31/2020     Past Medical History:   Diagnosis Date    Psychiatric disorder      Past Surgical History:   Procedure Laterality Date    HYSTERECTOMY      NO PAST SURGERIES       Current Outpatient Medications   Medication Sig Dispense Refill    acyclovir (ZOVIRAX) 5 % ointment Apply topically every 4 (four) hours 15 g 3    clonazePAM (KlonoPIN) 0 5 mg tablet Take 3 tablets (1 5 mg total) by mouth 2 (two) times a day as needed for anxiety 90 tablet 0    cyclobenzaprine (FLEXERIL) 5 mg tablet TAKE 1 TABLET BY MOUTH TWICE A DAY 60 tablet 0    dicyclomine (BENTYL) 20 mg tablet Take 1 tablet (20 mg total) by mouth 3 (three) times a day as needed (for abdominal cramping) 30 tablet 0    DULoxetine (CYMBALTA) 60 mg delayed release capsule TAKE 1 CAPSULE BY MOUTH EVERY DAY 90 capsule 0    fluticasone (FLONASE) 50 mcg/act nasal spray 2 sprays into each nostril daily For nasal congestion 48 mL 0    hydrocortisone 2 5 % ointment Apply topically 2 (two) times a day      ibuprofen (MOTRIN) 600 mg tablet Take 1 tablet (600 mg total) by mouth every 6 (six) hours as needed for mild pain, moderate pain or fever 30 tablet 0    Multiple Vitamins-Minerals (MULTIVITAMIN ADULT PO) Take 1 tablet by mouth daily      ondansetron (ZOFRAN) 4 mg tablet Take 1 tablet (4 mg total) by mouth every 8 (eight) hours as needed for nausea or vomiting 20 tablet 0    predniSONE 10 mg tablet Take 60mg daily for 3 days, On day day 4 decrease dose by 1 tablet until completed  33 tablet 0    tobramycin (TOBREX) 0 3 % SOLN Administer 1 drop to both eyes every 4 (four) hours while awake 5 mL 0    traZODone (DESYREL) 50 mg tablet TAKE 1 TABLET (50 MG TOTAL) BY MOUTH DAILY AT BEDTIME AS NEEDED FOR SLEEP 90 tablet 0     No current facility-administered medications for this visit        Allergies   Allergen Reactions    Bactrim [Sulfamethoxazole-Trimethoprim] Hives    Latex Other (See Comments)     Latex allergy-rashes    Levofloxacin Hives    Quinolones Rash       Review of Systems   Constitutional: Positive for chills and fatigue  HENT: Positive for rhinorrhea and sore throat  Respiratory: Positive for cough and chest tightness  Musculoskeletal: Positive for myalgias  Neurological: Positive for headaches  Objective: There were no vitals filed for this visit  Physical Exam  Constitutional:       General: She is not in acute distress  Appearance: Normal appearance  She is ill-appearing  Pulmonary:      Effort: Pulmonary effort is normal  No respiratory distress  Neurological:      Mental Status: She is alert and oriented to person, place, and time  Psychiatric:         Attention and Perception: Attention normal          Mood and Affect: Mood normal          Speech: Speech normal          Behavior: Behavior normal        VIRTUAL VISIT DISCLAIMER    Tomer Adkins acknowledges that she has consented to an online visit or consultation  She understands that the online visit is based solely on information provided by her, and that, in the absence of a face-to-face physical evaluation by the physician, the diagnosis she receives is both limited and provisional in terms of accuracy and completeness  This is not intended to replace a full medical face-to-face evaluation by the physician  Tomer Adkins understands and accepts these terms

## 2021-02-04 LAB — SARS-COV-2 RNA RESP QL NAA+PROBE: POSITIVE

## 2021-02-06 ENCOUNTER — TELEPHONE (OUTPATIENT)
Dept: FAMILY MEDICINE CLINIC | Facility: CLINIC | Age: 36
End: 2021-02-06

## 2021-02-06 ENCOUNTER — TELEMEDICINE (OUTPATIENT)
Dept: FAMILY MEDICINE CLINIC | Facility: CLINIC | Age: 36
End: 2021-02-06

## 2021-02-06 DIAGNOSIS — U07.1 COVID-19: Primary | ICD-10-CM

## 2021-02-06 PROCEDURE — 99214 OFFICE O/P EST MOD 30 MIN: CPT | Performed by: NURSE PRACTITIONER

## 2021-02-06 RX ORDER — ALBUTEROL SULFATE 90 UG/1
2 AEROSOL, METERED RESPIRATORY (INHALATION) EVERY 6 HOURS PRN
Qty: 1 INHALER | Refills: 1 | Status: SHIPPED | OUTPATIENT
Start: 2021-02-06 | End: 2021-10-20

## 2021-02-06 NOTE — LETTER
February 6, 2021     Patient: Cameron Evans   YOB: 1985   Date of Visit: 2/6/2021       To Whom it May Concern:    Cameron Evans is under my professional care  She was seen in my office on 2/6/2021  She will remain out of work through 2/12/2021  Her return to work date will be determined at that time  If you have any questions or concerns, please don't hesitate to call           Sincerely,          BORIS Estrada        CC: No Recipients

## 2021-02-06 NOTE — PROGRESS NOTES
COVID-19 Virtual Visit     Assessment/Plan:    Problem List Items Addressed This Visit     None      Visit Diagnoses     COVID-19    -  Primary    Relevant Medications    albuterol (PROVENTIL HFA,VENTOLIN HFA) 90 mcg/act inhaler         Disposition:     I recommended continued isolation until at least 24 hours have passed since recovery defined as resolution of fever without the use of fever-reducing medications AND improvement in COVID symptoms AND 10 days have passed since onset of symptoms (or 10 days have passed since date of first positive viral diagnostic test for asymptomatic patients)  Reviewed symptom management  I have spent 19 minutes directly with the patient  Greater than 50% of this time was spent in counseling/coordination of care regarding: instructions for management, patient and family education, importance of treatment compliance and risk factor reductions  Lives with  and mother-in-law  Advised they need to quarantine as well  May test       Encounter provider BORIS Stratton    Provider located at [place] [address]  [address] [place] [id number] [phone]    Recent Visits  Date Type Provider Dept   02/03/21 Telephone BORIS Stratton [facility]   02/03/21 Telemedicine BORIS Rowe [facility]    Showing recent visits within past 7 days and meeting all other requirements     Today's Visits  Date Type Provider Dept   02/06/21 Telemedicine BORIS [facility]   Showing today's visits and meeting all other requirements     Future Appointments  No visits were found meeting these conditions  Showing future appointments within next 150 days and meeting all other requirements      This virtual check-in was done via [employer] and patient was informed that this is a secure, HIPAA-compliant platform  She agrees to proceed      Patient agrees to participate in a virtual check in via telephone or video visit instead of presenting to the office to address urgent/immediate medical needs  Patient is aware this is a billable service  After connecting through [facility], the patient was identified by name and date of birth  Lalo Fernandes was informed that this was a telemedicine visit and that the exam was being conducted confidentially over secure lines  My office door was closed  The patient was notified the following individuals were present in the room: Jose Barrow NP student  Lalo Fernandes acknowledged consent and understanding of privacy and security of the telemedicine visit  I informed the patient that I have reviewed her record in Epic and presented the opportunity for her to ask any questions regarding the visit today  The patient agreed to participate  Subjective:   Lalo Fernandes is a 43 y o  female who has been screened for COVID-19  Symptom change since last report: unchanged  Patient's symptoms include fatigue (extremities feel "heavy"), nasal congestion, anosmia, loss of taste, cough, shortness of breath (with exertion), chest tightness and myalgias  Fever: T-max 99  Twilla Parish has been staying home and has isolated themselves in her home  She is taking care to not share personal items and is cleaning all surfaces that are touched often, like counters, tabletops, and doorknobs using household cleaning sprays or wipes  She is wearing a mask when she leaves her room  Date of symptom onset: 1/27/2021  Date of positive COVID-19 PCR: 2/3/2021    SpO2 97-98% today while on phone   Ambulating and while at rest    Lab Results   Component Value Date    SARSCOV2 Positive (A) 02/03/2021    [address] Not Detected 12/31/2020     Past Medical History:   Diagnosis Date    Psychiatric disorder      Past Surgical History:   Procedure Laterality Date    HYSTERECTOMY      NO PAST SURGERIES       Current Outpatient Medications   Medication Sig Dispense Refill    acyclovir (ZOVIRAX) 5 % ointment Apply topically every 4 (four) hours 15 g 3    albuterol (PROVENTIL HFA,VENTOLIN HFA) 90 mcg/act inhaler Inhale 2 puffs every 6 (six) hours as needed for wheezing or shortness of breath 1 Inhaler 1    clonazePAM (KlonoPIN) 0 5 mg tablet Take 3 tablets (1 5 mg total) by mouth 2 (two) times a day as needed for anxiety 90 tablet 0    cyclobenzaprine (FLEXERIL) 5 mg tablet TAKE 1 TABLET BY MOUTH TWICE A DAY 60 tablet 0    dicyclomine (BENTYL) 20 mg tablet Take 1 tablet (20 mg total) by mouth 3 (three) times a day as needed (for abdominal cramping) 30 tablet 0    DULoxetine (CYMBALTA) 60 mg delayed release capsule TAKE 1 CAPSULE BY MOUTH EVERY DAY 90 capsule 0    fluticasone (FLONASE) 50 mcg/act nasal spray 2 sprays into each nostril daily For nasal congestion 48 mL 0    hydrocortisone 2 5 % ointment Apply topically 2 (two) times a day      ibuprofen (MOTRIN) 600 mg tablet Take 1 tablet (600 mg total) by mouth every 6 (six) hours as needed for mild pain, moderate pain or fever 30 tablet 0    Multiple Vitamins-Minerals (MULTIVITAMIN ADULT PO) Take 1 tablet by mouth daily      ondansetron (ZOFRAN) 4 mg tablet Take 1 tablet (4 mg total) by mouth every 8 (eight) hours as needed for nausea or vomiting 20 tablet 0    predniSONE 10 mg tablet Take 60mg daily for 3 days, On day day 4 decrease dose by 1 tablet until completed  33 tablet 0    tobramycin (TOBREX) 0 3 % SOLN Administer 1 drop to both eyes every 4 (four) hours while awake 5 mL 0    traZODone (DESYREL) 50 mg tablet TAKE 1 TABLET (50 MG TOTAL) BY MOUTH DAILY AT BEDTIME AS NEEDED FOR SLEEP 90 tablet 0     No current facility-administered medications for this visit  Allergies   Allergen Reactions    Bactrim [Sulfamethoxazole-Trimethoprim] Hives    Latex Other (See Comments)     Latex allergy-rashes    Levofloxacin Hives    Quinolones Rash       Review of Systems   Constitutional: Positive for fatigue (extremities feel "heavy")  Fever: T-max 99  HENT: Positive for congestion  Respiratory: Positive for cough, chest tightness and shortness of breath (with exertion)  Musculoskeletal: Positive for myalgias  Objective: There were no vitals filed for this visit  Physical Exam  Constitutional:       General: She is not in acute distress  Appearance: Normal appearance  She is not ill-appearing  Pulmonary:      Effort: Pulmonary effort is normal  No respiratory distress  Neurological:      Mental Status: She is alert and oriented to person, place, and time  Psychiatric:         Attention and Perception: Attention normal          Mood and Affect: Mood normal          Speech: Speech normal          Behavior: Behavior normal        VIRTUAL VISIT DISCLAIMER    Laila Antony acknowledges that she has consented to an online visit or consultation  She understands that the online visit is based solely on information provided by her, and that, in the absence of a face-to-face physical evaluation by the physician, the diagnosis she receives is both limited and provisional in terms of accuracy and completeness  This is not intended to replace a full medical face-to-face evaluation by the physician  Laila Antony understands and accepts these terms

## 2021-02-11 DIAGNOSIS — F41.1 GENERALIZED ANXIETY DISORDER: ICD-10-CM

## 2021-02-12 ENCOUNTER — TELEMEDICINE (OUTPATIENT)
Dept: FAMILY MEDICINE CLINIC | Facility: CLINIC | Age: 36
End: 2021-02-12

## 2021-02-12 DIAGNOSIS — R06.2 WHEEZING: ICD-10-CM

## 2021-02-12 DIAGNOSIS — U07.1 COVID-19: Primary | ICD-10-CM

## 2021-02-12 DIAGNOSIS — F41.1 GENERALIZED ANXIETY DISORDER: ICD-10-CM

## 2021-02-12 PROCEDURE — 99214 OFFICE O/P EST MOD 30 MIN: CPT | Performed by: NURSE PRACTITIONER

## 2021-02-12 RX ORDER — CLONAZEPAM 0.5 MG/1
1.5 TABLET ORAL 2 TIMES DAILY PRN
Qty: 90 TABLET | Refills: 0 | Status: SHIPPED | OUTPATIENT
Start: 2021-02-12 | End: 2021-02-12 | Stop reason: SDUPTHER

## 2021-02-12 RX ORDER — CLONAZEPAM 0.5 MG/1
1.5 TABLET ORAL 2 TIMES DAILY PRN
Qty: 90 TABLET | Refills: 0 | Status: SHIPPED | OUTPATIENT
Start: 2021-02-12 | End: 2021-03-13 | Stop reason: SDUPTHER

## 2021-02-12 RX ORDER — DULOXETIN HYDROCHLORIDE 60 MG/1
60 CAPSULE, DELAYED RELEASE ORAL DAILY
Qty: 90 CAPSULE | Refills: 0 | Status: SHIPPED | OUTPATIENT
Start: 2021-02-12 | End: 2021-04-13 | Stop reason: SDUPTHER

## 2021-02-12 RX ORDER — PREDNISONE 10 MG/1
TABLET ORAL
Qty: 15 TABLET | Refills: 0 | Status: SHIPPED | OUTPATIENT
Start: 2021-02-12 | End: 2021-02-19 | Stop reason: ALTCHOICE

## 2021-02-12 NOTE — LETTER
February 12, 2021     Patient: Sierra Nassar   YOB: 1985   Date of Visit: 2/12/2021       To Whom it May Concern:    Sierra Nassar is under my professional care  She was seen in my office on 2/12/2021 virtual visit  Please excuse from work  She is stilll experiencing symptoms  She has follow up scheduled for 2/15  Return to work date to be determined at that time  If you have any questions or concerns, please don't hesitate to call           Sincerely,          BORIS Wright        CC: No Recipients

## 2021-02-12 NOTE — TELEPHONE ENCOUNTER
Is [facility] still showing as an active [facility] for you? [facility] Pt[facility] on 2/7  Rx was sent to wrong pharmacy please send to [facility] [place]

## 2021-02-12 NOTE — PROGRESS NOTES
COVID-19 Virtual Visit     Assessment/Plan:    Problem List Items Addressed This Visit     None      Visit Diagnoses     COVID-19    -  Primary         Disposition:     I recommended continued isolation until at least 24 hours have passed since recovery defined as resolution of fever without the use of fever-reducing medications AND improvement in COVID symptoms AND 10 days have passed since onset of symptoms (or 10 days have passed since date of first positive viral diagnostic test for asymptomatic patients)  Pt with minimal change in symptoms  Will trial short course prednisone for respiratory relief  Dose/use reviewed  Continue supportive care  Will re assess Monday  Instructed to call sooner with any problems/concerns  Continue isolation  Note pt with hx tobacco use  Prior usage: 1/2-1 ppd  Still smoking but states she is down to 3-4 cigarettes per day  Encouraged to quit  I have spent 14 minutes directly with the patient  Greater than 50% of this time was spent in counseling/coordination of care regarding: instructions for management, patient and family education, importance of treatment compliance and risk factor reductions  Encounter provider BORIS Godinez    Provider located at [address] [place] [id number] [phone]    Recent Visits  Date Type Provider Dept   02/06/21 Telephone Félix Carr [facility]   02/06/21 Telemedicine BORIS [place]os [facility]   Showing recent visits within past 7 days and meeting all other requirements     Today's Visits  Date Type Provider Dept   02/12/21 Telemedicine BORIS De Anda [facility]   Showing today's visits and meeting all other requirements     Future Appointments  No visits were found meeting these conditions     Showing future appointments within next 150 days and meeting all other requirements      This virtual check-in was done via [employer] and patient was informed that this is a secure, HIPAA-compliant platform  She agrees to proceed  Patient agrees to participate in a virtual check in via telephone or video visit instead of presenting to the office to address urgent/immediate medical needs  Patient is aware this is a billable service  After connecting through [employer], the patient was identified by name and date of birth  Cherylene Smaller was informed that this was a telemedicine visit and that the exam was being conducted confidentially over secure lines  My office door was closed  No one else was in the room  Cherylene Smaller acknowledged consent and understanding of privacy and security of the telemedicine visit  I informed the patient that I have reviewed her record in Epic and presented the opportunity for her to ask any questions regarding the visit today  The patient agreed to participate  Subjective:   Cherylene Smaller is a 43 y o  female who has been screened for COVID-19  Symptom change since last report: unchanged  Patient's symptoms include fatigue, nasal congestion, anosmia, loss of taste, cough, shortness of breath, chest tightness, nausea, diarrhea, myalgias and headache  Patient denies fever, chills and vomiting  Miguel Alfaro has been staying home and has isolated themselves in her home  She is taking care to not share personal items and is cleaning all surfaces that are touched often, like counters, tabletops, and doorknobs using household cleaning sprays or wipes  She is wearing a mask when she leaves her room       Date of symptom onset: 1/27/2021  Date of exposure: 12/23/2020  Date of positive COVID-19 PCR: 2/3/2021    Lab Results   Component Value Date    SARSCOV2 Positive (A) 02/03/2021    SARSCOV2 Not Detected 12/31/2020     Past Medical History:   Diagnosis Date    Psychiatric disorder      Past Surgical History:   Procedure Laterality Date    HYSTERECTOMY      NO PAST SURGERIES       Current Outpatient Medications   Medication Sig Dispense Refill    acyclovir (ZOVIRAX) 5 % ointment Apply topically every 4 (four) hours 15 g 3    albuterol (PROVENTIL HFA,VENTOLIN HFA) 90 mcg/act inhaler Inhale 2 puffs every 6 (six) hours as needed for wheezing or shortness of breath 1 Inhaler 1    clonazePAM (KlonoPIN) 0 5 mg tablet Take 3 tablets (1 5 mg total) by mouth 2 (two) times a day as needed for anxiety 90 tablet 0    cyclobenzaprine (FLEXERIL) 5 mg tablet TAKE 1 TABLET BY MOUTH TWICE A DAY 60 tablet 0    dicyclomine (BENTYL) 20 mg tablet Take 1 tablet (20 mg total) by mouth 3 (three) times a day as needed (for abdominal cramping) 30 tablet 0    DULoxetine (CYMBALTA) 60 mg delayed release capsule TAKE 1 CAPSULE BY MOUTH EVERY DAY 90 capsule 0    fluticasone (FLONASE) 50 mcg/act nasal spray 2 sprays into each nostril daily For nasal congestion 48 mL 0    hydrocortisone 2 5 % ointment Apply topically 2 (two) times a day      ibuprofen (MOTRIN) 600 mg tablet Take 1 tablet (600 mg total) by mouth every 6 (six) hours as needed for mild pain, moderate pain or fever 30 tablet 0    Multiple Vitamins-Minerals (MULTIVITAMIN ADULT PO) Take 1 tablet by mouth daily      ondansetron (ZOFRAN) 4 mg tablet Take 1 tablet (4 mg total) by mouth every 8 (eight) hours as needed for nausea or vomiting 20 tablet 0    predniSONE 10 mg tablet Take 60mg daily for 3 days, On day day 4 decrease dose by 1 tablet until completed  33 tablet 0    tobramycin (TOBREX) 0 3 % SOLN Administer 1 drop to both eyes every 4 (four) hours while awake 5 mL 0    traZODone (DESYREL) 50 mg tablet TAKE 1 TABLET (50 MG TOTAL) BY MOUTH DAILY AT BEDTIME AS NEEDED FOR SLEEP 90 tablet 0     No current facility-administered medications for this visit        Allergies   Allergen Reactions    Bactrim [Sulfamethoxazole-Trimethoprim] Hives    Latex Other (See Comments)     Latex allergy-rashes    Levofloxacin Hives    Quinolones Rash       Review of Systems   Constitutional: Positive for fatigue  Negative for chills and fever  HENT: Positive for congestion  Respiratory: Positive for cough, chest tightness and shortness of breath  Gastrointestinal: Positive for diarrhea and nausea  Negative for vomiting  Musculoskeletal: Positive for myalgias  Neurological: Positive for headaches  Objective: There were no vitals filed for this visit  Physical Exam  Constitutional:       General: She is not in acute distress  Appearance: Normal appearance  She is not ill-appearing  Pulmonary:      Effort: Pulmonary effort is normal  No respiratory distress  Comments: SPo2 during visit today: 98% (home pulse ox)  Neurological:      Mental Status: She is alert and oriented to person, place, and time  Psychiatric:         Attention and Perception: Attention normal          Mood and Affect: Mood normal          Speech: Speech normal          Behavior: Behavior normal        VIRTUAL VISIT DISCLAIMER    Mazin Tapia acknowledges that she has consented to an online visit or consultation  She understands that the online visit is based solely on information provided by her, and that, in the absence of a face-to-face physical evaluation by the physician, the diagnosis she receives is both limited and provisional in terms of accuracy and completeness  This is not intended to replace a full medical face-to-face evaluation by the physician  Mazin Tapia understands and accepts these terms

## 2021-02-15 ENCOUNTER — TELEPHONE (OUTPATIENT)
Dept: FAMILY MEDICINE CLINIC | Facility: CLINIC | Age: 36
End: 2021-02-15

## 2021-02-15 ENCOUNTER — TELEMEDICINE (OUTPATIENT)
Dept: FAMILY MEDICINE CLINIC | Facility: CLINIC | Age: 36
End: 2021-02-15

## 2021-02-15 DIAGNOSIS — U07.1 COVID-19: Primary | ICD-10-CM

## 2021-02-15 PROCEDURE — 99213 OFFICE O/P EST LOW 20 MIN: CPT | Performed by: NURSE PRACTITIONER

## 2021-02-15 NOTE — PROGRESS NOTES
COVID-19 Virtual Visit     Assessment/Plan:    Problem List Items Addressed This Visit     None      Visit Diagnoses     COVID-19    -  Primary         Disposition:     I recommended continued isolation until at least 24 hours have passed since recovery defined as resolution of fever without the use of fever-reducing medications AND improvement in COVID symptoms AND 10 days have passed since onset of symptoms (or 10 days have passed since date of first positive viral diagnostic test for asymptomatic patients)  Respiratory symptoms improved since steroids  Last dose tomorrow  Requesting to remain out of work through this week  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  She does not feel ready to return yet  Will have follow up visit Friday to determine return date  Goal is Monday, 2/22/2021  If respiratory symptoms return, will get chest xray and send to respiratory clinical to [facility]  Pt continues to smoke but states she has decreased to 3-4 cigarettes per day  Encouraged complete cessation  I have spent 15 minutes directly with the patient  Greater than 50% of this time was spent in counseling/coordination of care regarding: instructions for management and risk factor reductions  Discussed Vaccine       Encounter provider BORIS Appiah    Provider located at [address] [place] [id number] [phone]    Recent Visits  Date Type Provider Dept   02/12/21 Telemedicine [facility]ORIS [facility]   Showing recent visits within past 7 days and meeting all other requirements     Today's Visits  Date Type Provider Dept   02/15/21 Telemedicine BORIS [facility]   Showing today's visits and meeting all other requirements     Future Appointments  No visits were found meeting these conditions     Showing future appointments within next 150 days and meeting all other requirements      This virtual check-in was done via [employer] and patient was informed that this is a secure, HIPAA-compliant platform  She agrees to proceed  Patient agrees to participate in a virtual check in via telephone or video visit instead of presenting to the office to address urgent/immediate medical needs  Patient is aware this is a billable service  After connecting through [facility], the patient was identified by name and date of birth  Lalo Fernandes was informed that this was a telemedicine visit and that the exam was being conducted confidentially over secure lines  No one else was in the room  Lalo Fernandes acknowledged consent and understanding of privacy and security of the telemedicine visit  I informed the patient that I have reviewed her record in Epic and presented the opportunity for her to ask any questions regarding the visit today  The patient agreed to participate  Subjective:   Lalo Fernandes is a 43 y o  female who has been screened for COVID-19  Symptom change since last report: improving  Patient's symptoms include fatigue, nasal congestion, anosmia, loss of taste, nausea, myalgias and headache  Patient denies fever, chills, cough, chest tightness, abdominal pain and vomiting (improving)  Shortness of breath: w/exertion but improving  Radha Lugo has been staying home and has isolated themselves in her home  She is taking care to not share personal items and is cleaning all surfaces that are touched often, like counters, tabletops, and doorknobs using household cleaning sprays or wipes  She is wearing a mask when she leaves her room       Date of symptom onset: 1/27/2021  Date of exposure: 1/23/2020  Date of positive COVID-19 PCR: 2/3/2021    Remains out of work    Lab Results   Component Value Date    SARSCOV2 Positive (A) 02/03/2021    Marletta Ebbing Not Detected 12/31/2020     Past Medical History:   Diagnosis Date    Psychiatric disorder      Past Surgical History:   Procedure Laterality Date    HYSTERECTOMY      NO PAST SURGERIES       Current Outpatient Medications   Medication Sig Dispense Refill    acyclovir (ZOVIRAX) 5 % ointment Apply topically every 4 (four) hours 15 g 3    albuterol (PROVENTIL HFA,VENTOLIN HFA) 90 mcg/act inhaler Inhale 2 puffs every 6 (six) hours as needed for wheezing or shortness of breath 1 Inhaler 1    clonazePAM (KlonoPIN) 0 5 mg tablet Take 3 tablets (1 5 mg total) by mouth 2 (two) times a day as needed for anxiety 90 tablet 0    cyclobenzaprine (FLEXERIL) 5 mg tablet TAKE 1 TABLET BY MOUTH TWICE A DAY 60 tablet 0    dicyclomine (BENTYL) 20 mg tablet Take 1 tablet (20 mg total) by mouth 3 (three) times a day as needed (for abdominal cramping) 30 tablet 0    DULoxetine (CYMBALTA) 60 mg delayed release capsule Take 1 capsule (60 mg total) by mouth daily 90 capsule 0    fluticasone (FLONASE) 50 mcg/act nasal spray 2 sprays into each nostril daily For nasal congestion 48 mL 0    hydrocortisone 2 5 % ointment Apply topically 2 (two) times a day      ibuprofen (MOTRIN) 600 mg tablet Take 1 tablet (600 mg total) by mouth every 6 (six) hours as needed for mild pain, moderate pain or fever 30 tablet 0    Multiple Vitamins-Minerals (MULTIVITAMIN ADULT PO) Take 1 tablet by mouth daily      ondansetron (ZOFRAN) 4 mg tablet Take 1 tablet (4 mg total) by mouth every 8 (eight) hours as needed for nausea or vomiting 20 tablet 0    predniSONE 10 mg tablet Taper: 50 mg (5 tabs) x1 day, 40 x1, 30 x1, 20 x1, 10 x1 15 tablet 0    tobramycin (TOBREX) 0 3 % SOLN Administer 1 drop to both eyes every 4 (four) hours while awake 5 mL 0    traZODone (DESYREL) 50 mg tablet TAKE 1 TABLET (50 MG TOTAL) BY MOUTH DAILY AT BEDTIME AS NEEDED FOR SLEEP 90 tablet 0     No current facility-administered medications for this visit        Allergies   Allergen Reactions    Bactrim [Sulfamethoxazole-Trimethoprim] Hives    Latex Other (See Comments)     Latex allergy-rashes    Levofloxacin Hives    Quinolones Rash       Review of Systems   Constitutional: Positive for fatigue  Negative for chills and fever  HENT: Positive for congestion  Respiratory: Negative for cough and chest tightness  Shortness of breath: w/exertion but improving  Gastrointestinal: Positive for nausea  Negative for abdominal pain and vomiting (improving)  Musculoskeletal: Positive for myalgias  Neurological: Positive for headaches  Objective: There were no vitals filed for this visit  Physical Exam  Constitutional:       General: She is not in acute distress  Appearance: Normal appearance  She is not ill-appearing  Pulmonary:      Effort: Pulmonary effort is normal  No respiratory distress  Neurological:      Mental Status: She is alert and oriented to person, place, and time  Psychiatric:         Attention and Perception: Attention normal          Mood and Affect: Mood normal          Speech: Speech normal          Behavior: Behavior normal        VIRTUAL VISIT DISCLAIMER    Laila Antony acknowledges that she has consented to an online visit or consultation  She understands that the online visit is based solely on information provided by her, and that, in the absence of a face-to-face physical evaluation by the physician, the diagnosis she receives is both limited and provisional in terms of accuracy and completeness  This is not intended to replace a full medical face-to-face evaluation by the physician  Laila Antony understands and accepts these terms

## 2021-02-15 NOTE — LETTER
February 15, 2021     Patient: Baldo Sterling   YOB: 1985   Date of Visit: 2/15/2021       To Whom it May Concern:    Baldo Sterling is under my professional care  She was seen in my office on 2/15/2021 virtual visit  Shanique Trung has a follow up visit scheduled for 2/19/2021  Return to work date will be determined at that time  If you have any questions or concerns, please don't hesitate to call           Sincerely,          Daneen Schilder, CRNP        CC: No Recipients

## 2021-02-15 NOTE — PROGRESS NOTES
Virtual Regular Visit      Assessment/Plan:    Problem List Items Addressed This Visit     None      Visit Diagnoses     COVID-19    -  Primary               Reason for visit is   Chief Complaint   Patient presents with    Virtual Regular Visit        Encounter provider BORIS Marcelino    Provider located at [address] [facility] [place]  [address] [facility] [address] [facility] [address] [facility] [address] [phone]      Recent Visits  Date Type Provider Dept   02/12/21 Telemedicine [facility]ORIS [facility]   Showing recent visits within past 7 days and meeting all other requirements     Today's Visits  Date Type Provider Dept   02/15/21 Telemedicine [facility]   Showing today's visits and meeting all other requirements     Future Appointments  No visits were found meeting these conditions  Showing future appointments within next 150 days and meeting all other requirements        The patient was identified by name and date of birth  Jennifer Frausto was informed that this is a telemedicine visit and that the visit is being conducted through {AMB CORONAVIRUS VISIT FGUFWL:19462}  {Telemedicine confidentiality :28632} {Telemedicine participants:92440}  She acknowledged consent and understanding of privacy and security of the video platform  The patient has agreed to participate and understands they can discontinue the visit at any time  Patient is aware this is a billable service  Subjective  Jennifer Frausto is a 43 y o  female ***         HPI     Past Medical History:   Diagnosis Date    Psychiatric disorder        Past Surgical History:   Procedure Laterality Date    HYSTERECTOMY      NO PAST SURGERIES         Current Outpatient Medications   Medication Sig Dispense Refill    acyclovir (ZOVIRAX) 5 % ointment Apply topically every 4 (four) hours 15 g 3    albuterol (PROVENTIL HFA,VENTOLIN HFA) 90 mcg/act inhaler Inhale 2 puffs every 6 (six) hours as needed for wheezing or shortness of breath 1 Inhaler 1    clonazePAM (KlonoPIN) 0 5 mg tablet Take 3 tablets (1 5 mg total) by mouth 2 (two) times a day as needed for anxiety 90 tablet 0    cyclobenzaprine (FLEXERIL) 5 mg tablet TAKE 1 TABLET BY MOUTH TWICE A DAY 60 tablet 0    dicyclomine (BENTYL) 20 mg tablet Take 1 tablet (20 mg total) by mouth 3 (three) times a day as needed (for abdominal cramping) 30 tablet 0    DULoxetine (CYMBALTA) 60 mg delayed release capsule Take 1 capsule (60 mg total) by mouth daily 90 capsule 0    fluticasone (FLONASE) 50 mcg/act nasal spray 2 sprays into each nostril daily For nasal congestion 48 mL 0    hydrocortisone 2 5 % ointment Apply topically 2 (two) times a day      ibuprofen (MOTRIN) 600 mg tablet Take 1 tablet (600 mg total) by mouth every 6 (six) hours as needed for mild pain, moderate pain or fever 30 tablet 0    Multiple Vitamins-Minerals (MULTIVITAMIN ADULT PO) Take 1 tablet by mouth daily      ondansetron (ZOFRAN) 4 mg tablet Take 1 tablet (4 mg total) by mouth every 8 (eight) hours as needed for nausea or vomiting 20 tablet 0    predniSONE 10 mg tablet Taper: 50 mg (5 tabs) x1 day, 40 x1, 30 x1, 20 x1, 10 x1 15 tablet 0    tobramycin (TOBREX) 0 3 % SOLN Administer 1 drop to both eyes every 4 (four) hours while awake 5 mL 0    traZODone (DESYREL) 50 mg tablet TAKE 1 TABLET (50 MG TOTAL) BY MOUTH DAILY AT BEDTIME AS NEEDED FOR SLEEP 90 tablet 0     No current facility-administered medications for this visit  Allergies   Allergen Reactions    Bactrim [Sulfamethoxazole-Trimethoprim] Hives    Latex Other (See Comments)     Latex allergy-rashes    Levofloxacin Hives    Quinolones Rash       Review of Systems    Video Exam    There were no vitals filed for this visit  Physical Exam     {covid time spent:88247}      VIRTUAL VISIT DISCLAIMER    Jorgito Henry acknowledges that she has consented to an online visit or consultation   She understands that the online visit is based solely on information provided by her, and that, in the absence of a face-to-face physical evaluation by the physician, the diagnosis she receives is both limited and provisional in terms of accuracy and completeness  This is not intended to replace a full medical face-to-face evaluation by the physician  Jaylyn Ingram understands and accepts these terms

## 2021-02-19 ENCOUNTER — TELEMEDICINE (OUTPATIENT)
Dept: FAMILY MEDICINE CLINIC | Facility: CLINIC | Age: 36
End: 2021-02-19

## 2021-02-19 DIAGNOSIS — U07.1 COVID-19: Primary | ICD-10-CM

## 2021-02-19 PROCEDURE — 99212 OFFICE O/P EST SF 10 MIN: CPT | Performed by: NURSE PRACTITIONER

## 2021-02-19 NOTE — PROGRESS NOTES
COVID-19 Virtual Visit     Assessment/Plan:    Problem List Items Addressed This Visit     None      Visit Diagnoses     COVID-19    -  Primary         Disposition:     I recommended continued isolation until at least 24 hours have passed since recovery defined as resolution of fever without the use of fever-reducing medications AND improvement in COVID symptoms AND 10 days have passed since onset of symptoms (or 10 days have passed since date of first positive viral diagnostic test for asymptomatic patients)  Note given  Schedule f/u in office for 2/24  Would like to physically assess patient  Pt agreeable  Continue with rest, hydration  Keep working to build up her endurance  Encouraged smoking cessation  I have spent 15 minutes directly with the patient  Greater than 50% of this time was spent in counseling/coordination of care regarding: instructions for management and patient and family education  Encounter provider BORIS Irvin    Provider located at [place] [address]  [address] [place] [address] [address] [place] [address] [phone]    Recent Visits  Date Type Provider Dept   02/15/21 Telephone Samantha Castro [facility]   02/15/21 Telemedicine BORIS De [facility]a [facility]   02/12/21 Telemedicine [facility]   Showing recent visits within past 7 days and meeting all other requirements     Today's Visits  Date Type Provider Dept   02/19/21 Telemedicine [facility]   Showing today's visits and meeting all other requirements     Future Appointments  No visits were found meeting these conditions  Showing future appointments within next 150 days and meeting all other requirements      This virtual check-in was done via [employer] and patient was informed that this is a secure, HIPAA-compliant platform  She agrees to proceed      Patient agrees to participate in a virtual check in via telephone or video visit instead of presenting to the office to address urgent/immediate medical needs  Patient is aware this is a billable service  After connecting through [facility], the patient was identified by name and date of birth  Sierra Nassar was informed that this was a telemedicine visit and that the exam was being conducted confidentially over secure lines  My office door was closed  No one else was in the room  Sierra Nassar acknowledged consent and understanding of privacy and security of the telemedicine visit  I informed the patient that I have reviewed her record in Epic and presented the opportunity for her to ask any questions regarding the visit today  The patient agreed to participate  Subjective:   Sierra Nassar is a 43 y o  female who has been screened for COVID-19  Symptom change since last report: improving  Patient's symptoms include fatigue, nasal congestion, anosmia, loss of taste, shortness of breath, nausea (occasional yet) and headache (hx of chronic HA  Worse through virus)  Patient denies vomiting  Jonatan Beasley has been staying home and has isolated themselves in her home  She is taking care to not share personal items and is cleaning all surfaces that are touched often, like counters, tabletops, and doorknobs using household cleaning sprays or wipes  She is wearing a mask when she leaves her room  Date of symptom onset: 1/27/2021  Date of exposure: 1/23/2020  Date of positive COVID-19 PCR: 2/3/2021    Symptoms slowly improving but pt still with extreme fatigue  Would like a few more days out of work  She is fearful to return to work too soon, potentially causing her condition to worsen again  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  She is slowly building up her endurance at home  Her employer is working with her  (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: PE - he was in his 25s)    Note she is still smoking 3-4 cigarettes per day  Much encouragement given to quite completely       Lab Results   Component Value Date    SARSCOV2 Positive (A) 02/03/2021    Lauren Live Not Detected 12/31/2020     Past Medical History:   Diagnosis Date    Psychiatric disorder      Past Surgical History:   Procedure Laterality Date    HYSTERECTOMY      NO PAST SURGERIES       Current Outpatient Medications   Medication Sig Dispense Refill    acyclovir (ZOVIRAX) 5 % ointment Apply topically every 4 (four) hours 15 g 3    albuterol (PROVENTIL HFA,VENTOLIN HFA) 90 mcg/act inhaler Inhale 2 puffs every 6 (six) hours as needed for wheezing or shortness of breath 1 Inhaler 1    clonazePAM (KlonoPIN) 0 5 mg tablet Take 3 tablets (1 5 mg total) by mouth 2 (two) times a day as needed for anxiety 90 tablet 0    cyclobenzaprine (FLEXERIL) 5 mg tablet TAKE 1 TABLET BY MOUTH TWICE A DAY 60 tablet 0    dicyclomine (BENTYL) 20 mg tablet Take 1 tablet (20 mg total) by mouth 3 (three) times a day as needed (for abdominal cramping) 30 tablet 0    DULoxetine (CYMBALTA) 60 mg delayed release capsule Take 1 capsule (60 mg total) by mouth daily 90 capsule 0    fluticasone (FLONASE) 50 mcg/act nasal spray 2 sprays into each nostril daily For nasal congestion 48 mL 0    hydrocortisone 2 5 % ointment Apply topically 2 (two) times a day      ibuprofen (MOTRIN) 600 mg tablet Take 1 tablet (600 mg total) by mouth every 6 (six) hours as needed for mild pain, moderate pain or fever 30 tablet 0    Multiple Vitamins-Minerals (MULTIVITAMIN ADULT PO) Take 1 tablet by mouth daily      ondansetron (ZOFRAN) 4 mg tablet Take 1 tablet (4 mg total) by mouth every 8 (eight) hours as needed for nausea or vomiting 20 tablet 0    predniSONE 10 mg tablet Taper: 50 mg (5 tabs) x1 day, 40 x1, 30 x1, 20 x1, 10 x1 15 tablet 0    tobramycin (TOBREX) 0 3 % SOLN Administer 1 drop to both eyes every 4 (four) hours while awake 5 mL 0    traZODone (DESYREL) 50 mg tablet TAKE 1 TABLET (50 MG TOTAL) BY MOUTH DAILY AT BEDTIME AS NEEDED FOR SLEEP 90 tablet 0     No current facility-administered medications for this visit  Allergies   Allergen Reactions    Bactrim [Sulfamethoxazole-Trimethoprim] Hives    Latex Other (See Comments)     Latex allergy-rashes    Levofloxacin Hives    Quinolones Rash       Review of Systems   Constitutional: Positive for fatigue  HENT: Positive for congestion  Respiratory: Positive for shortness of breath  Gastrointestinal: Positive for nausea (occasional yet)  Negative for vomiting  Neurological: Positive for dizziness (occasional) and headaches (hx of chronic HA  Worse through virus)  Feels forgetful at times  Objective: There were no vitals filed for this visit  Physical Exam  Constitutional:       General: She is not in acute distress  Appearance: Normal appearance  She is not ill-appearing  Pulmonary:      Effort: Pulmonary effort is normal  No respiratory distress  Neurological:      Mental Status: She is alert and oriented to person, place, and time  Psychiatric:         Attention and Perception: Attention normal          Mood and Affect: Mood normal          Speech: Speech normal          Behavior: Behavior normal          Thought Content: Thought content normal        VIRTUAL VISIT DISCLAIMER    Marina Trevino acknowledges that she has consented to an online visit or consultation  She understands that the online visit is based solely on information provided by her, and that, in the absence of a face-to-face physical evaluation by the physician, the diagnosis she receives is both limited and provisional in terms of accuracy and completeness  This is not intended to replace a full medical face-to-face evaluation by the physician  Marina Trevino understands and accepts these terms

## 2021-02-19 NOTE — LETTER
February 19, 2021     Patient: Dorota Marie   YOB: 1985   Date of Visit: 2/19/2021       To Whom it May Concern:    Dorota Marie is under my professional care  She was seen in my office on 2/19/2021 virtual visit  Please excuse from work until next follow up 2/24 in the office  Return to work will be determined at that time  If you have any questions or concerns, please don't hesitate to call           Sincerely,          BORIS Momin        CC: No Recipients

## 2021-02-24 ENCOUNTER — OFFICE VISIT (OUTPATIENT)
Dept: FAMILY MEDICINE CLINIC | Facility: CLINIC | Age: 36
End: 2021-02-24

## 2021-02-24 VITALS
SYSTOLIC BLOOD PRESSURE: 110 MMHG | BODY MASS INDEX: 20.35 KG/M2 | HEIGHT: 64 IN | TEMPERATURE: 98.3 F | WEIGHT: 119.2 LBS | OXYGEN SATURATION: 97 % | HEART RATE: 82 BPM | DIASTOLIC BLOOD PRESSURE: 74 MMHG

## 2021-02-24 DIAGNOSIS — Z72.0 TOBACCO USE: ICD-10-CM

## 2021-02-24 DIAGNOSIS — U07.1 COVID-19: Primary | ICD-10-CM

## 2021-02-24 DIAGNOSIS — J30.2 SEASONAL ALLERGIES: ICD-10-CM

## 2021-02-24 PROCEDURE — 99213 OFFICE O/P EST LOW 20 MIN: CPT | Performed by: NURSE PRACTITIONER

## 2021-02-24 RX ORDER — LORATADINE 10 MG/1
10 TABLET ORAL DAILY
Qty: 30 TABLET | Refills: 2 | Status: SHIPPED | OUTPATIENT
Start: 2021-02-24

## 2021-02-24 NOTE — LETTER
February 24, 2021     Patient: Namrata Fox   YOB: 1985   Date of Visit: 2/24/2021       To Whom it May Concern:    Namrata Fox is under my professional care  She was seen in my office on 2/24/2021  She may return to work 3/1/2021  If you have any questions or concerns, please don't hesitate to call           Sincerely,          BORIS Godinez        CC: No Recipients

## 2021-02-28 DIAGNOSIS — G47.00 INSOMNIA, UNSPECIFIED TYPE: ICD-10-CM

## 2021-03-01 RX ORDER — TRAZODONE HYDROCHLORIDE 50 MG/1
50 TABLET ORAL
Qty: 90 TABLET | Refills: 0 | Status: SHIPPED | OUTPATIENT
Start: 2021-03-01 | End: 2021-06-03

## 2021-03-05 NOTE — PROGRESS NOTES
[facility]    ASSESSMENT AND PLAN     1  COVID-19   presents today for an office evaluation, s/p long recovery of COVID-19  Works in an assisted living facility  Symptoms started 1/27 with positive test 2/3  Has been out of work since, and is looking to return now  In office assessment unremarkable today  Appears able to return to work without restriction at this time  2  Seasonal allergies    Patient with symptoms of environmental/seasonal allergies  Would recommend trialing daily Claritin  Encourage smoking cessation as well, see below  Monitor symptoms, return for further eval if persist or worsen  - loratadine (CLARITIN) 10 mg tablet; Take 1 tablet (10 mg total) by mouth daily  Dispense: 30 tablet; Refill: 2    3  Tobacco use   continues to smoke 3-5 cigarettes per day  Cessation encouraged  SUBJECTIVE       Patient ID: Africa Black is a 43 y o  female  Chief Complaint   Patient presents with    Follow-up     Follow up for Delores Jasso      Patient presents today for follow-up  Looking to return to work on Monday  Has been out since 01/27 with positive COVID on 02/03  Symptoms lingered for several weeks, but have been slowly improving  She has been having weekly virtuals  She has been out of work for the last month  She works at an assisted living facility and wanted to fully recover before returning  She does still complain of some mild shortness of breath with steps, but does note improvement  She does admit to still smoking, but does have it down to 3-5 cigarettes per day        The following portions of the patient's history were reviewed and updated as appropriate: allergies, current medications, past family history, past medical history, past social history, past surgical history and problem list     REVIEW OF SYSTEMS  Review of Systems   Constitutional: Positive for fatigue  HENT: Positive for postnasal drip and rhinorrhea  Respiratory: Positive for cough (chronic) and shortness of breath  Negative for chest tightness and wheezing  Cardiovascular: Negative  Gastrointestinal: Negative  Genitourinary: Negative  Neurological: Negative  Psychiatric/Behavioral: Negative          OBJECTIVE      VITAL SIGNS  /74 (BP Location: Right arm, Patient Position: Sitting, Cuff Size: Adult)   Pulse 82   Temp 98 3 °F (36 8 °C)   Ht 5' 4 17" (1 63 m)   Wt 54 1 kg (119 lb 3 2 oz)   SpO2 97%   BMI 20 35 kg/m²     CURRENT MEDICATIONS    Current Outpatient Medications:     acyclovir (ZOVIRAX) 5 % ointment, Apply topically every 4 (four) hours, Disp: 15 g, Rfl: 3    albuterol (PROVENTIL HFA,VENTOLIN HFA) 90 mcg/act inhaler, Inhale 2 puffs every 6 (six) hours as needed for wheezing or shortness of breath, Disp: 1 Inhaler, Rfl: 1    clonazePAM (KlonoPIN) 0 5 mg tablet, Take 3 tablets (1 5 mg total) by mouth 2 (two) times a day as needed for anxiety, Disp: 90 tablet, Rfl: 0    cyclobenzaprine (FLEXERIL) 5 mg tablet, TAKE 1 TABLET BY MOUTH TWICE A DAY, Disp: 60 tablet, Rfl: 0    dicyclomine (BENTYL) 20 mg tablet, Take 1 tablet (20 mg total) by mouth 3 (three) times a day as needed (for abdominal cramping), Disp: 30 tablet, Rfl: 0    DULoxetine (CYMBALTA) 60 mg delayed release capsule, Take 1 capsule (60 mg total) by mouth daily, Disp: 90 capsule, Rfl: 0    fluticasone (FLONASE) 50 mcg/act nasal spray, 2 sprays into each nostril daily For nasal congestion, Disp: 48 mL, Rfl: 0    hydrocortisone 2 5 % ointment, Apply topically 2 (two) times a day, Disp: , Rfl:     ibuprofen (MOTRIN) 600 mg tablet, Take 1 tablet (600 mg total) by mouth every 6 (six) hours as needed for mild pain, moderate pain or fever, Disp: 30 tablet, Rfl: 0    Multiple Vitamins-Minerals (MULTIVITAMIN ADULT PO), Take 1 tablet by mouth daily, Disp: , Rfl:     loratadine (CLARITIN) 10 mg tablet, Take 1 tablet (10 mg total) by mouth daily, Disp: 30 tablet, Rfl: 2    tobramycin (TOBREX) 0 3 % SOLN, Administer 1 drop to both eyes every 4 (four) hours while awake (Patient not taking: Reported on 2/24/2021), Disp: 5 mL, Rfl: 0    traZODone (DESYREL) 50 mg tablet, TAKE 1 TABLET (50 MG TOTAL) BY MOUTH DAILY AT BEDTIME AS NEEDED FOR SLEEP, Disp: 90 tablet, Rfl: 0      PHYSICAL EXAMINATION   Physical Exam  Vitals signs and nursing note reviewed  Constitutional:       General: She is not in acute distress  Appearance: Normal appearance  She is well-developed and well-groomed  She is not ill-appearing  HENT:      Head: Normocephalic and atraumatic  Right Ear: Tympanic membrane, ear canal and external ear normal       Left Ear: Tympanic membrane, ear canal and external ear normal       Nose: Nose normal       Mouth/Throat:      Mouth: Mucous membranes are moist       Pharynx: Oropharynx is clear  Eyes:      Conjunctiva/sclera: Conjunctivae normal       Pupils: Pupils are equal, round, and reactive to light  Neck:      Musculoskeletal: Full passive range of motion without pain  Vascular: No carotid bruit  Cardiovascular:      Rate and Rhythm: Normal rate and regular rhythm  No extrasystoles are present  Heart sounds: Normal heart sounds  Pulmonary:      Effort: Pulmonary effort is normal  No respiratory distress  Breath sounds: Normal breath sounds and air entry  No wheezing, rhonchi or rales  Musculoskeletal:      Right lower leg: No edema  Left lower leg: No edema  Lymphadenopathy:      Cervical: No cervical adenopathy  Skin:     General: Skin is warm and dry  Neurological:      Mental Status: She is alert and oriented to person, place, and time  Psychiatric:         Attention and Perception: Attention normal          Mood and Affect: Mood normal          Speech: Speech normal          Behavior: Behavior normal          Thought Content:  Thought content normal

## 2021-03-09 ENCOUNTER — TELEPHONE (OUTPATIENT)
Dept: FAMILY MEDICINE CLINIC | Facility: CLINIC | Age: 36
End: 2021-03-09

## 2021-03-09 NOTE — TELEPHONE ENCOUNTER
----- Message from Katy Quesada DO sent at 3/9/2021  1:34 PM EST -----  Regarding: FW: Non-Urgent Medical Question  Contact: [phone]   Please schedule her for an evaluation  Thank you  ----- Message -----  From: Rosita Boateng MA  Sent: 3/9/2021   1:04 PM EST  To: Katy Quesada DO  Subject: FW: Non-Urgent Medical Question                    ----- Message -----  From: Jerman Poe  Sent: 3/9/2021  12:46 PM EST  To: [facility]  Subject: Non-Urgent Medical Question                      Since being diagnosed with covid back on February 4th   I have had and continue to have massive headaches/ migraines that ibuprofen and tylenol just are not helpful with at all   Is there anything else that you can recommend or call in for me ? I'm in misery with them   I generally suffered with headaches but these are much worse than my normal headaches

## 2021-03-10 ENCOUNTER — TELEPHONE (OUTPATIENT)
Dept: OTHER | Facility: OTHER | Age: 36
End: 2021-03-10

## 2021-03-13 DIAGNOSIS — F41.1 GENERALIZED ANXIETY DISORDER: ICD-10-CM

## 2021-03-15 RX ORDER — CLONAZEPAM 0.5 MG/1
1.5 TABLET ORAL 2 TIMES DAILY PRN
Qty: 90 TABLET | Refills: 0 | Status: SHIPPED | OUTPATIENT
Start: 2021-03-15 | End: 2021-04-13 | Stop reason: SDUPTHER

## 2021-04-13 DIAGNOSIS — F41.1 GENERALIZED ANXIETY DISORDER: ICD-10-CM

## 2021-04-14 RX ORDER — CLONAZEPAM 0.5 MG/1
1.5 TABLET ORAL 2 TIMES DAILY PRN
Qty: 90 TABLET | Refills: 0 | Status: SHIPPED | OUTPATIENT
Start: 2021-04-14 | End: 2021-05-26 | Stop reason: SDUPTHER

## 2021-04-14 RX ORDER — DULOXETIN HYDROCHLORIDE 60 MG/1
60 CAPSULE, DELAYED RELEASE ORAL DAILY
Qty: 90 CAPSULE | Refills: 0 | Status: SHIPPED | OUTPATIENT
Start: 2021-04-14 | End: 2021-05-26 | Stop reason: SDUPTHER

## 2021-04-20 DIAGNOSIS — R51.9 ACUTE INTRACTABLE HEADACHE, UNSPECIFIED HEADACHE TYPE: ICD-10-CM

## 2021-04-20 RX ORDER — FLUTICASONE PROPIONATE 50 MCG
2 SPRAY, SUSPENSION (ML) NASAL DAILY
Qty: 16 ML | Refills: 2 | Status: SHIPPED | OUTPATIENT
Start: 2021-04-20 | End: 2021-04-26 | Stop reason: SDUPTHER

## 2021-04-26 DIAGNOSIS — M54.42 CHRONIC BILATERAL LOW BACK PAIN WITH LEFT-SIDED SCIATICA: ICD-10-CM

## 2021-04-26 DIAGNOSIS — R51.9 ACUTE INTRACTABLE HEADACHE, UNSPECIFIED HEADACHE TYPE: ICD-10-CM

## 2021-04-26 DIAGNOSIS — G89.29 CHRONIC BILATERAL LOW BACK PAIN WITH LEFT-SIDED SCIATICA: ICD-10-CM

## 2021-04-26 RX ORDER — CYCLOBENZAPRINE HCL 5 MG
5 TABLET ORAL 2 TIMES DAILY
Qty: 60 TABLET | Refills: 0 | Status: SHIPPED | OUTPATIENT
Start: 2021-04-26 | End: 2021-07-06 | Stop reason: SDUPTHER

## 2021-04-26 RX ORDER — FLUTICASONE PROPIONATE 50 MCG
2 SPRAY, SUSPENSION (ML) NASAL DAILY
Qty: 16 ML | Refills: 0 | Status: SHIPPED | OUTPATIENT
Start: 2021-04-26 | End: 2021-10-20

## 2021-05-26 DIAGNOSIS — F41.1 GENERALIZED ANXIETY DISORDER: ICD-10-CM

## 2021-05-27 RX ORDER — CLONAZEPAM 0.5 MG/1
1.5 TABLET ORAL 2 TIMES DAILY PRN
Qty: 90 TABLET | Refills: 0 | Status: SHIPPED | OUTPATIENT
Start: 2021-05-27 | End: 2021-07-06 | Stop reason: SDUPTHER

## 2021-05-27 RX ORDER — DULOXETIN HYDROCHLORIDE 60 MG/1
60 CAPSULE, DELAYED RELEASE ORAL DAILY
Qty: 90 CAPSULE | Refills: 0 | Status: SHIPPED | OUTPATIENT
Start: 2021-05-27 | End: 2021-07-06 | Stop reason: SDUPTHER

## 2021-06-03 DIAGNOSIS — G47.00 INSOMNIA, UNSPECIFIED TYPE: ICD-10-CM

## 2021-06-03 RX ORDER — TRAZODONE HYDROCHLORIDE 50 MG/1
50 TABLET ORAL
Qty: 30 TABLET | Refills: 0 | Status: SHIPPED | OUTPATIENT
Start: 2021-06-03 | End: 2021-07-06 | Stop reason: SDUPTHER

## 2021-07-13 ENCOUNTER — OFFICE VISIT (OUTPATIENT)
Dept: FAMILY MEDICINE CLINIC | Facility: CLINIC | Age: 36
End: 2021-07-13
Payer: COMMERCIAL

## 2021-07-13 VITALS
SYSTOLIC BLOOD PRESSURE: 98 MMHG | DIASTOLIC BLOOD PRESSURE: 68 MMHG | HEIGHT: 64 IN | RESPIRATION RATE: 16 BRPM | TEMPERATURE: 97.6 F | HEART RATE: 87 BPM | OXYGEN SATURATION: 98 % | WEIGHT: 119.4 LBS | BODY MASS INDEX: 20.38 KG/M2

## 2021-07-13 DIAGNOSIS — M54.42 CHRONIC BILATERAL LOW BACK PAIN WITH LEFT-SIDED SCIATICA: ICD-10-CM

## 2021-07-13 DIAGNOSIS — F41.1 GENERALIZED ANXIETY DISORDER: Primary | ICD-10-CM

## 2021-07-13 DIAGNOSIS — M79.672 LEFT FOOT PAIN: ICD-10-CM

## 2021-07-13 DIAGNOSIS — G89.29 CHRONIC BILATERAL LOW BACK PAIN WITH LEFT-SIDED SCIATICA: ICD-10-CM

## 2021-07-13 DIAGNOSIS — N63.20 LEFT BREAST LUMP: ICD-10-CM

## 2021-07-13 DIAGNOSIS — M50.30 DEGENERATION OF CERVICAL INTERVERTEBRAL DISC: ICD-10-CM

## 2021-07-13 DIAGNOSIS — G47.00 INSOMNIA, UNSPECIFIED TYPE: ICD-10-CM

## 2021-07-13 PROCEDURE — 99215 OFFICE O/P EST HI 40 MIN: CPT | Performed by: FAMILY MEDICINE

## 2021-07-13 PROCEDURE — 3008F BODY MASS INDEX DOCD: CPT | Performed by: FAMILY MEDICINE

## 2021-07-13 RX ORDER — CLONAZEPAM 1 MG/1
1 TABLET ORAL 2 TIMES DAILY PRN
Qty: 90 TABLET | Refills: 0
Start: 2021-07-13 | End: 2021-07-27 | Stop reason: SDUPTHER

## 2021-07-13 NOTE — PROGRESS NOTES
Assessment/Plan:   1  Generalized anxiety disorder  Patient's symptoms have been increasing  At this time, it appears it is likely secondary to her were conditions  Will continue at this time with current treatment of Cymbalta as well as her clonazepam   If any symptoms are worsening, she was advised to follow up immediately  - clonazePAM (KlonoPIN) 1 mg tablet; Take 1 tablet (1 mg total) by mouth 2 (two) times a day as needed for anxiety  Dispense: 90 tablet; Refill: 0    2  Degeneration of cervical intervertebral disc  Patient's symptoms have been worsening  It appears that this is likely exacerbated by her current were conditions as well  At this time, will check x-ray to re-evaluate her cervical spine  She may ultimately benefit from seeing a spinal specialist   - XR spine cervical complete 4 or 5 vw non injury; Future    3  Insomnia, unspecified type  Reviewed patient's symptoms today  At this time, symptoms appear likely exacerbated by her very poor sleep schedule  She was advised to continue to use her clonazepam   She may use her trazodone at half a dose when she gets home from work  She was advised on importance of obtaining proper sleep duration  4  Chronic bilateral low back pain with left-sided sciatica  Stable today  Will continue with current treatment of Cymbalta as well as her ibuprofen    5  Left breast lump  Patient was found to have a left breast lump which was characterized as a complicated cyst in the past   She never followed up for a ultrasound  Order was placed for her today  - US breast left limited (diagnostic); Future    6  Left foot pain  Patient with complaints of left foot pain  At this time, will refer patient to Podiatry for further evaluation   - Ambulatory referral to Podiatry;  Future           Diagnoses and all orders for this visit:    Chronic bilateral low back pain with left-sided sciatica    Generalized anxiety disorder    Insomnia, unspecified type Subjective:       Chief Complaint   Patient presents with    Follow-up     med check       Patient ID: Dottie Haines is a 43 y o  female presents today for follow-up on chronic conditions  She has generalized anxiety disorder, chronic cervical DJD, insomnia, chronic lumbar back pain with sciatic radiation  She has been taking her medications regularly  She denies adverse reactions with medications  She has been having significant stress at work  She is currently over worked due to staffing shortages  She is often working greater than 24 hours she straight per shift  This has been affecting her sleep significantly  HPI    Review of Systems   Constitutional: Negative for activity change, chills, fatigue and fever  HENT: Negative for congestion, ear pain, sinus pressure and sore throat  Eyes: Negative for redness, itching and visual disturbance  Respiratory: Negative for cough and shortness of breath  Cardiovascular: Negative for chest pain and palpitations  Gastrointestinal: Negative for abdominal pain, diarrhea and nausea  Endocrine: Negative for cold intolerance and heat intolerance  Genitourinary: Negative for dysuria, flank pain and frequency  Musculoskeletal: Negative for arthralgias, back pain, gait problem and myalgias  Skin: Negative for color change  Allergic/Immunologic: Negative for environmental allergies  Neurological: Negative for dizziness, numbness and headaches  Psychiatric/Behavioral: Negative for behavioral problems and sleep disturbance  The following portions of the patient's history were reviewed and updated as appropriate : past family history, past medical history, past social history and past surgical history      Current Outpatient Medications:     acyclovir (ZOVIRAX) 5 % ointment, Apply topically every 4 (four) hours, Disp: 15 g, Rfl: 3    albuterol (PROVENTIL HFA,VENTOLIN HFA) 90 mcg/act inhaler, Inhale 2 puffs every 6 (six) hours as needed for wheezing or shortness of breath, Disp: 1 Inhaler, Rfl: 1    clonazePAM (KlonoPIN) 1 mg tablet, Take 0 5 tablets (0 5 mg total) by mouth 2 (two) times a day as needed for anxiety, Disp: 90 tablet, Rfl: 0    cyclobenzaprine (FLEXERIL) 5 mg tablet, Take 1 tablet (5 mg total) by mouth 2 (two) times a day, Disp: 60 tablet, Rfl: 0    dicyclomine (BENTYL) 20 mg tablet, Take 1 tablet (20 mg total) by mouth 3 (three) times a day as needed (for abdominal cramping), Disp: 30 tablet, Rfl: 0    DULoxetine (CYMBALTA) 60 mg delayed release capsule, Take 1 capsule (60 mg total) by mouth daily, Disp: 30 capsule, Rfl: 0    fluticasone (FLONASE) 50 mcg/act nasal spray, 2 sprays into each nostril daily For nasal congestion, Disp: 16 mL, Rfl: 0    hydrocortisone 2 5 % ointment, Apply topically 2 (two) times a day, Disp: , Rfl:     ibuprofen (MOTRIN) 600 mg tablet, Take 1 tablet (600 mg total) by mouth every 6 (six) hours as needed for mild pain, moderate pain or fever, Disp: 30 tablet, Rfl: 0    loratadine (CLARITIN) 10 mg tablet, Take 1 tablet (10 mg total) by mouth daily, Disp: 30 tablet, Rfl: 2    Multiple Vitamins-Minerals (MULTIVITAMIN ADULT PO), Take 1 tablet by mouth daily, Disp: , Rfl:     tobramycin (TOBREX) 0 3 % SOLN, Administer 1 drop to both eyes every 4 (four) hours while awake, Disp: 5 mL, Rfl: 0    traZODone (DESYREL) 50 mg tablet, Take 1 tablet (50 mg total) by mouth daily at bedtime as needed for sleep, Disp: 30 tablet, Rfl: 0         Objective:         Vitals:    07/13/21 1322   BP: 98/68   BP Location: Right arm   Patient Position: Sitting   Cuff Size: Adult   Pulse: 87   Resp: 16   Temp: 97 6 °F (36 4 °C)   TempSrc: Temporal   SpO2: 98%   Weight: 54 2 kg (119 lb 6 4 oz)   Height: 5' 4" (1 626 m)     Physical Exam  Vitals reviewed  Constitutional:       Appearance: She is well-developed  HENT:      Head: Normocephalic and atraumatic        Nose: Nose normal       Mouth/Throat:      Pharynx: No oropharyngeal exudate  Eyes:      General: No scleral icterus  Right eye: No discharge  Left eye: No discharge  Pupils: Pupils are equal, round, and reactive to light  Neck:      Trachea: No tracheal deviation  Cardiovascular:      Rate and Rhythm: Normal rate and regular rhythm  Pulses:           Dorsalis pedis pulses are 2+ on the right side and 2+ on the left side  Posterior tibial pulses are 2+ on the right side and 2+ on the left side  Heart sounds: Normal heart sounds  No murmur heard  No friction rub  No gallop  Pulmonary:      Effort: Pulmonary effort is normal  No respiratory distress  Breath sounds: Normal breath sounds  No wheezing or rales  Abdominal:      General: Bowel sounds are normal  There is no distension  Palpations: Abdomen is soft  Tenderness: There is no abdominal tenderness  There is no guarding or rebound  Musculoskeletal:         General: Normal range of motion  Cervical back: Normal range of motion and neck supple  Lymphadenopathy:      Head:      Right side of head: No submental or submandibular adenopathy  Left side of head: No submental or submandibular adenopathy  Cervical: No cervical adenopathy  Right cervical: No superficial, deep or posterior cervical adenopathy  Left cervical: No superficial, deep or posterior cervical adenopathy  Skin:     General: Skin is warm and dry  Findings: No erythema  Neurological:      Mental Status: She is alert and oriented to person, place, and time  Cranial Nerves: No cranial nerve deficit  Sensory: No sensory deficit  Psychiatric:         Mood and Affect: Mood is not anxious or depressed  Speech: Speech normal          Behavior: Behavior normal          Thought Content:  Thought content normal          Judgment: Judgment normal

## 2021-07-27 DIAGNOSIS — F41.1 GENERALIZED ANXIETY DISORDER: ICD-10-CM

## 2021-07-27 DIAGNOSIS — H92.09 EARACHE: ICD-10-CM

## 2021-07-27 RX ORDER — CLONAZEPAM 1 MG/1
1 TABLET ORAL 2 TIMES DAILY PRN
Qty: 90 TABLET | Refills: 0
Start: 2021-07-27 | End: 2021-08-20

## 2021-07-27 RX ORDER — IBUPROFEN 600 MG/1
600 TABLET ORAL EVERY 6 HOURS PRN
Qty: 30 TABLET | Refills: 0 | Status: SHIPPED | OUTPATIENT
Start: 2021-07-27 | End: 2021-09-25 | Stop reason: SDUPTHER

## 2021-08-13 ENCOUNTER — NURSE TRIAGE (OUTPATIENT)
Dept: OTHER | Facility: OTHER | Age: 36
End: 2021-08-13

## 2021-08-14 NOTE — TELEPHONE ENCOUNTER
Regarding: Took double dose of medicaiton  ----- Message from Alicia Flethcer sent at 8/13/2021  8:11 PM EDT -----  "I took a double dose of my meds on accident and am not sure what to do "     Pt was connected to poison control

## 2021-08-20 DIAGNOSIS — F41.1 GENERALIZED ANXIETY DISORDER: ICD-10-CM

## 2021-08-20 RX ORDER — CLONAZEPAM 1 MG/1
TABLET ORAL
Qty: 90 TABLET | Refills: 0 | Status: SHIPPED | OUTPATIENT
Start: 2021-08-20 | End: 2021-08-23 | Stop reason: SDUPTHER

## 2021-08-23 ENCOUNTER — TELEPHONE (OUTPATIENT)
Dept: FAMILY MEDICINE CLINIC | Facility: CLINIC | Age: 36
End: 2021-08-23

## 2021-08-23 NOTE — TELEPHONE ENCOUNTER
Patient called about Clonazipam rx  [facility] they didn't receive it on the 20th  I called them and the pharmacist said, they did get it bit did not fill it because the patient was given an rx for 90 days on the 6th of July  That means it should last til October  I called the patient and explained that, but she said you made some changes and she does need the refill      Please advise

## 2021-09-25 DIAGNOSIS — G89.29 CHRONIC BILATERAL LOW BACK PAIN WITH LEFT-SIDED SCIATICA: ICD-10-CM

## 2021-09-25 DIAGNOSIS — G47.00 INSOMNIA, UNSPECIFIED TYPE: ICD-10-CM

## 2021-09-25 DIAGNOSIS — M54.42 CHRONIC BILATERAL LOW BACK PAIN WITH LEFT-SIDED SCIATICA: ICD-10-CM

## 2021-09-25 DIAGNOSIS — H92.09 EARACHE: ICD-10-CM

## 2021-09-25 DIAGNOSIS — F41.1 GENERALIZED ANXIETY DISORDER: ICD-10-CM

## 2021-09-27 RX ORDER — TRAZODONE HYDROCHLORIDE 50 MG/1
50 TABLET ORAL
Qty: 30 TABLET | Refills: 0 | Status: SHIPPED | OUTPATIENT
Start: 2021-09-27 | End: 2021-10-19 | Stop reason: SDUPTHER

## 2021-09-27 RX ORDER — DULOXETIN HYDROCHLORIDE 60 MG/1
60 CAPSULE, DELAYED RELEASE ORAL DAILY
Qty: 30 CAPSULE | Refills: 0 | Status: SHIPPED | OUTPATIENT
Start: 2021-09-27 | End: 2021-10-19 | Stop reason: SDUPTHER

## 2021-09-27 RX ORDER — IBUPROFEN 600 MG/1
600 TABLET ORAL EVERY 6 HOURS PRN
Qty: 30 TABLET | Refills: 0 | Status: SHIPPED | OUTPATIENT
Start: 2021-09-27 | End: 2021-12-01 | Stop reason: SDUPTHER

## 2021-09-27 RX ORDER — CLONAZEPAM 1 MG/1
1 TABLET ORAL 2 TIMES DAILY
Qty: 60 TABLET | Refills: 0 | Status: SHIPPED | OUTPATIENT
Start: 2021-09-27 | End: 2021-10-19 | Stop reason: SDUPTHER

## 2021-09-27 RX ORDER — CYCLOBENZAPRINE HCL 5 MG
5 TABLET ORAL 2 TIMES DAILY
Qty: 60 TABLET | Refills: 0 | Status: SHIPPED | OUTPATIENT
Start: 2021-09-27 | End: 2021-12-01 | Stop reason: SDUPTHER

## 2021-10-19 DIAGNOSIS — F41.1 GENERALIZED ANXIETY DISORDER: ICD-10-CM

## 2021-10-19 DIAGNOSIS — G47.00 INSOMNIA, UNSPECIFIED TYPE: ICD-10-CM

## 2021-10-19 RX ORDER — TRAZODONE HYDROCHLORIDE 50 MG/1
50 TABLET ORAL
Qty: 30 TABLET | Refills: 0 | Status: SHIPPED | OUTPATIENT
Start: 2021-10-19 | End: 2021-12-01 | Stop reason: SDUPTHER

## 2021-10-19 RX ORDER — DULOXETIN HYDROCHLORIDE 60 MG/1
60 CAPSULE, DELAYED RELEASE ORAL DAILY
Qty: 30 CAPSULE | Refills: 0 | Status: SHIPPED | OUTPATIENT
Start: 2021-10-19 | End: 2021-12-01 | Stop reason: SDUPTHER

## 2021-10-19 RX ORDER — CLONAZEPAM 1 MG/1
1 TABLET ORAL 2 TIMES DAILY
Qty: 60 TABLET | Refills: 0 | Status: SHIPPED | OUTPATIENT
Start: 2021-10-19 | End: 2021-12-01 | Stop reason: SDUPTHER

## 2021-10-22 ENCOUNTER — TELEPHONE (OUTPATIENT)
Dept: FAMILY MEDICINE CLINIC | Facility: CLINIC | Age: 36
End: 2021-10-22

## 2021-12-28 DIAGNOSIS — M54.42 CHRONIC BILATERAL LOW BACK PAIN WITH LEFT-SIDED SCIATICA: ICD-10-CM

## 2021-12-28 DIAGNOSIS — G89.29 CHRONIC BILATERAL LOW BACK PAIN WITH LEFT-SIDED SCIATICA: ICD-10-CM

## 2021-12-28 DIAGNOSIS — H92.09 EARACHE: ICD-10-CM

## 2021-12-28 DIAGNOSIS — F41.1 GENERALIZED ANXIETY DISORDER: ICD-10-CM

## 2021-12-28 DIAGNOSIS — G47.00 INSOMNIA, UNSPECIFIED TYPE: ICD-10-CM

## 2021-12-29 RX ORDER — CLONAZEPAM 1 MG/1
1 TABLET ORAL 2 TIMES DAILY
Qty: 60 TABLET | Refills: 0 | Status: SHIPPED | OUTPATIENT
Start: 2021-12-29 | End: 2022-02-07 | Stop reason: SDUPTHER

## 2021-12-29 RX ORDER — TRAZODONE HYDROCHLORIDE 50 MG/1
50 TABLET ORAL
Qty: 30 TABLET | Refills: 0 | Status: SHIPPED | OUTPATIENT
Start: 2021-12-29 | End: 2022-02-07 | Stop reason: SDUPTHER

## 2021-12-29 RX ORDER — CYCLOBENZAPRINE HCL 5 MG
5 TABLET ORAL 2 TIMES DAILY
Qty: 60 TABLET | Refills: 0 | Status: SHIPPED | OUTPATIENT
Start: 2021-12-29 | End: 2022-04-13 | Stop reason: SDUPTHER

## 2021-12-29 RX ORDER — IBUPROFEN 600 MG/1
600 TABLET ORAL EVERY 6 HOURS PRN
Qty: 30 TABLET | Refills: 0 | Status: SHIPPED | OUTPATIENT
Start: 2021-12-29 | End: 2022-03-04 | Stop reason: SDUPTHER

## 2021-12-29 RX ORDER — DULOXETIN HYDROCHLORIDE 60 MG/1
60 CAPSULE, DELAYED RELEASE ORAL DAILY
Qty: 30 CAPSULE | Refills: 0 | Status: SHIPPED | OUTPATIENT
Start: 2021-12-29 | End: 2022-02-07 | Stop reason: SDUPTHER

## 2022-01-25 ENCOUNTER — TELEMEDICINE (OUTPATIENT)
Dept: FAMILY MEDICINE CLINIC | Facility: CLINIC | Age: 37
End: 2022-01-25
Payer: COMMERCIAL

## 2022-01-25 VITALS — HEIGHT: 64 IN | BODY MASS INDEX: 19.63 KG/M2 | TEMPERATURE: 99 F | WEIGHT: 115 LBS

## 2022-01-25 DIAGNOSIS — J06.9 UPPER RESPIRATORY TRACT INFECTION, UNSPECIFIED TYPE: Primary | ICD-10-CM

## 2022-01-25 PROCEDURE — 99214 OFFICE O/P EST MOD 30 MIN: CPT | Performed by: FAMILY MEDICINE

## 2022-01-25 PROCEDURE — 3008F BODY MASS INDEX DOCD: CPT | Performed by: FAMILY MEDICINE

## 2022-01-25 RX ORDER — AMOXICILLIN AND CLAVULANATE POTASSIUM 875; 125 MG/1; MG/1
1 TABLET, FILM COATED ORAL EVERY 12 HOURS SCHEDULED
Qty: 14 TABLET | Refills: 0 | Status: SHIPPED | OUTPATIENT
Start: 2022-01-25 | End: 2022-02-01

## 2022-01-25 RX ORDER — ACETAMINOPHEN 500 MG
500 TABLET ORAL AS NEEDED
COMMUNITY

## 2022-01-25 RX ORDER — METHYLPREDNISOLONE 4 MG/1
TABLET ORAL
Qty: 21 EACH | Refills: 0 | Status: SHIPPED | OUTPATIENT
Start: 2022-01-25 | End: 2022-02-17

## 2022-01-25 NOTE — PROGRESS NOTES
Virtual Regular Visit    Verification of patient location:    Patient is located in the following state in which I hold an active license [place]      Assessment/Plan:    Problem List Items Addressed This Visit        Respiratory    Upper respiratory tract infection - Primary     URI symptoms after recent COVID infection  Due to fever this morning I recommend patient stay home and isolate until symptoms improve and she is afebrile for 24 hours  Letter for work excuse sent via [employer]  Increase fluid intake and get plenty of rest       Please start augmentin and medrol dose pack which was sent to your pharmacy  If you have nasal congestions, post nasal drip, sinus pressure, runny nose you may try the following:        Clearing your sinuses in a nice steamy shower or in bathroom filled with steamy air  Nasal saline rinses every 1-2 hours while awake may also help decrease nasal congestion, drainage  You may try Mucinex D 12 hour version  1/2 to 1 tablet one to two times a day as needed for nasal congestion, runny nose, post nasal drip, or cough  If you have sore / scratch / irritated throat, you may try the following:          Warm salt water gargles every 1-2 hours while awake, throat lozenges, Tylenol and/or ibuprofen  Please note that a cough is not necessarily a bad thing  It is the body's way of protecting the airways  If a cough is keeping you from sleeping at night, you may use cough suppressant such as Delsym Cough Syrup, NyQuil, Robitussin DM  Most upper respiratory symptoms start to improve after 7-10 days but may take a few weeks to completely resolve  You may also use Ibuprofen or Acetaminophen containing product for symptom relief  Follow up in 1 week if your symptoms persist or worsen  Please call the office if you have any questions  The patient verbalized understanding of treatment plan             Relevant Medications    amoxicillin-clavulanate (AUGMENTIN) 875-125 mg per tablet    methylPREDNISolone 4 MG tablet therapy pack               Reason for visit is   Chief Complaint   Patient presents with    Virtual Regular Visit    URI     Tested on 1/3/2022- work had her out for 5 days but is still having issues    Sore Throat    Cough    Virtual Regular Visit        Encounter provider Aurelia Delgado DO    Provider located at [address]  [facility]  [phone]      Recent Visits  No visits were found meeting these conditions  Showing recent visits within past 7 days and meeting all other requirements  Today's Visits  Date Type Provider Dept   01/25/22 Telemedicine Aurelia Delgado DO [place]g [id number] [facility] [place] today's visits and meeting all other requirements  Future Appointments  No visits were found meeting these conditions  Showing future appointments within next 150 days and meeting all other requirements       The patient was identified by name and date of birth  Evan Alba was informed that this is a telemedicine visit and that the visit is being conducted through [address] [place] [address] Now and patient was informed that this is a secure, HIPAA-compliant platform  She agrees to proceed     My office door was closed  No one else was in the room  She acknowledged consent and understanding of privacy and security of the video platform  The patient has agreed to participate and understands they can discontinue the visit at any time  Patient is aware this is a billable service  Shani Shay is a 43 y o  female with sick symptoms x 3 weeks after positive COVID infection  She had headache, sore throat, body aches  She did test positive for COVID 1/3/22  Her symptoms have not resolved and she had worsening this morning  She is still having congestion, sinus pressure, headache, dry cough  She has used the netti pot and alternating between tylenol and motrin    She had a fever of 102 4 F this morning so she took another COVID test and it was negative  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       HPI     Past Medical History:   Diagnosis Date    Psychiatric disorder        Past Surgical History:   Procedure Laterality Date    HYSTERECTOMY      NO PAST SURGERIES         Current Outpatient Medications   Medication Sig Dispense Refill    acetaminophen (TYLENOL) 500 mg tablet Take 500 mg by mouth as needed for mild pain      acyclovir (ZOVIRAX) 5 % ointment Apply topically every 4 (four) hours 15 g 3    clonazePAM (KlonoPIN) 1 mg tablet Take 1 tablet (1 mg total) by mouth 2 (two) times a day 60 tablet 0    cyclobenzaprine (FLEXERIL) 5 mg tablet Take 1 tablet (5 mg total) by mouth 2 (two) times a day 60 tablet 0    dicyclomine (BENTYL) 20 mg tablet Take 1 tablet (20 mg total) by mouth 3 (three) times a day as needed (for abdominal cramping) 30 tablet 0    DULoxetine (CYMBALTA) 60 mg delayed release capsule Take 1 capsule (60 mg total) by mouth daily 30 capsule 0    ibuprofen (MOTRIN) 600 mg tablet Take 1 tablet (600 mg total) by mouth every 6 (six) hours as needed for mild pain, moderate pain or fever 30 tablet 0    loratadine (CLARITIN) 10 mg tablet Take 1 tablet (10 mg total) by mouth daily 30 tablet 2    Multiple Vitamins-Minerals (MULTIVITAMIN ADULT PO) Take 1 tablet by mouth daily      traZODone (DESYREL) 50 mg tablet Take 1 tablet (50 mg total) by mouth daily at bedtime as needed for sleep 30 tablet 0    amoxicillin-clavulanate (AUGMENTIN) 875-125 mg per tablet Take 1 tablet by mouth every 12 (twelve) hours for 7 days 14 tablet 0    methylPREDNISolone 4 MG tablet therapy pack Use as directed on package 21 each 0    tobramycin (TOBREX) 0 3 % SOLN Administer 1 drop to both eyes every 4 (four) hours while awake (Patient not taking: Reported on 1/25/2022 ) 5 mL 0     No current facility-administered medications for this visit  Allergies   Allergen Reactions    Bactrim [Sulfamethoxazole-Trimethoprim] Hives    Latex Other (See Comments)     Latex allergy-rashes    Levofloxacin Hives    Quinolones Rash       Review of Systems   Constitutional: Positive for fatigue and fever  Negative for chills  HENT: Positive for congestion, postnasal drip, sinus pressure, sinus pain and sore throat  Negative for ear pain  Eyes: Negative for pain and visual disturbance  Respiratory: Positive for cough  Negative for shortness of breath  Cardiovascular: Negative for chest pain and palpitations  Gastrointestinal: Negative for abdominal pain and vomiting  Genitourinary: Negative for dysuria and hematuria  Musculoskeletal: Negative for arthralgias and back pain  Skin: Negative for color change and rash  Neurological: Positive for headaches  Negative for seizures and syncope  All other systems reviewed and are negative  Video Exam    Vitals:    01/25/22 1240   Temp: 99 °F (37 2 °C)   TempSrc: Oral   Weight: 52 2 kg (115 lb)   Height: 5' 4" (1 626 m)       Physical Exam  Vitals and nursing note reviewed  Constitutional:       General: She is not in acute distress  Appearance: She is well-developed  She is not toxic-appearing  HENT:      Head: Normocephalic and atraumatic  Nose: Congestion present  Pulmonary:      Effort: Pulmonary effort is normal  No respiratory distress  Neurological:      General: No focal deficit present  Mental Status: She is alert and oriented to person, place, and time  Psychiatric:         Mood and Affect: Mood normal          Behavior: Behavior normal           I spent 15 minutes directly with the patient during this visit    VIRTUAL VISIT Tristan Thornton 72 verbally agrees to participate in [employer]   Pt is aware that [employer] could be limited without vital signs or the ability to perform a full hands-on physical exam  Katina Ceron understands she or the provider may request at any time to terminate the video visit and request the patient to seek care or treatment in person

## 2022-01-25 NOTE — ASSESSMENT & PLAN NOTE
URI symptoms after recent COVID infection  Due to fever this morning I recommend patient stay home and isolate until symptoms improve and she is afebrile for 24 hours  Letter for work excuse sent via Directly  Increase fluid intake and get plenty of rest       Please start augmentin and medrol dose pack which was sent to your pharmacy  If you have nasal congestions, post nasal drip, sinus pressure, runny nose you may try the following:        Clearing your sinuses in a nice steamy shower or in bathroom filled with steamy air  Nasal saline rinses every 1-2 hours while awake may also help decrease nasal congestion, drainage  You may try Mucinex D 12 hour version  1/2 to 1 tablet one to two times a day as needed for nasal congestion, runny nose, post nasal drip, or cough  If you have sore / scratch / irritated throat, you may try the following:          Warm salt water gargles every 1-2 hours while awake, throat lozenges, Tylenol and/or ibuprofen  Please note that a cough is not necessarily a bad thing  It is the body's way of protecting the airways  If a cough is keeping you from sleeping at night, you may use cough suppressant such as Delsym Cough Syrup, NyQuil, Robitussin DM  Most upper respiratory symptoms start to improve after 7-10 days but may take a few weeks to completely resolve  You may also use Ibuprofen or Acetaminophen containing product for symptom relief  Follow up in 1 week if your symptoms persist or worsen  Please call the office if you have any questions  The patient verbalized understanding of treatment plan

## 2022-01-25 NOTE — LETTER
January 25, 2022     Patient: Debbie Paulson   YOB: 1985   Date of Visit: 1/25/2022       To Whom it May Concern:    Debbie Paulson is under my professional care  She was seen in my office on 1/25/2022  She should be excused from work at this time due to illness  She may return to work on 1/29/2022  If you have any questions or concerns, please don't hesitate to call           Sincerely,          Bella Wahl DO        CC: No Recipients

## 2022-02-07 DIAGNOSIS — G47.00 INSOMNIA, UNSPECIFIED TYPE: ICD-10-CM

## 2022-02-07 DIAGNOSIS — F41.1 GENERALIZED ANXIETY DISORDER: ICD-10-CM

## 2022-02-07 RX ORDER — CLONAZEPAM 1 MG/1
1 TABLET ORAL 2 TIMES DAILY
Qty: 60 TABLET | Refills: 0 | Status: SHIPPED | OUTPATIENT
Start: 2022-02-07 | End: 2022-03-04 | Stop reason: SDUPTHER

## 2022-02-07 RX ORDER — TRAZODONE HYDROCHLORIDE 50 MG/1
50 TABLET ORAL
Qty: 30 TABLET | Refills: 0 | Status: SHIPPED | OUTPATIENT
Start: 2022-02-07 | End: 2022-03-04 | Stop reason: SDUPTHER

## 2022-02-07 RX ORDER — DULOXETIN HYDROCHLORIDE 60 MG/1
60 CAPSULE, DELAYED RELEASE ORAL DAILY
Qty: 30 CAPSULE | Refills: 0 | Status: SHIPPED | OUTPATIENT
Start: 2022-02-07 | End: 2022-03-04 | Stop reason: SDUPTHER

## 2022-02-13 ENCOUNTER — TELEPHONE (OUTPATIENT)
Dept: OTHER | Facility: OTHER | Age: 37
End: 2022-02-13

## 2022-02-17 ENCOUNTER — TELEPHONE (OUTPATIENT)
Dept: FAMILY MEDICINE CLINIC | Facility: CLINIC | Age: 37
End: 2022-02-17

## 2022-02-17 ENCOUNTER — OFFICE VISIT (OUTPATIENT)
Dept: FAMILY MEDICINE CLINIC | Facility: CLINIC | Age: 37
End: 2022-02-17
Payer: COMMERCIAL

## 2022-02-17 VITALS
HEIGHT: 64 IN | OXYGEN SATURATION: 98 % | WEIGHT: 122 LBS | BODY MASS INDEX: 20.83 KG/M2 | DIASTOLIC BLOOD PRESSURE: 70 MMHG | TEMPERATURE: 97.7 F | SYSTOLIC BLOOD PRESSURE: 114 MMHG | HEART RATE: 83 BPM

## 2022-02-17 DIAGNOSIS — J01.10 ACUTE FRONTAL SINUSITIS, RECURRENCE NOT SPECIFIED: Primary | ICD-10-CM

## 2022-02-17 DIAGNOSIS — F43.21 COMPLICATED BEREAVEMENT: ICD-10-CM

## 2022-02-17 DIAGNOSIS — F41.1 GENERALIZED ANXIETY DISORDER: ICD-10-CM

## 2022-02-17 PROCEDURE — 3008F BODY MASS INDEX DOCD: CPT | Performed by: FAMILY MEDICINE

## 2022-02-17 PROCEDURE — 99215 OFFICE O/P EST HI 40 MIN: CPT | Performed by: FAMILY MEDICINE

## 2022-02-17 PROCEDURE — 3725F SCREEN DEPRESSION PERFORMED: CPT | Performed by: FAMILY MEDICINE

## 2022-02-17 RX ORDER — PREDNISONE 10 MG/1
TABLET ORAL
Qty: 33 TABLET | Refills: 0 | Status: SHIPPED | OUTPATIENT
Start: 2022-02-17 | End: 2022-04-11

## 2022-02-17 RX ORDER — DOXYCYCLINE 100 MG/1
100 TABLET ORAL 2 TIMES DAILY
Qty: 14 TABLET | Refills: 0 | Status: SHIPPED | OUTPATIENT
Start: 2022-02-17 | End: 2022-02-24

## 2022-02-17 NOTE — TELEPHONE ENCOUNTER
Work note was made and faxed to her workplace -[address] [place] [address] Dr mcdonald [phone]  Also scheduled re-evaluation for April 11th

## 2022-02-17 NOTE — PROGRESS NOTES
Assessment/Plan:   1  Acute frontal sinusitis, recurrence not specified    Patient's symptoms appear likely secondary to persistence of her acute sinusitis  Continue with supportive care  Maintain hydration  At this time, will start treatment with doxycycline as well as a longer prednisone taper  Given the frequency of her sinusitis, will refer patient to otolaryngology for further evaluation   - doxycycline (ADOXA) 100 MG tablet; Take 1 tablet (100 mg total) by mouth 2 (two) times a day for 7 days  Dispense: 14 tablet; Refill: 0  - predniSONE 10 mg tablet; Take 60mg daily for 3 days, On day day 4 decrease dose by 1 tablet until completed  Dispense: 33 tablet; Refill: 0  - Ambulatory Referral to Otolaryngology; Future    2  Generalized anxiety disorder/Complicated bereavement    Reviewed patient's symptoms today  At this time, her symptoms appear concerning  It appears that her main stressors currently are the passing of her mother / bereavement as well as the stress that she is receiving from work  At this time, she was advised that she would highly benefit from speaking to a therapist/ psychologist   Will attempt to set this up with this patient  She currently has been on treatment with Cymbalta, trazodone as well as clonazepam   Will continue with his current treatment  If her symptoms do not improve over the next few weeks, will consider the addition of BuSpar  At this time, patient will need further time for her bereavement  She was given disability for the next 2 months  Will reassess at that time to see if she is able to return  I have spent 45 minutes with Patient  today in which greater than 50% of this time was spent in counseling/coordination of care regarding Prognosis, Risks and benefits of tx options, Intructions for management, Patient and family education, Importance of tx compliance and Risk factor reductions  There are no diagnoses linked to this encounter  Subjective:       Chief Complaint   Patient presents with    Headache    pressure in the face/eye    Dry cough     yellow/ green mucus    red nasal mucus      Patient ID: Ryanne Natarajan is a 42 y o  female  Sinusitis  This is a chronic problem  Episode onset: 3 weeks ago  The problem is unchanged  There has been no fever  The pain is mild  Associated symptoms include chills, congestion, coughing, headaches, sinus pressure, sneezing and a sore throat  Pertinent negatives include no ear pain or shortness of breath  Treatments tried: augmentin, prednisone, flonase, ibuprofen  The treatment provided mild relief  patient unfortunate has been under significant stress for the past few months  She states that her mother was diagnosed with brain cancer in the fall time  Unfortunately, she did pass away on December 27th  Patient has been having significant stress and depression secondary to this  She states that she did not get to spend the proper time with her mother at the end of her life  She also was the 1 who found her passed away in her room  She has been having significant difficulty difficulty with her anxiety  She states her mind is racing frequently  She has trouble sleeping at nighttime  She has been having frequent crying episodes  She has poor focus, lack of energy, she has been self isolating as well  She denies any suicidal or homicidal ideation  Review of Systems   Constitutional: Positive for chills  Negative for activity change, fatigue and fever  HENT: Positive for congestion, sinus pressure, sneezing and sore throat  Negative for ear pain  Eyes: Negative for redness, itching and visual disturbance  Respiratory: Positive for cough  Negative for shortness of breath  Cardiovascular: Negative for chest pain and palpitations  Gastrointestinal: Negative for abdominal pain, diarrhea and nausea  Endocrine: Negative for cold intolerance and heat intolerance  Genitourinary: Negative for dysuria, flank pain and frequency  Musculoskeletal: Negative for arthralgias, back pain, gait problem and myalgias  Skin: Negative for color change  Allergic/Immunologic: Negative for environmental allergies  Neurological: Positive for headaches  Negative for dizziness and numbness  Psychiatric/Behavioral: Negative for behavioral problems and sleep disturbance  The following portions of the patient's history were reviewed and updated as appropriate : past family history, past medical history, past social history and past surgical history      Current Outpatient Medications:     acetaminophen (TYLENOL) 500 mg tablet, Take 500 mg by mouth as needed for mild pain, Disp: , Rfl:     acyclovir (ZOVIRAX) 5 % ointment, Apply topically every 4 (four) hours, Disp: 15 g, Rfl: 3    clonazePAM (KlonoPIN) 1 mg tablet, Take 1 tablet (1 mg total) by mouth 2 (two) times a day, Disp: 60 tablet, Rfl: 0    cyclobenzaprine (FLEXERIL) 5 mg tablet, Take 1 tablet (5 mg total) by mouth 2 (two) times a day, Disp: 60 tablet, Rfl: 0    dicyclomine (BENTYL) 20 mg tablet, Take 1 tablet (20 mg total) by mouth 3 (three) times a day as needed (for abdominal cramping), Disp: 30 tablet, Rfl: 0    DULoxetine (CYMBALTA) 60 mg delayed release capsule, Take 1 capsule (60 mg total) by mouth daily, Disp: 30 capsule, Rfl: 0    ibuprofen (MOTRIN) 600 mg tablet, Take 1 tablet (600 mg total) by mouth every 6 (six) hours as needed for mild pain, moderate pain or fever, Disp: 30 tablet, Rfl: 0    loratadine (CLARITIN) 10 mg tablet, Take 1 tablet (10 mg total) by mouth daily, Disp: 30 tablet, Rfl: 2    Multiple Vitamins-Minerals (MULTIVITAMIN ADULT PO), Take 1 tablet by mouth daily, Disp: , Rfl:     traZODone (DESYREL) 50 mg tablet, Take 1 tablet (50 mg total) by mouth daily at bedtime as needed for sleep, Disp: 30 tablet, Rfl: 0    methylPREDNISolone 4 MG tablet therapy pack, Use as directed on package (Patient not taking: Reported on 2/17/2022 ), Disp: 21 each, Rfl: 0    tobramycin (TOBREX) 0 3 % SOLN, Administer 1 drop to both eyes every 4 (four) hours while awake (Patient not taking: Reported on 1/25/2022 ), Disp: 5 mL, Rfl: 0         Objective:         Vitals:    02/17/22 0941   BP: 114/70   BP Location: Right arm   Patient Position: Sitting   Cuff Size: Standard   Pulse: 83   Temp: 97 7 °F (36 5 °C)   TempSrc: Tympanic   SpO2: 98%   Weight: 55 3 kg (122 lb)   Height: 5' 4" (1 626 m)     Physical Exam  Vitals reviewed  Constitutional:       Appearance: She is well-developed  HENT:      Head: Normocephalic and atraumatic  Nose: Nose normal       Mouth/Throat:      Pharynx: No oropharyngeal exudate  Eyes:      General: No scleral icterus  Right eye: No discharge  Left eye: No discharge  Pupils: Pupils are equal, round, and reactive to light  Neck:      Trachea: No tracheal deviation  Cardiovascular:      Rate and Rhythm: Normal rate and regular rhythm  Pulses:           Dorsalis pedis pulses are 2+ on the right side and 2+ on the left side  Posterior tibial pulses are 2+ on the right side and 2+ on the left side  Heart sounds: Normal heart sounds  No murmur heard  No friction rub  No gallop  Pulmonary:      Effort: Pulmonary effort is normal  No respiratory distress  Breath sounds: Normal breath sounds  No wheezing or rales  Abdominal:      General: Bowel sounds are normal  There is no distension  Palpations: Abdomen is soft  Tenderness: There is no abdominal tenderness  There is no guarding or rebound  Musculoskeletal:         General: Normal range of motion  Cervical back: Normal range of motion and neck supple  Lymphadenopathy:      Head:      Right side of head: No submental or submandibular adenopathy  Left side of head: No submental or submandibular adenopathy  Cervical: No cervical adenopathy        Right cervical: No superficial, deep or posterior cervical adenopathy  Left cervical: No superficial, deep or posterior cervical adenopathy  Skin:     General: Skin is warm and dry  Findings: No erythema  Neurological:      Mental Status: She is alert and oriented to person, place, and time  Cranial Nerves: No cranial nerve deficit  Sensory: No sensory deficit  Psychiatric:         Mood and Affect: Mood is not anxious or depressed  Speech: Speech normal          Behavior: Behavior normal          Thought Content:  Thought content normal          Judgment: Judgment normal

## 2022-02-24 ENCOUNTER — TELEPHONE (OUTPATIENT)
Dept: FAMILY MEDICINE CLINIC | Facility: CLINIC | Age: 37
End: 2022-02-24

## 2022-02-24 NOTE — TELEPHONE ENCOUNTER
I notified pt that form is complete, she gave permission for her MIL to pick form up at her appt tomorrow, Inocencia Paniagua

## 2022-02-24 NOTE — TELEPHONE ENCOUNTER
Pt dropped off FMLA paperwork, she said you knew she was dropping it off  I will put it on your desk in your folder      Please call when finished [phone]

## 2022-03-03 ENCOUNTER — TELEPHONE (OUTPATIENT)
Dept: FAMILY MEDICINE CLINIC | Facility: CLINIC | Age: 37
End: 2022-03-03

## 2022-03-03 ENCOUNTER — OFFICE VISIT (OUTPATIENT)
Dept: URGENT CARE | Facility: CLINIC | Age: 37
End: 2022-03-03
Payer: COMMERCIAL

## 2022-03-03 VITALS
SYSTOLIC BLOOD PRESSURE: 107 MMHG | OXYGEN SATURATION: 99 % | BODY MASS INDEX: 19.63 KG/M2 | WEIGHT: 115 LBS | HEIGHT: 64 IN | HEART RATE: 76 BPM | RESPIRATION RATE: 16 BRPM | DIASTOLIC BLOOD PRESSURE: 67 MMHG | TEMPERATURE: 97.2 F

## 2022-03-03 DIAGNOSIS — S16.1XXA STRAIN OF MUSCLE, FASCIA AND TENDON AT NECK LEVEL, INITIAL ENCOUNTER: Primary | ICD-10-CM

## 2022-03-03 DIAGNOSIS — S70.02XA CONTUSION OF LEFT HIP, INITIAL ENCOUNTER: ICD-10-CM

## 2022-03-03 PROCEDURE — G0382 LEV 3 HOSP TYPE B ED VISIT: HCPCS | Performed by: NURSE PRACTITIONER

## 2022-03-03 PROCEDURE — S9083 URGENT CARE CENTER GLOBAL: HCPCS | Performed by: NURSE PRACTITIONER

## 2022-03-03 RX ORDER — KETOROLAC TROMETHAMINE 30 MG/ML
30 INJECTION, SOLUTION INTRAMUSCULAR; INTRAVENOUS ONCE
Status: COMPLETED | OUTPATIENT
Start: 2022-03-03 | End: 2022-03-03

## 2022-03-03 RX ADMIN — KETOROLAC TROMETHAMINE 30 MG: 30 INJECTION, SOLUTION INTRAMUSCULAR; INTRAVENOUS at 15:09

## 2022-03-03 NOTE — TELEPHONE ENCOUNTER
Pt called, she is having hip pain after falling on her stairs yesterday  Pt requesting xrays, I advised pt she needs an appt because we will not order without being seen  Pt states she'll go to an UC

## 2022-03-03 NOTE — PATIENT INSTRUCTIONS
Contusion in Adults   WHAT YOU NEED TO KNOW:   A contusion is a bruise that appears on your skin after an injury  A bruise happens when small blood vessels tear but skin does not  Blood leaks into nearby tissue, such as soft tissue or muscle  DISCHARGE INSTRUCTIONS:   Return to the emergency department if:   · You have new trouble moving the injured area  · You have tingling or numbness in or near the injured area  · Your hand or foot below the bruise gets cold or turns pale  Call your doctor if:   · You find a new lump in the injured area  · Your symptoms do not improve with treatment after 4 to 5 days  · You have questions or concerns about your condition or care  Medicines: You may need any of the following:  · NSAIDs  help decrease swelling and pain or fever  This medicine is available with or without a doctor's order  NSAIDs can cause stomach bleeding or kidney problems in certain people  If you take blood thinner medicine, always ask your healthcare provider if NSAIDs are safe for you  Always read the medicine label and follow directions  · Prescription pain medicine  may be given  Ask your healthcare provider how to take this medicine safely  Some prescription pain medicines contain acetaminophen  Do not take other medicines that contain acetaminophen without talking to your healthcare provider  Too much acetaminophen may cause liver damage  Prescription pain medicine may cause constipation  Ask your healthcare provider how to prevent or treat constipation  · Take your medicine as directed  Contact your healthcare provider if you think your medicine is not helping or if you have side effects  Tell him of her if you are allergic to any medicine  Keep a list of the medicines, vitamins, and herbs you take  Include the amounts, and when and why you take them  Bring the list or the pill bottles to follow-up visits  Carry your medicine list with you in case of an emergency      Help nica contusion heal:   · Rest the injured area  or use it less than usual  If you bruised your leg or foot, you may need crutches or a cane to help you walk  This will help you keep weight off your injured body part  · Apply ice  to decrease swelling and pain  Ice may also help prevent tissue damage  Use an ice pack, or put crushed ice in a plastic bag  Cover it with a towel and place it on your bruise for 15 to 20 minutes every hour or as directed  · Use compression  to support the area and decrease swelling  Wrap an elastic bandage around the area over the bruised muscle  Make sure the bandage is not too tight  You should be able to fit 1 finger between the bandage and your skin  · Elevate (raise) your injured body part  above the level of your heart to help decrease pain and swelling  Use pillows, blankets, or rolled towels to elevate the area as often as you can  · Do not drink alcohol  as directed  Alcohol may slow healing  · Do not stretch injured muscles  right after your injury  Ask your healthcare provider when and how you may safely stretch after your injury  Gentle stretches can help increase your flexibility  · Do not massage the area or put heating pads  on the bruise right after your injury  Heat and massage may slow healing  Your healthcare provider may tell you to apply heat after several days  At that time, heat will start to help the injury heal     Prevent another contusion:   · Stretch and warm up before you play sports or exercise  · Wear protective gear when you play sports  Examples are shin guards and padding  · If you begin a new physical activity, start slowly to give your body a chance to adjust     Follow up with your doctor as directed:  Write down your questions so you remember to ask them during your visits  © Copyright [employer] 2022 Information is for End User's use only and may not be sold, redistributed or otherwise used for commercial purposes   All illustrations and images included in CareNotes® are the copyrighted property of A D [employer] M [employer] Inc  or Akin Islas   The above information is an  only  It is not intended as medical advice for individual conditions or treatments  Talk to your doctor, nurse or pharmacist before following any medical regimen to see if it is safe and effective for you  Cervical Spine Strain, Ambulatory Care   GENERAL INFORMATION:   A cervical spine strain  is when the tissues and muscles in your neck are stretched  It is called whiplash because it happens when your neck is quickly whipped forward and back  The pain may be sudden, or it may begin hours after the injury  Cervical spine strain is most commonly caused by a car accident or a contact sports injury  Seek immediate care for the following symptoms:   · Pain, numbness, tingling, or weakness in your arms, face, or scalp    · Shortness of breath, a hoarse voice, or problems swallowing  Treatment for a cervical spine strain  may include any of the following:  · Ibuprofen  decreases pain and swelling  It is available without a doctor's order  Ask how much to take and how often to take it  Follow directions  Ibuprofen can cause stomach bleeding and kidney damage if not taken correctly  · Acetaminophen  decreases pain  It is available without a doctor's order  Ask how much to take and how often to take it  Follow directions  Acetaminophen can cause liver damage if not taken correctly  · Pain medicine  may be prescribed for you  Ask for information on how to take this medicine safely  · Muscle relaxers  decrease pain and muscle spasms  Manage your symptoms:   · Wear a soft cervical collar to support your neck and hold it still  You may need to wear this collar for 7 to 10 days  By day 3, your healthcare provider may tell you to take the collar off for short periods of time   He may tell you to wear the collar less each day until you no longer need it     · Rest and avoid moving your neck as your injury heals  Rest will help decrease the risk of more damage to your neck  Gradually return to your normal activities  Stop if you have pain  Avoid activities that can cause more damage to your neck, such as heavy lifting or strenuous exercise  · Ice your neck to help decrease swelling and pain  Put crushed ice in a plastic bag  Cover the ice bag with a towel and place the ice on your neck for 15 to 20 minutes every hour  Do this for as many days as directed  · Sleep without a pillow to help decrease pain  Instead of a pillow, you may roll a small towel tightly and place it under your neck  · Go to physical therapy as directed by your healthcare provider  A physical therapist can teach you exercises to help improve movement and decrease pain  Physical therapy can also help improve strength and decrease your risk for loss of function  Follow up with your healthcare provider as directed:  Write down your questions so you remember to ask them during your visits  CARE AGREEMENT:   You have the right to help plan your care  Learn about your health condition and how it may be treated  Discuss treatment options with your caregivers to decide what care you want to receive  You always have the right to refuse treatment  The above information is an  only  It is not intended as medical advice for individual conditions or treatments  Talk to your doctor, nurse or pharmacist before following any medical regimen to see if it is safe and effective for you  © 2014 [address] is for End User's use only and may not be sold, redistributed or otherwise used for commercial purposes  All illustrations and images included in CareNotes® are the copyrighted property of [employer]  or Genaro Mina

## 2022-03-03 NOTE — PROGRESS NOTES
[facility]        NAME: Nellie Martinez is a 42 y o  female  : 1985    MRN: [id number]  DATE: March 3, 2022  TIME: 3:57 PM    Assessment and Plan   Strain of muscle, fascia and tendon at neck level, initial encounter [S16  1XXA]  1  Strain of muscle, fascia and tendon at neck level, initial encounter  ketorolac (TORADOL) injection 30 mg   2  Contusion of left hip, initial encounter  ketorolac (TORADOL) injection 30 mg     toradol in office   Has flexeril at home - ok to start   RICE for hip contusion  Patient Instructions     Follow up with PCP in 3-5 days  Proceed to  ER if symptoms worsen  Chief Complaint     Chief Complaint   Patient presents with    Back Pain     Pt states she was going down her basement stairs in crocs and ended up missing 3-4 steps  Pt c/o cervical neck pain, lower back pain, and left hip pain  Incident happened around 01:00am today  Pt has been taking ibuprofen for symptoms  History of Present Illness   Nellie Martinez presents to the clinic c/o    Back Pain (Pt states she was going down her basement stairs in crocs and ended up missing 3-4 steps  Pt c/o cervical neck pain, lower back pain, and left hip pain  Incident happened around 01:00am today  Pt has been taking ibuprofen for symptoms )  Took advil 800 mg at 330 am   Has not taken anything since  Pain is a 5-7/10        Review of Systems   Review of Systems   All other systems reviewed and are negative          Current Medications     Long-Term Medications   Medication Sig Dispense Refill    acyclovir (ZOVIRAX) 5 % ointment Apply topically every 4 (four) hours 15 g 3    clonazePAM (KlonoPIN) 1 mg tablet Take 1 tablet (1 mg total) by mouth 2 (two) times a day 60 tablet 0    cyclobenzaprine (FLEXERIL) 5 mg tablet Take 1 tablet (5 mg total) by mouth 2 (two) times a day 60 tablet 0    dicyclomine (BENTYL) 20 mg tablet Take 1 tablet (20 mg total) by mouth 3 (three) times a day as needed (for abdominal cramping) 30 tablet 0    DULoxetine (CYMBALTA) 60 mg delayed release capsule Take 1 capsule (60 mg total) by mouth daily 30 capsule 0    ibuprofen (MOTRIN) 600 mg tablet Take 1 tablet (600 mg total) by mouth every 6 (six) hours as needed for mild pain, moderate pain or fever 30 tablet 0    loratadine (CLARITIN) 10 mg tablet Take 1 tablet (10 mg total) by mouth daily 30 tablet 2    traZODone (DESYREL) 50 mg tablet Take 1 tablet (50 mg total) by mouth daily at bedtime as needed for sleep 30 tablet 0       Current Allergies     Allergies as of 03/03/2022 - Reviewed 03/03/2022   Allergen Reaction Noted    Bactrim [sulfamethoxazole-trimethoprim] Hives 10/06/2020    Latex Other (See Comments) 05/30/2016    Levofloxacin Hives 11/17/2016    Quinolones Rash 10/25/2016            The following portions of the patient's history were reviewed and updated as appropriate: allergies, current medications, past family history, past medical history, past social history, past surgical history and problem list     Objective   /67   Pulse 76   Temp (!) 97 2 °F (36 2 °C) (Tympanic)   Resp 16   Ht 5' 4" (1 626 m)   Wt 52 2 kg (115 lb)   LMP 01/29/2020   SpO2 99%   BMI 19 74 kg/m²        Physical Exam     Physical Exam  Vitals and nursing note reviewed  Constitutional:       Appearance: Normal appearance  She is well-developed  HENT:      Head: Normocephalic and atraumatic  Right Ear: Tympanic membrane, ear canal and external ear normal       Left Ear: Tympanic membrane, ear canal and external ear normal       Nose: Nose normal       Mouth/Throat:      Mouth: Mucous membranes are moist    Eyes:      General: Lids are normal       Extraocular Movements: Extraocular movements intact  Conjunctiva/sclera: Conjunctivae normal       Pupils: Pupils are equal, round, and reactive to light  Cardiovascular:      Rate and Rhythm: Normal rate and regular rhythm        Heart sounds: Normal heart sounds, S1 normal and S2 normal    Pulmonary:      Effort: Pulmonary effort is normal       Breath sounds: Normal breath sounds  Musculoskeletal:      Cervical back: Normal range of motion and neck supple  Spasms present  Thoracic back: Normal       Lumbar back: Normal  Negative right straight leg raise test and negative left straight leg raise test       Left hip: Normal         Legs:    Skin:     General: Skin is warm and dry  Neurological:      Mental Status: She is alert and oriented to person, place, and time  Psychiatric:         Speech: Speech normal          Behavior: Behavior normal  Behavior is cooperative  Thought Content:  Thought content normal          Judgment: Judgment normal

## 2022-03-04 DIAGNOSIS — F41.1 GENERALIZED ANXIETY DISORDER: ICD-10-CM

## 2022-03-04 DIAGNOSIS — H92.09 EARACHE: ICD-10-CM

## 2022-03-04 DIAGNOSIS — K52.9 GASTROENTERITIS: ICD-10-CM

## 2022-03-04 DIAGNOSIS — G47.00 INSOMNIA, UNSPECIFIED TYPE: ICD-10-CM

## 2022-03-04 RX ORDER — IBUPROFEN 600 MG/1
600 TABLET ORAL EVERY 6 HOURS PRN
Qty: 30 TABLET | Refills: 0 | Status: SHIPPED | OUTPATIENT
Start: 2022-03-04 | End: 2022-04-13 | Stop reason: SDUPTHER

## 2022-03-04 RX ORDER — TRAZODONE HYDROCHLORIDE 50 MG/1
50 TABLET ORAL
Qty: 30 TABLET | Refills: 0 | Status: SHIPPED | OUTPATIENT
Start: 2022-03-04 | End: 2022-04-13 | Stop reason: SDUPTHER

## 2022-03-04 RX ORDER — DULOXETIN HYDROCHLORIDE 60 MG/1
60 CAPSULE, DELAYED RELEASE ORAL DAILY
Qty: 30 CAPSULE | Refills: 0 | Status: SHIPPED | OUTPATIENT
Start: 2022-03-04 | End: 2022-04-11 | Stop reason: SDUPTHER

## 2022-03-04 RX ORDER — DICYCLOMINE HCL 20 MG
20 TABLET ORAL 3 TIMES DAILY PRN
Qty: 30 TABLET | Refills: 0 | Status: SHIPPED | OUTPATIENT
Start: 2022-03-04 | End: 2022-03-10 | Stop reason: SDUPTHER

## 2022-03-04 RX ORDER — CLONAZEPAM 1 MG/1
1 TABLET ORAL 2 TIMES DAILY
Qty: 60 TABLET | Refills: 0 | Status: SHIPPED | OUTPATIENT
Start: 2022-03-04 | End: 2022-03-10 | Stop reason: SDUPTHER

## 2022-03-10 DIAGNOSIS — K52.9 GASTROENTERITIS: ICD-10-CM

## 2022-03-10 DIAGNOSIS — F41.1 GENERALIZED ANXIETY DISORDER: ICD-10-CM

## 2022-03-10 RX ORDER — DICYCLOMINE HCL 20 MG
20 TABLET ORAL 3 TIMES DAILY PRN
Qty: 30 TABLET | Refills: 0 | Status: SHIPPED | OUTPATIENT
Start: 2022-03-10

## 2022-03-10 RX ORDER — CLONAZEPAM 1 MG/1
1 TABLET ORAL 2 TIMES DAILY
Qty: 60 TABLET | Refills: 0 | Status: SHIPPED | OUTPATIENT
Start: 2022-03-10 | End: 2022-04-11 | Stop reason: SDUPTHER

## 2022-03-16 ENCOUNTER — TELEPHONE (OUTPATIENT)
Dept: FAMILY MEDICINE CLINIC | Facility: CLINIC | Age: 37
End: 2022-03-16

## 2022-03-16 NOTE — TELEPHONE ENCOUNTER
Pt LM requesting her breast US order be faxed to [facility]"  I faxed order and notified Pt

## 2022-03-18 ENCOUNTER — APPOINTMENT (OUTPATIENT)
Dept: RADIOLOGY | Facility: CLINIC | Age: 37
End: 2022-03-18
Payer: COMMERCIAL

## 2022-03-18 DIAGNOSIS — M50.30 DEGENERATION OF CERVICAL INTERVERTEBRAL DISC: ICD-10-CM

## 2022-03-18 PROCEDURE — 72050 X-RAY EXAM NECK SPINE 4/5VWS: CPT

## 2022-04-11 ENCOUNTER — TELEPHONE (OUTPATIENT)
Dept: FAMILY MEDICINE CLINIC | Facility: CLINIC | Age: 37
End: 2022-04-11

## 2022-04-11 ENCOUNTER — OFFICE VISIT (OUTPATIENT)
Dept: FAMILY MEDICINE CLINIC | Facility: CLINIC | Age: 37
End: 2022-04-11
Payer: COMMERCIAL

## 2022-04-11 VITALS
HEART RATE: 70 BPM | TEMPERATURE: 98.7 F | DIASTOLIC BLOOD PRESSURE: 70 MMHG | HEIGHT: 64 IN | BODY MASS INDEX: 20.49 KG/M2 | OXYGEN SATURATION: 99 % | SYSTOLIC BLOOD PRESSURE: 108 MMHG | WEIGHT: 120 LBS

## 2022-04-11 DIAGNOSIS — F41.1 GENERALIZED ANXIETY DISORDER: ICD-10-CM

## 2022-04-11 DIAGNOSIS — J01.10 ACUTE FRONTAL SINUSITIS, RECURRENCE NOT SPECIFIED: Primary | ICD-10-CM

## 2022-04-11 DIAGNOSIS — F33.9 DEPRESSION, RECURRENT (HCC): ICD-10-CM

## 2022-04-11 PROCEDURE — 3725F SCREEN DEPRESSION PERFORMED: CPT | Performed by: FAMILY MEDICINE

## 2022-04-11 PROCEDURE — 3008F BODY MASS INDEX DOCD: CPT | Performed by: FAMILY MEDICINE

## 2022-04-11 PROCEDURE — 99214 OFFICE O/P EST MOD 30 MIN: CPT | Performed by: FAMILY MEDICINE

## 2022-04-11 RX ORDER — CLONAZEPAM 1 MG/1
1 TABLET ORAL 2 TIMES DAILY
Qty: 60 TABLET | Refills: 0 | Status: SHIPPED | OUTPATIENT
Start: 2022-04-11 | End: 2022-04-28 | Stop reason: SDUPTHER

## 2022-04-11 RX ORDER — DULOXETIN HYDROCHLORIDE 60 MG/1
60 CAPSULE, DELAYED RELEASE ORAL DAILY
Qty: 30 CAPSULE | Refills: 0 | Status: SHIPPED | OUTPATIENT
Start: 2022-04-11 | End: 2022-06-29 | Stop reason: SDUPTHER

## 2022-04-11 RX ORDER — PREDNISONE 10 MG/1
TABLET ORAL
Qty: 21 TABLET | Refills: 0 | Status: SHIPPED | OUTPATIENT
Start: 2022-04-11

## 2022-04-11 RX ORDER — DOXYCYCLINE 100 MG/1
100 TABLET ORAL 2 TIMES DAILY
Qty: 14 TABLET | Refills: 0 | Status: SHIPPED | OUTPATIENT
Start: 2022-04-11 | End: 2022-04-18

## 2022-04-11 NOTE — PROGRESS NOTES
Assessment/Plan:   1  Generalized anxiety disorder/Depression, recurrent ([facility])    Reviewed patient's symptoms today  Her stress has been better controlled  At this time, will continue with her current treatment of Cymbalta as well as her clonazepam   She may highly benefit from speaking with a cognitive behavior therapist   If any of her symptoms should worsen, she was advised follow-up  - DULoxetine (CYMBALTA) 60 mg delayed release capsule; Take 1 capsule (60 mg total) by mouth daily  Dispense: 30 capsule; Refill: 0  - clonazePAM (KlonoPIN) 1 mg tablet; Take 1 tablet (1 mg total) by mouth 2 (two) times a day  Dispense: 60 tablet; Refill: 0    2  Acute frontal sinusitis, recurrence not specified    Persistent  Patient has not been able to follow-up with a ear nose throat specialist   At this time, she was advised to schedule appointment  Will start treatment with doxycycline as well as a prednisone taper  Follow up if any symptoms persist   - doxycycline (ADOXA) 100 MG tablet; Take 1 tablet (100 mg total) by mouth 2 (two) times a day for 7 days  Dispense: 14 tablet; Refill: 0  - predniSONE 10 mg tablet; Take 6 tablets By mouth  In the morning Qd  Decrease by  By 1 tablet daily until completed  Dispense: 21 tablet; Refill: 0             There are no diagnoses linked to this encounter  Subjective:       Chief Complaint   Patient presents with    Follow-up     Mental health check up    Sinus Problem     Sinus pressure, congestion, headache, sore throat  Symptoms started 4/7/22, denies fever      Patient ID: Radha Cancer is a 42 y o  female presents today for his anxiety as well as her depression  After her last visit, her symptoms have slowly  Improving  She states that work has been her primary stressor is  She has been taking her Cymbalta as well as her clonazepam and this has been effective for her  She would like to return to work this month  Sinusitis  This is a new problem   The current episode started in the past 7 days  The problem is unchanged  There has been no fever  She is experiencing no pain  Associated symptoms include chills, congestion, ear pain, headaches, sinus pressure and sneezing  Pertinent negatives include no coughing, shortness of breath or sore throat  Past treatments include acetaminophen  The treatment provided no relief  Review of Systems   Constitutional: Positive for chills  Negative for activity change, fatigue and fever  HENT: Positive for congestion, ear pain, sinus pressure and sneezing  Negative for sore throat  Eyes: Negative for redness, itching and visual disturbance  Respiratory: Negative for cough and shortness of breath  Cardiovascular: Negative for chest pain and palpitations  Gastrointestinal: Negative for abdominal pain, diarrhea and nausea  Endocrine: Negative for cold intolerance and heat intolerance  Genitourinary: Negative for dysuria, flank pain and frequency  Musculoskeletal: Negative for arthralgias, back pain, gait problem and myalgias  Skin: Negative for color change  Allergic/Immunologic: Negative for environmental allergies  Neurological: Positive for headaches  Negative for dizziness and numbness  Psychiatric/Behavioral: Negative for behavioral problems and sleep disturbance  The following portions of the patient's history were reviewed and updated as appropriate : past family history, past medical history, past social history and past surgical history      Current Outpatient Medications:     acetaminophen (TYLENOL) 500 mg tablet, Take 500 mg by mouth as needed for mild pain, Disp: , Rfl:     acyclovir (ZOVIRAX) 5 % ointment, Apply topically every 4 (four) hours, Disp: 15 g, Rfl: 3    clonazePAM (KlonoPIN) 1 mg tablet, Take 1 tablet (1 mg total) by mouth 2 (two) times a day, Disp: 60 tablet, Rfl: 0    cyclobenzaprine (FLEXERIL) 5 mg tablet, Take 1 tablet (5 mg total) by mouth 2 (two) times a day, Disp: 60 tablet, Rfl: 0    dicyclomine (BENTYL) 20 mg tablet, Take 1 tablet (20 mg total) by mouth 3 (three) times a day as needed (for abdominal cramping), Disp: 30 tablet, Rfl: 0    DULoxetine (CYMBALTA) 60 mg delayed release capsule, Take 1 capsule (60 mg total) by mouth daily, Disp: 30 capsule, Rfl: 0    ibuprofen (MOTRIN) 600 mg tablet, Take 1 tablet (600 mg total) by mouth every 6 (six) hours as needed for mild pain, moderate pain or fever, Disp: 30 tablet, Rfl: 0    loratadine (CLARITIN) 10 mg tablet, Take 1 tablet (10 mg total) by mouth daily, Disp: 30 tablet, Rfl: 2    Multiple Vitamins-Minerals (MULTIVITAMIN ADULT PO), Take 1 tablet by mouth daily, Disp: , Rfl:     traZODone (DESYREL) 50 mg tablet, Take 1 tablet (50 mg total) by mouth daily at bedtime as needed for sleep, Disp: 30 tablet, Rfl: 0    predniSONE 10 mg tablet, Take 60mg daily for 3 days, On day day 4 decrease dose by 1 tablet until completed  (Patient not taking: Reported on 4/11/2022 ), Disp: 33 tablet, Rfl: 0    tobramycin (TOBREX) 0 3 % SOLN, Administer 1 drop to both eyes every 4 (four) hours while awake (Patient not taking: Reported on 1/25/2022 ), Disp: 5 mL, Rfl: 0         Objective:         Vitals:    04/11/22 1005   BP: 108/70   BP Location: Left arm   Patient Position: Sitting   Cuff Size: Adult   Pulse: 70   Temp: 98 7 °F (37 1 °C)   SpO2: 99%   Weight: 54 4 kg (120 lb)   Height: 5' 4" (1 626 m)     Physical Exam  Vitals reviewed  Constitutional:       Appearance: She is well-developed  HENT:      Head: Normocephalic and atraumatic  Nose: Nose normal       Mouth/Throat:      Pharynx: No oropharyngeal exudate  Eyes:      General: No scleral icterus  Right eye: No discharge  Left eye: No discharge  Pupils: Pupils are equal, round, and reactive to light  Neck:      Trachea: No tracheal deviation  Cardiovascular:      Rate and Rhythm: Normal rate and regular rhythm        Pulses:           Dorsalis pedis pulses are 2+ on the right side and 2+ on the left side  Posterior tibial pulses are 2+ on the right side and 2+ on the left side  Heart sounds: Normal heart sounds  No murmur heard  No friction rub  No gallop  Pulmonary:      Effort: Pulmonary effort is normal  No respiratory distress  Breath sounds: Normal breath sounds  No wheezing or rales  Abdominal:      General: Bowel sounds are normal  There is no distension  Palpations: Abdomen is soft  Tenderness: There is no abdominal tenderness  There is no guarding or rebound  Musculoskeletal:         General: Normal range of motion  Cervical back: Normal range of motion and neck supple  Lymphadenopathy:      Head:      Right side of head: No submental or submandibular adenopathy  Left side of head: No submental or submandibular adenopathy  Cervical: No cervical adenopathy  Right cervical: No superficial, deep or posterior cervical adenopathy  Left cervical: No superficial, deep or posterior cervical adenopathy  Skin:     General: Skin is warm and dry  Findings: No erythema  Neurological:      Mental Status: She is alert and oriented to person, place, and time  Cranial Nerves: No cranial nerve deficit  Sensory: No sensory deficit  Psychiatric:         Mood and Affect: Mood is not anxious or depressed  Speech: Speech normal          Behavior: Behavior normal          Thought Content:  Thought content normal          Judgment: Judgment normal

## 2022-04-13 DIAGNOSIS — G47.00 INSOMNIA, UNSPECIFIED TYPE: ICD-10-CM

## 2022-04-13 DIAGNOSIS — G89.29 CHRONIC BILATERAL LOW BACK PAIN WITH LEFT-SIDED SCIATICA: ICD-10-CM

## 2022-04-13 DIAGNOSIS — H92.09 EARACHE: ICD-10-CM

## 2022-04-13 DIAGNOSIS — M54.42 CHRONIC BILATERAL LOW BACK PAIN WITH LEFT-SIDED SCIATICA: ICD-10-CM

## 2022-04-13 RX ORDER — TRAZODONE HYDROCHLORIDE 50 MG/1
50 TABLET ORAL
Qty: 30 TABLET | Refills: 0 | Status: SHIPPED | OUTPATIENT
Start: 2022-04-13 | End: 2022-04-28 | Stop reason: SDUPTHER

## 2022-04-13 RX ORDER — CYCLOBENZAPRINE HCL 5 MG
5 TABLET ORAL 2 TIMES DAILY
Qty: 60 TABLET | Refills: 0 | Status: SHIPPED | OUTPATIENT
Start: 2022-04-13 | End: 2022-06-02 | Stop reason: SDUPTHER

## 2022-04-13 RX ORDER — IBUPROFEN 600 MG/1
600 TABLET ORAL EVERY 6 HOURS PRN
Qty: 30 TABLET | Refills: 0 | Status: SHIPPED | OUTPATIENT
Start: 2022-04-13 | End: 2022-06-02 | Stop reason: SDUPTHER

## 2022-04-28 DIAGNOSIS — G47.00 INSOMNIA, UNSPECIFIED TYPE: ICD-10-CM

## 2022-04-28 DIAGNOSIS — F41.1 GENERALIZED ANXIETY DISORDER: ICD-10-CM

## 2022-04-28 RX ORDER — TRAZODONE HYDROCHLORIDE 50 MG/1
50 TABLET ORAL
Qty: 30 TABLET | Refills: 0 | Status: SHIPPED | OUTPATIENT
Start: 2022-04-28 | End: 2022-06-02 | Stop reason: SDUPTHER

## 2022-04-28 RX ORDER — CLONAZEPAM 1 MG/1
1 TABLET ORAL 2 TIMES DAILY
Qty: 60 TABLET | Refills: 0 | Status: SHIPPED | OUTPATIENT
Start: 2022-04-28 | End: 2022-06-02 | Stop reason: SDUPTHER

## 2022-06-02 DIAGNOSIS — G89.29 CHRONIC BILATERAL LOW BACK PAIN WITH LEFT-SIDED SCIATICA: ICD-10-CM

## 2022-06-02 DIAGNOSIS — F41.1 GENERALIZED ANXIETY DISORDER: ICD-10-CM

## 2022-06-02 DIAGNOSIS — G47.00 INSOMNIA, UNSPECIFIED TYPE: ICD-10-CM

## 2022-06-02 DIAGNOSIS — M54.42 CHRONIC BILATERAL LOW BACK PAIN WITH LEFT-SIDED SCIATICA: ICD-10-CM

## 2022-06-02 DIAGNOSIS — H92.09 EARACHE: ICD-10-CM

## 2022-06-02 RX ORDER — CYCLOBENZAPRINE HCL 5 MG
5 TABLET ORAL 2 TIMES DAILY
Qty: 60 TABLET | Refills: 0 | Status: SHIPPED | OUTPATIENT
Start: 2022-06-02 | End: 2022-06-29 | Stop reason: SDUPTHER

## 2022-06-02 RX ORDER — IBUPROFEN 600 MG/1
600 TABLET ORAL EVERY 6 HOURS PRN
Qty: 30 TABLET | Refills: 0 | Status: SHIPPED | OUTPATIENT
Start: 2022-06-02 | End: 2022-07-24 | Stop reason: SDUPTHER

## 2022-06-02 RX ORDER — TRAZODONE HYDROCHLORIDE 50 MG/1
50 TABLET ORAL
Qty: 30 TABLET | Refills: 0 | Status: SHIPPED | OUTPATIENT
Start: 2022-06-02 | End: 2022-06-29 | Stop reason: SDUPTHER

## 2022-06-02 RX ORDER — CLONAZEPAM 1 MG/1
1 TABLET ORAL 2 TIMES DAILY
Qty: 60 TABLET | Refills: 0 | Status: SHIPPED | OUTPATIENT
Start: 2022-06-02 | End: 2022-06-29 | Stop reason: SDUPTHER

## 2022-06-29 DIAGNOSIS — G47.00 INSOMNIA, UNSPECIFIED TYPE: ICD-10-CM

## 2022-06-29 DIAGNOSIS — M54.42 CHRONIC BILATERAL LOW BACK PAIN WITH LEFT-SIDED SCIATICA: ICD-10-CM

## 2022-06-29 DIAGNOSIS — F41.1 GENERALIZED ANXIETY DISORDER: ICD-10-CM

## 2022-06-29 DIAGNOSIS — G89.29 CHRONIC BILATERAL LOW BACK PAIN WITH LEFT-SIDED SCIATICA: ICD-10-CM

## 2022-06-29 RX ORDER — TRAZODONE HYDROCHLORIDE 50 MG/1
50 TABLET ORAL
Qty: 30 TABLET | Refills: 0 | Status: SHIPPED | OUTPATIENT
Start: 2022-06-29 | End: 2022-07-24 | Stop reason: SDUPTHER

## 2022-06-29 RX ORDER — DULOXETIN HYDROCHLORIDE 60 MG/1
60 CAPSULE, DELAYED RELEASE ORAL DAILY
Qty: 30 CAPSULE | Refills: 0 | Status: SHIPPED | OUTPATIENT
Start: 2022-06-29 | End: 2022-07-24 | Stop reason: SDUPTHER

## 2022-06-29 RX ORDER — CLONAZEPAM 1 MG/1
1 TABLET ORAL 2 TIMES DAILY
Qty: 60 TABLET | Refills: 0 | Status: SHIPPED | OUTPATIENT
Start: 2022-06-29 | End: 2022-07-24 | Stop reason: SDUPTHER

## 2022-06-29 RX ORDER — CYCLOBENZAPRINE HCL 5 MG
5 TABLET ORAL 2 TIMES DAILY
Qty: 60 TABLET | Refills: 0 | Status: SHIPPED | OUTPATIENT
Start: 2022-06-29

## 2022-07-24 DIAGNOSIS — G47.00 INSOMNIA, UNSPECIFIED TYPE: ICD-10-CM

## 2022-07-24 DIAGNOSIS — F41.1 GENERALIZED ANXIETY DISORDER: ICD-10-CM

## 2022-07-24 DIAGNOSIS — H92.09 EARACHE: ICD-10-CM

## 2022-07-25 RX ORDER — CLONAZEPAM 1 MG/1
1 TABLET ORAL 2 TIMES DAILY
Qty: 60 TABLET | Refills: 0 | Status: SHIPPED | OUTPATIENT
Start: 2022-07-25 | End: 2022-08-20 | Stop reason: SDUPTHER

## 2022-07-25 RX ORDER — DULOXETIN HYDROCHLORIDE 60 MG/1
60 CAPSULE, DELAYED RELEASE ORAL DAILY
Qty: 30 CAPSULE | Refills: 0 | Status: SHIPPED | OUTPATIENT
Start: 2022-07-25 | End: 2022-08-20 | Stop reason: SDUPTHER

## 2022-07-25 RX ORDER — TRAZODONE HYDROCHLORIDE 50 MG/1
50 TABLET ORAL
Qty: 30 TABLET | Refills: 0 | Status: SHIPPED | OUTPATIENT
Start: 2022-07-25 | End: 2022-08-20 | Stop reason: SDUPTHER

## 2022-07-25 RX ORDER — IBUPROFEN 600 MG/1
600 TABLET ORAL EVERY 6 HOURS PRN
Qty: 30 TABLET | Refills: 0 | Status: SHIPPED | OUTPATIENT
Start: 2022-07-25 | End: 2022-08-20 | Stop reason: SDUPTHER

## 2022-08-20 DIAGNOSIS — H92.09 EARACHE: ICD-10-CM

## 2022-08-20 DIAGNOSIS — G47.00 INSOMNIA, UNSPECIFIED TYPE: ICD-10-CM

## 2022-08-20 DIAGNOSIS — M54.42 CHRONIC BILATERAL LOW BACK PAIN WITH LEFT-SIDED SCIATICA: ICD-10-CM

## 2022-08-20 DIAGNOSIS — K52.9 GASTROENTERITIS: ICD-10-CM

## 2022-08-20 DIAGNOSIS — G89.29 CHRONIC BILATERAL LOW BACK PAIN WITH LEFT-SIDED SCIATICA: ICD-10-CM

## 2022-08-20 DIAGNOSIS — F41.1 GENERALIZED ANXIETY DISORDER: ICD-10-CM

## 2022-08-22 RX ORDER — DICYCLOMINE HCL 20 MG
20 TABLET ORAL 3 TIMES DAILY PRN
Qty: 30 TABLET | Refills: 1 | Status: SHIPPED | OUTPATIENT
Start: 2022-08-22

## 2022-08-22 RX ORDER — TRAZODONE HYDROCHLORIDE 50 MG/1
50 TABLET ORAL
Qty: 30 TABLET | Refills: 1 | Status: SHIPPED | OUTPATIENT
Start: 2022-08-22 | End: 2022-09-15

## 2022-08-22 RX ORDER — DULOXETIN HYDROCHLORIDE 60 MG/1
CAPSULE, DELAYED RELEASE ORAL
Qty: 90 CAPSULE | Refills: 1 | OUTPATIENT
Start: 2022-08-22

## 2022-08-22 RX ORDER — DULOXETIN HYDROCHLORIDE 60 MG/1
60 CAPSULE, DELAYED RELEASE ORAL DAILY
Qty: 30 CAPSULE | Refills: 1 | Status: SHIPPED | OUTPATIENT
Start: 2022-08-22 | End: 2022-10-12 | Stop reason: SDUPTHER

## 2022-08-22 RX ORDER — CYCLOBENZAPRINE HCL 5 MG
5 TABLET ORAL 2 TIMES DAILY
Qty: 60 TABLET | Refills: 0 | Status: SHIPPED | OUTPATIENT
Start: 2022-08-22 | End: 2022-08-27

## 2022-08-22 RX ORDER — TRAZODONE HYDROCHLORIDE 50 MG/1
50 TABLET ORAL
Qty: 90 TABLET | Refills: 1 | OUTPATIENT
Start: 2022-08-22

## 2022-08-22 RX ORDER — IBUPROFEN 600 MG/1
600 TABLET ORAL EVERY 6 HOURS PRN
Qty: 30 TABLET | Refills: 1 | Status: SHIPPED | OUTPATIENT
Start: 2022-08-22 | End: 2022-09-15 | Stop reason: SDUPTHER

## 2022-08-22 RX ORDER — CLONAZEPAM 1 MG/1
1 TABLET ORAL 2 TIMES DAILY
Qty: 60 TABLET | Refills: 0 | Status: SHIPPED | OUTPATIENT
Start: 2022-08-22 | End: 2022-09-15 | Stop reason: SDUPTHER

## 2022-08-26 DIAGNOSIS — G89.29 CHRONIC BILATERAL LOW BACK PAIN WITH LEFT-SIDED SCIATICA: ICD-10-CM

## 2022-08-26 DIAGNOSIS — M54.42 CHRONIC BILATERAL LOW BACK PAIN WITH LEFT-SIDED SCIATICA: ICD-10-CM

## 2022-08-27 RX ORDER — CYCLOBENZAPRINE HCL 5 MG
TABLET ORAL
Qty: 60 TABLET | Refills: 0 | Status: SHIPPED | OUTPATIENT
Start: 2022-08-27

## 2022-09-08 ENCOUNTER — RA CDI HCC (OUTPATIENT)
Dept: OTHER | Facility: HOSPITAL | Age: 37
End: 2022-09-08

## 2022-09-08 NOTE — PROGRESS NOTES
[facility]  coding opportunities       Chart reviewed, no opportunity found: CHART REVIEWED, NO OPPORTUNITY FOUND        Patients Insurance        Commercial Insurance: [address] [place] [address]

## 2022-09-15 DIAGNOSIS — G47.00 INSOMNIA, UNSPECIFIED TYPE: ICD-10-CM

## 2022-09-15 DIAGNOSIS — H92.09 EARACHE: ICD-10-CM

## 2022-09-15 DIAGNOSIS — F41.1 GENERALIZED ANXIETY DISORDER: ICD-10-CM

## 2022-09-15 RX ORDER — TRAZODONE HYDROCHLORIDE 50 MG/1
50 TABLET ORAL
Qty: 90 TABLET | Refills: 1 | Status: SHIPPED | OUTPATIENT
Start: 2022-09-15

## 2022-09-15 RX ORDER — IBUPROFEN 600 MG/1
600 TABLET ORAL EVERY 6 HOURS PRN
Qty: 30 TABLET | Refills: 0 | Status: SHIPPED | OUTPATIENT
Start: 2022-09-15 | End: 2022-10-20

## 2022-09-15 RX ORDER — CLONAZEPAM 1 MG/1
1 TABLET ORAL 2 TIMES DAILY
Qty: 60 TABLET | Refills: 0 | Status: SHIPPED | OUTPATIENT
Start: 2022-09-15 | End: 2022-10-06 | Stop reason: SDUPTHER

## 2022-10-06 ENCOUNTER — TELEPHONE (OUTPATIENT)
Dept: FAMILY MEDICINE CLINIC | Facility: CLINIC | Age: 37
End: 2022-10-06

## 2022-10-06 ENCOUNTER — OFFICE VISIT (OUTPATIENT)
Dept: FAMILY MEDICINE CLINIC | Facility: CLINIC | Age: 37
End: 2022-10-06
Payer: COMMERCIAL

## 2022-10-06 VITALS
OXYGEN SATURATION: 97 % | TEMPERATURE: 98.6 F | WEIGHT: 114.6 LBS | BODY MASS INDEX: 19.56 KG/M2 | HEIGHT: 64 IN | DIASTOLIC BLOOD PRESSURE: 76 MMHG | SYSTOLIC BLOOD PRESSURE: 108 MMHG | HEART RATE: 85 BPM | RESPIRATION RATE: 17 BRPM

## 2022-10-06 DIAGNOSIS — Z23 NEED FOR INFLUENZA VACCINATION: Primary | ICD-10-CM

## 2022-10-06 DIAGNOSIS — Z13.29 SCREENING FOR THYROID DISORDER: ICD-10-CM

## 2022-10-06 DIAGNOSIS — J01.90 ACUTE SINUSITIS, RECURRENCE NOT SPECIFIED, UNSPECIFIED LOCATION: ICD-10-CM

## 2022-10-06 DIAGNOSIS — Z23 NEED FOR TDAP VACCINATION: ICD-10-CM

## 2022-10-06 DIAGNOSIS — B34.9 VIRAL INFECTION, UNSPECIFIED: ICD-10-CM

## 2022-10-06 DIAGNOSIS — Z13.1 SCREENING FOR DIABETES MELLITUS: ICD-10-CM

## 2022-10-06 DIAGNOSIS — N64.4 PAIN OF BOTH BREASTS: ICD-10-CM

## 2022-10-06 DIAGNOSIS — Z13.0 SCREENING FOR IRON DEFICIENCY ANEMIA: ICD-10-CM

## 2022-10-06 DIAGNOSIS — Z13.220 SCREENING FOR CHOLESTEROL LEVEL: ICD-10-CM

## 2022-10-06 DIAGNOSIS — F41.1 GENERALIZED ANXIETY DISORDER: ICD-10-CM

## 2022-10-06 PROCEDURE — U0005 INFEC AGEN DETEC AMPLI PROBE: HCPCS | Performed by: FAMILY MEDICINE

## 2022-10-06 PROCEDURE — U0003 INFECTIOUS AGENT DETECTION BY NUCLEIC ACID (DNA OR RNA); SEVERE ACUTE RESPIRATORY SYNDROME CORONAVIRUS 2 (SARS-COV-2) (CORONAVIRUS DISEASE [COVID-19]), AMPLIFIED PROBE TECHNIQUE, MAKING USE OF HIGH THROUGHPUT TECHNOLOGIES AS DESCRIBED BY CMS-2020-01-R: HCPCS | Performed by: FAMILY MEDICINE

## 2022-10-06 PROCEDURE — 90472 IMMUNIZATION ADMIN EACH ADD: CPT | Performed by: FAMILY MEDICINE

## 2022-10-06 PROCEDURE — 99214 OFFICE O/P EST MOD 30 MIN: CPT | Performed by: FAMILY MEDICINE

## 2022-10-06 PROCEDURE — 90686 IIV4 VACC NO PRSV 0.5 ML IM: CPT | Performed by: FAMILY MEDICINE

## 2022-10-06 PROCEDURE — 90471 IMMUNIZATION ADMIN: CPT | Performed by: FAMILY MEDICINE

## 2022-10-06 PROCEDURE — 90715 TDAP VACCINE 7 YRS/> IM: CPT | Performed by: FAMILY MEDICINE

## 2022-10-06 RX ORDER — CLONAZEPAM 1 MG/1
1 TABLET ORAL 2 TIMES DAILY
Qty: 60 TABLET | Refills: 0 | Status: SHIPPED | OUTPATIENT
Start: 2022-10-06

## 2022-10-06 RX ORDER — DULOXETIN HYDROCHLORIDE 30 MG/1
30 CAPSULE, DELAYED RELEASE ORAL DAILY
Qty: 90 CAPSULE | Refills: 1 | Status: SHIPPED | OUTPATIENT
Start: 2022-10-06 | End: 2022-10-06 | Stop reason: SDUPTHER

## 2022-10-06 RX ORDER — AMOXICILLIN AND CLAVULANATE POTASSIUM 875; 125 MG/1; MG/1
1 TABLET, FILM COATED ORAL EVERY 12 HOURS SCHEDULED
Qty: 14 TABLET | Refills: 0 | Status: SHIPPED | OUTPATIENT
Start: 2022-10-06 | End: 2022-10-13

## 2022-10-06 NOTE — PROGRESS NOTES
Assessment/Plan:   1  Acute sinusitis, recurrence not specified, unspecified location  Start supportive care  Maintain hydration  Take over-the-counter Mucinex  Will start treatment with Augmentin  Follow-up if any symptoms persist   - amoxicillin-clavulanate (AUGMENTIN) 875-125 mg per tablet; Take 1 tablet by mouth every 12 (twelve) hours for 7 days  Dispense: 14 tablet; Refill: 0    2  Generalized anxiety disorder  Patient's anxiety has been mildly increased  At this time, will continue with her Cymbalta however will increase this dose to 90 mg daily  Continue as over the clonazepam  - clonazePAM (KlonoPIN) 1 mg tablet; Take 1 tablet (1 mg total) by mouth 2 (two) times a day  Dispense: 60 tablet; Refill: 0  - DULoxetine (CYMBALTA) 30 mg delayed release capsule; Take 1 capsule (30 mg total) by mouth daily  Dispense: 90 capsule; Refill: 1    3  Pain of both breasts  Reviewed patient's symptoms today  At this time she has been having pain in both her breasts she previously initially had pain on the left side however has been noticing pain right as well will send patient for bilateral diagnostic ultrasounds of her breast   - US breast left limited (diagnostic); Future  - US breast right limited (diagnostic); Future    4  Need for influenza vaccination  - influenza vaccine, quadrivalent, 0 5 mL, preservative-free, for adult and pediatric patients 6 mos+ (AFLURIA, FLUARIX, FLULAVAL, FLUZONE)    5  Need for Tdap vaccination  - TDAP VACCINE GREATER THAN OR EQUAL TO 8YO IM    6  Screening for iron deficiency anemia  - CBC; Future    7  Screening for diabetes mellitus  - Comprehensive metabolic panel; Future  - Hemoglobin A1C; Future    8  Screening for cholesterol level  - Lipid Panel with Direct LDL reflex; Future    9  Screening for thyroid disorder  - TSH, 3rd generation with Free T4 reflex; Future    10   Viral infection, unspecified  - COVID Only - Office Collect           Diagnoses and all orders for this visit:    Need for influenza vaccination    Generalized anxiety disorder          Subjective:       Chief Complaint   Patient presents with    Follow-up      Patient ID: Fernando Ren is a 42 y o  female presents today for a follow-up on her chronic conditions  She has generalized anxiety disorder  She is been taking her medications regularly she states that she would like to consider increasing her Cymbalta  She states that she has been having periodic ultrasounds of her breasts on the left side  She states that she is been having pain on the right side as well  Sinusitis  This is a new problem  The current episode started in the past 7 days  The problem is unchanged  There has been no fever  The pain is mild  Associated symptoms include congestion, coughing, headaches, sinus pressure, sneezing and a sore throat  Pertinent negatives include no chills, ear pain, neck pain or shortness of breath  Past treatments include oral decongestants (Flonase)  Review of Systems   Constitutional: Negative for activity change, chills, fatigue and fever  HENT: Positive for congestion, sinus pressure, sneezing and sore throat  Negative for ear pain  Eyes: Negative for redness, itching and visual disturbance  Respiratory: Positive for cough  Negative for shortness of breath  Cardiovascular: Negative for chest pain and palpitations  Gastrointestinal: Negative for abdominal pain, diarrhea and nausea  Endocrine: Negative for cold intolerance and heat intolerance  Genitourinary: Negative for dysuria, flank pain and frequency  Musculoskeletal: Negative for arthralgias, back pain, gait problem, myalgias and neck pain  Skin: Negative for color change  Allergic/Immunologic: Negative for environmental allergies  Neurological: Positive for headaches  Negative for dizziness and numbness  Psychiatric/Behavioral: Negative for behavioral problems and sleep disturbance         The following portions of the patient's history were reviewed and updated as appropriate : past family history, past medical history, past social history and past surgical history  Current Outpatient Medications:     acetaminophen (TYLENOL) 500 mg tablet, Take 500 mg by mouth as needed for mild pain, Disp: , Rfl:     acyclovir (ZOVIRAX) 5 % ointment, Apply topically every 4 (four) hours, Disp: 15 g, Rfl: 3    clonazePAM (KlonoPIN) 1 mg tablet, Take 1 tablet (1 mg total) by mouth 2 (two) times a day, Disp: 60 tablet, Rfl: 0    cyclobenzaprine (FLEXERIL) 5 mg tablet, TAKE 1 TABLET BY MOUTH TWICE A DAY, Disp: 60 tablet, Rfl: 0    dicyclomine (BENTYL) 20 mg tablet, Take 1 tablet (20 mg total) by mouth 3 (three) times a day as needed (for abdominal cramping), Disp: 30 tablet, Rfl: 1    DULoxetine (CYMBALTA) 60 mg delayed release capsule, Take 1 capsule (60 mg total) by mouth daily, Disp: 30 capsule, Rfl: 1    ibuprofen (MOTRIN) 600 mg tablet, Take 1 tablet (600 mg total) by mouth every 6 (six) hours as needed for mild pain, moderate pain or fever, Disp: 30 tablet, Rfl: 0    loratadine (CLARITIN) 10 mg tablet, Take 1 tablet (10 mg total) by mouth daily, Disp: 30 tablet, Rfl: 2    Multiple Vitamins-Minerals (MULTIVITAMIN ADULT PO), Take 1 tablet by mouth daily, Disp: , Rfl:     predniSONE 10 mg tablet, Take 6 tablets By mouth  In the morning Qd  Decrease by  By 1 tablet daily until completed  , Disp: 21 tablet, Rfl: 0    tobramycin (TOBREX) 0 3 % SOLN, Administer 1 drop to both eyes every 4 (four) hours while awake, Disp: 5 mL, Rfl: 0    traZODone (DESYREL) 50 mg tablet, TAKE 1 TABLET (50 MG TOTAL) BY MOUTH DAILY AT BEDTIME AS NEEDED FOR SLEEP, Disp: 90 tablet, Rfl: 1         Objective:         Vitals:    10/06/22 1138   BP: 108/76   Pulse: 85   Resp: 17   Temp: 98 6 °F (37 °C)   TempSrc: Tympanic   SpO2: 97%   Weight: 52 kg (114 lb 9 6 oz)   Height: 5' 4" (1 626 m)     Physical Exam  Vitals reviewed     Constitutional:       Appearance: She is well-developed  HENT:      Head: Normocephalic and atraumatic  Nose: Nose normal       Mouth/Throat:      Pharynx: No oropharyngeal exudate  Eyes:      General: No scleral icterus  Right eye: No discharge  Left eye: No discharge  Pupils: Pupils are equal, round, and reactive to light  Neck:      Trachea: No tracheal deviation  Cardiovascular:      Rate and Rhythm: Normal rate and regular rhythm  Pulses:           Dorsalis pedis pulses are 2+ on the right side and 2+ on the left side  Posterior tibial pulses are 2+ on the right side and 2+ on the left side  Heart sounds: Normal heart sounds  No murmur heard  No friction rub  No gallop  Pulmonary:      Effort: Pulmonary effort is normal  No respiratory distress  Breath sounds: Normal breath sounds  No wheezing or rales  Abdominal:      General: Bowel sounds are normal  There is no distension  Palpations: Abdomen is soft  Tenderness: There is no abdominal tenderness  There is no guarding or rebound  Musculoskeletal:         General: Normal range of motion  Cervical back: Normal range of motion and neck supple  Lymphadenopathy:      Head:      Right side of head: No submental or submandibular adenopathy  Left side of head: No submental or submandibular adenopathy  Cervical: No cervical adenopathy  Right cervical: No superficial, deep or posterior cervical adenopathy  Left cervical: No superficial, deep or posterior cervical adenopathy  Skin:     General: Skin is warm and dry  Findings: No erythema  Neurological:      Mental Status: She is alert and oriented to person, place, and time  Cranial Nerves: No cranial nerve deficit  Sensory: No sensory deficit  Psychiatric:         Mood and Affect: Mood is not anxious or depressed  Speech: Speech normal          Behavior: Behavior normal          Thought Content:  Thought content normal  Judgment: Judgment normal  no

## 2022-10-07 LAB — SARS-COV-2 RNA RESP QL NAA+PROBE: NEGATIVE

## 2022-10-12 DIAGNOSIS — F41.1 GENERALIZED ANXIETY DISORDER: ICD-10-CM

## 2022-10-12 RX ORDER — DULOXETIN HYDROCHLORIDE 60 MG/1
60 CAPSULE, DELAYED RELEASE ORAL DAILY
Qty: 90 CAPSULE | Refills: 1 | Status: SHIPPED | OUTPATIENT
Start: 2022-10-12 | End: 2022-11-21 | Stop reason: SDUPTHER

## 2022-10-13 DIAGNOSIS — J01.90 ACUTE SINUSITIS, RECURRENCE NOT SPECIFIED, UNSPECIFIED LOCATION: Primary | ICD-10-CM

## 2022-10-13 RX ORDER — PREDNISONE 10 MG/1
TABLET ORAL
Qty: 21 TABLET | Refills: 0 | Status: SHIPPED | OUTPATIENT
Start: 2022-10-13 | End: 2023-04-10

## 2022-10-20 DIAGNOSIS — H92.09 EARACHE: ICD-10-CM

## 2022-10-20 RX ORDER — IBUPROFEN 600 MG/1
TABLET ORAL
Qty: 30 TABLET | Refills: 0 | Status: SHIPPED | OUTPATIENT
Start: 2022-10-20

## 2022-11-09 ENCOUNTER — OFFICE VISIT (OUTPATIENT)
Dept: URGENT CARE | Facility: CLINIC | Age: 37
End: 2022-11-09

## 2022-11-09 VITALS
DIASTOLIC BLOOD PRESSURE: 62 MMHG | BODY MASS INDEX: 19.46 KG/M2 | SYSTOLIC BLOOD PRESSURE: 110 MMHG | OXYGEN SATURATION: 98 % | WEIGHT: 114 LBS | RESPIRATION RATE: 18 BRPM | HEIGHT: 64 IN | HEART RATE: 72 BPM | TEMPERATURE: 97.6 F

## 2022-11-09 DIAGNOSIS — J01.91 ACUTE RECURRENT SINUSITIS, UNSPECIFIED LOCATION: Primary | ICD-10-CM

## 2022-11-09 RX ORDER — CEFUROXIME AXETIL 500 MG/1
500 TABLET ORAL EVERY 12 HOURS SCHEDULED
Qty: 20 TABLET | Refills: 0 | Status: SHIPPED | OUTPATIENT
Start: 2022-11-09 | End: 2022-11-19

## 2022-11-09 NOTE — PROGRESS NOTES
[facility]    NAME: Enrique Rice is a 42 y o  female  : 1985    MRN: [id number]  DATE: 2022  TIME: 2:21 PM    Assessment and Plan   Acute recurrent sinusitis, unspecified location [J01 91]  1  Acute recurrent sinusitis, unspecified location  cefuroxime (CEFTIN) 500 mg tablet     Note given for work    Patient Instructions   Patient Instructions   Take antibiotic as instructed  Continue your Flonase  May continue the ibuprofen as needed  May do your Neti pot couple times a day and or use some nasal saline spray to help rinse and keep mucus flowing  Push fluids  Follow-up with your primary care as needed as well as with the ENT in December  Chief Complaint     Chief Complaint   Patient presents with   • Cold Like Symptoms     Patient with c/o sinus pain and pressure in face, head  Terrible headache  Started on Monday this week       History of Present Illness   Enrique Rice presents to the clinic c/o  26-year-old female comes in with burning pressure pain sinuses left greater than right with postnasal drip that started 3-4 days ago  Felt like she never really got over last episode  Has recurrent sinus problems  Recently on Augmentin and prednisone  She has been taking Motrin since being off the prednisone  She does Neti pot daily as well as Flonase daily  She has been taking plain Mucinex  She is a smoker  She has been told she has a deviated nasal septum  She is supposed to see an ENT in December  She works at a nursing facility  Review of Systems   Review of Systems   Constitutional: Positive for activity change, appetite change and fatigue  Negative for chills and fever  HENT: Positive for congestion, postnasal drip, rhinorrhea, sinus pressure, sinus pain and sore throat  Negative for ear discharge and ear pain  Eyes: Negative  Respiratory: Negative for cough, chest tightness, shortness of breath and wheezing  Cardiovascular: Negative  Neurological: Positive for headaches  Hematological: Negative          Current Medications     Long-Term Medications   Medication Sig Dispense Refill   • acyclovir (ZOVIRAX) 5 % ointment Apply topically every 4 (four) hours 15 g 3   • clonazePAM (KlonoPIN) 1 mg tablet Take 1 tablet (1 mg total) by mouth 2 (two) times a day 60 tablet 0   • cyclobenzaprine (FLEXERIL) 5 mg tablet TAKE 1 TABLET BY MOUTH TWICE A DAY 60 tablet 0   • dicyclomine (BENTYL) 20 mg tablet Take 1 tablet (20 mg total) by mouth 3 (three) times a day as needed (for abdominal cramping) 30 tablet 1   • DULoxetine (CYMBALTA) 30 mg delayed release capsule Take 1 capsule (30 mg total) by mouth daily Along with 60 mg capsule 90 capsule 1   • DULoxetine (CYMBALTA) 60 mg delayed release capsule Take 1 capsule (60 mg total) by mouth daily 90 capsule 1   • ibuprofen (MOTRIN) 600 mg tablet TAKE 1 TABLET BY MOUTH EVERY 6 (SIX) HOURS AS NEEDED FOR MILD PAIN, MODERATE PAIN OR FEVER 30 tablet 0   • loratadine (CLARITIN) 10 mg tablet Take 1 tablet (10 mg total) by mouth daily 30 tablet 2   • traZODone (DESYREL) 50 mg tablet TAKE 1 TABLET (50 MG TOTAL) BY MOUTH DAILY AT BEDTIME AS NEEDED FOR SLEEP 90 tablet 1       Current Allergies     Allergies as of 11/09/2022 - Reviewed 11/09/2022   Allergen Reaction Noted   • Bactrim [sulfamethoxazole-trimethoprim] Hives 10/06/2020   • Latex Other (See Comments) 05/30/2016   • Levofloxacin Hives 11/17/2016   • Quinolones Rash 10/25/2016          The following portions of the patient's history were reviewed and updated as appropriate: allergies, current medications, past family history, past medical history, past social history, past surgical history and problem list   Past Medical History:   Diagnosis Date   • Allergic    • Anxiety    • Depression    • Headache(784 0)    • Psychiatric disorder      Past Surgical History:   Procedure Laterality Date   • HYSTERECTOMY     • LAPAROSCOPIC TOTAL HYSTERECTOMY     • NO PAST SURGERIES       Family History   Problem Relation Age of Onset   • Mental illness Mother    • Depression Mother    • Diabetes Father    • Diabetes Maternal Grandmother    • Arthritis Maternal Grandmother    • ADD / ADHD Brother    • Anxiety disorder Brother    • Anxiety disorder Sister        Objective   /62   Pulse 72   Temp 97 6 °F (36 4 °C) (Tympanic)   Resp 18   Ht 5' 4" (1 626 m)   Wt 51 7 kg (114 lb)   LMP 01/29/2020   SpO2 98%   BMI 19 57 kg/m²   Patient's last menstrual period was 01/29/2020  Physical Exam     Physical Exam  Vitals and nursing note reviewed  Constitutional:       General: She is not in acute distress  Appearance: She is well-developed  She is ill-appearing  She is not toxic-appearing or diaphoretic  Comments: Appears mildly ill but in no acute distress  No trismus or conversational dyspnea  HENT:      Head: Normocephalic and atraumatic  Comments: Maxillary and frontal sinuses TTP  Right Ear: Tympanic membrane, ear canal and external ear normal       Left Ear: Tympanic membrane, ear canal and external ear normal       Nose: Congestion and rhinorrhea present  Mouth/Throat:      Mouth: Mucous membranes are moist       Pharynx: Posterior oropharyngeal erythema present  No oropharyngeal exudate  Comments: Cobblestoning posterior pharynx with patchy redness  Eyes:      General:         Right eye: No discharge  Left eye: No discharge  Conjunctiva/sclera: Conjunctivae normal       Pupils: Pupils are equal, round, and reactive to light  Cardiovascular:      Rate and Rhythm: Normal rate and regular rhythm  Heart sounds: Normal heart sounds  No murmur heard  No friction rub  No gallop  Pulmonary:      Effort: Pulmonary effort is normal  No respiratory distress  Breath sounds: Normal breath sounds  No stridor  No wheezing, rhonchi or rales  Musculoskeletal:      Cervical back: Normal range of motion and neck supple   No rigidity or tenderness  Lymphadenopathy:      Cervical: No cervical adenopathy  Skin:     General: Skin is warm and dry  Coloration: Skin is not jaundiced or pale  Findings: No rash  Neurological:      Mental Status: She is alert and oriented to person, place, and time     Psychiatric:         Mood and Affect: Mood normal          Behavior: Behavior normal

## 2022-11-09 NOTE — PATIENT INSTRUCTIONS
Take antibiotic as instructed  Continue your Flonase  May continue the ibuprofen as needed  May do your Neti pot couple times a day and or use some nasal saline spray to help rinse and keep mucus flowing  Push fluids  Follow-up with your primary care as needed as well as with the ENT in December

## 2022-11-09 NOTE — LETTER
November 9, 2022     Patient: Stephon Kim   YOB: 1985   Date of Visit: 11/9/2022       To Whom It May Concern:    Above patient seen in office today for acute medical ailment  May  RTW 11/12/22           Sincerely,        Nichelle Madsen PA-C    CC: No Recipients

## 2022-11-21 DIAGNOSIS — B00.9 HERPES SIMPLEX INFECTION: ICD-10-CM

## 2022-11-21 DIAGNOSIS — F41.1 GENERALIZED ANXIETY DISORDER: ICD-10-CM

## 2022-11-21 RX ORDER — ACYCLOVIR 50 MG/G
OINTMENT TOPICAL EVERY 4 HOURS
Qty: 15 G | Refills: 0 | Status: SHIPPED | OUTPATIENT
Start: 2022-11-21

## 2022-11-21 RX ORDER — CLONAZEPAM 1 MG/1
TABLET ORAL
Qty: 60 TABLET | Refills: 0 | Status: SHIPPED | OUTPATIENT
Start: 2022-11-21

## 2022-11-23 ENCOUNTER — OFFICE VISIT (OUTPATIENT)
Dept: PODIATRY | Facility: CLINIC | Age: 37
End: 2022-11-23

## 2022-11-23 VITALS
SYSTOLIC BLOOD PRESSURE: 100 MMHG | HEIGHT: 64 IN | DIASTOLIC BLOOD PRESSURE: 62 MMHG | WEIGHT: 116 LBS | BODY MASS INDEX: 19.81 KG/M2

## 2022-11-23 DIAGNOSIS — L60.0 INGROWING NAIL: Primary | ICD-10-CM

## 2022-11-23 DIAGNOSIS — M20.32 HALLUX VARUS (ACQUIRED), LEFT FOOT: ICD-10-CM

## 2022-11-23 DIAGNOSIS — M79.675 PAIN IN TOE OF LEFT FOOT: ICD-10-CM

## 2022-11-23 DIAGNOSIS — M79.674 PAIN IN TOE OF RIGHT FOOT: ICD-10-CM

## 2022-11-23 RX ORDER — LIDOCAINE HYDROCHLORIDE 10 MG/ML
1 INJECTION, SOLUTION EPIDURAL; INFILTRATION; INTRACAUDAL; PERINEURAL ONCE
Status: COMPLETED | OUTPATIENT
Start: 2022-11-23 | End: 2022-11-23

## 2022-11-23 RX ORDER — CHLORHEXIDINE GLUCONATE 0.12 MG/ML
15 RINSE ORAL ONCE
OUTPATIENT
Start: 2022-11-23 | End: 2022-11-23

## 2022-11-23 RX ORDER — LIDOCAINE HYDROCHLORIDE AND EPINEPHRINE 10; 10 MG/ML; UG/ML
1 INJECTION, SOLUTION INFILTRATION; PERINEURAL ONCE
Status: COMPLETED | OUTPATIENT
Start: 2022-11-23 | End: 2022-11-23

## 2022-11-23 RX ADMIN — LIDOCAINE HYDROCHLORIDE AND EPINEPHRINE 1 ML: 10; 10 INJECTION, SOLUTION INFILTRATION; PERINEURAL at 08:27

## 2022-11-23 RX ADMIN — LIDOCAINE HYDROCHLORIDE 1 ML: 10 INJECTION, SOLUTION EPIDURAL; INFILTRATION; INTRACAUDAL; PERINEURAL at 08:27

## 2022-11-23 NOTE — PROGRESS NOTES
Assessment/Plan:    Explained the patient that she has a hallux varus deformity of the left foot and likely osteoarthritis of the joint  Discussed etiology as to how this deformity could have occurred  Surgical intervention is necessary to try to correct this disorder and an implant arthroplasty was recommended  Patient was referred for x-rays to assess the left foot  Explained the patient that she has ingrown toenails affecting the lateral nail border of each great toe  Discussed treatment options recommending partial matrixectomy  The goal of this procedure is permanent erradication of the offending nail border  Procedure performed as follows: Anesthesia via 4cc of a one-to-one mixture of 1 percent xylocaine with epinephrine and 1 percent xylocaine plain  A Betadine prep was performed  Lateral nail border of the left great toe was avulsed to the eponychium  A partial matrixectomy was performed utilizing phenol in a standard manner  A bacitracin dressing was applied  Similar procedure was then performed along the lateral nail border of the right great toe  The patient is to soak in warm water twice a day tomorrow followed by a Neosporin dressing  The patient is rescheduled in 1 week  We will review x-ray findings at that time  Note:  30 minutes spent with patient  No problem-specific Assessment & Plan notes found for this encounter  Diagnoses and all orders for this visit:    Ingrowing nail  -     lidocaine (PF) (XYLOCAINE-MPF) 1 % injection 1 mL  -     lidocaine-epinephrine (XYLOCAINE/EPINEPHRINE) 1 %-1:100,000 injection 1 mL    Pain in toe of right foot    Pain in toe of left foot    Hallux varus (acquired), left foot  -     X-ray foot left 3+ views; Future          Subjective:      Patient ID: Marina Trevino is a 42 y o  female  HPI     Patient presents with multiple pedal concerns  Patient had bilateral bunionectomy performed in the 1990s    Unfortunately, she developed a medial deviation of the left great toe after the surgery was performed  Patient has difficulty wearing enclosed shoes due to the position of the great toe  She also has pain with motion of this toe  Another problem involves ingrown toenails that have affected the lateral nail border of each great toe  These nails have been a chronic problem for the patient  Patient also relates having had Achilles tendon lengthening in the 90s as well  The following portions of the patient's history were reviewed and updated as appropriate: allergies, current medications, past family history, past medical history, past social history, past surgical history and problem list     Review of Systems   Gastrointestinal:        GERD   Musculoskeletal: Positive for gait problem  Psychiatric/Behavioral: The patient is nervous/anxious  Objective:      /62   Ht 5' 4" (1 626 m)   Wt 52 6 kg (116 lb)   LMP 01/29/2020   BMI 19 91 kg/m²          Physical Exam  Constitutional:       Appearance: Normal appearance  Cardiovascular:      Pulses: Normal pulses  Musculoskeletal:         General: Tenderness and deformity present  Comments: Hallux varus deformity left foot  Pain with range of motion left 1st MPJ in the deviated and corrected position  Pain with palpation lateral aspect left 1st MPJ  Skin:     Comments: Pain with palpation lateral nail borders great toe bilateral   No evidence of paronychia      Neurological:      General: No focal deficit present  Mental Status: She is oriented to person, place, and time  Nail removal    Date/Time: 11/23/2022 11:04 AM  Performed by: Aura Norris DPM  Authorized by: Aura Norris DPM     Patient location:  Clinic[facility] Protocol:  Consent: Verbal consent obtained    Consent given by: patient  Patient understanding: patient states understanding of the procedure being performed  Patient identity confirmed: verbally with patient      Location: Foot:  R big toe  Pre-procedure details:     Skin preparation:  Betadine    Preparation: Patient was prepped and draped in the usual sterile fashion    Anesthesia (see MAR for exact dosages): Anesthesia method:  Nerve block    Block needle gauge:  25 G    Block anesthetic:  Lidocaine 1% WITH epi and lidocaine 1% w/o epi    Block injection procedure:  Anatomic landmarks identified    Block outcome:  Anesthesia achieved  Nail Removal:     Nail removed:  Partial    Nail side:  Lateral    Nail bed sutured: no    Ingrown nail:     Wedge excision of skin: no      Nail matrix removed or ablated:  Partial  Post-procedure details:     Dressing:  4x4 sterile gauze, antibiotic ointment and gauze roll    Patient tolerance of procedure: Tolerated well, no immediate complications  Nail removal    Date/Time: 11/23/2022 11:04 AM  Performed by: Monique Payne DPM  Authorized by: Monique Payne DPM     Patient location:  ClinicUniversal Protocol:  Consent: Verbal consent obtained  Consent given by: patient  Patient understanding: patient states understanding of the procedure being performed  Patient identity confirmed: verbally with patient      Location:     Foot:  L big toe  Pre-procedure details:     Skin preparation:  Betadine    Preparation: Patient was prepped and draped in the usual sterile fashion    Anesthesia (see MAR for exact dosages): Anesthesia method:  Nerve block    Block needle gauge:  25 G    Block anesthetic:  Lidocaine 1% WITH epi and lidocaine 1% w/o epi    Block injection procedure:  Anatomic landmarks identified    Block outcome:  Anesthesia achieved  Nail Removal:     Nail removed:  Partial    Nail side:  Lateral    Nail bed sutured: no    Ingrown nail:     Wedge excision of skin: no      Nail matrix removed or ablated:  Partial  Post-procedure details:     Dressing:  4x4 sterile gauze, antibiotic ointment and gauze roll    Patient tolerance of procedure:   Tolerated well, no immediate complications

## 2022-11-25 ENCOUNTER — APPOINTMENT (OUTPATIENT)
Dept: RADIOLOGY | Facility: CLINIC | Age: 37
End: 2022-11-25

## 2022-11-25 DIAGNOSIS — M20.32 HALLUX VARUS (ACQUIRED), LEFT FOOT: ICD-10-CM

## 2022-11-28 ENCOUNTER — OFFICE VISIT (OUTPATIENT)
Dept: URGENT CARE | Facility: CLINIC | Age: 37
End: 2022-11-28

## 2022-11-28 VITALS
OXYGEN SATURATION: 99 % | HEART RATE: 88 BPM | TEMPERATURE: 98.8 F | DIASTOLIC BLOOD PRESSURE: 72 MMHG | SYSTOLIC BLOOD PRESSURE: 98 MMHG | RESPIRATION RATE: 18 BRPM

## 2022-11-28 DIAGNOSIS — M77.11 LATERAL EPICONDYLITIS OF RIGHT ELBOW: Primary | ICD-10-CM

## 2022-11-28 RX ORDER — METHYLPREDNISOLONE 4 MG/1
TABLET ORAL
Qty: 1 EACH | Refills: 0 | Status: SHIPPED | OUTPATIENT
Start: 2022-11-28

## 2022-11-28 NOTE — LETTER
November 28, 2022     Patient: Namrata Fox   YOB: 1985   Date of Visit: 11/28/2022       To Whom It May Concern: It is my medical opinion that Namrata Fox should remain out of work until 11/30/2022  If you have any questions or concerns, please don't hesitate to call           Sincerely,        CASEY FU NOW    CC: No Recipients

## 2022-11-28 NOTE — PROGRESS NOTES
[facility]        NAME: Joe Martinez is a 42 y o  female  : 1985    MRN: [id number]  DATE: 2022  TIME: 4:37 PM    Assessment and Plan   Lateral epicondylitis of right elbow [M77 11]  1  Lateral epicondylitis of right elbow  methylPREDNISolone 4 MG tablet therapy pack    Ambulatory Referral to Orthopedic Surgery        Start steroid dose pack   Continue ice   Referral to ortho provided  Work note provided for tomorrow   Pt in agreement with plan     Patient Instructions     Follow up with PCP in 3-5 days  Proceed to  ER if symptoms worsen  Chief Complaint     Chief Complaint   Patient presents with   • Arm Pain     Patient with right arm and elbow pain for 1 week  No injury         History of Present Illness   Joe Martinez presents to the clinic c/o    Arm Pain (Patient with right arm and elbow pain for 1 week  No injury)  Has been applying ice and taking ibuprofen   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 that could have caused the pain  Review of Systems   Review of Systems   All other systems reviewed and are negative          Current Medications     Long-Term Medications   Medication Sig Dispense Refill   • acyclovir (ZOVIRAX) 5 % ointment Apply topically every 4 (four) hours 15 g 0   • clonazePAM (KlonoPIN) 1 mg tablet TAKE 1 TABLET BY MOUTH TWICE A DAY 60 tablet 0   • cyclobenzaprine (FLEXERIL) 5 mg tablet TAKE 1 TABLET BY MOUTH TWICE A DAY 60 tablet 0   • dicyclomine (BENTYL) 20 mg tablet Take 1 tablet (20 mg total) by mouth 3 (three) times a day as needed (for abdominal cramping) 30 tablet 1   • DULoxetine (CYMBALTA) 30 mg delayed release capsule Take 1 capsule (30 mg total) by mouth daily Along with 60 mg capsule 90 capsule 0   • DULoxetine (CYMBALTA) 60 mg delayed release capsule Take 1 capsule (60 mg total) by mouth daily 90 capsule 0   • ibuprofen (MOTRIN) 600 mg tablet Take 1 tablet (600 mg total) by mouth every 6 (six) hours as needed for mild pain 120 tablet 0   • loratadine (CLARITIN) 10 mg tablet Take 1 tablet (10 mg total) by mouth daily 30 tablet 2   • traZODone (DESYREL) 50 mg tablet Take 1 tablet (50 mg total) by mouth daily at bedtime as needed for sleep 90 tablet 0       Current Allergies     Allergies as of 11/28/2022 - Reviewed 11/28/2022   Allergen Reaction Noted   • Bactrim [sulfamethoxazole-trimethoprim] Hives 10/06/2020   • Latex Other (See Comments) 05/30/2016   • Levofloxacin Hives 11/17/2016   • Quinolones Rash 10/25/2016            The following portions of the patient's history were reviewed and updated as appropriate: allergies, current medications, past family history, past medical history, past social history, past surgical history and problem list     Objective   BP 98/72   Pulse 88   Temp 98 8 °F (37 1 °C) (Tympanic)   Resp 18   LMP 01/29/2020   SpO2 99%        Physical Exam     Physical Exam  Vitals and nursing note reviewed  Constitutional:       General: She is active  Vital signs are normal       Appearance: Normal appearance  She is well-developed and well-nourished  HENT:      Head: Normocephalic and atraumatic  Eyes:      General: Lids are normal       Conjunctiva/sclera: Conjunctivae normal    Cardiovascular:      Rate and Rhythm: Normal rate and regular rhythm  Heart sounds: Normal heart sounds, S1 normal and S2 normal    Pulmonary:      Effort: Pulmonary effort is normal       Breath sounds: Normal breath sounds  Musculoskeletal:      Right elbow: No swelling, deformity, effusion or lacerations  Normal range of motion  Tenderness present in lateral epicondyle  No radial head, medial epicondyle or olecranon process tenderness  Skin:     General: Skin is warm and dry  Neurological:      Mental Status: She is alert and oriented to person, place, and time     Psychiatric:         Mood and Affect: Mood and affect normal          Speech: Speech normal          Behavior: Behavior normal  Behavior is cooperative  Thought Content:  Thought content normal          Cognition and Memory: Cognition and memory normal          Judgment: Judgment normal

## 2022-11-28 NOTE — PATIENT INSTRUCTIONS
Tennis Elbow   AMBULATORY CARE:   Tennis elbow  is inflammation of the tendons in your elbow  Tendons are strong tissues that connect muscle to bone  Common symptoms include the following:   Pain on the side of your elbow that travels to your upper arm, forearm, or fingers    Weakness in your wrist or hand    Trouble holding, lifting, or grabbing an object, such as a coffee cup    Red, swollen, warm skin on the outside of your elbow    Seek care immediately if:   You suddenly have no feeling in your arm, hand, or fingers  You suddenly cannot move your arm, wrist, hand, or fingers  Contact your healthcare provider if:   Your symptoms do not get better within 2 weeks, even with treatment  You have more pain or weakness in your arm, wrist, hand, or fingers  You have new numbness or tingling in your arm, hand, or fingers  You have questions or concerns about your condition or care  Treatment:   Support devices  may be needed to limit your arm movement  Examples include an arm strap, brace, or splint  These devices also help decrease pain and prevent more damage to your tendon  Acetaminophen  decreases pain and fever  It is available without a doctor's order  Ask how much to take and how often to take it  Follow directions  Read the labels of all other medicines you are using to see if they also contain acetaminophen, or ask your doctor or pharmacist  Acetaminophen can cause liver damage if not taken correctly  Do not use more than 4 grams (4,000 milligrams) total of acetaminophen in one day  NSAIDs , such as ibuprofen, help decrease swelling, pain, and fever  This medicine is available with or without a doctor's order  NSAIDs can cause stomach bleeding or kidney problems in certain people  If you take blood thinner medicine, always ask your healthcare provider if NSAIDs are safe for you  Always read the medicine label and follow directions      A steroid injection  will help decrease pain and swelling  Physical therapy  may be recommended  A physical therapist teaches you exercises to help improve movement and strength, and to decrease pain  Surgery  may be needed if your symptoms do not improve with other treatments  During surgery, your healthcare provider will remove any damaged tissue  He may also cut your tendon and reattach it  Self-care:   Rest  your injured arm and avoid activities that cause pain  This will help your tendons heal     Apply ice  on your elbow for 15 to 20 minutes every hour or as directed  Use an ice pack, or put crushed ice in a plastic bag  Cover it with a towel before you apply it to your skin  Ice helps prevent tissue damage and decreases swelling and pain  Elevate  your elbow above the level of your heart as often as you can  This will help decrease swelling and pain  Prop your elbow on pillows or blankets to keep it elevated comfortably  Follow up with your doctor as directed:  Write down your questions so you remember to ask them during your visits  © [employer] 2022 Information is for End User's use only and may not be sold, redistributed or otherwise used for commercial purposes  All illustrations and images included in CareNotes® are the copyrighted property of A D A M , Inc  or Formerly named [facility] Raffi Islas   The above information is an  only  It is not intended as medical advice for individual conditions or treatments  Talk to your doctor, nurse or pharmacist before following any medical regimen to see if it is safe and effective for you  Tennis Elbow Exercises   WHAT YOU NEED TO KNOW:   What are tennis elbow exercises? Tennis elbow exercises help decrease pain in your elbow, forearm, wrist, and hand  They also help strengthen your arm muscles and prevent further injury  Start these exercises slowly  Do the exercises on both arms  Stop if you feel pain  Finger extensions:  Hold your fingers close together   Put a rubber band around the outside of your fingers and thumb  Spread your fingers apart and then slowly bring them together without letting the rubber band fall off  Repeat 40 times  Wrist flexor stretch:  Hold your arm straight out in front of you with your palm facing down  Use your other hand to grasp your fingers  Keep your elbow straight and slowly bend your hand back  Your fingertips should point up and your palm should face away from you  Do this until you feel a stretch in the top of your wrist  Hold for 10 seconds  Repeat 5 times  Wrist extensor stretch: This stretch is the opposite of the wrist flexor stretch  Hold your arm straight out in front of you with your palm facing down  Use your other hand to grasp your fingers  Keep your elbow straight and slowly bend your hand down  Your fingertips should point down and your palm should face you  Do this until you feel a stretch in your wrist  Hold for 10 seconds  Repeat 5 times  Bicep curls:  Place your hand under your injured elbow for support  Turn your palm so that it faces up and hold a weight in your hand  Ask your healthcare provider how much weight you should use  Slowly bend and straighten your elbow 30 times  :  Hold a soft rubber ball or tennis ball in your hand  Squeeze the ball as hard as you can and hold this position  Ask your healthcare provider how long to hold this position  Repeat this exercise as directed by your healthcare provider  When should I contact my healthcare provider? You have increased pain or weakness in your arm, wrist, hand, or fingers  You have new numbness or tingling in your arm, hand, or fingers  You have questions or concerns about tennis elbow exercises  CARE AGREEMENT:   You have the right to help plan your care  Learn about your health condition and how it may be treated  Discuss treatment options with your healthcare providers to decide what care you want to receive   You always have the right to refuse treatment  The above information is an  only  It is not intended as medical advice for individual conditions or treatments  Talk to your doctor, nurse or pharmacist before following any medical regimen to see if it is safe and effective for you  © Copyright [employer] 2022 Information is for End User's use only and may not be sold, redistributed or otherwise used for commercial purposes   All illustrations and images included in CareNotes® are the copyrighted property of [employer] , Inc  or [address] [place] [address]

## 2022-11-29 ENCOUNTER — TELEPHONE (OUTPATIENT)
Dept: PODIATRY | Facility: CLINIC | Age: 37
End: 2022-11-29

## 2022-11-29 ENCOUNTER — OFFICE VISIT (OUTPATIENT)
Dept: OBGYN CLINIC | Facility: MEDICAL CENTER | Age: 37
End: 2022-11-29

## 2022-11-29 VITALS
HEART RATE: 76 BPM | SYSTOLIC BLOOD PRESSURE: 126 MMHG | BODY MASS INDEX: 19.97 KG/M2 | DIASTOLIC BLOOD PRESSURE: 77 MMHG | WEIGHT: 117 LBS | HEIGHT: 64 IN

## 2022-11-29 DIAGNOSIS — M77.11 LATERAL EPICONDYLITIS OF RIGHT ELBOW: Primary | ICD-10-CM

## 2022-11-29 NOTE — PROGRESS NOTES
1  Lateral epicondylitis of right elbow  Ambulatory Referral to Orthopedic Surgery    Cock Up Wrist Splint    Diclofenac Sodium (VOLTAREN) 1 %        Orders Placed This Encounter   Procedures   • Cock Up Wrist Splint        Imaging Studies (I personally reviewed images in PACS and report):    • No radiographs ordered today    IMPRESSION:  • Acute atraumatic right elbow pain/RUE x1 week secondary to lateral epicondylitis  • Recently was at [facility] and has not started oral medrol pack    Other factors:  • RHD  • Works as CNA  • Tobacco dependence    PLAN:    • Clinical exam and radiographic imaging reviewed with patient today, with impression as per above  I have discussed with the patient the pathophysiology of this diagnosis and reviewed how the examination correlates with this diagnosis  • Counseled patient that her symptoms seem to be consistent with lateral epicondylitis  • I recommended starting with conservative treatment at this time  I recommended she purchase an OTC elbow brace today to be used during the day  I did prescribe her a universal wrist brace to wear at night  • Regards to pain control, recommended she fill the prescription for the Medrol Dosepak from urgent care and take accordingly  I counseled in some circumstances a cortisone injection of the lateral epicondyle can be given but discussed that while provide short-term relief it may come back at a long-term if she does not on conservative measures through stretching and conditioning of her right upper extremity  Patient agreeable to defer cortisone injection today  • I offered formal physical therapy to help facilitate recovery but patient prefers to start with a home exercise program for now which is provided today  • In regards to further pain control, I did prescribe her topical diclofenac which I recommend she start after completing her Medrol Dosepak  • Work combinations were provided today as per communications      Return in about 4 weeks (around 12/27/2022)  Portions of the record may have been created with voice recognition software  Occasional wrong word or "sound a like" substitutions may have occurred due to the inherent limitations of voice recognition software  Read the chart carefully and recognize, using context, where substitutions have occurred  I have spent 45 minutes with Patient  today in which greater than 50% of this time was spent in counseling/coordination of care regarding Diagnostic results, Prognosis, Risks and benefits of tx options, Intructions for management, Patient and family education, Importance of tx compliance, Risk factor reductions and Impressions  CHIEF COMPLAINT:  Chief Complaint   Patient presents with   • Right Elbow - Pain   • Right Arm - Pain         HPI:  Loren Ellis is a RHD 42 y o  female  who presents for       Visit 11/29/2022 :  Initial evaluation of right elbow pain:  Of the patient's right-hand dominant  She works as a CNA  She states she has been having ongoing right elbow pain for the past 1 week without a precipitating injury  She states it is worsened and that is now spread to her right upper arm as well as her forearm  She describes as a sharp/throbbing/burning pain of moderate to severe intensity  She states it is constant but is aggravated with any activity such as range of motion movements of her elbow as well as lifting/pushing/pulling  She states her swelling over her elbow at times  She denies any discoloration of her elbow  She reports intermittent paresthesias over the dorsal aspect of her hand  She denies having similar symptoms in the past   She recently saw her urgent care yesterday for this issue was prescribed a Medrol Dosepak which she has not filled yet  She does not use any bracing  She states this pain interferes with her ability to sleep at night  She states it interferes with her activities of daily living given she is right-hand dominant    She reports are using Tylenol, NSAIDs, icing with minimal to no relief  Medical, Surgical, Family, and Social History    Past Medical History:   Diagnosis Date   • Allergic    • Anxiety    • Bunion    • Depression    • Headache(784 0)    • Ingrown toenail    • Psychiatric disorder      Past Surgical History:   Procedure Laterality Date   • BUNIONECTOMY     • HYSTERECTOMY     • LAPAROSCOPIC TOTAL HYSTERECTOMY     • TONSILLECTOMY       Social History   Social History     Substance and Sexual Activity   Alcohol Use Not Currently     Social History     Substance and Sexual Activity   Drug Use Never     Social History     Tobacco Use   Smoking Status Every Day   • Packs/day: 0 50   • Years: 25 00   • Pack years: 12 50   • Types: Cigarettes   • Start date: 4/11/2002   Smokeless Tobacco Never     Family History   Problem Relation Age of Onset   • Mental illness Mother    • Depression Mother    • Diabetes Father    • Hypertension Father    • Diabetes Maternal Grandmother    • Arthritis Maternal Grandmother    • Heart disease Maternal Grandmother    • ADD / ADHD Brother    • Anxiety disorder Brother    • Anxiety disorder Sister    • Rashes / Skin problems Sister      Allergies   Allergen Reactions   • Bactrim [Sulfamethoxazole-Trimethoprim] Hives   • Latex Other (See Comments)     Latex allergy-rashes   • Levofloxacin Hives   • Quinolones Rash          Physical Exam  /77   Pulse 76   Ht 5' 4" (1 626 m)   Wt 53 1 kg (117 lb)   LMP 01/29/2020   BMI 20 08 kg/m²     Constitutional:  see vital signs  Gen: well-developed, normocephalic/atraumatic, well-groomed  Eyes: No inflammation or discharge of conjunctiva or lids; sclera clear   Pulmonary/Chest: Effort normal  No respiratory distress  Right Hand Exam     Tenderness   The patient is experiencing no tenderness       Range of Motion   Wrist   Extension: 40   Flexion: 60   Pronation: 90   Supination: 90     Muscle Strength   Right wrist normal muscle strength: Limited gripping and wrist extension due to aggravating pain over lateral elbow and forearm  Wrist extension: 4/5   Wrist flexion: 5/5   : 4/5     Other   Erythema: absent  Sensation: normal  Pulse: present    Comments:  Full digit MCP/PIP/DIP motion without malrotation or digital scissoring  Thumb MCP/DIP intact with opposition  No swelling, bruising, erythema  Sensation intact in radial/median/ulnar distribution        Right Elbow Exam     Tenderness   The patient is experiencing tenderness in the lateral epicondyle  Range of Motion   Extension: 0   Flexion: 100     Muscle Strength   Pronation:  4/5   Supination:  4/5     Tests   Varus: negative (Aggravates elbow pain laterally)  Valgus: negative (Aggravates elbow pain laterally)  Tinel's sign (cubital tunnel): negative    Other   Erythema: absent    Comments:  Cozen's test:  Positive    Pain is mainly localized over the lateral epicondyle but patient does note diffuse tenderness throughout her elbow as well as her lateral upper arm and along her dorsal forearm                Procedures

## 2022-11-29 NOTE — TELEPHONE ENCOUNTER
Call placed to patient and LM in regards to scheduling surgery as discussed with Jennifer Goetz DPM at last office visit  Will await call back

## 2022-11-29 NOTE — LETTER
November 29, 2022     Patient: Go Saleem  YOB: 1985  Date of Visit: 11/29/2022      To Whom it May Concern:    Go Saleem is under my professional care  Kris Jacobson was seen in my office on 11/29/2022  Please excuse her this week from work  She can return on 12/05/2022 but is restricted from lifting/pushing/pulling no more than 1 pound with her right arm  Allow use of right elbow strap if needed  She will follow up in about 4 weeks for re-evaluation  If you have any questions or concerns, please don't hesitate to call           Sincerely,          Shantel Khan MD        CC: Go Saleem

## 2022-11-30 ENCOUNTER — OFFICE VISIT (OUTPATIENT)
Dept: PODIATRY | Facility: CLINIC | Age: 37
End: 2022-11-30

## 2022-11-30 VITALS
HEIGHT: 64 IN | WEIGHT: 117 LBS | SYSTOLIC BLOOD PRESSURE: 112 MMHG | DIASTOLIC BLOOD PRESSURE: 64 MMHG | BODY MASS INDEX: 19.97 KG/M2

## 2022-11-30 DIAGNOSIS — L60.0 INGROWING NAIL: Primary | ICD-10-CM

## 2022-11-30 DIAGNOSIS — M20.32 HALLUX VARUS (ACQUIRED), LEFT FOOT: ICD-10-CM

## 2022-11-30 NOTE — PROGRESS NOTES
Patient presents 1 week post partial matrixectomy lateral nail border hallux bilateral   Surgical sites healing uneventfully  No evidence of infection  Patient told that she may discontinue soaks and Neosporin  Patient notes that the medial nail borders of each great toe are also uncomfortable at times  Advised her to hold off on matrixectomy unless pain is significant  She has a hallux varus deformity of the left foot that is to be surgically corrected in February  This may be contributing to the medial nail border pain  Also, toenails will be cosmetically displeasing if both borders are removed  I reviewed x-rays of the left foot taken on 11/25/2022  They reveal a hallux varus deformity  There is a negative intermetatarsal angle with peaking tibial sesamoid  Range of motion is restricted to 25° of dorsiflexion both in the deformed position as well as the corrected position  Recommended implant arthroplasties for correction along with capsulorrhaphy with possible need of graft jacket  Patient will be rescheduled for consent signing  Her surgery is currently scheduled for February 10th, 2023

## 2022-12-07 ENCOUNTER — TELEPHONE (OUTPATIENT)
Dept: OBGYN CLINIC | Facility: CLINIC | Age: 37
End: 2022-12-07

## 2022-12-07 NOTE — TELEPHONE ENCOUNTER
Caller: Patient    Doctor: Nora Salinas     Reason for call: Patient states that her right shoulder/ scapula area is in a lot of pain   She is wanting to know if an xray can be ordered for this to rule that part out? She finished the steroids that were prescribed and she has been using there voltaren gel and she is not getting any relief        Please advise     Call back#: [phone]

## 2022-12-08 ENCOUNTER — APPOINTMENT (OUTPATIENT)
Dept: RADIOLOGY | Facility: CLINIC | Age: 37
End: 2022-12-08

## 2022-12-08 DIAGNOSIS — M50.30 DEGENERATION OF CERVICAL INTERVERTEBRAL DISC: ICD-10-CM

## 2022-12-08 DIAGNOSIS — M25.511 RIGHT SHOULDER PAIN, UNSPECIFIED CHRONICITY: ICD-10-CM

## 2022-12-08 DIAGNOSIS — M25.511 RIGHT SHOULDER PAIN, UNSPECIFIED CHRONICITY: Primary | ICD-10-CM

## 2022-12-08 NOTE — TELEPHONE ENCOUNTER
Called & spoke to patient regarding shoulder pain  Explained to patient that message was initially routed to Dr Ken Bardales as the elbow was primarily addressed and treated so apologies for the delay  Given severity of shoulder pain, I did encourage patient to seek out xray via [facility] instead of wait  However, patient claims that when she attempted to address shoulder in the past via [facility] were deemed not warranted  Xray order placed for the right shoulder  Patient will obtain and is aware that if anything concerning is found, Dr Ken Bardales will be notified by radiology   She also understands if xray is unremarkable, then further imaging may be required to treat and diagnose cause of shoulder pain

## 2022-12-08 NOTE — TELEPHONE ENCOUNTER
Caller: Patient    Doctor: Erica Quintana    Reason for call: Patient has not heard from anyone yet and is concerned  She states that there is a Sl building down the road from her that she can get x-rays at  She really feels an x-ray needs to be done to make sure that something is not getting missed  She is experiencing a lot of pain in her shoulder blades/scapula area  She cannot sleep at night  She has been using the Voltaren gel and taking the steroid given to her and nothing is relieving the pain  She is taking Ibuprofen  She is in a lot of pain and cannot get comfortable  Please advise       Call back#: [phone]

## 2022-12-09 DIAGNOSIS — G89.29 CHRONIC BILATERAL LOW BACK PAIN WITH LEFT-SIDED SCIATICA: ICD-10-CM

## 2022-12-09 DIAGNOSIS — B00.9 HERPES SIMPLEX INFECTION: ICD-10-CM

## 2022-12-09 DIAGNOSIS — M54.42 CHRONIC BILATERAL LOW BACK PAIN WITH LEFT-SIDED SCIATICA: ICD-10-CM

## 2022-12-12 RX ORDER — CYCLOBENZAPRINE HCL 5 MG
TABLET ORAL
Qty: 60 TABLET | Refills: 0 | Status: SHIPPED | OUTPATIENT
Start: 2022-12-12

## 2022-12-12 RX ORDER — ACYCLOVIR 50 MG/G
OINTMENT TOPICAL EVERY 4 HOURS
Qty: 15 G | Refills: 0 | Status: SHIPPED | OUTPATIENT
Start: 2022-12-12

## 2022-12-16 DIAGNOSIS — M25.511 ACUTE PAIN OF RIGHT SHOULDER: Primary | ICD-10-CM

## 2022-12-16 DIAGNOSIS — M79.2 RADICULAR PAIN IN RIGHT ARM: ICD-10-CM

## 2022-12-16 DIAGNOSIS — M77.11 LATERAL EPICONDYLITIS OF RIGHT ELBOW: ICD-10-CM

## 2022-12-16 RX ORDER — GABAPENTIN 100 MG/1
100 CAPSULE ORAL 3 TIMES DAILY
Qty: 90 CAPSULE | Refills: 0 | Status: SHIPPED | OUTPATIENT
Start: 2022-12-16 | End: 2023-01-20 | Stop reason: SDUPTHER

## 2022-12-16 RX ORDER — MELOXICAM 15 MG/1
15 TABLET ORAL DAILY
Qty: 30 TABLET | Refills: 1 | Status: SHIPPED | OUTPATIENT
Start: 2022-12-16 | End: 2023-01-20 | Stop reason: SDUPTHER

## 2022-12-20 DIAGNOSIS — H92.09 EARACHE: ICD-10-CM

## 2022-12-20 RX ORDER — IBUPROFEN 600 MG/1
600 TABLET ORAL EVERY 6 HOURS PRN
Qty: 120 TABLET | Refills: 0 | Status: SHIPPED | OUTPATIENT
Start: 2022-12-20 | End: 2023-01-19

## 2022-12-23 DIAGNOSIS — F41.1 GENERALIZED ANXIETY DISORDER: ICD-10-CM

## 2022-12-24 RX ORDER — CLONAZEPAM 1 MG/1
TABLET ORAL
Qty: 60 TABLET | Refills: 0 | Status: SHIPPED | OUTPATIENT
Start: 2022-12-24

## 2022-12-26 DIAGNOSIS — F41.1 GENERALIZED ANXIETY DISORDER: ICD-10-CM

## 2022-12-27 ENCOUNTER — APPOINTMENT (OUTPATIENT)
Dept: RADIOLOGY | Facility: MEDICAL CENTER | Age: 37
End: 2022-12-27

## 2022-12-27 ENCOUNTER — OFFICE VISIT (OUTPATIENT)
Dept: OBGYN CLINIC | Facility: MEDICAL CENTER | Age: 37
End: 2022-12-27

## 2022-12-27 VITALS
SYSTOLIC BLOOD PRESSURE: 126 MMHG | BODY MASS INDEX: 19.63 KG/M2 | DIASTOLIC BLOOD PRESSURE: 84 MMHG | HEART RATE: 76 BPM | HEIGHT: 64 IN | WEIGHT: 115 LBS

## 2022-12-27 DIAGNOSIS — M25.511 ACUTE PAIN OF RIGHT SHOULDER: ICD-10-CM

## 2022-12-27 DIAGNOSIS — M75.51 BURSITIS OF RIGHT SHOULDER: ICD-10-CM

## 2022-12-27 DIAGNOSIS — M77.11 LATERAL EPICONDYLITIS OF RIGHT ELBOW: Primary | ICD-10-CM

## 2022-12-27 DIAGNOSIS — M77.11 LATERAL EPICONDYLITIS OF RIGHT ELBOW: ICD-10-CM

## 2022-12-27 RX ORDER — DULOXETIN HYDROCHLORIDE 60 MG/1
CAPSULE, DELAYED RELEASE ORAL
Qty: 90 CAPSULE | Refills: 0 | Status: SHIPPED | OUTPATIENT
Start: 2022-12-27

## 2022-12-27 RX ORDER — DULOXETIN HYDROCHLORIDE 30 MG/1
30 CAPSULE, DELAYED RELEASE ORAL DAILY
Qty: 90 CAPSULE | Refills: 0 | Status: SHIPPED | OUTPATIENT
Start: 2022-12-27

## 2022-12-27 NOTE — PATIENT INSTRUCTIONS

## 2022-12-27 NOTE — LETTER
December 27, 2022     Patient: Onel Acevedo  YOB: 1985  Date of Visit: 12/27/2022      To Whom it May Concern:    Onel Acevedo is under my professional care  Calaiskeke Collier was seen in my office on 12/27/2022  Please excuse her this week from work  She can return on 12/05/2022 but is restricted from lifting/pushing/pulling no more than 1 pound with her right arm  Allow use of right elbow strap if needed  She will be starting formal physical therapy  She will follow up in about 3-4 weeks for re-evaluation  If you have any questions or concerns, please don't hesitate to call           Sincerely,          Master Doran MD        CC: Onel Acevedo

## 2022-12-27 NOTE — PROGRESS NOTES
1  Lateral epicondylitis of right elbow  XR elbow 3+ vw right    Hand/upper extremity injection: R elbow      2  Acute pain of right shoulder  Large joint arthrocentesis: R subacromial bursa      3  Bursitis of right shoulder  Large joint arthrocentesis: R subacromial bursa        Orders Placed This Encounter   Procedures   • Large joint arthrocentesis: R subacromial bursa   • Hand/upper extremity injection: R elbow   • XR elbow 3+ vw right        Imaging Studies (I personally reviewed images in PACS and report):    • X-ray right elbow 12/27/2022: No acute osseous abnormalities  No significant degenerative changes  No joint effusion  • X-ray right shoulder 12/8/2022: No acute osseous abnormalities  No significant degenerative changes  • X-ray cervical spine 12/8/2022: No acute osseous abnormalities  Tiny osteophytes of C5 and mild disc space loss at C5-6  IMPRESSION:  • Acute atraumatic right elbow and progressively right shoulder pain x3 week secondary to lateral epicondylitis  • No relief from oral medrol at [facility], nsaids, gabapentin  • Clinical exam consistent with lateral epicondylitis of elbow, bursitis of right shoulder  Neurologically intact on exam  • Radiographic imaging unremarkable other than mild OA changes of c-spine    Other factors:  • RHD  • Works as CNA  • Tobacco dependence    PLAN:    • Clinical exam and radiographic imaging reviewed with patient today, with impression as per above  I have discussed with the patient the pathophysiology of this diagnosis and reviewed how the examination correlates with this diagnosis  • Right elbow starp prescribed and recommended use during the day/while wokring  • Reportedly starting formal PT from prior referral  • Treatment options were discussed and after discussing risk/benefits patient was agreeable to a right-sided lateral epicondyle cortisone injection as well as a right subacromial cortisone injection as per procedure note below     • Counseled patient that her symptoms seem to be consistent with lateral epicondylitis  • Work combinations were provided today as per communications  Return in about 4 weeks (around 1/24/2023)  Portions of the record may have been created with voice recognition software  Occasional wrong word or "sound a like" substitutions may have occurred due to the inherent limitations of voice recognition software  Read the chart carefully and recognize, using context, where substitutions have occurred  I have spent 30 minutes with Patient  today in which greater than 50% of this time was spent in counseling/coordination of care regarding Diagnostic results, Prognosis, Risks and benefits of tx options, Intructions for management, Patient and family education, Importance of tx compliance, Risk factor reductions and Impressions  CHIEF COMPLAINT:  Chief Complaint   Patient presents with   • Right Elbow - Follow-up         HPI:  Ching Willard is a RHD 42 y o  female  who presents for     Visit 12/27/2022: Follow-up right elbow pain/right shoulder pain:  Patient reports no improvement since last visit  Of note, she has undergone a Medrol Dosepak, NSAIDs, gabapentin all of which she reports no relief  She states she has difficulty obtaining the elbow strap and had to return incorrect braces that were sent by [facility]  She does not start formal physical therapy but states she has an upcoming appointment in the next few days  She continues to report severe intensity pain over the lateral aspect of her elbow which can radiate up to her anterior lateral aspect of her shoulder  She states it can sometimes radiate down her forearm and into the dorsal aspect of her hand  It is aggravated with lifting/pushing/pulling of only a few pounds  She also reports pain when reaching above shoulder height and reaching behind her back  Reports swelling over the lateral aspect of her elbow but denies discoloration    She states it can be accompanied by tingling sensations into her hand  Denies new injury since last visit  Denies crepitus of shoulder and elbow during range of motion movements  Medical, Surgical, Family, and Social History    Past Medical History:   Diagnosis Date   • Allergic    • Anxiety    • Bunion    • Depression    • Headache(784 0)    • Ingrown toenail    • Psychiatric disorder      Past Surgical History:   Procedure Laterality Date   • BUNIONECTOMY     • HYSTERECTOMY     • LAPAROSCOPIC TOTAL HYSTERECTOMY     • TONSILLECTOMY       Social History   Social History     Substance and Sexual Activity   Alcohol Use Not Currently     Social History     Substance and Sexual Activity   Drug Use Never     Social History     Tobacco Use   Smoking Status Every Day   • Packs/day: 0 50   • Years: 25 00   • Pack years: 12 50   • Types: Cigarettes   • Start date: 4/11/2002   Smokeless Tobacco Never     Family History   Problem Relation Age of Onset   • Mental illness Mother    • Depression Mother    • Diabetes Father    • Hypertension Father    • Diabetes Maternal Grandmother    • Arthritis Maternal Grandmother    • Heart disease Maternal Grandmother    • ADD / ADHD Brother    • Anxiety disorder Brother    • Anxiety disorder Sister    • Rashes / Skin problems Sister      Allergies   Allergen Reactions   • Bactrim [Sulfamethoxazole-Trimethoprim] Hives   • Latex Other (See Comments)     Latex allergy-rashes   • Levofloxacin Hives   • Quinolones Rash          Physical Exam  /84   Pulse 76   Ht 5' 4" (1 626 m)   Wt 52 2 kg (115 lb)   LMP 01/29/2020   BMI 19 74 kg/m²     Constitutional:  see vital signs  Gen: well-developed, normocephalic/atraumatic, well-groomed  Eyes: No inflammation or discharge of conjunctiva or lids; sclera clear   Pulmonary/Chest: Effort normal  No respiratory distress  Right Hand Exam     Tenderness   The patient is experiencing no tenderness       Range of Motion   Wrist   Extension: 40   Flexion: 60 Pronation: 90   Supination: 90     Muscle Strength   Right wrist normal muscle strength: Limited gripping and wrist extension due to aggravating pain over lateral elbow and forearm  Wrist extension: 4/5   Wrist flexion: 5/5   : 4/5     Other   Erythema: absent  Sensation: normal  Pulse: present    Comments:  Full digit MCP/PIP/DIP motion without malrotation or digital scissoring  Thumb MCP/DIP intact with opposition  No swelling, bruising, erythema  Sensation intact in radial/median/ulnar distribution        Right Elbow Exam     Tenderness   The patient is experiencing tenderness in the lateral epicondyle  Range of Motion   Extension: 0   Flexion: 100     Muscle Strength   Pronation:  4/5   Supination:  4/5     Tests   Varus: negative (Aggravates elbow pain laterally)  Valgus: negative (Aggravates elbow pain laterally)  Tinel's sign (cubital tunnel): negative    Other   Erythema: absent    Comments:  Cozen's test:  Positive    Pain is mainly localized over the lateral epicondyle but patient does note diffuse tenderness throughout her elbow as well as her lateral upper arm and along her dorsal forearm  Shoulder exam:       RIGHT    Inspection Erythema None     Swelling None     Increased Warmth None    Rotator cuff ER 5-/5     IR 5/5     Abduction 4/5    ROM      Abduction 170     ER0 60     IRb T7    TTP:  +lateral/posterior/dorsal aspects of shoulder    Special Tests: O'Briens  (FF 90, add 10, resist thumbs up-, resist thumbs down+) Negative slap    Cross body Adduction Negative     Speeds  Negative     Yergason's Negative     Drop arm Negative     Neer Positive     Donnelly Positive        UE NV Exam: +2 Radial pulses   Sensation intact to light touch C5-T1 bilaterally  Ok sign (median nerve): intact  Froment sign (ulnar nerve): intact  Thumb extension (radial nerve): 5/5 and intact    Cervical ROM is full without pain, numbness or tingling                  Large joint arthrocentesis: R subacromial bursa  Universal Protocol:  Consent: Verbal consent obtained  Risks and benefits: risks, benefits and alternatives were discussed  Consent given by: patient  Patient understanding: patient states understanding of the procedure being performed  Site marked: the operative site was marked  Radiology Images displayed and confirmed  If images not available, report reviewed: imaging studies available  Patient identity confirmed: verbally with patient    Supporting Documentation  Indications: pain   Procedure Details  Location: shoulder - R subacromial bursa  Preparation: Patient was prepped and draped in the usual sterile fashion  Needle size: 22 G  Ultrasound guidance: no  Approach: posterior  Medications administered: 40 mg triamcinolone acetonide 40 mg/mL; 2 mL bupivacaine 0 25 %    Patient tolerance: patient tolerated the procedure well with no immediate complications  Dressing:  Sterile dressing applied    Hand/upper extremity injection: R elbow  Universal Protocol:  Consent: Verbal consent obtained  Risks and benefits: risks, benefits and alternatives were discussed  Consent given by: patient  Patient understanding: patient states understanding of the procedure being performed  Site marked: the operative site was marked  Radiology Images displayed and confirmed   If images not available, report reviewed: imaging studies available  Required items: required blood products, implants, devices, and special equipment available  Patient identity confirmed: verbally with patient    Supporting Documentation  Indications: pain, diagnostic and therapeutic   Procedure Details  Condition:lateral epicondylitis Site: R elbow   Preparation: Patient was prepped and draped in the usual sterile fashion  Needle size: 25 G  Ultrasound guidance: no  Approach: dorsal  Medications administered: 20 mg triamcinolone acetonide 40 mg/mL; 0 5 mL bupivacaine 0 25 %

## 2022-12-27 NOTE — LETTER
December 27, 2022     Patient: Arthur Ortiz  YOB: 1985  Date of Visit: 12/27/2022      To Whom it May Concern:    Arthur Ortiz is under my professional care  Maribelsofie Eller was seen in my office on 12/27/2022  Please excuse her from work today  She can return on 12/28/2022 but is restricted from lifting/pushing/pulling no more than 1 pound with her right arm  Allow use of right elbow strap if needed  She will be starting formal physical therapy  She will follow up in about 3-4 weeks for re-evaluation  If you have any questions or concerns, please don't hesitate to call           Sincerely,          Tiffani Patel MD        CC: Arthur Ortiz

## 2022-12-28 RX ORDER — BUPIVACAINE HYDROCHLORIDE 2.5 MG/ML
2 INJECTION, SOLUTION INFILTRATION; PERINEURAL
Status: COMPLETED | OUTPATIENT
Start: 2022-12-28 | End: 2022-12-28

## 2022-12-28 RX ORDER — TRIAMCINOLONE ACETONIDE 40 MG/ML
20 INJECTION, SUSPENSION INTRA-ARTICULAR; INTRAMUSCULAR
Status: COMPLETED | OUTPATIENT
Start: 2022-12-28 | End: 2022-12-28

## 2022-12-28 RX ORDER — TRIAMCINOLONE ACETONIDE 40 MG/ML
40 INJECTION, SUSPENSION INTRA-ARTICULAR; INTRAMUSCULAR
Status: COMPLETED | OUTPATIENT
Start: 2022-12-28 | End: 2022-12-28

## 2022-12-28 RX ORDER — BUPIVACAINE HYDROCHLORIDE 2.5 MG/ML
0.5 INJECTION, SOLUTION INFILTRATION; PERINEURAL
Status: COMPLETED | OUTPATIENT
Start: 2022-12-28 | End: 2022-12-28

## 2022-12-28 RX ADMIN — TRIAMCINOLONE ACETONIDE 40 MG: 40 INJECTION, SUSPENSION INTRA-ARTICULAR; INTRAMUSCULAR at 08:43

## 2022-12-28 RX ADMIN — BUPIVACAINE HYDROCHLORIDE 2 ML: 2.5 INJECTION, SOLUTION INFILTRATION; PERINEURAL at 08:43

## 2022-12-28 RX ADMIN — BUPIVACAINE HYDROCHLORIDE 0.5 ML: 2.5 INJECTION, SOLUTION INFILTRATION; PERINEURAL at 08:43

## 2022-12-28 RX ADMIN — TRIAMCINOLONE ACETONIDE 20 MG: 40 INJECTION, SUSPENSION INTRA-ARTICULAR; INTRAMUSCULAR at 08:43

## 2022-12-29 ENCOUNTER — TELEPHONE (OUTPATIENT)
Dept: FAMILY MEDICINE CLINIC | Facility: CLINIC | Age: 37
End: 2022-12-29

## 2022-12-29 DIAGNOSIS — G47.00 INSOMNIA, UNSPECIFIED TYPE: ICD-10-CM

## 2022-12-29 RX ORDER — TRAZODONE HYDROCHLORIDE 50 MG/1
50 TABLET ORAL
Qty: 90 TABLET | Refills: 0 | Status: SHIPPED | OUTPATIENT
Start: 2022-12-29

## 2022-12-29 NOTE — TELEPHONE ENCOUNTER
Pt LVM stating that she's been having issues with getting her Trazodone 50mg at [facility] due to prior auth  She would like a call back because she does not have anymore left

## 2022-12-29 NOTE — TELEPHONE ENCOUNTER
Waiting for fax from [facility] to [facility]  [phone]

## 2022-12-30 ENCOUNTER — OFFICE VISIT (OUTPATIENT)
Age: 37
End: 2022-12-30

## 2022-12-30 VITALS
HEART RATE: 73 BPM | HEIGHT: 64 IN | RESPIRATION RATE: 16 BRPM | TEMPERATURE: 98.3 F | WEIGHT: 112 LBS | BODY MASS INDEX: 19.12 KG/M2 | OXYGEN SATURATION: 99 %

## 2022-12-30 DIAGNOSIS — R05.1 ACUTE COUGH: ICD-10-CM

## 2022-12-30 DIAGNOSIS — J06.9 VIRAL URI: Primary | ICD-10-CM

## 2022-12-30 LAB
SARS-COV-2 AG UPPER RESP QL IA: NEGATIVE
VALID CONTROL: NORMAL

## 2022-12-30 NOTE — PATIENT INSTRUCTIONS
Upper Respiratory Infection   WHAT YOU NEED TO KNOW:   An upper respiratory infection is also called a cold  It can affect your nose, throat, ears, and sinuses  Cold symptoms are usually worst for the first 3 to 5 days  Most people get better in 7 to 14 days  You may continue to cough for 2 to 3 weeks  Colds are caused by viruses and do not get better with antibiotics  DISCHARGE INSTRUCTIONS:   Call your local emergency number (911 in the [address] [place] [address],[address]) if:   You have chest pain or trouble breathing  Return to the emergency department if:   You have a fever over 102ºF (39ºC)  Call your doctor if:   You have a low fever  Your sore throat gets worse or you see white or yellow spots in your throat  Your symptoms get worse after 3 to 5 days or are not better in 14 days  You have a rash anywhere on your skin  You have large, tender lumps in your neck  You have thick, green, or yellow drainage from your nose  You cough up thick yellow, green, or bloody mucus  You have a bad earache  You have questions or concerns about your condition or care  Medicines: You may need any of the following:  Decongestants  help reduce nasal congestion and help you breathe more easily  If you take decongestant pills, they may make you feel restless or cause problems with your sleep  Do not use decongestant sprays for more than a few days  Cough suppressants  help reduce coughing  Ask your healthcare provider which type of cough medicine is best for you  NSAIDs , such as ibuprofen, help decrease swelling, pain, and fever  NSAIDs can cause stomach bleeding or kidney problems in certain people  If you take blood thinner medicine, always ask your healthcare provider if NSAIDs are safe for you  Always read the medicine label and follow directions  Acetaminophen  decreases pain and fever  It is available without a doctor's order  Ask how much to take and how often to take it  Follow directions   Read the labels of all other medicines you are using to see if they also contain acetaminophen, or ask your doctor or pharmacist  Acetaminophen can cause liver damage if not taken correctly  Do not use more than 4 grams (4,000 milligrams) total of acetaminophen in one day  Take your medicine as directed  Contact your healthcare provider if you think your medicine is not helping or if you have side effects  Tell him or her if you are allergic to any medicine  Keep a list of the medicines, vitamins, and herbs you take  Include the amounts, and when and why you take them  Bring the list or the pill bottles to follow-up visits  Carry your medicine list with you in case of an emergency  Self-care:   Rest as much as possible  Slowly start to do more each day  Drink more liquids as directed  Liquids will help thin and loosen mucus so you can cough it up  Liquids will also help prevent dehydration  Liquids that help prevent dehydration include water, fruit juice, and broth  Do not drink liquids that contain caffeine  Caffeine can increase your risk for dehydration  Ask your healthcare provider how much liquid to drink each day  Soothe a sore throat  Gargle with warm salt water  Make salt water by dissolving ¼ teaspoon salt in 1 cup warm water  You may also suck on hard candy or throat lozenges  You may use a sore throat spray  Use a humidifier or vaporizer  Use a cool mist humidifier or a vaporizer to increase air moisture in your home  This may make it easier for you to breathe and help decrease your cough  Use saline nasal drops as directed  These help relieve congestion  Apply petroleum-based jelly around the outside of your nostrils  This can decrease irritation from blowing your nose  Do not smoke  Nicotine and other chemicals in cigarettes and cigars can make your symptoms worse  They can also cause infections such as bronchitis or pneumonia   Ask your healthcare provider for information if you currently smoke and need help to quit  E-cigarettes or smokeless tobacco still contain nicotine  Talk to your healthcare provider before you use these products  Prevent a cold: Wash your hands often  Use soap and water every time you wash your hands  Rub your soapy hands together, lacing your fingers  Use the fingers of one hand to scrub under the nails of the other hand  Wash for at least 20 seconds  Rinse with warm, running water for several seconds  Then dry your hands  Use germ-killing gel if soap and water are not available  Do not touch your eyes or mouth without washing your hands first          Cover a sneeze or cough  Use a tissue that covers your mouth and nose  Put the used tissue in the trash right away  Use the bend of your arm if a tissue is not available  Wash your hands well with soap and water or use a hand   Do not stand close to anyone who is sneezing or coughing  Try to stay away from others while you are sick  This is especially important during the first 2 to 3 days when the virus is more easily spread  Wait until a fever, cough, or other symptoms are gone before you return to work or other regular activities  Do not share items while you are sick  This includes food, drinks, eating utensils, and dishes  Follow up with your doctor as directed:  Write down your questions so you remember to ask them during your visits  © Copyright [employer] 2022 Information is for End User's use only and may not be sold, redistributed or otherwise used for commercial purposes  All illustrations and images included in CareNotes® are the copyrighted property of A D A M , Inc  or [facility] Raffi Islas   The above information is an  only  It is not intended as medical advice for individual conditions or treatments  Talk to your doctor, nurse or pharmacist before following any medical regimen to see if it is safe and effective for you

## 2022-12-30 NOTE — LETTER
Claudette Langston [facility] Ange Lindsey  [facility] [address]  [address]  [address] [address] [address]  Dept: [phone]    December 30, 2022    Patient: Elkin Chirinos  YOB: 1985    Elkin Chirinos was seen and evaluated at our [facility]  Please note if Covid and Flu tests are negative, they may return to work when fever free for 24 hours without the use of a fever reducing agent  If Covid test is positive, they may return to work on 1/2/2023, as this is 5 days from the onset of symptoms  Upon return, they must then adhere to strict masking for an additional 5 days  If Flu test is positive, then she must be fever-free for at least 24 hours off of fever-reducing medication with symptoms improved before returning to work       Sincerely,    Rex Najjar, PA-C

## 2022-12-31 LAB
FLUAV RNA RESP QL NAA+PROBE: NEGATIVE
FLUBV RNA RESP QL NAA+PROBE: NEGATIVE
SARS-COV-2 RNA RESP QL NAA+PROBE: NEGATIVE

## 2023-01-02 NOTE — PROGRESS NOTES
[address] [employer] [facility]        NAME: Marylou Jimenez is a 42 y o  female  : 1985    MRN: [id number]  DATE: 2022  TIME: 10:52 AM    Assessment and Plan   Viral URI [J06 9]  1  Viral URI  Poct Covid 19 Rapid Antigen Test    Cov/Flu-Collected at [facility]      2  Acute cough  Poct Covid 19 Rapid Antigen Test    Cov/Flu-Collected at [facility]            Patient Instructions     Discussed condition with pt  Rapid COVID test is negative  PCR was sent out for COVID and flu  I suspect viral URI for which I rec hydration, rest, discussed OTC cough/cold meds, and observation  Follow up with PCP in 3-5 days  Proceed to  ER if symptoms worsen  Chief Complaint     Chief Complaint   Patient presents with   • Nasal Congestion     Patient states this is day 3 of symptom, fever, nasal congestion, headaches, sore throat, taking OTC to help feeling worst day,had fever yesterday          History of Present Illness       Patient presents with 3-day history of fever, headache, congestion, sore throat  Denies significant cough, and/V/D, recent COVID exposure but has not performed any home testing  Has been taking over-the-counter meds to help her symptoms  Review of Systems   Review of Systems   Constitutional: Positive for fever  HENT: Positive for congestion and sore throat  Respiratory: Negative  Cardiovascular: Negative  Gastrointestinal: Negative  Genitourinary: Negative  Neurological: Positive for headaches           Current Medications       Current Outpatient Medications:   •  acetaminophen (TYLENOL) 500 mg tablet, Take 500 mg by mouth as needed for mild pain, Disp: , Rfl:   •  acyclovir (ZOVIRAX) 5 % ointment, APPLY TOPICALLY EVERY 4 (FOUR) HOURS, Disp: 15 g, Rfl: 0  •  clonazePAM (KlonoPIN) 1 mg tablet, TAKE 1 TABLET BY MOUTH TWICE A DAY, Disp: 60 tablet, Rfl: 0  •  cyclobenzaprine (FLEXERIL) 5 mg tablet, TAKE 1 TABLET BY MOUTH TWICE A DAY, Disp: 60 tablet, Rfl: 0  •  Diclofenac Sodium (VOLTAREN) 1 %, Apply 2 g topically 4 (four) times a day, Disp: 100 g, Rfl: 3  •  dicyclomine (BENTYL) 20 mg tablet, Take 1 tablet (20 mg total) by mouth 3 (three) times a day as needed (for abdominal cramping), Disp: 30 tablet, Rfl: 1  •  DULoxetine (CYMBALTA) 30 mg delayed release capsule, TAKE 1 CAPSULE (30 MG TOTAL) BY MOUTH DAILY ALONG WITH 60 MG CAPSULE, Disp: 90 capsule, Rfl: 0  •  DULoxetine (CYMBALTA) 60 mg delayed release capsule, TAKE 1 CAPSULE BY MOUTH EVERY DAY, Disp: 90 capsule, Rfl: 0  •  gabapentin (NEURONTIN) 100 mg capsule, Take 1 capsule (100 mg total) by mouth 3 (three) times a day, Disp: 90 capsule, Rfl: 0  •  ibuprofen (MOTRIN) 600 mg tablet, TAKE 1 TABLET (600 MG TOTAL) BY MOUTH EVERY 6 (SIX) HOURS AS NEEDED FOR MILD PAIN, Disp: 120 tablet, Rfl: 0  •  loratadine (CLARITIN) 10 mg tablet, Take 1 tablet (10 mg total) by mouth daily, Disp: 30 tablet, Rfl: 2  •  meloxicam (Mobic) 15 mg tablet, Take 1 tablet (15 mg total) by mouth daily, Disp: 30 tablet, Rfl: 1  •  methylPREDNISolone 4 MG tablet therapy pack, Use as directed on package, Disp: 1 each, Rfl: 0  •  Multiple Vitamins-Minerals (MULTIVITAMIN ADULT PO), Take 1 tablet by mouth daily, Disp: , Rfl:   •  predniSONE 10 mg tablet, Take 6 tablets By mouth  In the morning Qd  Decrease by  By 1 tablet daily until completed  , Disp: 21 tablet, Rfl: 0  •  predniSONE 10 mg tablet, Take 6 tablets By mouth  In the morning Qd  Decrease by  By 1 tablet daily until completed  , Disp: 21 tablet, Rfl: 0  •  tobramycin (TOBREX) 0 3 % SOLN, Administer 1 drop to both eyes every 4 (four) hours while awake, Disp: 5 mL, Rfl: 0  •  traZODone (DESYREL) 50 mg tablet, TAKE 1 TABLET (50 MG TOTAL) BY MOUTH DAILY AT BEDTIME AS NEEDED FOR SLEEP, Disp: 90 tablet, Rfl: 0    Current Allergies     Allergies as of 12/30/2022 - Reviewed 12/30/2022   Allergen Reaction Noted   • Bactrim [sulfamethoxazole-trimethoprim] Hives 10/06/2020   • Latex Other (See Comments) 05/30/2016   • Levofloxacin Hives 11/17/2016   • Quinolones Rash 10/25/2016            The following portions of the patient's history were reviewed and updated as appropriate: allergies, current medications, past family history, past medical history, past social history, past surgical history and problem list      Past Medical History:   Diagnosis Date   • Allergic    • Anxiety    • Bunion    • Depression    • Headache(784 0)    • Ingrown toenail    • Psychiatric disorder        Past Surgical History:   Procedure Laterality Date   • BUNIONECTOMY     • HYSTERECTOMY     • LAPAROSCOPIC TOTAL HYSTERECTOMY     • TONSILLECTOMY         Family History   Problem Relation Age of Onset   • Mental illness Mother    • Depression Mother    • Diabetes Father    • Hypertension Father    • Diabetes Maternal Grandmother    • Arthritis Maternal Grandmother    • Heart disease Maternal Grandmother    • ADD / ADHD Brother    • Anxiety disorder Brother    • Anxiety disorder Sister    • Rashes / Skin problems Sister          Medications have been verified  Objective   Pulse 73   Temp 98 3 °F (36 8 °C)   Resp 16   Ht 5' 4" (1 626 m)   Wt 50 8 kg (112 lb)   LMP 01/29/2020   SpO2 99%   BMI 19 22 kg/m²   Patient's last menstrual period was 01/29/2020  Physical Exam     Physical Exam  Vitals reviewed  Constitutional:       General: She is not in acute distress  Appearance: She is well-developed  HENT:      Right Ear: Hearing, tympanic membrane, ear canal and external ear normal       Left Ear: Hearing, tympanic membrane, ear canal and external ear normal       Nose: Mucosal edema (B/L boggy turbinates) and congestion present  Mouth/Throat:      Mouth: Mucous membranes are moist       Pharynx: Posterior oropharyngeal erythema (PND) present  No oropharyngeal exudate  Tonsils: No tonsillar exudate  Cardiovascular:      Rate and Rhythm: Normal rate and regular rhythm        Pulses: Normal pulses  Heart sounds: Normal heart sounds  No murmur heard  Pulmonary:      Effort: Pulmonary effort is normal  No respiratory distress  Breath sounds: Normal breath sounds  Musculoskeletal:      Cervical back: Neck supple  Lymphadenopathy:      Cervical: No cervical adenopathy  Neurological:      Mental Status: She is alert and oriented to person, place, and time

## 2023-01-04 NOTE — TELEPHONE ENCOUNTER
Faxed paperwork w/ofc notes back to [facility] for prior auth waiting for determination

## 2023-01-11 ENCOUNTER — OFFICE VISIT (OUTPATIENT)
Dept: PODIATRY | Facility: CLINIC | Age: 38
End: 2023-01-11

## 2023-01-11 VITALS
WEIGHT: 112 LBS | SYSTOLIC BLOOD PRESSURE: 120 MMHG | HEIGHT: 64 IN | DIASTOLIC BLOOD PRESSURE: 68 MMHG | BODY MASS INDEX: 19.12 KG/M2

## 2023-01-11 DIAGNOSIS — M20.32 HALLUX VARUS (ACQUIRED), LEFT FOOT: Primary | ICD-10-CM

## 2023-01-11 NOTE — PROGRESS NOTES
Patient presents for consent signing  Patient has hallux varus and limitation in motion left great toe joint  She is scheduled for an implant arthroplasty on April 14, 2023 at [facility]  The procedure was explained including pre and postoperative course, risk and complications  The consent form was signed

## 2023-01-20 DIAGNOSIS — M77.11 LATERAL EPICONDYLITIS OF RIGHT ELBOW: ICD-10-CM

## 2023-01-20 DIAGNOSIS — F41.1 GENERALIZED ANXIETY DISORDER: ICD-10-CM

## 2023-01-20 DIAGNOSIS — M79.2 RADICULAR PAIN IN RIGHT ARM: ICD-10-CM

## 2023-01-20 DIAGNOSIS — M25.511 ACUTE PAIN OF RIGHT SHOULDER: ICD-10-CM

## 2023-01-21 RX ORDER — CLONAZEPAM 1 MG/1
1 TABLET ORAL 2 TIMES DAILY
Qty: 60 TABLET | Refills: 0 | Status: SHIPPED | OUTPATIENT
Start: 2023-01-21

## 2023-01-23 RX ORDER — GABAPENTIN 100 MG/1
100 CAPSULE ORAL 3 TIMES DAILY
Qty: 90 CAPSULE | Refills: 0 | Status: SHIPPED | OUTPATIENT
Start: 2023-01-23

## 2023-01-23 RX ORDER — MELOXICAM 15 MG/1
15 TABLET ORAL DAILY
Qty: 30 TABLET | Refills: 0 | Status: SHIPPED | OUTPATIENT
Start: 2023-01-23

## 2023-01-25 DIAGNOSIS — M25.511 ACUTE PAIN OF RIGHT SHOULDER: Primary | ICD-10-CM

## 2023-01-25 DIAGNOSIS — M25.521 PAIN IN RIGHT ELBOW: ICD-10-CM

## 2023-01-25 DIAGNOSIS — M77.11 LATERAL EPICONDYLITIS OF RIGHT ELBOW: ICD-10-CM

## 2023-01-25 NOTE — PROGRESS NOTES
Patient messaging me on patient portal noting that she is having progressively worsening pain of her right elbow and shoulder despite several treatment modalities including bracing, work accommodations, PT, cortisone injections of the right lateral epicondyle as well as the right subacromial   Reports pain is worse along her right elbow and right shoulder  Given she has had unremarkable radiographic imaging previously, I have ordered an MRI of her right elbow and shoulder today  Based on the findings of these imagings, I may need to also further obtain imaging of the cervical spine and refer her to pain management for potential cervical injections as her pain may be from a radicular source as well

## 2023-02-09 ENCOUNTER — HOSPITAL ENCOUNTER (OUTPATIENT)
Dept: MRI IMAGING | Facility: HOSPITAL | Age: 38
Discharge: HOME | End: 2023-02-09

## 2023-02-09 DIAGNOSIS — M25.521 PAIN IN RIGHT ELBOW: ICD-10-CM

## 2023-02-09 DIAGNOSIS — M77.11 LATERAL EPICONDYLITIS OF RIGHT ELBOW: ICD-10-CM

## 2023-02-09 DIAGNOSIS — M25.511 ACUTE PAIN OF RIGHT SHOULDER: ICD-10-CM

## 2023-02-15 DIAGNOSIS — H92.09 EARACHE: ICD-10-CM

## 2023-02-15 RX ORDER — IBUPROFEN 600 MG/1
600 TABLET ORAL EVERY 6 HOURS PRN
Qty: 120 TABLET | Refills: 0 | Status: SHIPPED | OUTPATIENT
Start: 2023-02-15 | End: 2023-03-17

## 2023-02-17 DIAGNOSIS — F41.1 GENERALIZED ANXIETY DISORDER: ICD-10-CM

## 2023-02-17 RX ORDER — CLONAZEPAM 1 MG/1
1 TABLET ORAL 2 TIMES DAILY
Qty: 60 TABLET | Refills: 0 | Status: SHIPPED | OUTPATIENT
Start: 2023-02-17

## 2023-02-20 ENCOUNTER — PATIENT MESSAGE (OUTPATIENT)
Dept: FAMILY MEDICINE CLINIC | Facility: CLINIC | Age: 38
End: 2023-02-20

## 2023-02-21 ENCOUNTER — OFFICE VISIT (OUTPATIENT)
Dept: FAMILY MEDICINE CLINIC | Facility: CLINIC | Age: 38
End: 2023-02-21

## 2023-02-21 VITALS
RESPIRATION RATE: 16 BRPM | WEIGHT: 107.4 LBS | HEART RATE: 68 BPM | TEMPERATURE: 97.3 F | DIASTOLIC BLOOD PRESSURE: 70 MMHG | OXYGEN SATURATION: 100 % | SYSTOLIC BLOOD PRESSURE: 102 MMHG | BODY MASS INDEX: 18.34 KG/M2 | HEIGHT: 64 IN

## 2023-02-21 DIAGNOSIS — K52.9 GASTROENTERITIS: ICD-10-CM

## 2023-02-21 DIAGNOSIS — B34.9 VIRAL INFECTION, UNSPECIFIED: Primary | ICD-10-CM

## 2023-02-21 RX ORDER — ONDANSETRON 4 MG/1
4 TABLET, FILM COATED ORAL EVERY 8 HOURS PRN
Qty: 20 TABLET | Refills: 0 | Status: SHIPPED | OUTPATIENT
Start: 2023-02-21

## 2023-02-21 NOTE — PROGRESS NOTES
Assessment/Plan:   1  Gastroenteritis/Viral infection, unspecified  Patient symptoms today  At some, it is unclear as to exact cause of her gastroenteritis and upper respiratory symptoms  We will check COVID test   At this time, maintain proper hydration  Start a bland diet slowly advancing as tolerated  She may take dicyclomine for symptom relief  Rx for Zofran was also sent to her pharmacy  Follow-up if any symptoms are worsening   - Covid/Flu- Office Collect  - ondansetron (ZOFRAN) 4 mg tablet; Take 1 tablet (4 mg total) by mouth every 8 (eight) hours as needed for nausea or vomiting  Dispense: 20 tablet; Refill: 0             There are no diagnoses linked to this encounter  Subjective:       Chief Complaint   Patient presents with   • Cold Like Symptoms   • Diarrhea   • Vomiting     Started 2/18      Patient ID: Ragini Robert is a 34 y o  female  Diarrhea   This is a new problem  Episode onset: Started saturday  The problem occurs 2 to 4 times per day  The problem has been unchanged  Associated symptoms include abdominal pain, bloating, myalgias and vomiting  Pertinent negatives include no arthralgias, chills, coughing, fever or headaches  She has tried bismuth subsalicylate for the symptoms  The treatment provided no relief  Vomiting   Associated symptoms include abdominal pain, diarrhea and myalgias  Pertinent negatives include no arthralgias, chest pain, chills, coughing, dizziness, fever or headaches  Review of Systems   Constitutional: Negative for activity change, chills, fatigue and fever  HENT: Negative for congestion, ear pain, sinus pressure and sore throat  Eyes: Negative for redness, itching and visual disturbance  Respiratory: Negative for cough and shortness of breath  Cardiovascular: Negative for chest pain and palpitations  Gastrointestinal: Positive for abdominal pain, bloating, diarrhea and vomiting  Negative for nausea     Endocrine: Negative for cold intolerance and heat intolerance  Genitourinary: Negative for dysuria, flank pain and frequency  Musculoskeletal: Positive for myalgias  Negative for arthralgias, back pain and gait problem  Skin: Negative for color change  Allergic/Immunologic: Negative for environmental allergies  Neurological: Negative for dizziness, numbness and headaches  Psychiatric/Behavioral: Negative for behavioral problems and sleep disturbance  The following portions of the patient's history were reviewed and updated as appropriate : past family history, past medical history, past social history and past surgical history      Current Outpatient Medications:   •  acetaminophen (TYLENOL) 500 mg tablet, Take 500 mg by mouth as needed for mild pain, Disp: , Rfl:   •  acyclovir (ZOVIRAX) 5 % ointment, APPLY TOPICALLY EVERY 4 (FOUR) HOURS, Disp: 15 g, Rfl: 0  •  clonazePAM (KlonoPIN) 1 mg tablet, Take 1 tablet (1 mg total) by mouth 2 (two) times a day, Disp: 60 tablet, Rfl: 0  •  cyclobenzaprine (FLEXERIL) 5 mg tablet, TAKE 1 TABLET BY MOUTH TWICE A DAY, Disp: 60 tablet, Rfl: 0  •  Diclofenac Sodium (VOLTAREN) 1 %, Apply 2 g topically 4 (four) times a day, Disp: 100 g, Rfl: 3  •  dicyclomine (BENTYL) 20 mg tablet, Take 1 tablet (20 mg total) by mouth 3 (three) times a day as needed (for abdominal cramping), Disp: 30 tablet, Rfl: 1  •  DULoxetine (CYMBALTA) 30 mg delayed release capsule, TAKE 1 CAPSULE (30 MG TOTAL) BY MOUTH DAILY ALONG WITH 60 MG CAPSULE, Disp: 90 capsule, Rfl: 0  •  DULoxetine (CYMBALTA) 60 mg delayed release capsule, TAKE 1 CAPSULE BY MOUTH EVERY DAY, Disp: 90 capsule, Rfl: 0  •  gabapentin (NEURONTIN) 100 mg capsule, Take 1 capsule (100 mg total) by mouth 3 (three) times a day, Disp: 90 capsule, Rfl: 0  •  ibuprofen (MOTRIN) 600 mg tablet, TAKE 1 TABLET (600 MG TOTAL) BY MOUTH EVERY 6 (SIX) HOURS AS NEEDED FOR MILD PAIN, Disp: 120 tablet, Rfl: 0  •  loratadine (CLARITIN) 10 mg tablet, Take 1 tablet (10 mg total) by mouth daily, Disp: 30 tablet, Rfl: 2  •  meloxicam (Mobic) 15 mg tablet, Take 1 tablet (15 mg total) by mouth daily, Disp: 30 tablet, Rfl: 0  •  Multiple Vitamins-Minerals (MULTIVITAMIN ADULT PO), Take 1 tablet by mouth daily, Disp: , Rfl:   •  tobramycin (TOBREX) 0 3 % SOLN, Administer 1 drop to both eyes every 4 (four) hours while awake, Disp: 5 mL, Rfl: 0  •  traZODone (DESYREL) 50 mg tablet, TAKE 1 TABLET (50 MG TOTAL) BY MOUTH DAILY AT BEDTIME AS NEEDED FOR SLEEP, Disp: 90 tablet, Rfl: 0  •  methylPREDNISolone 4 MG tablet therapy pack, Use as directed on package (Patient not taking: Reported on 2/21/2023), Disp: 1 each, Rfl: 0  •  predniSONE 10 mg tablet, Take 6 tablets By mouth  In the morning Qd  Decrease by  By 1 tablet daily until completed  (Patient not taking: Reported on 2/21/2023), Disp: 21 tablet, Rfl: 0  •  predniSONE 10 mg tablet, Take 6 tablets By mouth  In the morning Qd  Decrease by  By 1 tablet daily until completed  (Patient not taking: Reported on 2/21/2023), Disp: 21 tablet, Rfl: 0         Objective:         Vitals:    02/21/23 1726   BP: 102/70   Pulse: 68   Resp: 16   Temp: (!) 97 3 °F (36 3 °C)   TempSrc: Tympanic   SpO2: 100%   Weight: 48 7 kg (107 lb 6 4 oz)   Height: 5' 4" (1 626 m)     Physical Exam  Vitals reviewed  Constitutional:       Appearance: She is well-developed  HENT:      Head: Normocephalic and atraumatic  Nose: Nose normal       Mouth/Throat:      Pharynx: No oropharyngeal exudate  Eyes:      General: No scleral icterus  Right eye: No discharge  Left eye: No discharge  Pupils: Pupils are equal, round, and reactive to light  Neck:      Trachea: No tracheal deviation  Cardiovascular:      Rate and Rhythm: Normal rate and regular rhythm  Pulses:           Dorsalis pedis pulses are 2+ on the right side and 2+ on the left side  Posterior tibial pulses are 2+ on the right side and 2+ on the left side        Heart sounds: Normal heart sounds  No murmur heard  No friction rub  No gallop  Pulmonary:      Effort: Pulmonary effort is normal  No respiratory distress  Breath sounds: Normal breath sounds  No wheezing or rales  Abdominal:      General: Bowel sounds are normal  There is no distension  Palpations: Abdomen is soft  Tenderness: There is no abdominal tenderness  There is no guarding or rebound  Musculoskeletal:         General: Normal range of motion  Cervical back: Normal range of motion and neck supple  Lymphadenopathy:      Head:      Right side of head: No submental or submandibular adenopathy  Left side of head: No submental or submandibular adenopathy  Cervical: No cervical adenopathy  Right cervical: No superficial, deep or posterior cervical adenopathy  Left cervical: No superficial, deep or posterior cervical adenopathy  Skin:     General: Skin is warm and dry  Findings: No erythema  Neurological:      Mental Status: She is alert and oriented to person, place, and time  Cranial Nerves: No cranial nerve deficit  Sensory: No sensory deficit  Psychiatric:         Mood and Affect: Mood is not anxious or depressed  Speech: Speech normal          Behavior: Behavior normal          Thought Content:  Thought content normal          Judgment: Judgment normal

## 2023-03-01 ENCOUNTER — PATIENT MESSAGE (OUTPATIENT)
Dept: FAMILY MEDICINE CLINIC | Facility: CLINIC | Age: 38
End: 2023-03-01

## 2023-03-04 DIAGNOSIS — M77.11 LATERAL EPICONDYLITIS OF RIGHT ELBOW: ICD-10-CM

## 2023-03-04 DIAGNOSIS — M79.2 RADICULAR PAIN IN RIGHT ARM: ICD-10-CM

## 2023-03-04 DIAGNOSIS — M25.511 ACUTE PAIN OF RIGHT SHOULDER: ICD-10-CM

## 2023-03-08 RX ORDER — MELOXICAM 15 MG/1
15 TABLET ORAL DAILY
Qty: 30 TABLET | Refills: 0 | Status: SHIPPED | OUTPATIENT
Start: 2023-03-08

## 2023-03-12 DIAGNOSIS — K52.9 GASTROENTERITIS: ICD-10-CM

## 2023-03-13 RX ORDER — DICYCLOMINE HCL 20 MG
20 TABLET ORAL 3 TIMES DAILY PRN
Qty: 30 TABLET | Refills: 1 | Status: SHIPPED | OUTPATIENT
Start: 2023-03-13

## 2023-03-19 DIAGNOSIS — G47.00 INSOMNIA, UNSPECIFIED TYPE: ICD-10-CM

## 2023-03-20 ENCOUNTER — OFFICE VISIT (OUTPATIENT)
Dept: OBGYN CLINIC | Facility: MEDICAL CENTER | Age: 38
End: 2023-03-20

## 2023-03-20 VITALS
HEIGHT: 64 IN | BODY MASS INDEX: 19.12 KG/M2 | HEART RATE: 73 BPM | DIASTOLIC BLOOD PRESSURE: 70 MMHG | WEIGHT: 112 LBS | SYSTOLIC BLOOD PRESSURE: 111 MMHG

## 2023-03-20 DIAGNOSIS — M77.11 LATERAL EPICONDYLITIS OF RIGHT ELBOW: ICD-10-CM

## 2023-03-20 DIAGNOSIS — F41.1 GENERALIZED ANXIETY DISORDER: ICD-10-CM

## 2023-03-20 DIAGNOSIS — M54.12 CERVICAL RADICULOPATHY: Primary | ICD-10-CM

## 2023-03-20 DIAGNOSIS — M54.12 RADICULOPATHY, CERVICAL REGION: ICD-10-CM

## 2023-03-20 RX ORDER — METHYLPREDNISOLONE 4 MG/1
TABLET ORAL
Qty: 1 EACH | Refills: 0 | Status: SHIPPED | OUTPATIENT
Start: 2023-03-20

## 2023-03-20 RX ORDER — CLONAZEPAM 1 MG/1
TABLET ORAL
Qty: 60 TABLET | Refills: 0 | Status: SHIPPED | OUTPATIENT
Start: 2023-03-20

## 2023-03-20 RX ORDER — TRAZODONE HYDROCHLORIDE 50 MG/1
50 TABLET ORAL
Qty: 90 TABLET | Refills: 0 | Status: SHIPPED | OUTPATIENT
Start: 2023-03-20

## 2023-03-20 NOTE — PROGRESS NOTES
Orthopaedic Surgery - Office Note  Martin Escamilla (34 y o  female)   : 1985   MRN: [id number]  Encounter Date: 3/20/2023    Chief Complaint   Patient presents with   • Right Shoulder - Pain       Assessment / Plan  C5-6 DDD with right C5 radiculopathy    · XR's and MRI's were reviewed with the patient  · Her symptoms and imaging are most consistent with cervical radiculopathy  · Medrol dosepack was prescribed  · Continue gabapentin and meloxicam  · Heat/massage  · Referral to PT for cervical program  · Referral to spine and pain for further evaluation and management  Return if symptoms worsen or fail to improve  History of Present Illness  Martin Escamilla is a 34 y o  female who presents for evaluation of right shoulder, neck, and arm pain, present since 2022  She denies injury  She has been treated for lateral epicondylitis and rotator cuff tendinitis without relief  She attempted shoulder and elbow PT, is using a tennis elbow strap, and has taken meloxicam, gabapentin, and had a medrol taper  None of these interventions has helped her pain  Pain is constant and radiates from the right shoulder and neck to the periscapular region and down to the hand  Pain does not correlate with use of the arm  She also has some numbness in the RUE  Review of Systems  Pertinent items are noted in HPI  All other systems were reviewed and are negative  Physical Exam  /70   Pulse 73   Ht 5' 4" (1 626 m)   Wt 50 8 kg (112 lb)   LMP 2020   BMI 19 22 kg/m²   Cons: Appears well  No apparent distress  Psych: Alert  Oriented x3  Mood and affect normal   Eyes: PERRLA, EOMI  Resp: Normal effort  No audible wheezing or stridor  CV: Palpable pulse  No discernable arrhythmia  No LE edema  Lymph:  No palpable cervical, axillary, or inguinal lymphadenopathy  Skin: Warm  No palpable masses  No visible lesions  Neuro: Normal muscle tone  Normal and symmetric DTR's       Right Shoulder Exam  Alignment / Posture:  moderate scapular protraction  Inspection:  No swelling  No muscle atrophy  Palpation:  no focal shoulder tenderness  ROM:  Limited neck rotation to the right and left  Limited neck lateral bending to the right and left  Strength:  5/5 supraspinatus, infraspinatus, and subscapularis  Stability:  No objective shoulder instability  Tests: (-) Quinn Loosen  (-) Neer  (-) Painful arc  (-) Belly press  (-) Speed  (-) [place]  Neurovascular:  Sensation intact in Ax/R/M/U nerve distributions  2+ radial pulse  Studies Reviewed  I have personally reviewed pertinent films in PACS  XR of right shoulder - no fractures or degenerative changes  XR of   cervical spine - disc space narrowing and osteophyte at C5-6  MRI of right shoulder - no rotator cuff, labral, or chondral injuries    Procedures  No procedures today  Medical, Surgical, Family, and Social History  The patient's medical history, family history, and social history, were reviewed and updated as appropriate      Past Medical History:   Diagnosis Date   • Allergic    • Anxiety    • Bunion    • Depression    • Headache(784 0)    • Ingrown toenail    • Psychiatric disorder        Past Surgical History:   Procedure Laterality Date   • BUNIONECTOMY     • HYSTERECTOMY     • LAPAROSCOPIC TOTAL HYSTERECTOMY     • TONSILLECTOMY         Family History   Problem Relation Age of Onset   • Mental illness Mother    • Depression Mother    • Diabetes Father    • Hypertension Father    • Diabetes Maternal Grandmother    • Arthritis Maternal Grandmother    • Heart disease Maternal Grandmother    • ADD / ADHD Brother    • Anxiety disorder Brother    • Anxiety disorder Sister    • Rashes / Skin problems Sister        Social History     Occupational History   • Not on file   Tobacco Use   • Smoking status: Every Day     Packs/day: 0 50     Years: 25 00     Pack years: 12 50     Types: Cigarettes     Start date: 4/11/2002   • Smokeless tobacco: Never Substance and Sexual Activity   • Alcohol use: Not Currently   • Drug use: Never   • Sexual activity: Yes     Partners: Male     Birth control/protection: None     Comment: Hysterectomy on July 1st 2020       Allergies   Allergen Reactions   • Bactrim [Sulfamethoxazole-Trimethoprim] Hives   • Latex Other (See Comments)     Latex allergy-rashes   • Levofloxacin Hives   • Quinolones Rash         Current Outpatient Medications:   •  acetaminophen (TYLENOL) 500 mg tablet, Take 500 mg by mouth as needed for mild pain, Disp: , Rfl:   •  acyclovir (ZOVIRAX) 5 % ointment, APPLY TOPICALLY EVERY 4 (FOUR) HOURS, Disp: 15 g, Rfl: 0  •  clonazePAM (KlonoPIN) 1 mg tablet, Take 1 tablet (1 mg total) by mouth 2 (two) times a day, Disp: 60 tablet, Rfl: 0  •  cyclobenzaprine (FLEXERIL) 5 mg tablet, TAKE 1 TABLET BY MOUTH TWICE A DAY, Disp: 60 tablet, Rfl: 0  •  Diclofenac Sodium (VOLTAREN) 1 %, Apply 2 g topically 4 (four) times a day, Disp: 100 g, Rfl: 3  •  dicyclomine (BENTYL) 20 mg tablet, TAKE 1 TABLET (20 MG TOTAL) BY MOUTH 3 (THREE) TIMES A DAY AS NEEDED (FOR ABDOMINAL CRAMPING), Disp: 30 tablet, Rfl: 1  •  DULoxetine (CYMBALTA) 30 mg delayed release capsule, TAKE 1 CAPSULE (30 MG TOTAL) BY MOUTH DAILY ALONG WITH 60 MG CAPSULE, Disp: 90 capsule, Rfl: 0  •  DULoxetine (CYMBALTA) 60 mg delayed release capsule, TAKE 1 CAPSULE BY MOUTH EVERY DAY, Disp: 90 capsule, Rfl: 0  •  gabapentin (NEURONTIN) 100 mg capsule, Take 1 capsule (100 mg total) by mouth 3 (three) times a day, Disp: 90 capsule, Rfl: 0  •  loratadine (CLARITIN) 10 mg tablet, Take 1 tablet (10 mg total) by mouth daily, Disp: 30 tablet, Rfl: 2  •  meloxicam (MOBIC) 15 mg tablet, TAKE 1 TABLET (15 MG TOTAL) BY MOUTH DAILY  , Disp: 30 tablet, Rfl: 0  •  methylPREDNISolone 4 MG tablet therapy pack, Use as directed on package, Disp: 1 each, Rfl: 0  •  Multiple Vitamins-Minerals (MULTIVITAMIN ADULT PO), Take 1 tablet by mouth daily, Disp: , Rfl:   •  ondansetron (ZOFRAN) 4 mg tablet, Take 1 tablet (4 mg total) by mouth every 8 (eight) hours as needed for nausea or vomiting, Disp: 20 tablet, Rfl: 0  •  tobramycin (TOBREX) 0 3 % SOLN, Administer 1 drop to both eyes every 4 (four) hours while awake, Disp: 5 mL, Rfl: 0  •  traZODone (DESYREL) 50 mg tablet, TAKE 1 TABLET (50 MG TOTAL) BY MOUTH DAILY AT BEDTIME AS NEEDED FOR SLEEP, Disp: 90 tablet, Rfl: 0  •  ibuprofen (MOTRIN) 600 mg tablet, TAKE 1 TABLET (600 MG TOTAL) BY MOUTH EVERY 6 (SIX) HOURS AS NEEDED FOR MILD PAIN, Disp: 120 tablet, Rfl: 0  •  predniSONE 10 mg tablet, Take 6 tablets By mouth  In the morning Qd  Decrease by  By 1 tablet daily until completed  (Patient not taking: Reported on 2/21/2023), Disp: 21 tablet, Rfl: 0  •  predniSONE 10 mg tablet, Take 6 tablets By mouth  In the morning Qd  Decrease by  By 1 tablet daily until completed   (Patient not taking: Reported on 2/21/2023), Disp: 21 tablet, Rfl: 0      Marcelo Bishop MD    Scribe Attestation    I,:   am acting as a scribe while in the presence of the attending physician :       I,:   personally performed the services described in this documentation    as scribed in my presence :

## 2023-03-20 NOTE — LETTER
3/20/2023    Patient: Pita Fabian  YOB: 1985  Date of visit: 3/20/2023    To whom it may concern:    Pita Fabian is under my professional care and was seen in the office on 3/20/2023  Work restrictions: No lifting/pushing/pulling greater than 1 lb  Gerhardt Sep Until evaluation by pain management  Please contact us if you have any questions        Sincerely,      Jorge Bruno MD

## 2023-03-27 ENCOUNTER — TELEPHONE (OUTPATIENT)
Dept: OBGYN CLINIC | Facility: HOSPITAL | Age: 38
End: 2023-03-27

## 2023-03-27 NOTE — TELEPHONE ENCOUNTER
Caller: patient  Doctor: Florecita Theodore    Reason for call: patient will be dropping off disability forms to the office today    Call back#:  [phone]

## 2023-04-14 PROBLEM — J06.9 UPPER RESPIRATORY TRACT INFECTION: Status: RESOLVED | Noted: 2022-01-25 | Resolved: 2023-04-14

## 2023-04-24 DIAGNOSIS — K52.9 GASTROENTERITIS: ICD-10-CM

## 2023-04-24 DIAGNOSIS — B34.9 VIRAL INFECTION, UNSPECIFIED: ICD-10-CM

## 2023-04-24 RX ORDER — ONDANSETRON 4 MG/1
TABLET, FILM COATED ORAL
Qty: 20 TABLET | Refills: 0 | Status: SHIPPED | OUTPATIENT
Start: 2023-04-24

## 2023-04-26 ENCOUNTER — OFFICE VISIT (OUTPATIENT)
Dept: PODIATRY | Facility: CLINIC | Age: 38
End: 2023-04-26

## 2023-04-26 VITALS — SYSTOLIC BLOOD PRESSURE: 114 MMHG | HEIGHT: 64 IN | BODY MASS INDEX: 19.34 KG/M2 | DIASTOLIC BLOOD PRESSURE: 66 MMHG

## 2023-04-26 DIAGNOSIS — M20.32 HALLUX VARUS (ACQUIRED), LEFT FOOT: Primary | ICD-10-CM

## 2023-04-26 NOTE — PROGRESS NOTES
Patient presents 12 days post implant arthroplasty left first MPJ  Patient still has moderate discomfort  This is likely due to overuse as patient has a 3year-old at home  She was told that she could continue with ibuprofen 600 mg 3 times daily 4 times daily-  Patient may now get left foot wet  There is no edema or evidence of infection  She is rescheduled in 1 week

## 2023-05-03 ENCOUNTER — OFFICE VISIT (OUTPATIENT)
Dept: PODIATRY | Facility: CLINIC | Age: 38
End: 2023-05-03

## 2023-05-03 VITALS — WEIGHT: 112 LBS | BODY MASS INDEX: 19.12 KG/M2 | HEIGHT: 64 IN

## 2023-05-03 DIAGNOSIS — M20.32 HALLUX VARUS (ACQUIRED), LEFT FOOT: Primary | ICD-10-CM

## 2023-05-03 NOTE — PROGRESS NOTES
Patient presents 19 days post implant arthroplasty left great toe joint  Only mild pain now related in the joint  Patient told that she may return to a comfortable running shoe in 2 days  Surgical site is healing uneventfully with no evidence of infection  She will be rescheduled in 3 weeks  Patient does relate pain in the left great toenail  A partial matrixectomy was performed in November along the lateral border  No evidence of ingrown nail recurrence but this nail is dystrophic  No treatment recommended at this time

## 2023-05-16 DIAGNOSIS — G47.00 INSOMNIA, UNSPECIFIED TYPE: ICD-10-CM

## 2023-05-16 DIAGNOSIS — F41.1 GENERALIZED ANXIETY DISORDER: ICD-10-CM

## 2023-05-16 RX ORDER — CLONAZEPAM 1 MG/1
1 TABLET ORAL 2 TIMES DAILY
Qty: 60 TABLET | Refills: 0 | Status: SHIPPED | OUTPATIENT
Start: 2023-05-16

## 2023-05-16 RX ORDER — TRAZODONE HYDROCHLORIDE 50 MG/1
50 TABLET ORAL
Qty: 90 TABLET | Refills: 0 | Status: SHIPPED | OUTPATIENT
Start: 2023-05-16

## 2023-05-23 ENCOUNTER — OFFICE VISIT (OUTPATIENT)
Dept: FAMILY MEDICINE CLINIC | Facility: CLINIC | Age: 38
End: 2023-05-23

## 2023-05-23 VITALS
WEIGHT: 110 LBS | HEART RATE: 90 BPM | SYSTOLIC BLOOD PRESSURE: 122 MMHG | OXYGEN SATURATION: 98 % | DIASTOLIC BLOOD PRESSURE: 78 MMHG | RESPIRATION RATE: 14 BRPM | BODY MASS INDEX: 18.88 KG/M2 | TEMPERATURE: 97.9 F

## 2023-05-23 DIAGNOSIS — M54.12 CHRONIC CERVICAL RADICULOPATHY: Primary | ICD-10-CM

## 2023-05-23 DIAGNOSIS — E44.1 MILD PROTEIN-CALORIE MALNUTRITION (HCC): ICD-10-CM

## 2023-05-23 DIAGNOSIS — M54.41 CHRONIC BILATERAL LOW BACK PAIN WITH BILATERAL SCIATICA: ICD-10-CM

## 2023-05-23 DIAGNOSIS — G89.29 CHRONIC BILATERAL LOW BACK PAIN WITH BILATERAL SCIATICA: ICD-10-CM

## 2023-05-23 DIAGNOSIS — R29.898 WEAKNESS OF RIGHT UPPER EXTREMITY: ICD-10-CM

## 2023-05-23 DIAGNOSIS — Z12.31 ENCOUNTER FOR SCREENING MAMMOGRAM FOR MALIGNANT NEOPLASM OF BREAST: ICD-10-CM

## 2023-05-23 DIAGNOSIS — F41.1 GENERALIZED ANXIETY DISORDER: ICD-10-CM

## 2023-05-23 DIAGNOSIS — M54.42 CHRONIC BILATERAL LOW BACK PAIN WITH BILATERAL SCIATICA: ICD-10-CM

## 2023-05-23 DIAGNOSIS — G47.00 INSOMNIA, UNSPECIFIED TYPE: ICD-10-CM

## 2023-05-23 NOTE — PROGRESS NOTES
Assessment/Plan:   1  Chronic cervical radiculopathy/Weakness of right upper extremity  Reviewed patient symptoms today  At this time, there is great concern secondary to her weakness in her right upper extremity  At this time, reviewed her x-ray imaging as well as her recent evaluation by physical therapy as well as orthopedics  We will check MRI of her cervical spine to further rule out any gross abnormalities  She is scheduled to see pain management next month  At this time, we will continue with her current treatment  We will restart her gabapentin however take this primarily at nighttime  Titrate dose to 100 mg nightly  - MRI cervical spine wo contrast; Future    2  Generalized anxiety disorder  Anxiety has been increasing secondary to multiple stressors primarily her work  At this time, continue with her current treatment of duloxetine as well as her clonazepam     3  Insomnia, unspecified type  Uncontrolled currently secondary to her pain  Continue at this time with her current treatment of trazodone  4  Encounter for screening mammogram for malignant neoplasm of breast  - Mammo screening bilateral w 3d & cad; Future    5  Chronic bilateral low back pain with bilateral sciatica  - XR spine lumbar minimum 4 views non injury; Future               Diagnoses and all orders for this visit:    Encounter for screening mammogram for malignant neoplasm of breast          Subjective:       Chief Complaint   Patient presents with   • Anxiety      Patient ID: Bishop Bryant is a 34 y o  female presents today for a follow-up on her chronic disease  She has been having large complaints of cervical radiculopathy and shoulder pain  She has been following up with orthopedics regularly  Back Pain  This is a chronic problem  The current episode started more than 1 month ago  The problem occurs constantly  The problem has been rapidly worsening since onset  The pain is present in the thoracic spine   The quality of the pain is described as aching, burning, shooting and stabbing  The pain is at a severity of 7/10  The pain is the same all the time  The symptoms are aggravated by bending, position, lying down, standing and twisting  Stiffness is present all day  Associated symptoms include abdominal pain, chest pain, headaches, numbness, paresthesias, tingling, weakness and weight loss  Pertinent negatives include no bladder incontinence, bowel incontinence, dysuria or fever  Review of Systems   Constitutional: Positive for weight loss  Negative for activity change, chills, fatigue and fever  HENT: Negative for congestion, ear pain, sinus pressure and sore throat  Eyes: Negative for redness, itching and visual disturbance  Respiratory: Negative for cough and shortness of breath  Cardiovascular: Positive for chest pain  Negative for palpitations  Gastrointestinal: Positive for abdominal pain  Negative for bowel incontinence, diarrhea and nausea  Endocrine: Negative for cold intolerance and heat intolerance  Genitourinary: Negative for bladder incontinence, dysuria, flank pain and frequency  Musculoskeletal: Positive for back pain  Negative for arthralgias, gait problem and myalgias  Skin: Negative for color change  Allergic/Immunologic: Negative for environmental allergies  Neurological: Positive for tingling, weakness, numbness, headaches and paresthesias  Negative for dizziness  Psychiatric/Behavioral: Negative for behavioral problems and sleep disturbance  The following portions of the patient's history were reviewed and updated as appropriate : past family history, past medical history, past social history and past surgical history      Current Outpatient Medications:   •  Calcium Carbonate Antacid (TUMS PO), Take by mouth if needed, Disp: , Rfl:   •  clonazePAM (KlonoPIN) 1 mg tablet, Take 1 tablet (1 mg total) by mouth 2 (two) times a day, Disp: 60 tablet, Rfl: 0  •  cyclobenzaprine (FLEXERIL) 5 mg tablet, TAKE 1 TABLET BY MOUTH TWICE A DAY, Disp: 60 tablet, Rfl: 0  •  dicyclomine (BENTYL) 20 mg tablet, TAKE 1 TABLET (20 MG TOTAL) BY MOUTH 3 (THREE) TIMES A DAY AS NEEDED (FOR ABDOMINAL CRAMPING), Disp: 30 tablet, Rfl: 1  •  DULoxetine (CYMBALTA) 30 mg delayed release capsule, TAKE 1 CAPSULE (30 MG TOTAL) BY MOUTH DAILY ALONG WITH 60 MG CAPSULE, Disp: 90 capsule, Rfl: 0  •  DULoxetine (CYMBALTA) 60 mg delayed release capsule, TAKE 1 CAPSULE BY MOUTH EVERY DAY, Disp: 90 capsule, Rfl: 0  •  Multiple Vitamins-Minerals (MULTIVITAMIN ADULT PO), Take 1 tablet by mouth daily, Disp: , Rfl:   •  ondansetron (ZOFRAN) 4 mg tablet, TAKE 1 TABLET BY MOUTH EVERY 8 HOURS AS NEEDED FOR NAUSEA AND VOMITING, Disp: 20 tablet, Rfl: 0  •  traZODone (DESYREL) 50 mg tablet, Take 1 tablet (50 mg total) by mouth daily at bedtime as needed for sleep, Disp: 90 tablet, Rfl: 0  •  acyclovir (ZOVIRAX) 5 % ointment, APPLY TOPICALLY EVERY 4 (FOUR) HOURS (Patient not taking: Reported on 5/3/2023), Disp: 15 g, Rfl: 0  •  Diclofenac Sodium (VOLTAREN) 1 %, Apply 2 g topically 4 (four) times a day (Patient taking differently: Apply 2 g topically 4 (four) times a day as needed), Disp: 100 g, Rfl: 3  •  gabapentin (NEURONTIN) 100 mg capsule, Take 1 capsule (100 mg total) by mouth 3 (three) times a day (Patient not taking: Reported on 4/10/2023), Disp: 90 capsule, Rfl: 0  •  ibuprofen (MOTRIN) 600 mg tablet, TAKE 1 TABLET (600 MG TOTAL) BY MOUTH EVERY 6 (SIX) HOURS AS NEEDED FOR MILD PAIN, Disp: 120 tablet, Rfl: 0  •  loratadine (CLARITIN) 10 mg tablet, Take 1 tablet (10 mg total) by mouth daily (Patient taking differently: Take 10 mg by mouth daily as needed), Disp: 30 tablet, Rfl: 2  •  meloxicam (MOBIC) 15 mg tablet, TAKE 1 TABLET (15 MG TOTAL) BY MOUTH DAILY   (Patient not taking: Reported on 4/10/2023), Disp: 30 tablet, Rfl: 0         Objective:         Vitals:    05/23/23 1328   BP: 122/78   BP Location: Right arm   Patient Position: Sitting   Cuff Size: Standard   Pulse: 90   Resp: 14   Temp: 97 9 °F (36 6 °C)   TempSrc: Temporal   SpO2: 98%   Weight: 49 9 kg (110 lb)     Physical Exam  Vitals reviewed  Constitutional:       Appearance: She is well-developed  HENT:      Head: Normocephalic and atraumatic  Nose: Nose normal       Mouth/Throat:      Pharynx: No oropharyngeal exudate  Eyes:      General: No scleral icterus  Right eye: No discharge  Left eye: No discharge  Pupils: Pupils are equal, round, and reactive to light  Neck:      Trachea: No tracheal deviation  Cardiovascular:      Rate and Rhythm: Normal rate and regular rhythm  Pulses:           Dorsalis pedis pulses are 2+ on the right side and 2+ on the left side  Posterior tibial pulses are 2+ on the right side and 2+ on the left side  Heart sounds: Normal heart sounds  No murmur heard  No friction rub  No gallop  Pulmonary:      Effort: Pulmonary effort is normal  No respiratory distress  Breath sounds: Normal breath sounds  No wheezing or rales  Abdominal:      General: Bowel sounds are normal  There is no distension  Palpations: Abdomen is soft  Tenderness: There is no abdominal tenderness  There is no guarding or rebound  Musculoskeletal:      Cervical back: Neck supple  Spasms and tenderness present  No pain with movement  Decreased range of motion  Lymphadenopathy:      Head:      Right side of head: No submental or submandibular adenopathy  Left side of head: No submental or submandibular adenopathy  Cervical: No cervical adenopathy  Right cervical: No superficial, deep or posterior cervical adenopathy  Left cervical: No superficial, deep or posterior cervical adenopathy  Skin:     General: Skin is warm and dry  Findings: No erythema  Neurological:      Mental Status: She is alert and oriented to person, place, and time        Cranial Nerves: No cranial nerve deficit  Sensory: No sensory deficit  Psychiatric:         Mood and Affect: Mood is not anxious or depressed  Speech: Speech normal          Behavior: Behavior normal          Thought Content:  Thought content normal          Judgment: Judgment normal

## 2023-05-23 NOTE — LETTER
May 23, 2023     Patient: Rosanne Gross  YOB: 1985  Date of Visit: 5/23/2023      To Whom it May Concern:     Rosanne Gross is under my professional care and was seen in the office on 5/23/2023  Work restrictions: No lifting/pushing/pulling greater than 1 lb  Adam Ford   Until evaluation by pain management      Please contact us if you have any questions         Sincerely,         Sincerely,          Marilyn Pepe DO        CC: No Recipients

## 2023-05-24 ENCOUNTER — APPOINTMENT (OUTPATIENT)
Dept: RADIOLOGY | Facility: CLINIC | Age: 38
End: 2023-05-24

## 2023-05-24 ENCOUNTER — OFFICE VISIT (OUTPATIENT)
Dept: PODIATRY | Facility: CLINIC | Age: 38
End: 2023-05-24

## 2023-05-24 VITALS
HEART RATE: 99 BPM | WEIGHT: 112 LBS | BODY MASS INDEX: 19.12 KG/M2 | SYSTOLIC BLOOD PRESSURE: 121 MMHG | HEIGHT: 64 IN | DIASTOLIC BLOOD PRESSURE: 82 MMHG

## 2023-05-24 DIAGNOSIS — M54.42 CHRONIC BILATERAL LOW BACK PAIN WITH BILATERAL SCIATICA: ICD-10-CM

## 2023-05-24 DIAGNOSIS — G89.29 CHRONIC BILATERAL LOW BACK PAIN WITH BILATERAL SCIATICA: ICD-10-CM

## 2023-05-24 DIAGNOSIS — M20.32 HALLUX VARUS (ACQUIRED), LEFT FOOT: Primary | ICD-10-CM

## 2023-05-24 DIAGNOSIS — M54.41 CHRONIC BILATERAL LOW BACK PAIN WITH BILATERAL SCIATICA: ICD-10-CM

## 2023-05-24 RX ORDER — MELOXICAM 15 MG/1
15 TABLET ORAL DAILY
Qty: 30 TABLET | Refills: 1 | Status: SHIPPED | OUTPATIENT
Start: 2023-05-24 | End: 2023-07-23

## 2023-05-24 NOTE — PROGRESS NOTES
Patient presents approximately 6 weeks post implant arthroplasty left great toe placed due to hallux varus  Patient has been trying to return to a comfortable running shoe but can only wear it 4 hours a day before she has significant swelling both in the joint and also the left ankle  She notes that she has a 3year-old and  the child frequently steps on her left foot  On exam, only mild edema noted left first MPJ at this time  No ankle edema present  Advised patient to utilize compression stockings to help with his edema  Also prescribed meloxicam 15 mg daily for 30 days with 1 refill  Patient also advised to utilize ice and elevation  Reappoint 6 weeks

## 2023-05-27 DIAGNOSIS — F41.1 GENERALIZED ANXIETY DISORDER: ICD-10-CM

## 2023-05-30 ENCOUNTER — HOSPITAL ENCOUNTER (OUTPATIENT)
Dept: MRI IMAGING | Facility: HOSPITAL | Age: 38
Discharge: HOME | End: 2023-05-30

## 2023-05-30 DIAGNOSIS — M54.12 CHRONIC CERVICAL RADICULOPATHY: ICD-10-CM

## 2023-05-30 DIAGNOSIS — F41.1 GENERALIZED ANXIETY DISORDER: ICD-10-CM

## 2023-05-30 DIAGNOSIS — R29.898 WEAKNESS OF RIGHT UPPER EXTREMITY: ICD-10-CM

## 2023-05-30 RX ORDER — DULOXETIN HYDROCHLORIDE 30 MG/1
30 CAPSULE, DELAYED RELEASE ORAL DAILY
Qty: 90 CAPSULE | Refills: 0 | Status: SHIPPED | OUTPATIENT
Start: 2023-05-30

## 2023-05-30 RX ORDER — DULOXETIN HYDROCHLORIDE 30 MG/1
30 CAPSULE, DELAYED RELEASE ORAL DAILY
Qty: 90 CAPSULE | Refills: 0 | Status: SHIPPED | OUTPATIENT
Start: 2023-05-30 | End: 2023-05-30

## 2023-06-05 ENCOUNTER — TELEPHONE (OUTPATIENT)
Dept: FAMILY MEDICINE CLINIC | Facility: CLINIC | Age: 38
End: 2023-06-05

## 2023-06-05 DIAGNOSIS — G95.20 CERVICAL SPINAL CORD COMPRESSION (HCC): Primary | ICD-10-CM

## 2023-06-05 NOTE — TELEPHONE ENCOUNTER
Marquis Rizo from [facility] calling to notify Dr Cristina Inman that the patients MRI of cervical spine is available  It has significant findings

## 2023-06-12 DIAGNOSIS — G47.00 INSOMNIA, UNSPECIFIED TYPE: ICD-10-CM

## 2023-06-12 DIAGNOSIS — F41.1 GENERALIZED ANXIETY DISORDER: ICD-10-CM

## 2023-06-12 RX ORDER — TRAZODONE HYDROCHLORIDE 50 MG/1
50 TABLET ORAL
Qty: 90 TABLET | Refills: 0 | Status: SHIPPED | OUTPATIENT
Start: 2023-06-12

## 2023-06-12 RX ORDER — CLONAZEPAM 1 MG/1
1 TABLET ORAL 2 TIMES DAILY
Qty: 60 TABLET | Refills: 0 | Status: SHIPPED | OUTPATIENT
Start: 2023-06-12

## 2023-06-21 ENCOUNTER — TELEPHONE (OUTPATIENT)
Dept: NEUROSURGERY | Facility: CLINIC | Age: 38
End: 2023-06-21

## 2023-06-21 NOTE — TELEPHONE ENCOUNTER
We can leave the snpx on for tomorrow  If the patient calls back, we will just change it to a solo w/ savannah if the patient is in agreement  If they wish to see hernandez, will have to r/s

## 2023-06-21 NOTE — TELEPHONE ENCOUNTER
LM MESSAGE & MYCHART, ePIC TEXT MESSAGE FOR CB TO RESCHEDULE Ava GARCIA [facility] SCHEDULED 6/22 @ [facility]- HOLDING APPT [address] @ 10:45am  [address]

## 2023-06-30 ENCOUNTER — OFFICE VISIT (OUTPATIENT)
Dept: NEUROSURGERY | Facility: CLINIC | Age: 38
End: 2023-06-30
Payer: COMMERCIAL

## 2023-06-30 VITALS
WEIGHT: 112 LBS | HEIGHT: 64 IN | TEMPERATURE: 97.9 F | HEART RATE: 86 BPM | DIASTOLIC BLOOD PRESSURE: 71 MMHG | SYSTOLIC BLOOD PRESSURE: 125 MMHG | OXYGEN SATURATION: 97 % | BODY MASS INDEX: 19.12 KG/M2

## 2023-06-30 DIAGNOSIS — M54.50 LOWER BACK PAIN: Primary | ICD-10-CM

## 2023-06-30 DIAGNOSIS — G95.20 CERVICAL SPINAL CORD COMPRESSION (HCC): ICD-10-CM

## 2023-06-30 DIAGNOSIS — M54.12 RADICULOPATHY, CERVICAL REGION: ICD-10-CM

## 2023-06-30 DIAGNOSIS — M54.2 NECK PAIN: ICD-10-CM

## 2023-06-30 PROCEDURE — 99243 OFF/OP CNSLTJ NEW/EST LOW 30: CPT | Performed by: PHYSICIAN ASSISTANT

## 2023-06-30 NOTE — ASSESSMENT & PLAN NOTE
· Patient also reports low back pain radiates to bilateral lower extremity with associated numbness and weakness  Plan  · Recommend patient trial conservative management with physical therapy and pain management  Referrals to PT and pain management provided  · If symptoms persist or worsen despite conservative measures for about 6 weeks, patient to follow-up with neurosurgery with MRI of the lumbar spine for further evaluation

## 2023-06-30 NOTE — PROGRESS NOTES
Neurosurgery Office Note  Radha Laureano 34 y o  female MRN: [id number]      Assessment/Plan     Radiculopathy, cervical region  Patient presents for low back pain that radiates to right greater than left upper extremity with associated numbness and weakness  · Imaging reviewed personally and by attending  Final results below discussed with the patient  Right cervical spine without contrast 5/30/2023: Multilevel spondylosis most significant at C5-C6 there is a disc osteophyte complex with reports left paracentral disc protrusion  Flattening of the ventral aspect of the spinal cord with mild to moderate cord compression  Cord signal abnormality  Moderate Left > mild to moderate right neuro-foraminal narrowing  Plan  · Pain control with prescribed and OTC medications  Currently on gabapentin and Flexeril  Provided referral to pain management for further evaluation and consideration for MESFIN  · Reports she has had physical therapy in the past her neck  Records noted of cancelled PT appointments but no recent PT  Recommend trial of PT    · Reviewed with patient imaging findings above in detail  At this time, no neurosurgical intervention is anticipated  Recommend patient trial conservative management with physical therapy and pain management  · If symptoms persist despite conservative measures, patient could follow-up with neurosurgery as needed for further follow-up and evaluation  · Discussed plan of care with patient who showed understanding  · Patient made aware to contact neurosurgery with any questions or concerns  Lower back pain  · Patient also reports low back pain radiates to bilateral lower extremity with associated numbness and weakness  Plan  · Recommend patient trial conservative management with physical therapy and pain management  Referrals to PT and pain management provided    · If symptoms persist or worsen despite conservative measures for about 6 weeks, patient to follow-up with neurosurgery with MRI of the lumbar spine for further evaluation  Diagnoses and all orders for this visit:    Lower back pain  -     MRI lumbar spine without contrast; Future  -     Ambulatory Referral to Physical Therapy; Future  -     Ambulatory Referral to Pain Management; Future    Cervical spinal cord compression [facility])  -     Ambulatory Referral to Neurosurgery  -     MRI lumbar spine without contrast; Future  -     Ambulatory Referral to Physical Therapy; Future  -     Ambulatory Referral to Pain Management; Future    Neck pain  -     Ambulatory Referral to Physical Therapy; Future  -     Ambulatory Referral to Pain Management; Future    Radiculopathy, cervical region  -     Ambulatory Referral to Physical Therapy; Future  -     Ambulatory Referral to Pain Management; Future        I have spent a total time of 45 minutes on 06/30/23 in caring for this patient including Diagnostic results, Risks and benefits of tx options, Instructions for management, Patient and family education, Importance of tx compliance, Risk factor reductions, Impressions, Counseling / Coordination of care, Documenting in the medical record, Reviewing / ordering tests, medicine, procedures  , Obtaining or reviewing history   and Communicating with other healthcare professionals   CHIEF COMPLAINT    No chief complaint on file  HISTORY    History of Present Illness     34y o  year old female     Patient is a 25-year-old female with past medical history of gastroesophageal reflux disease history of left breast lump, hx of hysterectomy, history of herpes simplex virus, history of BPPV, tobacco use disorder and depression who presents to the neurosurgery office with complaint of worsening neck and back pain  Patient reports that she has had on and off neck pain and back pain over the last 2-3 years    She reports that since November 2022 she has been having worsening neck pain with radicular pain worse on the right upper extremity greater than lower extremity as well as back pain  Pt reports neck pain that she rated as 10/10 on the pain scale that radiates to the right shoulder, the RUE laterally and posteriorly to the 1st, 2nd and 3rd digits with associated numbness/weakness in RUE  She reports less pain/numbness in the 4th/5th digits  Reports diffuculty with fine motor skills  She reports dropping heavy objects  Reports difficulty lifting her 3year old son (born via surrogate given hx of hysterectomy) due to her sx  Pt reports she she injection in her right shoulder but denies TAB  Pt reports she has had PT for her neck and BUE  Pt also reports back pain that she rated as 8/10 on the pain scale, in the posterior and medial aspect of the BLE with associated numbness and weakness  Pt denies urinary or bowel incontinence  She denies difficulty walking  Has not had MESFIN in the LE and not had PT for back pain /BLE sx  REVIEW OF SYSTEMS    Review of Systems   Constitutional: Negative  HENT: Negative  Eyes: Positive for visual disturbance (Opthalmology 3/2023- updated RX for new glasses)  Respiratory: Negative  Cardiovascular: Negative  Gastrointestinal: Negative  Endocrine: Negative  Genitourinary: Negative  Musculoskeletal: Positive for back pain ( b/l lbp radiates to b/l buttocks and posterior legs into b/l feet x 4 months  no inciting event  prolonged sitting,standing,walking increases her pain   She desxcribes her pain as a stabbing, burning pain and she rates it an 8/10 today ), gait problem, myalgias, neck pain (Neck pain radiates to BUE R>L   + N/T/W on right arm x 8 months  No inciting events  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  Her pain is a constant, throbbing, shooting pain and she rates it a 10/10 today  ) and neck stiffness ( Decerease ROM , worse when looking up/down and tightness on trapezius muscle  )          Meds: Flexeril, Meloxicam, Ibuprofen, Gabapentin provides minimal relief     PT @ [facility] on FEb 2023 didn't help   Pain Man -- none   C/S XR done 12/2022, C/S MRI done 5/30/23  no previous spine surgeries   Skin: Negative  Allergic/Immunologic: Positive for environmental allergies  On Claritin   Neurological: Positive for dizziness (positional changes), weakness (right leg gives out H/o fall down steps  ), numbness and headaches (everyday lasting all day x 6 months  Takes Ibuprofen mild, temporary relief)  Hematological: Negative  Psychiatric/Behavioral: Positive for sleep disturbance (due to pain)  All other systems reviewed and are negative  ROS obtained by MA  Reviewed  See HPI       Meds/Allergies     Current Outpatient Medications   Medication Sig Dispense Refill   • acyclovir (ZOVIRAX) 5 % ointment APPLY TOPICALLY EVERY 4 (FOUR) HOURS 15 g 0   • Calcium Carbonate Antacid (TUMS PO) Take by mouth if needed     • clonazePAM (KlonoPIN) 1 mg tablet Take 1 tablet (1 mg total) by mouth 2 (two) times a day 60 tablet 0   • cyclobenzaprine (FLEXERIL) 5 mg tablet TAKE 1 TABLET BY MOUTH TWICE A DAY 60 tablet 0   • dicyclomine (BENTYL) 20 mg tablet TAKE 1 TABLET (20 MG TOTAL) BY MOUTH 3 (THREE) TIMES A DAY AS NEEDED (FOR ABDOMINAL CRAMPING) 30 tablet 1   • DULoxetine (CYMBALTA) 30 mg delayed release capsule TAKE 1 CAPSULE (30 MG TOTAL) BY MOUTH DAILY ALONG WITH 60 MG CAPSULE 90 capsule 0   • DULoxetine (CYMBALTA) 60 mg delayed release capsule TAKE 1 CAPSULE BY MOUTH EVERY DAY 90 capsule 0   • gabapentin (NEURONTIN) 100 mg capsule Take 1 capsule (100 mg total) by mouth 3 (three) times a day 90 capsule 0   • loratadine (CLARITIN) 10 mg tablet Take 1 tablet (10 mg total) by mouth daily (Patient taking differently: Take 10 mg by mouth daily as needed) 30 tablet 2   • meloxicam (Mobic) 15 mg tablet Take 1 tablet (15 mg total) by mouth daily 30 tablet 1   • Multiple Vitamins-Minerals (MULTIVITAMIN ADULT PO) Take 1 tablet by mouth daily     • ondansetron (ZOFRAN) 4 mg tablet TAKE 1 TABLET BY MOUTH EVERY 8 HOURS AS NEEDED FOR NAUSEA AND VOMITING 20 tablet 0   • traZODone (DESYREL) 50 mg tablet Take 1 tablet (50 mg total) by mouth daily at bedtime as needed for sleep 90 tablet 0   • Diclofenac Sodium (VOLTAREN) 1 % Apply 2 g topically 4 (four) times a day (Patient taking differently: Apply 2 g topically 4 (four) times a day as needed) 100 g 3   • ibuprofen (MOTRIN) 600 mg tablet TAKE 1 TABLET (600 MG TOTAL) BY MOUTH EVERY 6 (SIX) HOURS AS NEEDED FOR MILD PAIN 120 tablet 0     No current facility-administered medications for this visit         Allergies   Allergen Reactions   • Bactrim [Sulfamethoxazole-Trimethoprim] Hives   • Latex Other (See Comments)     Latex allergy-rashes   • Levofloxacin Hives   • Quinolones Rash       PAST HISTORY    Past Medical History:   Diagnosis Date   • Anxiety    • Bunion     left foot   OR   correction today 4/14/2023   • Cervical radiculopathy    • COVID     x 2-- Jan 2022 and Jan 2023   • Depression    • GERD (gastroesophageal reflux disease)    • Headache(784 0)    • Migraines    • Seasonal allergies    • Tobacco abuse    • Wears contact lenses        Past Surgical History:   Procedure Laterality Date   • BUNIONECTOMY Right    • LAPAROSCOPIC TOTAL HYSTERECTOMY     • HI HALLUX RIGIDUS W/CHEILECTOMY 1ST MP JT W/IMPLT Left 4/14/2023    Procedure: BUNIONECTOMY with implant;  Surgeon: Clementine Rose DPM;  [facility];  Service: Podiatry   • TONSILLECTOMY         Social History     Tobacco Use   • Smoking status: Every Day     Packs/day: 0 50     Years: 25 00     Total pack years: 12 50     Types: Cigarettes     Start date: 4/11/2002   • Smokeless tobacco: Never   Vaping Use   • Vaping Use: Never used   Substance Use Topics   • Alcohol use: Not Currently   • Drug use: Never       Family History   Problem Relation Age of Onset   • Mental illness Mother    • Depression Mother    • Diabetes Father    • Hypertension Father    • Diabetes Maternal "Grandmother    • Arthritis Maternal Grandmother    • Heart disease Maternal Grandmother    • ADD / ADHD Brother    • Anxiety disorder Brother    • Anxiety disorder Sister    • Rashes / Skin problems Sister          Above history personally reviewed  EXAM    Vitals:Blood pressure 125/71, pulse 86, temperature 97 9 °F (36 6 °C), temperature source Temporal, height 5' 4\" (1 626 m), weight 50 8 kg (112 lb), last menstrual period 01/29/2020, SpO2 97 %, unknown if currently breastfeeding  ,Body mass index is 19 22 kg/m²  Physical Exam  Constitutional:       General: She is not in acute distress  Appearance: She is well-developed  HENT:      Head: Normocephalic and atraumatic  Eyes:      Pupils: Pupils are equal, round, and reactive to light  Neck:      Trachea: No tracheal deviation  Cardiovascular:      Rate and Rhythm: Normal rate  Pulmonary:      Effort: Pulmonary effort is normal    Abdominal:      Palpations: Abdomen is soft  Tenderness: There is no abdominal tenderness  There is no guarding  Musculoskeletal:      Cervical back: Neck supple  Skin:     General: Skin is warm and dry  Coloration: Skin is not pale  Findings: No rash  Neurological:      Mental Status: She is alert and oriented to person, place, and time  Psychiatric:         Behavior: Behavior normal          Neurologic Exam     Mental Status   Oriented to person, place, and time  Cranial Nerves     CN III, IV, VI   Pupils are equal, round, and reactive to light  MEDICAL DECISION MAKING    Imaging Studies:     I have personally reviewed pertinent reports     and I have personally reviewed pertinent films in PACS  "

## 2023-06-30 NOTE — ASSESSMENT & PLAN NOTE
Patient presents for low back pain that radiates to right greater than left upper extremity with associated numbness and weakness  · Imaging reviewed personally and by attending  Final results below discussed with the patient  Right cervical spine without contrast 5/30/2023: Multilevel spondylosis most significant at C5-C6 there is a disc osteophyte complex with reports left paracentral disc protrusion  Flattening of the ventral aspect of the spinal cord with mild to moderate cord compression  Cord signal abnormality  Moderate Left > mild to moderate right neuro-foraminal narrowing  Plan  · Pain control with prescribed and OTC medications  Currently on gabapentin and Flexeril  Provided referral to pain management for further evaluation and consideration for MESFIN  · Reports she has had physical therapy in the past her neck  Records noted of cancelled PT appointments but no recent PT  Recommend trial of PT    · Reviewed with patient imaging findings above in detail  At this time, no neurosurgical intervention is anticipated  Recommend patient trial conservative management with physical therapy and pain management  · If symptoms persist despite conservative measures, patient could follow-up with neurosurgery as needed for further follow-up and evaluation  · Discussed plan of care with patient who showed understanding  · Patient made aware to contact neurosurgery with any questions or concerns

## 2023-07-05 ENCOUNTER — OFFICE VISIT (OUTPATIENT)
Dept: PODIATRY | Facility: CLINIC | Age: 38
End: 2023-07-05
Payer: COMMERCIAL

## 2023-07-05 VITALS
SYSTOLIC BLOOD PRESSURE: 107 MMHG | WEIGHT: 112 LBS | RESPIRATION RATE: 18 BRPM | HEIGHT: 64 IN | HEART RATE: 91 BPM | DIASTOLIC BLOOD PRESSURE: 73 MMHG | BODY MASS INDEX: 19.12 KG/M2

## 2023-07-05 DIAGNOSIS — M25.572 PAIN IN JOINT, FOOT, LEFT: Primary | ICD-10-CM

## 2023-07-05 PROCEDURE — 20550 NJX 1 TENDON SHEATH/LIGAMENT: CPT

## 2023-07-05 RX ORDER — LIDOCAINE HYDROCHLORIDE 10 MG/ML
1 INJECTION, SOLUTION EPIDURAL; INFILTRATION; INTRACAUDAL; PERINEURAL ONCE
Status: COMPLETED | OUTPATIENT
Start: 2023-07-05 | End: 2023-07-05

## 2023-07-05 RX ORDER — TRIAMCINOLONE ACETONIDE 40 MG/ML
20 INJECTION, SUSPENSION INTRA-ARTICULAR; INTRAMUSCULAR ONCE
Status: COMPLETED | OUTPATIENT
Start: 2023-07-05 | End: 2023-07-05

## 2023-07-05 RX ADMIN — LIDOCAINE HYDROCHLORIDE 1 ML: 10 INJECTION, SOLUTION EPIDURAL; INFILTRATION; INTRACAUDAL; PERINEURAL at 13:15

## 2023-07-05 RX ADMIN — TRIAMCINOLONE ACETONIDE 20 MG: 40 INJECTION, SUSPENSION INTRA-ARTICULAR; INTRAMUSCULAR at 13:22

## 2023-07-05 NOTE — PROGRESS NOTES
Patient presents approximately 3 months post implant arthroplasty left great toe joint. This implant was utilized due to hallux varus. Patient is able to wear clogs with minimal discomfort but she has difficulty with no shoes. She states that she can only wear a running shoe for very short time before she has significant throbbing. On exam, pain with palpation lateral aspect first MPJ left foot. Range of motion is within normal limits and alignment of great toe is within normal limits. Meloxicam has not been very helpful and patient may discontinue. Treatment: Injected lateral aspect first MPJ left foot with 0.5 cc Kenalog 40 along with 1 cc 1% Xylocaine. Patient is rescheduled in 4 weeks. Foot injection     Date/Time 7/5/2023 1:15 PM     Performed by  Shubham Valle DPM   Authorized by Shubham Valle DPM     Universal Protocol   Consent: Verbal consent obtained. Risks and benefits: risks, benefits and alternatives were discussed  Consent given by: patient  Patient understanding: patient states understanding of the procedure being performed  Patient identity confirmed: verbally with patient        Local anesthesia used: yes     Anesthesia   Local anesthesia used: yes  Local Anesthetic: lidocaine 1% without epinephrine     Procedure Details   Procedure Notes: Injected left foot with 0.5 cc Kenalog 40 along with 1 cc 1% Xylocaine.

## 2023-07-10 DIAGNOSIS — F41.1 GENERALIZED ANXIETY DISORDER: ICD-10-CM

## 2023-07-10 DIAGNOSIS — G47.00 INSOMNIA, UNSPECIFIED TYPE: ICD-10-CM

## 2023-07-11 RX ORDER — TRAZODONE HYDROCHLORIDE 50 MG/1
50 TABLET ORAL
Qty: 90 TABLET | Refills: 0 | Status: SHIPPED | OUTPATIENT
Start: 2023-07-11

## 2023-07-11 RX ORDER — DULOXETIN HYDROCHLORIDE 60 MG/1
60 CAPSULE, DELAYED RELEASE ORAL DAILY
Qty: 90 CAPSULE | Refills: 0 | Status: SHIPPED | OUTPATIENT
Start: 2023-07-11

## 2023-07-11 RX ORDER — CLONAZEPAM 1 MG/1
1 TABLET ORAL 2 TIMES DAILY
Qty: 60 TABLET | Refills: 0 | Status: SHIPPED | OUTPATIENT
Start: 2023-07-11

## 2023-07-11 RX ORDER — DULOXETIN HYDROCHLORIDE 30 MG/1
30 CAPSULE, DELAYED RELEASE ORAL DAILY
Qty: 90 CAPSULE | Refills: 0 | Status: SHIPPED | OUTPATIENT
Start: 2023-07-11

## 2023-07-13 ENCOUNTER — CONSULT (OUTPATIENT)
Dept: PAIN MEDICINE | Facility: MEDICAL CENTER | Age: 38
End: 2023-07-13
Payer: COMMERCIAL

## 2023-07-13 VITALS
WEIGHT: 112 LBS | HEART RATE: 80 BPM | DIASTOLIC BLOOD PRESSURE: 74 MMHG | BODY MASS INDEX: 19.12 KG/M2 | SYSTOLIC BLOOD PRESSURE: 109 MMHG | HEIGHT: 64 IN

## 2023-07-13 DIAGNOSIS — M54.12 RADICULOPATHY, CERVICAL REGION: ICD-10-CM

## 2023-07-13 DIAGNOSIS — M50.120 CERVICAL DISC DISORDER WITH RADICULOPATHY OF MID-CERVICAL REGION: Primary | ICD-10-CM

## 2023-07-13 DIAGNOSIS — M54.2 NECK PAIN: ICD-10-CM

## 2023-07-13 DIAGNOSIS — G95.20 CERVICAL SPINAL CORD COMPRESSION (HCC): ICD-10-CM

## 2023-07-13 PROCEDURE — 99244 OFF/OP CNSLTJ NEW/EST MOD 40: CPT | Performed by: PHYSICAL MEDICINE & REHABILITATION

## 2023-07-13 NOTE — PATIENT INSTRUCTIONS
Cervical Radiculopathy   WHAT YOU NEED TO KNOW:   Cervical radiculopathy is a painful condition that happens when a spinal nerve in your neck is pinched or irritated. DISCHARGE INSTRUCTIONS:   Medicines: You may need any of the following:  NSAIDs  help decrease swelling and pain. This medicine can be bought without a doctor's order. This medicine can cause stomach bleeding or kidney problems in certain people. If you take blood thinner medicine, always ask your provider if NSAIDs are safe for you. Always read the medicine label and follow the directions on it before using this medicine. Prescription pain medicine  helps decrease pain. Do not wait until the pain is severe before you take this medicine. Steroids  help decrease pain and swelling. These may be given as a pill or as an injection in your neck. You may need more than 1 injection if your symptoms do not improve after the first treatment. Take your medicine as directed. Contact your healthcare provider if you think your medicine is not helping or if you have side effects. Tell your provider if you are allergic to any medicine. Keep a list of the medicines, vitamins, and herbs you take. Include the amounts, and when and why you take them. Bring the list or the pill bottles to follow-up visits. Carry your medicine list with you in case of an emergency. Follow up with your healthcare provider or spine specialist as directed:  Write down your questions so you remember to ask them during your visits. Physical therapy:  Your provider may suggest physical therapy to stretch and strengthen your muscles. Your physical therapist can teach you how to improve your posture and the way you hold your neck. The therapist may also teach you how to be safely active and avoid more injury. The therapist can also help you develop an exercise program that is safe for your back and neck. Self-care:   Ice  helps decrease swelling and pain.  Ice may also help prevent tissue damage. Use an ice pack, or put crushed ice in a plastic bag. Cover it with a towel and place it on your neck for 15 to 20 minutes every hour or as directed. Rest  when you feel it is needed. Slowly start to do more each day. Return to your daily activities as directed. Wear a soft collar. You may be given a soft collar to support your neck while you sleep. Wear the soft collar only as directed. Do light stretches and regular exercise. Your provider may suggest light stretches to help decrease stiffness in your neck and arm as you recover. After your pain is controlled, you may benefit from regular exercise. Ask what type of exercise is safe for your back and neck. Review your work area. A comfortable work area can help prevent neck strain. Ask your employer for an ergonomic review to check the position of your desk, chair, phone, and computer. Make any necessary adjustments for your comfort. Contact your healthcare provider or spine specialist if:   You have a fever. You are losing weight without trying. Your pain is worse, even with medicine. One or both hands feel more numb than before, or you cannot move your fingers well. You have questions or concerns about your condition or care. © Copyright [employer] 2022 Information is for End User's use only and may not be sold, redistributed or otherwise used for commercial purposes. The above information is an  only. It is not intended as medical advice for individual conditions or treatments. Talk to your doctor, nurse or pharmacist before following any medical regimen to see if it is safe and effective for you.

## 2023-07-13 NOTE — PROGRESS NOTES
Assessment  1. Cervical disc disorder with radiculopathy of mid-cervical region    2. Radiculopathy, cervical region    3. Cervical spinal cord compression (HCC)    4. Neck pain        Plan  1. At this time we will schedule the patient for a right cervical epidural steroid injection under fluoroscopic guidance. Complete risks and benefits including bleeding, infection, tissue reaction, allergic reaction were discussed. Verbal consent obtained. My impressions and treatment recommendations were discussed in detail with the patient who verbalized understanding and had no further questions. Discharge instructions were provided. I personally saw and examined the patient and I agree with the above discussed plan of care. Orders Placed This Encounter   Procedures   • [place] spine and pain procedure     Standing Status:   Future     Standing Expiration Date:   7/13/2024     Order Specific Question:   Reason for Exam:     Answer:   (R) TAB     Order Specific Question:   Anticoagulant hold needed? Answer:   no     No orders of the defined types were placed in this encounter. History of Present Illness    Daniel Junior is a 34 y.o. female [facility] neck and radiating arm pain consistent with cervical radiculopathy from a disc herniation. The patient's been experiencing symptoms for about 8 months without any specific inciting event or trauma. She is describing moderate to severe intensity pain rated as an 8/10 which is constant without any typical pattern characterized as burning numbness pins-and-needles and throbbing sensation. She does describe some upper extremity weakness as well as occasional dropping of objects. Aggravating factors include lying down standing bending sitting walking exercise coughing and sneezing. Alleviating factors are unknown.     Diagnostic studies include MRI of the cervical spine demonstrating multiple disc protrusions in the cervical spine with some narrowing of the central canal.  No significant hyperintensity noted within the architecture of the spinal cord itself fortunately. She has tried physical therapy exercise and heat or ice application as well as oral analgesics including 2 rounds of oral steroids all which have not provided significant benefit. Social history positive for tobacco use negative for marijuana and alcohol. I have personally reviewed and/or updated the patient's past medical history, past surgical history, family history, social history, current medications, allergies, and vital signs today. Review of Systems   Constitutional: Positive for unexpected weight change. Negative for fever. HENT: Positive for sore throat. Negative for trouble swallowing. Eyes: Negative for visual disturbance. Respiratory: Negative for shortness of breath and wheezing. Cardiovascular: Positive for leg swelling. Negative for chest pain and palpitations. Gastrointestinal: Positive for abdominal pain and nausea. Negative for constipation, diarrhea and vomiting. Endocrine: Negative for cold intolerance, heat intolerance and polydipsia. Genitourinary: Negative for difficulty urinating and frequency. Musculoskeletal: Positive for joint swelling, myalgias, neck pain and neck stiffness. Negative for arthralgias and gait problem. Skin: Negative for rash. Neurological: Positive for dizziness, numbness and headaches. Negative for seizures, syncope and weakness. Hematological: Does not bruise/bleed easily. Psychiatric/Behavioral: Positive for decreased concentration and dysphoric mood. The patient is nervous/anxious. All other systems reviewed and are negative.       Patient Active Problem List   Diagnosis   • Generalized anxiety disorder   • Lower back pain   • Insomnia   • GERD without esophagitis   • Left breast lump   • Benign paroxysmal positional vertigo due to bilateral vestibular disorder   • Herpes simplex infection   • Abnormal finding on thyroid function test   • Atypical nevus   • Radiculopathy, cervical region   • Anxiety   • Tobacco abuse   • Migraines   • Depression   • Mild protein-calorie malnutrition ([address])   • Neck pain       Past Medical History:   Diagnosis Date   • Anxiety    • Bunion     left foot   OR   correction today 4/14/2023   • Cervical radiculopathy    • COVID     x 2-- Jan 2022 and Jan 2023   • Depression    • GERD (gastroesophageal reflux disease)    • Headache(784.0)    • Migraines    • Seasonal allergies    • Tobacco abuse    • Wears contact lenses        Past Surgical History:   Procedure Laterality Date   • BUNIONECTOMY Right    • LAPAROSCOPIC TOTAL HYSTERECTOMY     • DC HALLUX RIGIDUS W/CHEILECTOMY 1ST MP JT W/IMPLT Left 4/14/2023    Procedure: BUNIONECTOMY with implant;  Surgeon: Reema Reyes DPM;  Location: [facility] OR;  Service: Podiatry   • TONSILLECTOMY         Family History   Problem Relation Age of Onset   • Mental illness Mother    • Depression Mother    • Early death Mother    • Diabetes Father    • Hypertension Father    • Early death Father    • Diabetes Maternal Grandmother    • Arthritis Maternal Grandmother    • Heart disease Maternal Grandmother    • ADD / ADHD Brother    • Anxiety disorder Brother    • Anxiety disorder Sister    • Rashes / Skin problems Sister    • Migraines Sister        Social History     Occupational History   • Not on file   Tobacco Use   • Smoking status: Every Day     Packs/day: 0.50     Years: 20.00     Total pack years: 10.00     Types: Cigarettes     Start date: 4/11/2002   • Smokeless tobacco: Never   Vaping Use   • Vaping Use: Never used   Substance and Sexual Activity   • Alcohol use: Not Currently   • Drug use: No   • Sexual activity: Yes     Partners: Male     Birth control/protection: None     Comment: Hysterectomy on July 1st 2020       Current Outpatient Medications on File Prior to Visit Medication Sig   • acyclovir (ZOVIRAX) 5 % ointment APPLY TOPICALLY EVERY 4 (FOUR) HOURS   • Calcium Carbonate Antacid (TUMS PO) Take by mouth if needed   • clonazePAM (KlonoPIN) 1 mg tablet Take 1 tablet (1 mg total) by mouth 2 (two) times a day   • cyclobenzaprine (FLEXERIL) 5 mg tablet TAKE 1 TABLET BY MOUTH TWICE A DAY   • dicyclomine (BENTYL) 20 mg tablet TAKE 1 TABLET (20 MG TOTAL) BY MOUTH 3 (THREE) TIMES A DAY AS NEEDED (FOR ABDOMINAL CRAMPING)   • DULoxetine (CYMBALTA) 30 mg delayed release capsule Take 1 capsule (30 mg total) by mouth daily Along with 60 mg capsule   • DULoxetine (CYMBALTA) 60 mg delayed release capsule Take 1 capsule (60 mg total) by mouth daily   • gabapentin (NEURONTIN) 100 mg capsule Take 1 capsule (100 mg total) by mouth 3 (three) times a day   • loratadine (CLARITIN) 10 mg tablet Take 1 tablet (10 mg total) by mouth daily (Patient taking differently: Take 10 mg by mouth daily as needed)   • meloxicam (Mobic) 15 mg tablet Take 1 tablet (15 mg total) by mouth daily   • Multiple Vitamins-Minerals (MULTIVITAMIN ADULT PO) Take 1 tablet by mouth daily   • ondansetron (ZOFRAN) 4 mg tablet TAKE 1 TABLET BY MOUTH EVERY 8 HOURS AS NEEDED FOR NAUSEA AND VOMITING   • traZODone (DESYREL) 50 mg tablet Take 1 tablet (50 mg total) by mouth daily at bedtime as needed for sleep   • Diclofenac Sodium (VOLTAREN) 1 % Apply 2 g topically 4 (four) times a day (Patient not taking: Reported on 7/13/2023)   • ibuprofen (MOTRIN) 600 mg tablet TAKE 1 TABLET (600 MG TOTAL) BY MOUTH EVERY 6 (SIX) HOURS AS NEEDED FOR MILD PAIN     No current facility-administered medications on file prior to visit.        Allergies   Allergen Reactions   • Bactrim [Sulfamethoxazole-Trimethoprim] Hives   • Latex Other (See Comments)     Latex allergy-rashes   • Levofloxacin Hives   • Quinolones Rash       Physical Exam    /74   Pulse 80   Ht 5' 4" (1.626 m)   Wt 50.8 kg (112 lb)   LMP 01/29/2020   BMI 19.22 kg/m² Constitutional: normal, well developed, well nourished, alert, in no distress and non-toxic and no overt pain behavior. Eyes: anicteric  HEENT: grossly intact  Neck: supple, symmetric, trachea midline and no masses   Pulmonary:even and unlabored  Cardiovascular:No edema or pitting edema present  Skin:Normal without rashes or lesions and well hydrated  Psychiatric:Mood and affect appropriate  Neurologic:Cranial Nerves II-XII grossly intact, bilateral upper extremity muscle stretch reflexes are diminished throughout, bilateral upper extremity sensation is intact to light touch and pinprick, negative George sign bilaterally, bilateral upper extremity muscle strength is essentially normal.  Musculoskeletal:normal, except for reproduction of neck and radiating arm pain with cervical extension, rotation towards the right and side bending towards the right    Imaging    MRI cervical spine wo contrast: Result Notes     Marilyn Pepe,    6/5/2023  1:14 PM EDT       Good afternoon, your MRI of your neck appeared to show multiple abnormalities that were arthritic.  You also appear to have spinal cord compression at the C5-C6 spinal segment.  Given this finding, it is highly recommended that you see a spinal surgeon.  We are working on scheduling you ASAP. Gm Mcintosh will be in touch soon with a time.  Please let me know if you have any questions.  Thank you            Study Result    Narrative & Impression   MRI CERVICAL SPINE WITHOUT CONTRAST     INDICATION: M54.12: Radiculopathy, cervical region  R29.898: Other symptoms and signs involving the musculoskeletal system. Chronic cervical radiculopathy. Weakness in the right upper extremity.     COMPARISON: 12/8/2022     TECHNIQUE:  Multiplanar, multisequence imaging of the cervical spine was performed.  .        The study is submitted for interpretation at this time.     IMAGE QUALITY:  Diagnostic     FINDINGS:     ALIGNMENT: There is nonspecific straightening of the cervical lordosis without subluxation.     MARROW SIGNAL:  Normal marrow signal is identified within the visualized bony structures. No discrete marrow lesion.     CERVICAL AND VISUALIZED THORACIC CORD: Cord compression at C5-C6 without cord signal abnormality.     PREVERTEBRAL AND PARASPINAL SOFT TISSUES:  Normal.     VISUALIZED POSTERIOR FOSSA:  The visualized posterior fossa demonstrates no abnormal signal.     CERVICAL DISC SPACES:     C2-C3:  Normal.     C3-C4: There is a disc osteophyte complex with a superimposed right neural foraminal disc protrusion. Mild central canal and right neural foraminal narrowing. Left neural foramen patent.     C4-C5: There is a disc osteophyte complex with a superimposed right neural foraminal disc protrusion. Moderate right neural foraminal narrowing. Mild to moderate central canal narrowing. Mild left neural foraminal narrowing.     C5-C6: There is a disc osteophyte complex with a superimposed left paracentral disc protrusion. There is flattening of the ventral aspect of the spinal cord with cord compression. No cord signal abnormality. Severe central canal narrowing. Moderate to   severe left neural foraminal narrowing. Moderate right neural foraminal narrowing.     C6-C7: There is a disc osteophyte complex with a superimposed right neural foraminal disc protrusion. Moderate right neural foraminal narrowing. Mild central canal narrowing. Mild left neural foraminal narrowing.     C7-T1:  Normal.     UPPER THORACIC DISC SPACES:  Normal.     OTHER FINDINGS:  None.     IMPRESSION:        1. Multilevel spondylosis with cord compression at C5-C6. No cord signal abnormality.  Spine surgical assessment recommended.              Workstation performed: [id number]

## 2023-07-21 ENCOUNTER — HOSPITAL ENCOUNTER (OUTPATIENT)
Dept: RADIOLOGY | Facility: MEDICAL CENTER | Age: 38
Discharge: HOME | End: 2023-07-21
Payer: COMMERCIAL

## 2023-07-21 VITALS
OXYGEN SATURATION: 98 % | RESPIRATION RATE: 18 BRPM | TEMPERATURE: 99 F | DIASTOLIC BLOOD PRESSURE: 76 MMHG | HEART RATE: 65 BPM | SYSTOLIC BLOOD PRESSURE: 115 MMHG

## 2023-07-21 DIAGNOSIS — M50.120 CERVICAL DISC DISORDER WITH RADICULOPATHY OF MID-CERVICAL REGION: ICD-10-CM

## 2023-07-21 PROCEDURE — 62321 NJX INTERLAMINAR CRV/THRC: CPT | Performed by: PHYSICAL MEDICINE & REHABILITATION

## 2023-07-21 RX ORDER — METHYLPREDNISOLONE ACETATE 80 MG/ML
80 INJECTION, SUSPENSION INTRA-ARTICULAR; INTRALESIONAL; INTRAMUSCULAR; PARENTERAL; SOFT TISSUE ONCE
Status: COMPLETED | OUTPATIENT
Start: 2023-07-21 | End: 2023-07-21

## 2023-07-21 RX ADMIN — METHYLPREDNISOLONE ACETATE 80 MG: 80 INJECTION, SUSPENSION INTRA-ARTICULAR; INTRALESIONAL; INTRAMUSCULAR; PARENTERAL; SOFT TISSUE at 13:10

## 2023-07-21 RX ADMIN — IOHEXOL 2 ML: 300 INJECTION, SOLUTION INTRAVENOUS at 13:10

## 2023-07-21 NOTE — H&P
History of Present Illness:  The patient is a 34 y.o. female who presents with complaints of neck and right arm pain    Past Medical History:   Diagnosis Date   • Anxiety    • Bunion     left foot   OR   correction today 4/14/2023   • Cervical radiculopathy    • COVID     x 2-- Jan 2022 and Jan 2023   • Depression    • GERD (gastroesophageal reflux disease)    • Headache(784.0)    • Migraines    • Seasonal allergies    • Tobacco abuse    • Wears contact lenses        Past Surgical History:   Procedure Laterality Date   • BUNIONECTOMY Right    • LAPAROSCOPIC TOTAL HYSTERECTOMY     • ND HALLUX RIGIDUS W/CHEILECTOMY 1ST MP JT W/IMPLT Left 4/14/2023    Procedure: BUNIONECTOMY with implant;  Surgeon: Shandra Don DPM;  Location: [facility];  Service: Podiatry   • TONSILLECTOMY           Current Outpatient Medications:   •  acyclovir (ZOVIRAX) 5 % ointment, APPLY TOPICALLY EVERY 4 (FOUR) HOURS, Disp: 15 g, Rfl: 0  •  Calcium Carbonate Antacid (TUMS PO), Take by mouth if needed, Disp: , Rfl:   •  clonazePAM (KlonoPIN) 1 mg tablet, Take 1 tablet (1 mg total) by mouth 2 (two) times a day, Disp: 60 tablet, Rfl: 0  •  cyclobenzaprine (FLEXERIL) 5 mg tablet, TAKE 1 TABLET BY MOUTH TWICE A DAY, Disp: 60 tablet, Rfl: 0  •  Diclofenac Sodium (VOLTAREN) 1 %, Apply 2 g topically 4 (four) times a day (Patient not taking: Reported on 7/13/2023), Disp: 100 g, Rfl: 3  •  dicyclomine (BENTYL) 20 mg tablet, TAKE 1 TABLET (20 MG TOTAL) BY MOUTH 3 (THREE) TIMES A DAY AS NEEDED (FOR ABDOMINAL CRAMPING), Disp: 30 tablet, Rfl: 1  •  DULoxetine (CYMBALTA) 30 mg delayed release capsule, Take 1 capsule (30 mg total) by mouth daily Along with 60 mg capsule, Disp: 90 capsule, Rfl: 0  •  DULoxetine (CYMBALTA) 60 mg delayed release capsule, Take 1 capsule (60 mg total) by mouth daily, Disp: 90 capsule, Rfl: 0  •  gabapentin (NEURONTIN) 100 mg capsule, Take 1 capsule (100 mg total) by mouth 3 (three) times a day, Disp: 90 capsule, Rfl: 0  • ibuprofen (MOTRIN) 600 mg tablet, TAKE 1 TABLET (600 MG TOTAL) BY MOUTH EVERY 6 (SIX) HOURS AS NEEDED FOR MILD PAIN, Disp: 120 tablet, Rfl: 0  •  loratadine (CLARITIN) 10 mg tablet, Take 1 tablet (10 mg total) by mouth daily (Patient taking differently: Take 10 mg by mouth daily as needed), Disp: 30 tablet, Rfl: 2  •  meloxicam (Mobic) 15 mg tablet, Take 1 tablet (15 mg total) by mouth daily, Disp: 30 tablet, Rfl: 1  •  Multiple Vitamins-Minerals (MULTIVITAMIN ADULT PO), Take 1 tablet by mouth daily, Disp: , Rfl:   •  ondansetron (ZOFRAN) 4 mg tablet, TAKE 1 TABLET BY MOUTH EVERY 8 HOURS AS NEEDED FOR NAUSEA AND VOMITING, Disp: 20 tablet, Rfl: 0  •  traZODone (DESYREL) 50 mg tablet, Take 1 tablet (50 mg total) by mouth daily at bedtime as needed for sleep, Disp: 90 tablet, Rfl: 0  No current facility-administered medications for this encounter. Allergies   Allergen Reactions   • Bactrim [Sulfamethoxazole-Trimethoprim] Hives   • Latex Other (See Comments)     Latex allergy-rashes   • Levofloxacin Hives   • Quinolones Rash       Physical Exam:   Vitals:    07/21/23 1302   BP: 117/75   Pulse: 67   Resp: 18   Temp: 99 °F (37.2 °C)   SpO2: 99%     General: Awake, Alert, Oriented x 3, Mood and affect appropriate  Respiratory: Respirations even and unlabored  Cardiovascular: Peripheral pulses intact; no edema  Musculoskeletal Exam: neck and right arm pain    ASA Score: 2    Patient/Chart Verification  Patient ID Verified: Verbal  ID Band Applied: No  Consents Confirmed: Procedural, To be obtained in the Pre-Procedure area  H&P( within 30 days) Verified: To be obtained in the Pre-Procedure area  Interval H&P(within 24 hr) Complete (required for Outpatients and Surgery Admit only): To be obtained in the Pre-Procedure area  Allergies Reviewed: Yes  Anticoag/NSAID held?: Yes (LD of Ibuprofen was 1 week ago)  Currently on antibiotics?: No  Pregnancy denied?: Yes    Assessment:   1.  Cervical disc disorder with radiculopathy of mid-cervical region        Plan: (R) TAB

## 2023-07-21 NOTE — DISCHARGE INSTRUCTIONS
Epidural Steroid Injection   WHAT YOU NEED TO KNOW:   An epidural steroid injection (MESFIN) is a procedure to inject steroid medicine into the epidural space. The epidural space is between your spinal cord and vertebrae. Steroids reduce inflammation and fluid buildup in your spine that may be causing pain. You may be given pain medicine along with the steroids. ACTIVITY  Do not drive or operate machinery today. No strenuous activity today - bending, lifting, etc.  You may resume normal activites starting tomorrow - start slowly and as tolerated. You may shower today, but no tub baths or hot tubs. You may have numbness for several hours from the local anesthetic. Please use caution and common sense, especially with weight-bearing activities. CARE OF THE INJECTION SITE  If you have soreness or pain, apply ice to the area today (20 minutes on/20 minutes off). Starting tomorrow, you may use warm, moist heat or ice if needed. You may have an increase or change in your discomfort for 36-48 hours after your treatment. Apply ice and continue with any pain medication you have been prescribed. Notify the [facility] if you have any of the following: redness, drainage, swelling, headache, stiff neck or fever above 100°F.    SPECIAL INSTRUCTIONS  Our office will contact you in approximately 7 days for a progress report. MEDICATIONS  Continue to take all routine medications. Our office may have instructed you to hold some medications. You may resume your Ibuprofen in 24 hours, so tomorrow after 1:30 pm.    As no general anesthesia was used in today's procedure, you should not experience any side effects related to anesthesia. If you are diabetic, the steroids used in today's injection may temporarily increase your blood sugar levels after the first few days after your injection.  Please keep a close eye on your sugars and alert the doctor who manages your diabetes if your sugars are significantly high from your baseline or you are symptomatic. If you have a problem specifically related to your procedure, please call our office at [phone]. Problems not related to your procedure should be directed to your primary care physician.

## 2023-07-28 ENCOUNTER — TELEPHONE (OUTPATIENT)
Dept: PAIN MEDICINE | Facility: CLINIC | Age: 38
End: 2023-07-28

## 2023-07-28 DIAGNOSIS — M79.18 MYOFASCIAL PAIN SYNDROME: Primary | ICD-10-CM

## 2023-07-28 RX ORDER — METHOCARBAMOL 750 MG/1
750 TABLET, FILM COATED ORAL 2 TIMES DAILY PRN
Qty: 30 TABLET | Refills: 0 | Status: SHIPPED | OUTPATIENT
Start: 2023-07-28

## 2023-07-28 NOTE — TELEPHONE ENCOUNTER
Caller: pt    Doctor: Marcia Bal    Reason for call: no improvement 8-9/10 pain level.     Call back#: [phone]

## 2023-07-28 NOTE — TELEPHONE ENCOUNTER
Attempted to reach pt. Detailed VMMLOM as per [facility] document advising pt of same and not to drive or operate machinery until she knows how the medication will effect her. Cb# and OH provided if there are questions.

## 2023-07-28 NOTE — TELEPHONE ENCOUNTER
S/w pt. Pt had a TAB on 7/21. Pt had some relief the day of the procedure but is not having any relief now. Pt is aware it takes 2 weeks for the full benefit of the procedure. Pt  has trie Tylenol, Ibuprofen, Cymbalta without relief. Pt is currently weaning off of Gabapentin which she was taking 100 mg tid. Pt is down to 100 mg at HS. Pt states Gabapentin was not providing any relief. Pt does take Flexeril at HS which helps but makes her groggy so is unable to take in the daytime. Pt has tried Robaxin in the past which helped and didn't make her groggy. Pt is requesting a script of Robaxin to help with her pain until her 2 week time frame.  Please advise-

## 2023-07-28 NOTE — TELEPHONE ENCOUNTER
RN attempted to reach pt regarding previous. VMMLOM with c/b number office hours and location provided.

## 2023-08-04 ENCOUNTER — TELEPHONE (OUTPATIENT)
Dept: NEUROSURGERY | Facility: CLINIC | Age: 38
End: 2023-08-04

## 2023-08-08 ENCOUNTER — HOSPITAL ENCOUNTER (OUTPATIENT)
Dept: MRI IMAGING | Facility: HOSPITAL | Age: 38
Discharge: HOME | End: 2023-08-08
Payer: COMMERCIAL

## 2023-08-08 DIAGNOSIS — M54.50 LOWER BACK PAIN: ICD-10-CM

## 2023-08-08 DIAGNOSIS — G95.20 CERVICAL SPINAL CORD COMPRESSION (HCC): ICD-10-CM

## 2023-08-08 PROCEDURE — G1004 CDSM NDSC: HCPCS

## 2023-08-08 PROCEDURE — 72148 MRI LUMBAR SPINE W/O DYE: CPT

## 2023-08-10 DIAGNOSIS — F41.1 GENERALIZED ANXIETY DISORDER: ICD-10-CM

## 2023-08-10 RX ORDER — CLONAZEPAM 1 MG/1
1 TABLET ORAL 2 TIMES DAILY
Qty: 60 TABLET | Refills: 0 | Status: SHIPPED | OUTPATIENT
Start: 2023-08-10

## 2023-08-23 ENCOUNTER — OFFICE VISIT (OUTPATIENT)
Dept: NEUROSURGERY | Facility: CLINIC | Age: 38
End: 2023-08-23
Payer: COMMERCIAL

## 2023-08-23 VITALS
BODY MASS INDEX: 19.12 KG/M2 | RESPIRATION RATE: 16 BRPM | HEART RATE: 76 BPM | TEMPERATURE: 99.1 F | HEIGHT: 64 IN | SYSTOLIC BLOOD PRESSURE: 112 MMHG | WEIGHT: 112 LBS | DIASTOLIC BLOOD PRESSURE: 74 MMHG

## 2023-08-23 DIAGNOSIS — M54.2 NECK PAIN: Primary | ICD-10-CM

## 2023-08-23 PROCEDURE — 99213 OFFICE O/P EST LOW 20 MIN: CPT | Performed by: PHYSICIAN ASSISTANT

## 2023-08-23 NOTE — PROGRESS NOTES
Neurosurgery Office Note  Marco Josiah 34 y.o. female MRN: [id number]      Assessment/Plan      Patient is a 34 yr sold woman with Hx of progressive neck pain that she started experiencing since November 2022. Patient reports the pain is severe, estimated 8/10, radiates down right UE>Left UE, associated with paresthesia, numbness,  weakness, dropping objects and fine motor function. She denies gait issues or B/B dysfunction. Cx spine MRI shows Cord compression and right NFN at C5-6 more so than the other regions. Patient was referred to pain Mx and had Right C7-T1 interlaminar MESFIN. Also taking oral Gabapentin 100 mg TID. She denies any improvement. Her right arm radiculopathy progressed. Patient also complains of lower back pain with bilateral LE radiculopathy, numbness in posterior left thigh. She denies weakness in the legs. She had Lumbar spine MRI which appears normal except small synovial cyst at S2 level. Exam-A&OX3,but anxious and seems worried about her neck pain, Danielle, strength right  4+/5,otherwise 5/5 in the left UE & both LEs. Sensation to LT intact bilaterally. DTR 2+, no clonus in both ankles, positive right Valdez's test, negative on the left. Gait slight wobbly on heel to toes walking. Tenderness noted on palpation of posterior cervical spine and mild discomfort in the lower lumbar region. Hx, PEx, and images reviewed with the patient. Mx plan discussed. Reviewed the images with Dr Dinora Marsh. There is disk at C5-6 with moderate compression of central cord but without signal change, also right NFN more than the other regions. The symptoms usually improves with injection if given at the right spot, so recommend continue  follow up Pain Mx with possible repeat MESFIN at right C5-6 region, increase Gabapentin dosages and follow up with NSx in 3-6 months with flexion-extension x-rays. All questions and concerns were answered to patient's satisfaction.  Patient expressed her understandings and agreed with the plan. Plan:  1. Flexion-Extension Cervical spine xrays  2. Continue follow up with pain Mx, Recommend right C5-6 MESFIN, MBB  3. Increase Gabapentin dosages   4. Follow up after 6 months, shared visit, Dr Los Perez. 5. Call with questions or cocnerns       I have spent a total time of 45 minutes on 08/23/23 in caring for this patient including Diagnostic results, Prognosis, Risks and benefits of tx options, Instructions for management, Patient and family education, Importance of tx compliance, Risk factor reductions, Impressions, Counseling / Coordination of care, Documenting in the medical record, Reviewing / ordering tests, medicine, procedures   and Obtaining or reviewing history  . Chief Complaint   Patient presents with   • Follow-up     [facility] FU W/ MRI [facility]INE 8/8/23           History of Present Illness     C/C: " Neck and Lower back pain"    HPI  All patient's medical Hx were reviewed and updated appropriate: Allergies, current medication lists,  past medical history, past surgical history, family history, social history, and current medical lists. Patient is here today to go over his lumbar spine MRI and clinical follow-up for his cervical radiculopathy. Patient reports moderate to severe neck pain with bilateral, right more than left side radiculopathy. Has associated paresthesia and numbness in her right arm,  weakness and dropping objects and final dysfunction denies any gait issues or bowel/bladder dysfunction. Patient had C7-T1 transpedicular MESFIN and is taking gabapentin 100 mg 3 times daily, but her symptoms did not get any better. Patient reported lower back pain with bilateral posterior lower extremity radiculopathy. No weakness, reports this numbness in her posterior left thigh. She denies any fever, chills, rigors, cough or chest pain.     REVIEW OF SYSTEMS  Review of system personally reviewed and updated as follows:   Review of Systems   Constitutional: Negative. HENT: Negative. Eyes: Positive for visual disturbance (Opthalmology 3/2023- updated RX for new glasses). Respiratory: Negative. Cardiovascular: Negative. Gastrointestinal: Negative. Endocrine: Negative. Genitourinary: Negative. Musculoskeletal: Positive for back pain ( b/l lbp radiates to b/l buttocks and posterior legs into b/l feet x 4 months. no inciting event. prolonged sitting,standing,walking increases her pain . She desxcribes her pain as a stabbing, burning pain and she rates it an 8/10 today.), gait problem, myalgias, neck pain (Neck pain radiates to BUE R>L . + N/T/W on right arm x 8 months. No inciting events. 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 Her pain is a constant, throbbing, shooting pain and she rates it a 10/10 today. ) and neck stiffness ( Decerease ROM , worse when looking up/down and tightness on trapezius muscle. ). Meds: Flexeril, Meloxicam, Ibuprofen, Gabapentin provides minimal relief     PT @ [facility] on FEb 2023 didn't help   Pain Man -- none   C/S XR done 12/2022, C/S MRI done 5/30/23  no previous spine surgeries   Skin: Negative. Allergic/Immunologic: Positive for environmental allergies. On Claritin   Neurological: Positive for dizziness (positional changes), weakness (right leg gives out H/o fall down steps. ), numbness and headaches (everyday lasting all day x 6 months. Takes Ibuprofen mild, temporary relief). Hematological: Negative. Psychiatric/Behavioral: Positive for sleep disturbance (due to pain). ROS obtained by MA. Reviewed. See HPI.      Meds/Allergies     Current Outpatient Medications   Medication Sig Dispense Refill   • acyclovir (ZOVIRAX) 5 % ointment APPLY TOPICALLY EVERY 4 (FOUR) HOURS 15 g 0   • Calcium Carbonate Antacid (TUMS PO) Take by mouth if needed     • clonazePAM (KlonoPIN) 1 mg tablet Take 1 tablet (1 mg total) by mouth 2 (two) times a day 60 tablet 0   • dicyclomine (BENTYL) 20 mg tablet TAKE 1 TABLET (20 MG TOTAL) BY MOUTH 3 (THREE) TIMES A DAY AS NEEDED (FOR ABDOMINAL CRAMPING) 30 tablet 1   • DULoxetine (CYMBALTA) 30 mg delayed release capsule Take 1 capsule (30 mg total) by mouth daily Along with 60 mg capsule 90 capsule 0   • DULoxetine (CYMBALTA) 60 mg delayed release capsule Take 1 capsule (60 mg total) by mouth daily 90 capsule 0   • gabapentin (NEURONTIN) 100 mg capsule Take 1 capsule (100 mg total) by mouth 3 (three) times a day 90 capsule 0   • ibuprofen (MOTRIN) 600 mg tablet TAKE 1 TABLET (600 MG TOTAL) BY MOUTH EVERY 6 (SIX) HOURS AS NEEDED FOR MILD PAIN 120 tablet 0   • loratadine (CLARITIN) 10 mg tablet Take 1 tablet (10 mg total) by mouth daily (Patient taking differently: Take 10 mg by mouth daily as needed) 30 tablet 2   • methocarbamol (Robaxin-750) 750 mg tablet Take 1 tablet (750 mg total) by mouth 2 (two) times a day as needed for muscle spasms 30 tablet 0   • Multiple Vitamins-Minerals (MULTIVITAMIN ADULT PO) Take 1 tablet by mouth daily     • ondansetron (ZOFRAN) 4 mg tablet TAKE 1 TABLET BY MOUTH EVERY 8 HOURS AS NEEDED FOR NAUSEA AND VOMITING 20 tablet 0   • traZODone (DESYREL) 50 mg tablet Take 1 tablet (50 mg total) by mouth daily at bedtime as needed for sleep 90 tablet 0   • Diclofenac Sodium (VOLTAREN) 1 % Apply 2 g topically 4 (four) times a day (Patient not taking: Reported on 7/13/2023) 100 g 3   • meloxicam (Mobic) 15 mg tablet Take 1 tablet (15 mg total) by mouth daily (Patient not taking: Reported on 8/23/2023) 30 tablet 1     No current facility-administered medications for this visit.        Allergies   Allergen Reactions   • Bactrim [Sulfamethoxazole-Trimethoprim] Hives   • Latex Other (See Comments)     Latex allergy-rashes   • Levofloxacin Hives   • Quinolones Rash       PAST HISTORY    Past Medical History:   Diagnosis Date   • Anxiety    • Bunion     left foot   OR   correction today 4/14/2023   • Cervical radiculopathy    • COVID     x 2-- Jan 2022 and Jan 2023   • Depression    • GERD (gastroesophageal reflux disease)    • Headache(784.0)    • Migraines    • Seasonal allergies    • Tobacco abuse    • Wears contact lenses        Past Surgical History:   Procedure Laterality Date   • BUNIONECTOMY Right    • LAPAROSCOPIC TOTAL HYSTERECTOMY     • GA HALLUX RIGIDUS W/CHEILECTOMY 1ST MP JT W/IMPLT Left 4/14/2023    Procedure: BUNIONECTOMY with implant;  Surgeon: Barbara Roman DPM;  Location: [facility];  Service: Podiatry   • TONSILLECTOMY         Social History     Tobacco Use   • Smoking status: Every Day     Packs/day: 0.50     Years: 20.00     Total pack years: 10.00     Types: Cigarettes     Start date: 4/11/2002   • Smokeless tobacco: Never   Vaping Use   • Vaping Use: Never used   Substance Use Topics   • Alcohol use: Not Currently   • Drug use: No       Family History   Problem Relation Age of Onset   • Mental illness Mother    • Depression Mother    • Early death Mother    • Diabetes Father    • Hypertension Father    • Early death Father    • Diabetes Maternal Grandmother    • Arthritis Maternal Grandmother    • Heart disease Maternal Grandmother    • ADD / ADHD Brother    • Anxiety disorder Brother    • Anxiety disorder Sister    • Rashes / Skin problems Sister    • Migraines Sister          Above history personally reviewed. EXAM    Vitals:Blood pressure 112/74, pulse 76, temperature 99.1 °F (37.3 °C), resp. rate 16, height 5' 4" (1.626 m), weight 50.8 kg (112 lb), last menstrual period 01/29/2020, unknown if currently breastfeeding. ,Body mass index is 19.22 kg/m². Physical Exam  Constitutional:       Appearance: Normal appearance. HENT:      Head: Normocephalic and atraumatic. Cardiovascular:      Rate and Rhythm: Normal rate and regular rhythm. Pulmonary:      Effort: Pulmonary effort is normal.   Musculoskeletal:         General: Tenderness present. Cervical back: Tenderness present.    Neurological:      Mental Status: She is alert and oriented to person, place, and time. GCS: GCS eye subscore is 4. GCS verbal subscore is 5. GCS motor subscore is 6. Cranial Nerves: Cranial nerves 2-12 are intact. Sensory: No sensory deficit. Motor: Weakness present. Coordination: Finger-Nose-Finger Test normal.      Deep Tendon Reflexes: Reflexes are normal and symmetric. Reflex Scores:       Tricep reflexes are 2+ on the right side and 2+ on the left side. Bicep reflexes are 2+ on the right side and 2+ on the left side. Brachioradialis reflexes are 2+ on the right side and 2+ on the left side. Patellar reflexes are 2+ on the right side and 2+ on the left side. Achilles reflexes are 2+ on the right side and 2+ on the left side. Psychiatric:         Speech: Speech normal.         Neurologic Exam     Mental Status   Oriented to person, place, and time. Speech: speech is normal   Level of consciousness: alert    Cranial Nerves   Cranial nerves II through XII intact.      Motor Exam   Muscle bulk: normal  Overall muscle tone: normal  Right arm tone: normal  Left arm tone: normal  Right arm pronator drift: absent  Left arm pronator drift: absent  Right leg tone: normal  Left leg tone: normal    Sensory Exam   Light touch normal.   Right arm light touch: normal  Left arm light touch: normal  Right leg light touch: normal  Left leg light touch: normal    Gait, Coordination, and Reflexes     Coordination   Finger to nose coordination: normal    Reflexes   Right brachioradialis: 2+  Left brachioradialis: 2+  Right biceps: 2+  Left biceps: 2+  Right triceps: 2+  Left triceps: 2+  Right patellar: 2+  Left patellar: 2+  Right achilles: 2+  Left achilles: 2+  Right : 2+  Left : 2+  Right Valdez: present  Left Valdez: absent  Right ankle clonus: absent  Left pendular knee jerk: absent        MEDICAL DECISION MAKING    Imaging Studies:     MRI lumbar spine without contrast    Result Date: 8/11/2023  Narrative: MRI LUMBAR SPINE WITHOUT CONTRAST INDICATION: G95.20: Unspecified cord compression M54.50: Low back pain, unspecified. COMPARISON:  None. TECHNIQUE:  Multiplanar, multisequence imaging of the lumbar spine was performed. . IMAGE QUALITY:  Diagnostic FINDINGS: VERTEBRAL BODIES:  There are 5 lumbar type vertebral bodies. Normal alignment of the lumbar spine. No spondylolysis or spondylolisthesis. No scoliosis. No compression fracture. Scattered Schmorl's nodes are seen. SACRUM:  Normal signal within the sacrum. No evidence of insufficiency or stress fracture. DISTAL CORD AND CONUS:  Normal size and signal within the distal cord and conus. PARASPINAL SOFT TISSUES:  Paraspinal soft tissues are unremarkable. LOWER THORACIC DISC SPACES:  Normal disc height and signal.  No disc herniation, canal stenosis or foraminal narrowing. LUMBAR DISC SPACES: L1-L2:  Normal. L2-L3:  Normal. L3-L4:  Normal. L4-L5:  Normal. L5-S1:  Normal. OTHER FINDINGS:  None. Impression: Normal MRI of the lumbar spine.  Workstation performed: [id number]       I have personally reviewed pertinent reports.  , I have personally reviewed pertinent films in PACS and I have personally reviewed pertinent films in PACS with a Radiologist.

## 2023-08-25 ENCOUNTER — OFFICE VISIT (OUTPATIENT)
Dept: PAIN MEDICINE | Facility: MEDICAL CENTER | Age: 38
End: 2023-08-25
Payer: COMMERCIAL

## 2023-08-25 VITALS
HEART RATE: 86 BPM | OXYGEN SATURATION: 98 % | BODY MASS INDEX: 19.29 KG/M2 | HEIGHT: 64 IN | WEIGHT: 113 LBS | SYSTOLIC BLOOD PRESSURE: 126 MMHG | DIASTOLIC BLOOD PRESSURE: 80 MMHG

## 2023-08-25 DIAGNOSIS — G95.20 CERVICAL SPINAL CORD COMPRESSION (HCC): ICD-10-CM

## 2023-08-25 DIAGNOSIS — M48.02 CERVICAL SPINAL STENOSIS: ICD-10-CM

## 2023-08-25 DIAGNOSIS — M50.120 CERVICAL DISC DISORDER WITH RADICULOPATHY OF MID-CERVICAL REGION: ICD-10-CM

## 2023-08-25 DIAGNOSIS — M54.12 CERVICAL RADICULOPATHY: Primary | ICD-10-CM

## 2023-08-25 PROCEDURE — 99214 OFFICE O/P EST MOD 30 MIN: CPT | Performed by: PHYSICIAN ASSISTANT

## 2023-08-25 NOTE — PROGRESS NOTES
Assessment:  1. Cervical radiculopathy    2. Cervical spinal stenosis    3. Cervical disc disorder with radiculopathy of mid-cervical region    4. Cervical spinal cord compression (HCC)        Plan:  While the patient was in the office today, I did have a thorough conversation regarding their chronic pain syndrome, medication management, and treatment plan options. After discussing options, we will get the patient scheduled for a C6-7 epidural steroid injection to address the persistent right upper extremity radicular component of her pain pattern. Patient has much more significant radicular pain in comparison to axial neck pain therefore I do not think diagnostic medial branch blocks are indicated although I did provide education regarding that procedure. Patient is frustrated regarding her chronic pain state and functional deficits, increasing weakness of her right upper extremity. We will obtain a consultation with Dr. [facility] to discuss potential surgical options. Complete risks and benefits including bleeding, infection, tissue reaction, nerve injury and allergic reaction were discussed. The approach was demonstrated using models and literature was provided. Verbal and written consent was obtained. My impressions and treatment recommendations were discussed in detail with the patient who verbalized understanding and had no further questions. Discharge instructions were provided. I personally saw and examined the patient and I agree with the above discussed plan of care. Orders Placed This Encounter   Procedures   • [place] spine and pain procedure     Standing Status:   Future     Standing Expiration Date:   8/25/2027     Order Specific Question:   Reason for Exam:     Answer:   C6-7 TAB     Order Specific Question:   Is the patient pregnant? Answer:   No     Order Specific Question:   Anticoagulant hold needed?      Answer:   no   • Ambulatory Referral to Spine Surgery     Standing Status: Future     Standing Expiration Date:   8/25/2024     Referral Priority:   Routine     Referral Type:   Consult - AMB     Referral Reason:   Specialty Services Required     Referred to Provider:   Dulcie Bamberger, MD     Requested Specialty:   Orthopedic Surgery     Number of Visits Requested:   1     Expiration Date:   8/25/2024     No orders of the defined types were placed in this encounter. History of Present Illness:  Monroe Dai is a 34 y.o. female who presents for a follow up office visit in regards to neck and arm pain. The patient’s current symptoms include chronic right-sided neck pain with right upper extremity radicular symptoms that she presently rates a 8 out of 10 on the pain scale. She describes the pain as a constant burning, throbbing, pressure-like pain with numbness and paresthesias. She is noting a significant amount of weakness of her right upper extremity. Patient underwent a C7-T1 interlaminar epidural steroid injection and reports minimal improvement if any. Patient denies any bowel or bladder symptoms. She is noting weakness of her legs and states that she has fallen recently. Patient has cord compression as seen on her MRI and did have a recent consultation with the neurosurgical team but was told to follow-up with us regarding further injections. I have personally reviewed and/or updated the patient's past medical history, past surgical history, family history, social history, current medications, allergies, and vital signs today. Review of Systems   Respiratory: Negative for shortness of breath. Cardiovascular: Negative for chest pain. Gastrointestinal: Positive for nausea. Negative for constipation, diarrhea and vomiting. Musculoskeletal: Positive for myalgias, neck pain and neck stiffness. Negative for arthralgias, gait problem and joint swelling. Skin: Negative for rash. Neurological: Positive for dizziness, weakness and headaches.  Negative for seizures. All other systems reviewed and are negative.       Patient Active Problem List   Diagnosis   • Generalized anxiety disorder   • Lower back pain   • Insomnia   • GERD without esophagitis   • Left breast lump   • Benign paroxysmal positional vertigo due to bilateral vestibular disorder   • Herpes simplex infection   • Abnormal finding on thyroid function test   • Atypical nevus   • Radiculopathy, cervical region   • Anxiety   • Tobacco abuse   • Migraines   • Depression   • Mild protein-calorie malnutrition (HCC)   • Neck pain   • Cervical disc disorder with radiculopathy of mid-cervical region       Past Medical History:   Diagnosis Date   • Anxiety    • Bunion     left foot   OR   correction today 4/14/2023   • Cervical radiculopathy    • COVID     x 2-- Jan 2022 and Jan 2023   • Depression    • GERD (gastroesophageal reflux disease)    • Headache(784.0)    • Migraines    • Seasonal allergies    • Tobacco abuse    • Wears contact lenses        Past Surgical History:   Procedure Laterality Date   • BUNIONECTOMY Right    • LAPAROSCOPIC TOTAL HYSTERECTOMY     • UT HALLUX RIGIDUS W/CHEILECTOMY 1ST MP JT W/IMPLT Left 4/14/2023    Procedure: BUNIONECTOMY with implant;  Surgeon: Esequiel Arguelles DPM;  Location: [facility];  Service: Podiatry   • TONSILLECTOMY         Family History   Problem Relation Age of Onset   • Mental illness Mother    • Depression Mother    • Early death Mother    • Diabetes Father    • Hypertension Father    • Early death Father    • Diabetes Maternal Grandmother    • Arthritis Maternal Grandmother    • Heart disease Maternal Grandmother    • ADD / ADHD Brother    • Anxiety disorder Brother    • Anxiety disorder Sister    • Rashes / Skin problems Sister    • Migraines Sister        Social History     Occupational History   • Not on file   Tobacco Use   • Smoking status: Every Day     Packs/day: 0.50     Years: 20.00     Total pack years: 10.00     Types: Cigarettes     Start date: 4/11/2002   • Smokeless tobacco: Never   Vaping Use   • Vaping Use: Never used   Substance and Sexual Activity   • Alcohol use: Not Currently   • Drug use: No   • Sexual activity: Yes     Partners: Male     Birth control/protection: None     Comment: Hysterectomy on July 1st 2020       Current Outpatient Medications on File Prior to Visit   Medication Sig   • Calcium Carbonate Antacid (TUMS PO) Take by mouth if needed   • clonazePAM (KlonoPIN) 1 mg tablet Take 1 tablet (1 mg total) by mouth 2 (two) times a day   • dicyclomine (BENTYL) 20 mg tablet TAKE 1 TABLET (20 MG TOTAL) BY MOUTH 3 (THREE) TIMES A DAY AS NEEDED (FOR ABDOMINAL CRAMPING)   • DULoxetine (CYMBALTA) 30 mg delayed release capsule Take 1 capsule (30 mg total) by mouth daily Along with 60 mg capsule   • DULoxetine (CYMBALTA) 60 mg delayed release capsule Take 1 capsule (60 mg total) by mouth daily   • loratadine (CLARITIN) 10 mg tablet Take 1 tablet (10 mg total) by mouth daily (Patient taking differently: Take 10 mg by mouth daily as needed)   • methocarbamol (Robaxin-750) 750 mg tablet Take 1 tablet (750 mg total) by mouth 2 (two) times a day as needed for muscle spasms   • Multiple Vitamins-Minerals (MULTIVITAMIN ADULT PO) Take 1 tablet by mouth daily   • ondansetron (ZOFRAN) 4 mg tablet TAKE 1 TABLET BY MOUTH EVERY 8 HOURS AS NEEDED FOR NAUSEA AND VOMITING   • traZODone (DESYREL) 50 mg tablet Take 1 tablet (50 mg total) by mouth daily at bedtime as needed for sleep   • acyclovir (ZOVIRAX) 5 % ointment APPLY TOPICALLY EVERY 4 (FOUR) HOURS   • Diclofenac Sodium (VOLTAREN) 1 % Apply 2 g topically 4 (four) times a day (Patient not taking: Reported on 7/13/2023)   • gabapentin (NEURONTIN) 100 mg capsule Take 1 capsule (100 mg total) by mouth 3 (three) times a day   • ibuprofen (MOTRIN) 600 mg tablet TAKE 1 TABLET (600 MG TOTAL) BY MOUTH EVERY 6 (SIX) HOURS AS NEEDED FOR MILD PAIN   • meloxicam (Mobic) 15 mg tablet Take 1 tablet (15 mg total) by mouth daily (Patient not taking: Reported on 8/23/2023)     No current facility-administered medications on file prior to visit. Allergies   Allergen Reactions   • Bactrim [Sulfamethoxazole-Trimethoprim] Hives   • Latex Other (See Comments)     Latex allergy-rashes   • Levofloxacin Hives   • Quinolones Rash       Physical Exam:    /80   Pulse 86   Ht 5' 4" (1.626 m)   Wt 51.3 kg (113 lb)   LMP 01/29/2020   SpO2 98%   BMI 19.40 kg/m²     Constitutional:normal, well developed, well nourished, alert, in no distress and non-toxic and no overt pain behavior. Eyes:anicteric  HEENT:grossly intact  Neck:supple, symmetric, trachea midline and no masses   Pulmonary:even and unlabored  Cardiovascular:No edema or pitting edema present  Skin:Normal without rashes or lesions and well hydrated  Psychiatric:Mood and affect appropriate  Neurologic:Cranial Nerves II-XII grossly intact  Musculoskeletal: Cervical spine is nontender to palpation, limited range of motion with right rotation and extension, decreased  strength of the right upper extremity. Imaging  MRI CERVICAL SPINE WITHOUT CONTRAST     INDICATION: M54.12: Radiculopathy, cervical region  R29.898: Other symptoms and signs involving the musculoskeletal system. Chronic cervical radiculopathy. Weakness in the right upper extremity.     COMPARISON: 12/8/2022     TECHNIQUE:  Multiplanar, multisequence imaging of the cervical spine was performed. .        The study is submitted for interpretation at this time.     IMAGE QUALITY:  Diagnostic     FINDINGS:     ALIGNMENT: There is nonspecific straightening of the cervical lordosis without subluxation.     MARROW SIGNAL:  Normal marrow signal is identified within the visualized bony structures.   No discrete marrow lesion.     CERVICAL AND VISUALIZED THORACIC CORD: Cord compression at C5-C6 without cord signal abnormality.     PREVERTEBRAL AND PARASPINAL SOFT TISSUES:  Normal.     VISUALIZED POSTERIOR FOSSA: The visualized posterior fossa demonstrates no abnormal signal.     CERVICAL DISC SPACES:     C2-C3:  Normal.     C3-C4: There is a disc osteophyte complex with a superimposed right neural foraminal disc protrusion. Mild central canal and right neural foraminal narrowing. Left neural foramen patent.     C4-C5: There is a disc osteophyte complex with a superimposed right neural foraminal disc protrusion. Moderate right neural foraminal narrowing. Mild to moderate central canal narrowing. Mild left neural foraminal narrowing.     C5-C6: There is a disc osteophyte complex with a superimposed left paracentral disc protrusion. There is flattening of the ventral aspect of the spinal cord with cord compression. No cord signal abnormality. Severe central canal narrowing. Moderate to   severe left neural foraminal narrowing. Moderate right neural foraminal narrowing.     C6-C7: There is a disc osteophyte complex with a superimposed right neural foraminal disc protrusion. Moderate right neural foraminal narrowing. Mild central canal narrowing. Mild left neural foraminal narrowing.     C7-T1:  Normal.     UPPER THORACIC DISC SPACES:  Normal.     OTHER FINDINGS:  None.     IMPRESSION:        1. Multilevel spondylosis with cord compression at C5-C6. No cord signal abnormality.  Spine surgical assessment recommended.

## 2023-08-25 NOTE — PATIENT INSTRUCTIONS
Epidural Steroid Injection   WHAT YOU NEED TO KNOW:   What do I need to know about an epidural steroid injection (MESFIN)? An MESFIN is a procedure to inject steroid medicine into the epidural space. The epidural space is between your spinal cord and vertebrae. Steroids reduce inflammation and fluid buildup in your spine that may be causing pain. You may be given pain medicine along with the steroids. How do I prepare for an MESFIN? Your provider will talk to you about how to prepare for your procedure. He or she will tell you what medicines to take or not take on the day of your procedure. You may need to stop taking blood thinners or other medicines several days before your procedure. You may need to adjust any diabetes medicine you take on the day of your procedure. Steroid medicine can increase your blood sugar level. Arrange for someone to drive you home when you are discharged. What will happen during an MESFIN? You will be given medicine to numb the procedure area. You will be awake for the procedure, but you will not feel pain. You may also be given medicine to help you relax. Contrast liquid will be used to help your provider see the area better. Tell the provider if you have ever had an allergic reaction to contrast liquid. Your provider may place the needle into your neck area, middle of your back, or tailbone area. He or she may inject the medicine next to the nerves that are causing your pain. He or she may instead inject the medicine into a larger area of the epidural space. This helps the medicine spread to more nerves. Your provider will use a fluoroscope to help guide the needle to the right place. A fluoroscope is a type of x-ray. After the procedure, a bandage will be placed over the injection site to prevent infection. What will happen after an MESFIN? You will have a bandage over the injection site to prevent infection.  Your provider will tell you when you can bathe and any activity guidelines. You will be able to go home. What are the risks of an MESFIN? You may have temporary or permanent nerve damage or paralysis. You may have bleeding or develop a serious infection, such as meningitis (swelling of the brain coverings). An abscess may also develop. An abscess is a pus-filled area under the skin. You may need surgery to fix the abscess. You may have a seizure, anxiety, or trouble sleeping. If you are a man, you may have temporary erectile dysfunction (not able to have an erection). CARE AGREEMENT:   You have the right to help plan your care. Learn about your health condition and how it may be treated. Discuss treatment options with your healthcare providers to decide what care you want to receive. You always have the right to refuse treatment. The above information is an  only. It is not intended as medical advice for individual conditions or treatments. Talk to your doctor, nurse or pharmacist before following any medical regimen to see if it is safe and effective for you. © Copyright Oli Garcia 2022 Information is for End User's use only and may not be sold, redistributed or otherwise used for commercial purposes.

## 2023-08-31 NOTE — PROGRESS NOTES
Assessment & Plan/Medical Decision Makin y.o. female with Neck Pain, right periscapular pain, right upper extremity pain and imaging findings most notable for Cervical Spondylosis, foraminal stenosis        The clinical, physical and imaging findings were reviewed with the patient. Lianne Julio  has a constellation of findings consistent with Cervical Radiculopathy in the setting of cervical degenerative disease. Also with component of rotator cuff dysfunction. Lianne Julio describes ongoing neck and right upper extremity pain with weakness. She has tried oral medications, physical therapy, right shoulder injection and cervical injection without significant relief. Her pain and symptoms are greatly interfering with her daily functioning. Physical exam showing right upper extremity weakness. We discussed treatment options including ongoing medications mgmt, ongoing PT, chiropractor, activity modifications, further interventional spine procedures, and surgery. Would recommend pt proceed with cervical MESFIN scheduled for . Also would recommend possible US guided right shoulder injection. Discussed potential role of steroid injection at or near the source of pain to provide targeted relief. Patient did not require a new referral to pain management at today's visit. Patient instructed to return to office/ER sooner if symptoms are not improving, getting worse, or new worrisome/neurologic symptoms arise. Patient will follow up in approx 2 months for repeat evaluation after additional cervical MESFIN. If pain/symptoms and RUE weakness continue to worsen despite conservative treatment, will plan to discuss role of surgical intervention. Subjective:      Chief Complaint: Neck Pain    HPI:  Linda Hernandez is a 34 y.o. female presenting for initial visit with chief complaint of neck and right upper extremity pain since . R hand dominant. She notes waking up with pain without inciting injury or event. Describes ongoing neck pain with radiation into her right scapular region and upper extremity with some numbness/tingling. Also with increasing headaches. Pain worse with neck range of motion. Shoulder pain worse with shoulder range of motion. Pain is constant and has been interfering with her daily activities and functioning. She notes dropping items frequently due to  strength weakness. Also notes changes in fine motor skills. Denies changes in balance or gait. Denies any michelle trauma. Denies fever or chills, no night sweats. Denies any bladder or bowel changes. Denies heart or lung disease. Denies diabetes or kidney disease. Conservative therapy includes the following:   Medications: flexeril, advil, tylenol without relief. Robaxin without relief. Has taken oral steroids in past without relief  Injections:   7/21/2023 - right C7-T1 interlaminar MESFIN - no improvement  Has upcoming C5-6 TAB scheduled for 9/12/2023  Has had right shoulder and elbow injections without relief  Physical Therapy: was in therapy without much improvement  Chiropractic Medicine: has not attempted  Accupunture/Massage Therapy: has not attempted   These therapeutic modalities were ineffective at providing sustained pain relief/functional improvement.     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  Living situation: Lives with family   ADLs: patient is able to perform , but notes limitations due to increased pain    Objective:     Family History   Problem Relation Age of Onset   • Mental illness Mother    • Depression Mother    • Early death Mother    • Diabetes Father    • Hypertension Father    • Early death Father    • Diabetes Maternal Grandmother    • Arthritis Maternal Grandmother    • Heart disease Maternal Grandmother    • ADD / ADHD Brother    • Anxiety disorder Brother    • Anxiety disorder Sister    • Rashes / Skin problems Sister    • Migraines Sister        Past Medical History:   Diagnosis Date   • Anxiety    • Bunion     left foot   OR   correction today 4/14/2023   • Cervical radiculopathy    • COVID     x 2-- Jan 2022 and Jan 2023   • Depression    • GERD (gastroesophageal reflux disease)    • Headache(784.0)    • Migraines    • Seasonal allergies    • Tobacco abuse    • Wears contact lenses        Current Outpatient Medications   Medication Sig Dispense Refill   • acyclovir (ZOVIRAX) 5 % ointment APPLY TOPICALLY EVERY 4 (FOUR) HOURS 15 g 0   • Calcium Carbonate Antacid (TUMS PO) Take by mouth if needed     • clonazePAM (KlonoPIN) 1 mg tablet Take 1 tablet (1 mg total) by mouth 2 (two) times a day 60 tablet 0   • dicyclomine (BENTYL) 20 mg tablet TAKE 1 TABLET (20 MG TOTAL) BY MOUTH 3 (THREE) TIMES A DAY AS NEEDED (FOR ABDOMINAL CRAMPING) 30 tablet 1   • DULoxetine (CYMBALTA) 30 mg delayed release capsule Take 1 capsule (30 mg total) by mouth daily Along with 60 mg capsule 90 capsule 0   • DULoxetine (CYMBALTA) 60 mg delayed release capsule Take 1 capsule (60 mg total) by mouth daily 90 capsule 0   • loratadine (CLARITIN) 10 mg tablet Take 1 tablet (10 mg total) by mouth daily (Patient taking differently: Take 10 mg by mouth daily as needed) 30 tablet 2   • methocarbamol (Robaxin-750) 750 mg tablet Take 1 tablet (750 mg total) by mouth 2 (two) times a day as needed for muscle spasms 30 tablet 0   • Multiple Vitamins-Minerals (MULTIVITAMIN ADULT PO) Take 1 tablet by mouth daily     • ondansetron (ZOFRAN) 4 mg tablet TAKE 1 TABLET BY MOUTH EVERY 8 HOURS AS NEEDED FOR NAUSEA AND VOMITING 20 tablet 0   • traZODone (DESYREL) 50 mg tablet Take 1 tablet (50 mg total) by mouth daily at bedtime as needed for sleep 90 tablet 0   • Diclofenac Sodium (VOLTAREN) 1 % Apply 2 g topically 4 (four) times a day (Patient not taking: Reported on 7/13/2023) 100 g 3   • gabapentin (NEURONTIN) 100 mg capsule Take 1 capsule (100 mg total) by mouth 3 (three) times a day 90 capsule 0   • ibuprofen (MOTRIN) 600 mg tablet TAKE 1 TABLET (600 MG TOTAL) BY MOUTH EVERY 6 (SIX) HOURS AS NEEDED FOR MILD PAIN 120 tablet 0   • meloxicam (Mobic) 15 mg tablet Take 1 tablet (15 mg total) by mouth daily (Patient not taking: Reported on 8/23/2023) 30 tablet 1     No current facility-administered medications for this visit.        Past Surgical History:   Procedure Laterality Date   • BUNIONECTOMY Right    • LAPAROSCOPIC TOTAL HYSTERECTOMY     • MS HALLUX RIGIDUS W/CHEILECTOMY 1ST MP JT W/IMPLT Left 4/14/2023    Procedure: BUNIONECTOMY with implant;  Surgeon: John Lomas DPM;  Location: [facility];  Service: Podiatry   • TONSILLECTOMY         Social History     Socioeconomic History   • Marital status: /Civil Union     Spouse name: Not on file   • Number of children: Not on file   • Years of education: Not on file   • Highest education level: Not on file   Occupational History   • Not on file   Tobacco Use   • Smoking status: Every Day     Packs/day: 0.50     Years: 20.00     Total pack years: 10.00     Types: Cigarettes     Start date: 4/11/2002   • Smokeless tobacco: Never   Vaping Use   • Vaping Use: Never used   Substance and Sexual Activity   • Alcohol use: Not Currently   • Drug use: No   • Sexual activity: Yes     Partners: Male     Birth control/protection: None     Comment: Hysterectomy on July 1st 2020   Other Topics Concern   • Not on file   Social History Narrative   • Not on file     Social Determinants of Health     Financial Resource Strain: Not on file   Food Insecurity: Not on file   Transportation Needs: Not on file   Physical Activity: Not on file   Stress: Not on file   Social Connections: Not on file   Intimate Partner Violence: Not on file   Housing Stability: Not on file       Allergies   Allergen Reactions   • Bactrim [Sulfamethoxazole-Trimethoprim] Hives   • Latex Other (See Comments)     Latex allergy-rashes   • Levofloxacin Hives   • Quinolones Rash Review of Systems  General- denies fever/chills  HEENT- denies hearing loss or sore throat  Eyes- denies eye pain or visual disturbances, denies red eyes  Respiratory- denies cough or SOB  Cardio- denies chest pain or palpitations  GI- denies abdominal pain  Endocrine- denies urinary frequency  Urinary- denies pain with urination  Musculoskeletal- Negative except noted above  Skin- denies rashes or wounds  Neurological- denies dizziness or headache  Psychiatric- denies anxiety or difficulty concentrating    Physical Exam  /83   Pulse 90   Ht 5' 4" (1.626 m)   Wt 51.3 kg (113 lb 1.5 oz)   LMP 01/29/2020   BMI 19.41 kg/m²     General/Constitutional: No apparent distress: well-nourished and well developed. Lymphatic: No appreciable lymphadenopathy  Respiratory: Non-labored breathing  Vascular: No edema, swelling or tenderness, except as noted in detailed exam.  Integumentary: No impressive skin lesions present, except as noted in detailed exam.  Psych: Normal mood and affect, oriented to person, place and time. MSK: normal other than stated in HPI and exam  Gait & balance: no evidence of myelopathic gait, ambulates Independently     Cervical  spine range of motion:  -Forward flexion chin to chest  -Extension to 60  -Lateral bend 30 right, 30 left  -Rotation 45 right, 45 left. There is no point tenderness with palpation along the posterior cervical, thoracic, lumbar spine.      Neurologic:  Upper Extremity Motor Function    Right  Left    Deltoid  5/5  5/5    Bicep  5/5  5/5    Wrist extension  4+/5  5/5    Tricep  4+/5  5/5    Finger flexion/  4-/5  5/5    Hand intrinsic  5/5  5/5      Lower Extremity Motor Function    Right  Left    Heel rise  5/5  5/5    Toe rise  5/5  5/5      Sensory: light touch is intact to bilateral upper and lower extremities     Reflexes:    Right Left   Biceps 2+ 2+   Triceps 2+ 2+   Brachioradialis 2+ 2+   Patellar 3+ 3+   Achilles 2+ 2+   Babinski neg neg     Other tests:  Spurling's: positive  Valdez's: positive right  Inverted radial reflex: negative  Clonus: negative  Tandem gait: negative  Carpal Tinel/Phalen: negative bilateral   Cubital Tinel: negative bilateral   Shoulder: painless active & passive ROM left; pain with active ROM right  Shoulder impingement tests: positive right    Diagnostic Tests   IMAGING: I have personally reviewed the images and these are my findings:  Cervical Spine X-rays from 9/1/2023: cervical spondylosis with loss of disc height notably C5-6, uncovertebral hypertrophy; osteophyte formation and mild facet hypertrophy, no apparent spondylolisthesis, no appreciated lytic/blastic lesions, no obvious instability    Cervical Spine MRI from 5/30/2023: multi level cervical disc degeneration with disc desiccation, loss of disc height, and foraminal stenosis most noted at C5-6    Procedures, if performed today     None performed       Portions of the record may have been created with voice recognition software. Occasional wrong word or "sound a like" substitutions may have occurred due to the inherent limitations of voice recognition software. Read the chart carefully and recognize, using context, where substitutions have occurred.

## 2023-09-01 ENCOUNTER — HOSPITAL ENCOUNTER (OUTPATIENT)
Dept: RADIOLOGY | Facility: HOSPITAL | Age: 38
Discharge: HOME | End: 2023-09-01
Attending: ORTHOPAEDIC SURGERY
Payer: COMMERCIAL

## 2023-09-01 ENCOUNTER — OFFICE VISIT (OUTPATIENT)
Dept: OBGYN CLINIC | Facility: HOSPITAL | Age: 38
End: 2023-09-01
Payer: COMMERCIAL

## 2023-09-01 VITALS
HEART RATE: 90 BPM | WEIGHT: 113.1 LBS | HEIGHT: 64 IN | DIASTOLIC BLOOD PRESSURE: 83 MMHG | BODY MASS INDEX: 19.31 KG/M2 | SYSTOLIC BLOOD PRESSURE: 122 MMHG

## 2023-09-01 DIAGNOSIS — M48.02 CERVICAL SPINAL STENOSIS: ICD-10-CM

## 2023-09-01 DIAGNOSIS — M54.12 CERVICAL RADICULOPATHY: Primary | ICD-10-CM

## 2023-09-01 DIAGNOSIS — M50.120 CERVICAL DISC DISORDER WITH RADICULOPATHY OF MID-CERVICAL REGION: ICD-10-CM

## 2023-09-01 DIAGNOSIS — G95.20 CERVICAL SPINAL CORD COMPRESSION (HCC): ICD-10-CM

## 2023-09-01 DIAGNOSIS — M54.12 CERVICAL RADICULOPATHY: ICD-10-CM

## 2023-09-01 PROCEDURE — 99214 OFFICE O/P EST MOD 30 MIN: CPT | Performed by: ORTHOPAEDIC SURGERY

## 2023-09-01 PROCEDURE — 72050 X-RAY EXAM NECK SPINE 4/5VWS: CPT

## 2023-09-02 DIAGNOSIS — F41.1 GENERALIZED ANXIETY DISORDER: ICD-10-CM

## 2023-09-02 DIAGNOSIS — G47.00 INSOMNIA, UNSPECIFIED TYPE: ICD-10-CM

## 2023-09-05 RX ORDER — CLONAZEPAM 1 MG/1
1 TABLET ORAL 2 TIMES DAILY
Qty: 60 TABLET | Refills: 0 | Status: SHIPPED | OUTPATIENT
Start: 2023-09-05

## 2023-09-05 RX ORDER — TRAZODONE HYDROCHLORIDE 50 MG/1
50 TABLET ORAL
Qty: 90 TABLET | Refills: 0 | Status: SHIPPED | OUTPATIENT
Start: 2023-09-05

## 2023-09-05 RX ORDER — DULOXETIN HYDROCHLORIDE 30 MG/1
30 CAPSULE, DELAYED RELEASE ORAL DAILY
Qty: 90 CAPSULE | Refills: 0 | Status: SHIPPED | OUTPATIENT
Start: 2023-09-05

## 2023-09-07 ENCOUNTER — TELEPHONE (OUTPATIENT)
Dept: NEUROSURGERY | Facility: CLINIC | Age: 38
End: 2023-09-07

## 2023-09-07 NOTE — TELEPHONE ENCOUNTER
9/7/23:  L/M FOR PT TO CALL BACK TO R/S [facility] APPT WITH [facility] FOR 2/2024 PT CX VIA MY CHART  Canceled via [employer]

## 2023-09-12 ENCOUNTER — HOSPITAL ENCOUNTER (OUTPATIENT)
Dept: RADIOLOGY | Facility: MEDICAL CENTER | Age: 38
Discharge: HOME | End: 2023-09-12
Admitting: PHYSICAL MEDICINE & REHABILITATION
Payer: COMMERCIAL

## 2023-09-12 VITALS
SYSTOLIC BLOOD PRESSURE: 111 MMHG | HEART RATE: 86 BPM | OXYGEN SATURATION: 95 % | TEMPERATURE: 98.2 F | RESPIRATION RATE: 18 BRPM | DIASTOLIC BLOOD PRESSURE: 72 MMHG

## 2023-09-12 DIAGNOSIS — M54.12 CERVICAL RADICULOPATHY: ICD-10-CM

## 2023-09-12 PROCEDURE — 62321 NJX INTERLAMINAR CRV/THRC: CPT | Performed by: PHYSICAL MEDICINE & REHABILITATION

## 2023-09-12 RX ORDER — METHYLPREDNISOLONE ACETATE 80 MG/ML
80 INJECTION, SUSPENSION INTRA-ARTICULAR; INTRALESIONAL; INTRAMUSCULAR; PARENTERAL; SOFT TISSUE ONCE
Status: COMPLETED | OUTPATIENT
Start: 2023-09-12 | End: 2023-09-12

## 2023-09-12 RX ADMIN — IOHEXOL 2 ML: 300 INJECTION, SOLUTION INTRAVENOUS at 15:33

## 2023-09-12 RX ADMIN — METHYLPREDNISOLONE ACETATE 80 MG: 80 INJECTION, SUSPENSION INTRA-ARTICULAR; INTRALESIONAL; INTRAMUSCULAR; PARENTERAL; SOFT TISSUE at 15:33

## 2023-09-12 NOTE — H&P
History of Present Illness:  The patient is a 34 y.o. female who presents with complaints of neck and arm pain    Past Medical History:   Diagnosis Date   • Anxiety    • Bunion     left foot   OR   correction today 4/14/2023   • Cervical radiculopathy    • COVID     x 2-- Jan 2022 and Jan 2023   • Depression    • GERD (gastroesophageal reflux disease)    • Headache(784.0)    • Migraines    • Seasonal allergies    • Tobacco abuse    • Wears contact lenses        Past Surgical History:   Procedure Laterality Date   • BUNIONECTOMY Right    • LAPAROSCOPIC TOTAL HYSTERECTOMY     • VA HALLUX RIGIDUS W/CHEILECTOMY 1ST MP JT W/IMPLT Left 4/14/2023    Procedure: BUNIONECTOMY with implant;  Surgeon: Daija De La Fuente DPM;  Location: [facility];  Service: Podiatry   • TONSILLECTOMY           Current Outpatient Medications:   •  acyclovir (ZOVIRAX) 5 % ointment, APPLY TOPICALLY EVERY 4 (FOUR) HOURS, Disp: 15 g, Rfl: 0  •  Calcium Carbonate Antacid (TUMS PO), Take by mouth if needed, Disp: , Rfl:   •  clonazePAM (KlonoPIN) 1 mg tablet, Take 1 tablet (1 mg total) by mouth 2 (two) times a day, Disp: 60 tablet, Rfl: 0  •  Diclofenac Sodium (VOLTAREN) 1 %, Apply 2 g topically 4 (four) times a day (Patient not taking: Reported on 7/13/2023), Disp: 100 g, Rfl: 3  •  dicyclomine (BENTYL) 20 mg tablet, TAKE 1 TABLET (20 MG TOTAL) BY MOUTH 3 (THREE) TIMES A DAY AS NEEDED (FOR ABDOMINAL CRAMPING), Disp: 30 tablet, Rfl: 1  •  DULoxetine (CYMBALTA) 30 mg delayed release capsule, Take 1 capsule (30 mg total) by mouth daily Along with 60 mg capsule, Disp: 90 capsule, Rfl: 0  •  DULoxetine (CYMBALTA) 60 mg delayed release capsule, Take 1 capsule (60 mg total) by mouth daily, Disp: 90 capsule, Rfl: 0  •  gabapentin (NEURONTIN) 100 mg capsule, Take 1 capsule (100 mg total) by mouth 3 (three) times a day, Disp: 90 capsule, Rfl: 0  •  ibuprofen (MOTRIN) 600 mg tablet, TAKE 1 TABLET (600 MG TOTAL) BY MOUTH EVERY 6 (SIX) HOURS AS NEEDED FOR MILD PAIN, Disp: 120 tablet, Rfl: 0  •  loratadine (CLARITIN) 10 mg tablet, Take 1 tablet (10 mg total) by mouth daily (Patient taking differently: Take 10 mg by mouth daily as needed), Disp: 30 tablet, Rfl: 2  •  meloxicam (Mobic) 15 mg tablet, Take 1 tablet (15 mg total) by mouth daily (Patient not taking: Reported on 8/23/2023), Disp: 30 tablet, Rfl: 1  •  methocarbamol (Robaxin-750) 750 mg tablet, Take 1 tablet (750 mg total) by mouth 2 (two) times a day as needed for muscle spasms, Disp: 30 tablet, Rfl: 0  •  Multiple Vitamins-Minerals (MULTIVITAMIN ADULT PO), Take 1 tablet by mouth daily, Disp: , Rfl:   •  ondansetron (ZOFRAN) 4 mg tablet, TAKE 1 TABLET BY MOUTH EVERY 8 HOURS AS NEEDED FOR NAUSEA AND VOMITING, Disp: 20 tablet, Rfl: 0  •  traZODone (DESYREL) 50 mg tablet, Take 1 tablet (50 mg total) by mouth daily at bedtime as needed for sleep, Disp: 90 tablet, Rfl: 0    Allergies   Allergen Reactions   • Bactrim [Sulfamethoxazole-Trimethoprim] Hives   • Latex Other (See Comments)     Latex allergy-rashes   • Levofloxacin Hives   • Quinolones Rash       Physical Exam:   Vitals:    09/12/23 1458   BP: 117/76   Pulse: 88   Resp: 18   Temp: 98.2 °F (36.8 °C)   SpO2: 95%     General: Awake, Alert, Oriented x 3, Mood and affect appropriate  Respiratory: Respirations even and unlabored  Cardiovascular: Peripheral pulses intact; no edema  Musculoskeletal Exam: neck and arm pain    ASA Score: 2    Patient/Chart Verification  Patient ID Verified: Verbal  ID Band Applied: No  Consents Confirmed: Procedural, To be obtained in the Pre-Procedure area  Interval H&P(within 24 hr) Complete (required for Outpatients and Surgery Admit only): To be obtained in the Pre-Procedure area  Allergies Reviewed: Yes  Anticoag/NSAID held?: NA  Currently on antibiotics?: No  Pregnancy denied?: Yes    Assessment:   1.  Cervical radiculopathy        Plan: C6-7 TAB

## 2023-09-12 NOTE — DISCHARGE INSTRUCTIONS
Epidural Steroid Injection   WHAT YOU NEED TO KNOW:   An epidural steroid injection (MESFIN) is a procedure to inject steroid medicine into the epidural space. The epidural space is between your spinal cord and vertebrae. Steroids reduce inflammation and fluid buildup in your spine that may be causing pain. You may be given pain medicine along with the steroids. ACTIVITY  Do not drive or operate machinery today. No strenuous activity today - bending, lifting, etc.  You may resume normal activites starting tomorrow - start slowly and as tolerated. You may shower today, but no tub baths or hot tubs. You may have numbness for several hours from the local anesthetic. Please use caution and common sense, especially with weight-bearing activities. CARE OF THE INJECTION SITE  If you have soreness or pain, apply ice to the area today (20 minutes on/20 minutes off). Starting tomorrow, you may use warm, moist heat or ice if needed. You may have an increase or change in your discomfort for 36-48 hours after your treatment. Apply ice and continue with any pain medication you have been prescribed. Notify [facility] if you have any of the following: redness, drainage, swelling, headache, stiff neck or fever above 100°F.    SPECIAL INSTRUCTIONS  Our office will contact you in approximately 7 days for a progress report. MEDICATIONS  Continue to take all routine medications. resume ibuprofen at 3:15pm 9/13/23  Our office may have instructed you to hold some medications. As no general anesthesia was used in today's procedure, you should not experience any side effects related to anesthesia. If you are diabetic, the steroids used in today's injection may temporarily increase your blood sugar levels after the first few days after your injection.  Please keep a close eye on your sugars and alert the doctor who manages your diabetes if your sugars are significantly high from your baseline or you are symptomatic. If you have a problem specifically related to your procedure, please call our office at [phone]. Problems not related to your procedure should be directed to your primary care physician.

## 2023-09-14 ENCOUNTER — TELEPHONE (OUTPATIENT)
Dept: FAMILY MEDICINE CLINIC | Facility: CLINIC | Age: 38
End: 2023-09-14

## 2023-09-14 DIAGNOSIS — Z13.0 SCREENING FOR IRON DEFICIENCY ANEMIA: ICD-10-CM

## 2023-09-14 DIAGNOSIS — Z13.220 SCREENING FOR CHOLESTEROL LEVEL: ICD-10-CM

## 2023-09-14 DIAGNOSIS — Z11.59 NEED FOR HEPATITIS C SCREENING TEST: ICD-10-CM

## 2023-09-14 DIAGNOSIS — Z13.29 SCREENING FOR THYROID DISORDER: ICD-10-CM

## 2023-09-14 DIAGNOSIS — Z13.1 SCREENING FOR DIABETES MELLITUS: ICD-10-CM

## 2023-09-14 DIAGNOSIS — F41.1 GENERALIZED ANXIETY DISORDER: Primary | ICD-10-CM

## 2023-09-14 NOTE — TELEPHONE ENCOUNTER
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 Patient would like requisition faxed to [facility] in [place] on [address].  [phone]

## 2023-09-19 ENCOUNTER — HOSPITAL ENCOUNTER (OUTPATIENT)
Dept: MAMMOGRAPHY | Facility: MEDICAL CENTER | Age: 38
Discharge: HOME | End: 2023-09-19
Payer: COMMERCIAL

## 2023-09-19 ENCOUNTER — TELEPHONE (OUTPATIENT)
Dept: PAIN MEDICINE | Facility: CLINIC | Age: 38
End: 2023-09-19

## 2023-09-19 VITALS — BODY MASS INDEX: 18.78 KG/M2 | HEIGHT: 64 IN | WEIGHT: 110 LBS

## 2023-09-19 DIAGNOSIS — Z12.31 ENCOUNTER FOR SCREENING MAMMOGRAM FOR MALIGNANT NEOPLASM OF BREAST: ICD-10-CM

## 2023-09-19 PROCEDURE — 77067 SCR MAMMO BI INCL CAD: CPT

## 2023-09-19 PROCEDURE — 77063 BREAST TOMOSYNTHESIS BI: CPT

## 2023-09-19 NOTE — TELEPHONE ENCOUNTER
Caller: Kathleen Goyal PT    Doctor: Dr Vee Mercado     Reason for call: Pt called in with 0% improvement and pain level 7/10.       Call back#: [phone]

## 2023-09-21 ENCOUNTER — TELEPHONE (OUTPATIENT)
Dept: FAMILY MEDICINE CLINIC | Facility: CLINIC | Age: 38
End: 2023-09-21

## 2023-09-21 NOTE — TELEPHONE ENCOUNTER
Pt called stating that she has been trying to get a hold of [id number] for the past 2 days and the phone number that was given to her ([phone]) has been busy. When googled the [phone] was found. She will try this number, if she has problems with that one she was instructed to call back to let us know.

## 2023-09-21 NOTE — TELEPHONE ENCOUNTER
----- Message from Osmar Eric DO sent at 9/20/2023  9:31 PM EDT -----  Please call patient, confirm that she is scheduled for her further testing due to her left abnormal mammogram.  Thank you

## 2023-09-25 ENCOUNTER — TELEPHONE (OUTPATIENT)
Dept: FAMILY MEDICINE CLINIC | Facility: CLINIC | Age: 38
End: 2023-09-25

## 2023-09-25 NOTE — TELEPHONE ENCOUNTER
----- Message from Jaimee Garces, DO sent at 9/20/2023  9:31 PM EDT -----  Please call patient, confirm that she is scheduled for her further testing due to her left abnormal mammogram.  Thank you

## 2023-09-25 NOTE — TELEPHONE ENCOUNTER
Spoke w/ pt. [facility] sometime today to schedule.

## 2023-09-30 DIAGNOSIS — F41.1 GENERALIZED ANXIETY DISORDER: ICD-10-CM

## 2023-10-01 LAB
ALBUMIN SERPL-MCNC: 4.3 G/DL (ref 3.6–5.1)
ALBUMIN/GLOB SERPL: 1.7 (CALC) (ref 1–2.5)
ALP SERPL-CCNC: 49 U/L (ref 31–125)
ALT SERPL-CCNC: 8 U/L (ref 6–29)
AST SERPL-CCNC: 11 U/L (ref 10–30)
BILIRUB SERPL-MCNC: 0.5 MG/DL (ref 0.2–1.2)
BUN SERPL-MCNC: 7 MG/DL (ref 7–25)
BUN/CREAT SERPL: NORMAL (CALC) (ref 6–22)
CALCIUM SERPL-MCNC: 9.2 MG/DL (ref 8.6–10.2)
CHLORIDE SERPL-SCNC: 106 MMOL/L (ref 98–110)
CHOLEST SERPL-MCNC: 218 MG/DL
CHOLEST/HDLC SERPL: 4.3 (CALC)
CO2 SERPL-SCNC: 29 MMOL/L (ref 20–32)
CREAT SERPL-MCNC: 0.63 MG/DL (ref 0.5–0.99)
ERYTHROCYTE [DISTWIDTH] IN BLOOD BY AUTOMATED COUNT: 12.5 % (ref 11–15)
EST. AVERAGE GLUCOSE BLD GHB EST-MCNC: 108 MG/DL
EST. AVERAGE GLUCOSE BLD GHB EST-SCNC: 6 MMOL/L
GFR/BSA.PRED SERPLBLD CYS-BASED-ARV: 115 ML/MIN/1.73M2
GLOBULIN SER CALC-MCNC: 2.6 G/DL (CALC) (ref 1.9–3.7)
GLUCOSE SERPL-MCNC: 88 MG/DL (ref 65–99)
HBA1C MFR BLD: 5.4 % OF TOTAL HGB
HCT VFR BLD AUTO: 37.2 % (ref 35–45)
HDLC SERPL-MCNC: 51 MG/DL
HGB BLD-MCNC: 12.4 G/DL (ref 11.7–15.5)
LDLC SERPL CALC-MCNC: 138 MG/DL (CALC)
MCH RBC QN AUTO: 30 PG (ref 27–33)
MCHC RBC AUTO-ENTMCNC: 33.3 G/DL (ref 32–36)
MCV RBC AUTO: 90.1 FL (ref 80–100)
NONHDLC SERPL-MCNC: 167 MG/DL (CALC)
PLATELET # BLD AUTO: 288 THOUSAND/UL (ref 140–400)
PMV BLD REES-ECKER: 9.5 FL (ref 7.5–12.5)
POTASSIUM SERPL-SCNC: 4 MMOL/L (ref 3.5–5.3)
PROT SERPL-MCNC: 6.9 G/DL (ref 6.1–8.1)
RBC # BLD AUTO: 4.13 MILLION/UL (ref 3.8–5.1)
SODIUM SERPL-SCNC: 140 MMOL/L (ref 135–146)
TRIGL SERPL-MCNC: 155 MG/DL
TSH SERPL-ACNC: 2.53 MIU/L
WBC # BLD AUTO: 7.3 THOUSAND/UL (ref 3.8–10.8)

## 2023-10-02 RX ORDER — DULOXETIN HYDROCHLORIDE 60 MG/1
60 CAPSULE, DELAYED RELEASE ORAL DAILY
Qty: 90 CAPSULE | Refills: 0 | Status: SHIPPED | OUTPATIENT
Start: 2023-10-02

## 2023-10-04 LAB
ALBUMIN SERPL-MCNC: 4.3 G/DL (ref 3.6–5.1)
ALBUMIN/GLOB SERPL: 1.7 (CALC) (ref 1–2.5)
ALP SERPL-CCNC: 49 U/L (ref 31–125)
ALT SERPL-CCNC: 8 U/L (ref 6–29)
AST SERPL-CCNC: 11 U/L (ref 10–30)
BILIRUB SERPL-MCNC: 0.5 MG/DL (ref 0.2–1.2)
BUN SERPL-MCNC: 7 MG/DL (ref 7–25)
BUN/CREAT SERPL: NORMAL (CALC) (ref 6–22)
CALCIUM SERPL-MCNC: 9.2 MG/DL (ref 8.6–10.2)
CHLORIDE SERPL-SCNC: 106 MMOL/L (ref 98–110)
CHOLEST SERPL-MCNC: 218 MG/DL
CHOLEST/HDLC SERPL: 4.3 (CALC)
CO2 SERPL-SCNC: 29 MMOL/L (ref 20–32)
CREAT SERPL-MCNC: 0.63 MG/DL (ref 0.5–0.99)
ERYTHROCYTE [DISTWIDTH] IN BLOOD BY AUTOMATED COUNT: 12.5 % (ref 11–15)
EST. AVERAGE GLUCOSE BLD GHB EST-MCNC: 108 MG/DL
EST. AVERAGE GLUCOSE BLD GHB EST-SCNC: 6 MMOL/L
GFR/BSA.PRED SERPLBLD CYS-BASED-ARV: 115 ML/MIN/1.73M2
GLOBULIN SER CALC-MCNC: 2.6 G/DL (CALC) (ref 1.9–3.7)
GLUCOSE SERPL-MCNC: 88 MG/DL (ref 65–99)
HBA1C MFR BLD: 5.4 % OF TOTAL HGB
HCT VFR BLD AUTO: 37.2 % (ref 35–45)
HCV RNA SERPL NAA+PROBE-ACNC: ABNORMAL IU/ML
HCV RNA SERPL NAA+PROBE-LOG IU: ABNORMAL LOG IU/ML
HDLC SERPL-MCNC: 51 MG/DL
HGB BLD-MCNC: 12.4 G/DL (ref 11.7–15.5)
LDLC SERPL CALC-MCNC: 138 MG/DL (CALC)
MCH RBC QN AUTO: 30 PG (ref 27–33)
MCHC RBC AUTO-ENTMCNC: 33.3 G/DL (ref 32–36)
MCV RBC AUTO: 90.1 FL (ref 80–100)
NONHDLC SERPL-MCNC: 167 MG/DL (CALC)
PLATELET # BLD AUTO: 288 THOUSAND/UL (ref 140–400)
PMV BLD REES-ECKER: 9.5 FL (ref 7.5–12.5)
POTASSIUM SERPL-SCNC: 4 MMOL/L (ref 3.5–5.3)
PROT SERPL-MCNC: 6.9 G/DL (ref 6.1–8.1)
RBC # BLD AUTO: 4.13 MILLION/UL (ref 3.8–5.1)
SODIUM SERPL-SCNC: 140 MMOL/L (ref 135–146)
TRIGL SERPL-MCNC: 155 MG/DL
TSH SERPL-ACNC: 2.53 MIU/L
WBC # BLD AUTO: 7.3 THOUSAND/UL (ref 3.8–10.8)

## 2023-10-05 DIAGNOSIS — R76.8 ABNORMAL HEPATITIS SEROLOGY: Primary | ICD-10-CM

## 2023-10-11 DIAGNOSIS — F41.1 GENERALIZED ANXIETY DISORDER: ICD-10-CM

## 2023-10-12 RX ORDER — CLONAZEPAM 1 MG/1
1 TABLET ORAL 2 TIMES DAILY
Qty: 60 TABLET | Refills: 0 | Status: SHIPPED | OUTPATIENT
Start: 2023-10-12

## 2023-10-16 DIAGNOSIS — R76.8 ABNORMAL HEPATITIS SEROLOGY: Primary | ICD-10-CM

## 2023-10-17 ENCOUNTER — OFFICE VISIT (OUTPATIENT)
Dept: FAMILY MEDICINE CLINIC | Facility: CLINIC | Age: 38
End: 2023-10-17
Payer: COMMERCIAL

## 2023-10-17 VITALS
TEMPERATURE: 98.2 F | OXYGEN SATURATION: 98 % | BODY MASS INDEX: 19.77 KG/M2 | HEART RATE: 84 BPM | WEIGHT: 115.8 LBS | HEIGHT: 64 IN | SYSTOLIC BLOOD PRESSURE: 124 MMHG | DIASTOLIC BLOOD PRESSURE: 77 MMHG

## 2023-10-17 DIAGNOSIS — Z23 ENCOUNTER FOR IMMUNIZATION: Primary | ICD-10-CM

## 2023-10-17 DIAGNOSIS — R76.8 ABNORMAL HEPATITIS SEROLOGY: ICD-10-CM

## 2023-10-17 DIAGNOSIS — G95.20 CERVICAL SPINAL CORD COMPRESSION (HCC): ICD-10-CM

## 2023-10-17 DIAGNOSIS — M79.18 MYOFASCIAL PAIN SYNDROME: ICD-10-CM

## 2023-10-17 DIAGNOSIS — Z00.00 ANNUAL PHYSICAL EXAM: ICD-10-CM

## 2023-10-17 PROCEDURE — 90677 PCV20 VACCINE IM: CPT

## 2023-10-17 PROCEDURE — 99396 PREV VISIT EST AGE 40-64: CPT | Performed by: FAMILY MEDICINE

## 2023-10-17 PROCEDURE — 90686 IIV4 VACC NO PRSV 0.5 ML IM: CPT

## 2023-10-17 PROCEDURE — 90471 IMMUNIZATION ADMIN: CPT

## 2023-10-17 PROCEDURE — 90472 IMMUNIZATION ADMIN EACH ADD: CPT

## 2023-10-17 RX ORDER — METHOCARBAMOL 750 MG/1
750 TABLET, FILM COATED ORAL 2 TIMES DAILY PRN
Qty: 60 TABLET | Refills: 2 | Status: SHIPPED | OUTPATIENT
Start: 2023-10-17

## 2023-10-17 RX ORDER — NABUMETONE 500 MG/1
500 TABLET, FILM COATED ORAL 2 TIMES DAILY
Qty: 60 TABLET | Refills: 2 | Status: SHIPPED | OUTPATIENT
Start: 2023-10-17

## 2023-10-17 NOTE — PATIENT INSTRUCTIONS
Wellness Visit for Adults   AMBULATORY CARE:   A wellness visit  is when you see your healthcare provider to get screened for health problems. Your healthcare provider will also give you advice on how to stay healthy. Write down your questions so you remember to ask them. Ask your healthcare provider how often you should have a wellness visit. What happens at a wellness visit:  Your healthcare provider will ask about your health, and your family history of health problems. This includes high blood pressure, heart disease, and cancer. He or she will ask if you have symptoms that concern you, if you smoke, and about your mood. You may also be asked about your intake of medicines, supplements, food, and alcohol. Any of the following may be done: Your weight  will be checked. Your height may also be checked so your body mass index (BMI) can be calculated. Your BMI shows if you are at a healthy weight. Your blood pressure  and heart rate will be checked. Your temperature may also be checked. Blood and urine tests  may be done. Blood tests may be done to check your cholesterol levels. Abnormal cholesterol levels increase your risk for heart disease and stroke. You may also need a blood or urine test to check for diabetes if you are at increased risk. Urine tests may be done to look for signs of an infection or kidney disease. A physical exam  includes checking your heartbeat and lungs with a stethoscope. Your healthcare provider may also check your skin to look for sun damage. Screening tests  may be recommended. A screening test is done to check for diseases that may not cause symptoms. The screening tests you may need depend on your age, gender, family history, and lifestyle habits. For example, colorectal screening may be recommended if you are 48years old or older. Screening tests you need if you are a woman:   A Pap smear  is used to screen for cervical cancer.  Pap smears are usually done every 3 to 5 years depending on your age. You may need them more often if you have had abnormal Pap smear test results in the past. Ask your healthcare provider how often you should have a Pap smear. A mammogram  is an x-ray of your breasts to screen for breast cancer. Experts recommend mammograms every 2 years starting at age 48 years. You may need a mammogram at age 52 years or younger if you have an increased risk for breast cancer. Talk to your healthcare provider about when you should start having mammograms and how often you need them. Vaccines you may need:   Get an influenza vaccine  every year. The influenza vaccine protects you from the flu. Several types of viruses cause the flu. The viruses change over time, so new vaccines are made each year. Get a tetanus-diphtheria (Td) booster vaccine  every 10 years. This vaccine protects you against tetanus and diphtheria. Tetanus is a severe infection that may cause painful muscle spasms and lockjaw. Diphtheria is a severe bacterial infection that causes a thick covering in the back of your mouth and throat. Get a human papillomavirus (HPV) vaccine  if you are female and aged 23 to 32 or male 23 to 24 and never received it. This vaccine protects you from HPV infection. HPV is the most common infection spread by sexual contact. HPV may also cause vaginal, penile, and anal cancers. Get a pneumococcal vaccine  if you are aged 72 years or older. The pneumococcal vaccine is an injection given to protect you from pneumococcal disease. Pneumococcal disease is an infection caused by pneumococcal bacteria. The infection may cause pneumonia, meningitis, or an ear infection. Get a shingles vaccine  if you are 60 or older, even if you have had shingles before. The shingles vaccine is an injection to protect you from the varicella-zoster virus. This is the same virus that causes chickenpox.  Shingles is a painful rash that develops in people who had chickenpox or have been exposed to the virus. How to eat healthy:  My Plate is a model for planning healthy meals. It shows the types and amounts of foods that should go on your plate. Fruits and vegetables make up about half of your plate, and grains and protein make up the other half. A serving of dairy is included on the side of your plate. The amount of calories and serving sizes you need depends on your age, gender, weight, and height. Examples of healthy foods are listed below:  Eat a variety of vegetables  such as dark green, red, and orange vegetables. You can also include canned vegetables low in sodium (salt) and frozen vegetables without added butter or sauces. Eat a variety of fresh fruits , canned fruit in 100% juice, frozen fruit, and dried fruit. Include whole grains. At least half of the grains you eat should be whole grains. Examples include whole-wheat bread, wheat pasta, brown rice, and whole-grain cereals such as oatmeal.    Eat a variety of protein foods such as seafood (fish and shellfish), lean meat, and poultry without skin (turkey and chicken). Examples of lean meats include pork leg, shoulder, or tenderloin, and beef round, sirloin, tenderloin, and extra lean ground beef. Other protein foods include eggs and egg substitutes, beans, peas, soy products, nuts, and seeds. Choose low-fat dairy products such as skim or 1% milk or low-fat yogurt, cheese, and cottage cheese. Limit unhealthy fats  such as butter, hard margarine, and shortening. Exercise:  Exercise at least 30 minutes per day on most days of the week. Some examples of exercise include walking, biking, dancing, and swimming. You can also fit in more physical activity by taking the stairs instead of the elevator or parking farther away from stores. Include muscle strengthening activities 2 days each week. Regular exercise provides many health benefits.  It helps you manage your weight, and decreases your risk for type 2 diabetes, heart disease, stroke, and high blood pressure. Exercise can also help improve your mood. Ask your healthcare provider about the best exercise plan for you. General health and safety guidelines:   Do not smoke. Nicotine and other chemicals in cigarettes and cigars can cause lung damage. Ask your healthcare provider for information if you currently smoke and need help to quit. E-cigarettes or smokeless tobacco still contain nicotine. Talk to your healthcare provider before you use these products. Limit alcohol. A drink of alcohol is 12 ounces of beer, 5 ounces of wine, or 1½ ounces of liquor. Lose weight, if needed. Being overweight increases your risk of certain health conditions. These include heart disease, high blood pressure, type 2 diabetes, and certain types of cancer. Protect your skin. Do not sunbathe or use tanning beds. Use sunscreen with a SPF 15 or higher. Apply sunscreen at least 15 minutes before you go outside. Reapply sunscreen every 2 hours. Wear protective clothing, hats, and sunglasses when you are outside. Drive safely. Always wear your seatbelt. Make sure everyone in your car wears a seatbelt. A seatbelt can save your life if you are in an accident. Do not use your cell phone when you are driving. This could distract you and cause an accident. Pull over if you need to make a call or send a text message. Practice safe sex. Use latex condoms if are sexually active and have more than one partner. Your healthcare provider may recommend screening tests for sexually transmitted infections (STIs). Wear helmets, lifejackets, and protective gear. Always wear a helmet when you ride a bike or motorcycle, go skiing, or play sports that could cause a head injury. Wear protective equipment when you play sports. Wear a lifejacket when you are on a boat or doing water sports.     © Copyright Marsha R[employer] 2023 Information is for End User's use only and may not be sold, redistributed or otherwise used for commercial purposes. The above information is an  only. It is not intended as medical advice for individual conditions or treatments. Talk to your doctor, nurse or pharmacist before following any medical regimen to see if it is safe and effective for you.

## 2023-10-17 NOTE — PROGRESS NOTES
[id number] [address] GROUP    NAME: Dominik Patino  AGE: 34 y.o. SEX: female  : 1985     DATE: 10/17/2023     Assessment and Plan:     Problem List Items Addressed This Visit    None  Visit Diagnoses       Encounter for immunization    -  Primary    Relevant Orders    influenza vaccine, quadrivalent, 0.5 mL, preservative-free, for adult and pediatric patients 6 mos+ (AFLURIA, FLUARIX, FLULAVAL, FLUZONE) (Completed)    Pneumococcal Conjugate Vaccine 20-valent (Pcv20) (Completed)    Annual physical exam        Cervical spinal cord compression (HCC)        Relevant Medications    nabumetone (RELAFEN) 500 mg tablet    Myofascial pain syndrome        Relevant Medications    methocarbamol (Robaxin-750) 750 mg tablet    Abnormal hepatitis serology        Relevant Orders    HIV 1/2 AG/AB W REFLEX [facility] and [facility] only    Chlamydia/GC amplified DNA by PCR    RPR (DX) W/REFL TITER AND CONFIRM TESTING (REFL)    US abdomen complete            Immunizations and preventive care screenings were discussed with patient today. Appropriate education was printed on patient's after visit summary. Counseling:  Alcohol/drug use: discussed moderation in alcohol intake, the recommendations for healthy alcohol use, and avoidance of illicit drug use. Dental Health: discussed importance of regular tooth brushing, flossing, and dental visits. Injury prevention: discussed safety/seat belts, safety helmets, smoke detectors, carbon dioxide detectors, and smoking near bedding or upholstery. Sexual health: discussed sexually transmitted diseases, partner selection, use of condoms, avoidance of unintended pregnancy, and contraceptive alternatives. Exercise: the importance of regular exercise/physical activity was discussed. Recommend exercise 3-5 times per week for at least 30 minutes. Return in 6 months (on 2024).      Chief Complaint:     Chief Complaint Patient presents with    Physical Exam      History of Present Illness:     Adult Annual Physical   Patient here for a comprehensive physical exam. The patient reports problems - persistent cervical neck pain with radiation down her right arm . Diet and Physical Activity  Diet/Nutrition: well balanced diet. Exercise: no formal exercise. Depression Screening  PHQ-2/9 Depression Screening           General Health  Sleep: sleeps poorly. Hearing: normal - bilateral.  Vision: no vision problems. Dental: regular dental visits. /GYN Health  Patient is: premenopausal           Review of Systems:     Review of Systems   Constitutional:  Negative for activity change, chills, fatigue and fever. HENT:  Negative for congestion, ear pain, sinus pressure and sore throat. Eyes:  Negative for redness, itching and visual disturbance. Respiratory:  Negative for cough and shortness of breath. Cardiovascular:  Negative for chest pain and palpitations. Gastrointestinal:  Negative for abdominal pain, diarrhea and nausea. Endocrine: Negative for cold intolerance and heat intolerance. Genitourinary:  Negative for dysuria, flank pain and frequency. Musculoskeletal:  Negative for arthralgias, back pain, gait problem and myalgias. Skin:  Negative for color change. Allergic/Immunologic: Negative for environmental allergies. Neurological:  Negative for dizziness, numbness and headaches. Psychiatric/Behavioral:  Negative for behavioral problems and sleep disturbance.        Past Medical History:     Past Medical History:   Diagnosis Date    Anxiety     Bunion     left foot   OR   correction today 4/14/2023    Cervical radiculopathy     COVID     x 2-- Jan 2022 and Jan 2023    Depression     GERD (gastroesophageal reflux disease)     Headache(784.0)     Migraines     Seasonal allergies     Tobacco abuse     Wears contact lenses       Past Surgical History:     Past Surgical History:   Procedure Laterality Date    BUNIONECTOMY Right     HYSTERECTOMY      2020    LAPAROSCOPIC TOTAL HYSTERECTOMY      KY HALLUX RIGIDUS W/CHEILECTOMY 1ST MP JT W/IMPLT Left 04/14/2023    Procedure: BUNIONECTOMY with implant;  Surgeon: Worth Klinefelter, DPM;  Location: [facility];  Service: Podiatry    TONSILLECTOMY        Social History:     Social History     Socioeconomic History    Marital status: /Civil Union     Spouse name: None    Number of children: None    Years of education: None    Highest education level: None   Occupational History    None   Tobacco Use    Smoking status: Every Day     Packs/day: 0.50     Years: 20.00     Total pack years: 10.00     Types: Cigarettes     Start date: 4/11/2002    Smokeless tobacco: Never   Vaping Use    Vaping Use: Never used   Substance and Sexual Activity    Alcohol use: Not Currently    Drug use: No    Sexual activity: Yes     Partners: Male     Birth control/protection: None     Comment: Hysterectomy on July 1st 2020   Other Topics Concern    None   Social History Narrative    None     Social Determinants of Health     Financial Resource Strain: Not on file   Food Insecurity: Not on file   Transportation Needs: Not on file   Physical Activity: Not on file   Stress: Not on file   Social Connections: Not on file   Intimate Partner Violence: Not on file   Housing Stability: Not on file      Family History:     Family History   Problem Relation Age of Onset    Mental illness Mother     Depression Mother     Early death Mother     Lung cancer Mother 62        with brain mets. Diabetes Father     Hypertension Father     Early death Father     Anxiety disorder Sister     Rashes / Skin problems Sister     Migraines Sister     Diabetes Maternal Grandmother     Arthritis Maternal Grandmother     Heart disease Maternal Grandmother     No Known Problems Maternal Grandfather     Breast cancer Paternal Grandmother         age unknown.     Anuerysm Paternal Grandfather     ADD / ADHD Brother     Anxiety disorder Brother     Melanoma Brother     Multiple sclerosis Paternal Aunt       Current Medications:     Current Outpatient Medications   Medication Sig Dispense Refill    acyclovir (ZOVIRAX) 5 % ointment APPLY TOPICALLY EVERY 4 (FOUR) HOURS 15 g 0    Calcium Carbonate Antacid (TUMS PO) Take by mouth if needed      clonazePAM (KlonoPIN) 1 mg tablet Take 1 tablet (1 mg total) by mouth 2 (two) times a day 60 tablet 0    Diclofenac Sodium (VOLTAREN) 1 % Apply 2 g topically 4 (four) times a day 100 g 3    dicyclomine (BENTYL) 20 mg tablet TAKE 1 TABLET (20 MG TOTAL) BY MOUTH 3 (THREE) TIMES A DAY AS NEEDED (FOR ABDOMINAL CRAMPING) 30 tablet 1    DULoxetine (CYMBALTA) 30 mg delayed release capsule Take 1 capsule (30 mg total) by mouth daily Along with 60 mg capsule 90 capsule 0    DULoxetine (CYMBALTA) 60 mg delayed release capsule TAKE ONE CAPSULE BY MOUTH EVERY DAY 90 capsule 0    ibuprofen (MOTRIN) 600 mg tablet TAKE 1 TABLET (600 MG TOTAL) BY MOUTH EVERY 6 (SIX) HOURS AS NEEDED FOR MILD PAIN 120 tablet 0    loratadine (CLARITIN) 10 mg tablet Take 1 tablet (10 mg total) by mouth daily (Patient taking differently: Take 10 mg by mouth daily as needed) 30 tablet 2    methocarbamol (Robaxin-750) 750 mg tablet Take 1 tablet (750 mg total) by mouth 2 (two) times a day as needed for muscle spasms 60 tablet 2    Multiple Vitamins-Minerals (MULTIVITAMIN ADULT PO) Take 1 tablet by mouth daily      nabumetone (RELAFEN) 500 mg tablet Take 1 tablet (500 mg total) by mouth 2 (two) times a day 60 tablet 2    ondansetron (ZOFRAN) 4 mg tablet TAKE 1 TABLET BY MOUTH EVERY 8 HOURS AS NEEDED FOR NAUSEA AND VOMITING 20 tablet 0    traZODone (DESYREL) 50 mg tablet Take 1 tablet (50 mg total) by mouth daily at bedtime as needed for sleep 90 tablet 0    gabapentin (NEURONTIN) 100 mg capsule Take 1 capsule (100 mg total) by mouth 3 (three) times a day 90 capsule 0     No current facility-administered medications for this visit. Allergies: Allergies   Allergen Reactions    Bactrim [Sulfamethoxazole-Trimethoprim] Hives    Latex Other (See Comments)     Latex allergy-rashes    Levofloxacin Hives    Quinolones Rash      Physical Exam:     /77 (BP Location: Right arm, Patient Position: Sitting, Cuff Size: Adult)   Pulse 84   Temp 98.2 °F (36.8 °C) (Temporal)   Ht 5' 4" (1.626 m)   Wt 52.5 kg (115 lb 12.8 oz)   LMP 01/29/2020   SpO2 98%   BMI 19.88 kg/m²     Physical Exam  Vitals reviewed. Constitutional:       General: She is not in acute distress. Appearance: She is well-developed. She is not diaphoretic. HENT:      Head: Normocephalic and atraumatic. Right Ear: External ear normal.      Left Ear: External ear normal.      Nose: Nose normal.   Eyes:      General:         Right eye: No discharge. Left eye: No discharge. Pupils: Pupils are equal, round, and reactive to light. Cardiovascular:      Rate and Rhythm: Normal rate and regular rhythm. Heart sounds: Normal heart sounds. No murmur heard. No friction rub. No gallop. Pulmonary:      Effort: Pulmonary effort is normal. No respiratory distress. Breath sounds: Normal breath sounds. No wheezing or rales. Abdominal:      General: Bowel sounds are normal. There is no distension. Palpations: Abdomen is soft. Tenderness: There is no abdominal tenderness. There is no guarding. Musculoskeletal:         General: Normal range of motion. Cervical back: Normal range of motion and neck supple. Lymphadenopathy:      Cervical: No cervical adenopathy. Skin:     General: Skin is warm and dry. Capillary Refill: Capillary refill takes less than 2 seconds. Findings: No erythema or rash. Neurological:      Mental Status: She is alert and oriented to person, place, and time. Cranial Nerves: No cranial nerve deficit. Psychiatric:         Behavior: Behavior normal.         Thought Content:  Thought content normal.         Judgment: Judgment normal.          IhDO JAVIER Sanchez [facility]

## 2023-10-19 LAB
HCV RNA SERPL NAA+PROBE-ACNC: NORMAL IU/ML
HCV RNA SERPL NAA+PROBE-LOG IU: NORMAL LOG IU/ML
HIV 1+2 AB+HIV1 P24 AG SERPL QL IA: NORMAL
RPR SER QL: NORMAL

## 2023-10-20 ENCOUNTER — HOSPITAL ENCOUNTER (OUTPATIENT)
Dept: RADIOLOGY | Facility: HOSPITAL | Age: 38
Discharge: HOME | End: 2023-10-20
Payer: COMMERCIAL

## 2023-10-20 ENCOUNTER — OFFICE VISIT (OUTPATIENT)
Dept: OBGYN CLINIC | Facility: HOSPITAL | Age: 38
End: 2023-10-20

## 2023-10-20 VITALS
SYSTOLIC BLOOD PRESSURE: 121 MMHG | HEART RATE: 103 BPM | BODY MASS INDEX: 19.76 KG/M2 | HEIGHT: 64 IN | DIASTOLIC BLOOD PRESSURE: 80 MMHG | WEIGHT: 115.74 LBS

## 2023-10-20 DIAGNOSIS — Z01.818 PRE-OPERATIVE CLEARANCE: ICD-10-CM

## 2023-10-20 DIAGNOSIS — M54.12 CERVICAL RADICULOPATHY: Primary | ICD-10-CM

## 2023-10-20 PROCEDURE — 71046 X-RAY EXAM CHEST 2 VIEWS: CPT

## 2023-10-20 NOTE — PROGRESS NOTES
Assessment & Plan/Medical Decision Makin y.o. female with Neck Pain, right periscapular pain, right upper extremity pain and imaging findings most notable for Cervical Spondylosis, foraminal stenosis        The clinical, physical and imaging findings were reviewed with the patient. Kathleen Goyal  has a constellation of findings consistent with Cervical Radiculopathy in the setting of cervical degenerative disease. Also with component of rotator cuff dysfunction. Kathleen Goyal describes ongoing neck and right upper extremity pain with weakness. She has tried oral medications, physical therapy, right shoulder injection and cervical injection without significant relief. She recently underwent right C6-7 interlaminar MESFIN on 2023 without significant lasting relief. Her pain and symptoms are greatly interfering with her daily functioning. Physical exam showing right upper extremity weakness. We discussed the differential diagnosis including peripheral nerve compression, etc. Kathleen Goyal' symptoms, exam findings and imaging are most consistent with cervical radiculopathy. We discussed the natural history of cervical radiculopathy. Discussed treatment options. Reviewed the role of continued non operative treatment such as continued physical therapy, activity modification, repeat injections. Given her radicular symptoms  have persisted and impairing her function, recommend consideration of surgical intervention. Kathleen Goyal would like to proceed with cervical surgery. We discussed surgical options. In my opinion, would be anterior cervical discectomy and disc replacement/arthroplasty C5-6 to address cervical disc disease/herniation with stenosis. We reviewed the options - such as ACDF versus disc arthroplasty versus posterior surgery for decompression. Discussed rationale for disc arthroplasty as well as FDA indications and contraindications.  Discussed the potential for possible fusion procedure based upon intra operative findings. If fusion is deemed appropriate, we reviewed the options - ICBG versus cage versus allograft +/- biologic/graft extenders supplementation for obtaining solid arthrodesis as well as the associated risk/benefit profiles and fusion efficacy as well as the FDA status of the instrumentation and products. After discussion with the patient we will plan on anterior cervical discectomy and fusion with allograft vs cage with graft extender supplementation, if disc arthroplasty in not feasible. Explained at length the rationale for surgical invention and postoperative expectations. We discussed and Celester Reason expressed her understanding, that in general, spine surgery is more predictive in improving extremity/radicular discomfort rather than axial spine pain, and arresting the progression of spinal cord dysfunction rather than improving. I reviewed with the patient possible risks of surgery which included the risk of risk of nerve injury including C5 nerve palsy, autonomic nervous system dysfunction, persistent and/or worsening pain, chronic pain, need for further surgery, vocal cord dysfunction including voice changes and hoarseness, swallowing difficulty with possible need for tube feeding, spinal fluid leak and meningitis, hematoma, blood loss, infection, DVT, pulmonary embolism, blindness, paralysis and even death, as well as other risks on consent form. Also, with disc arthroplasty, risks include instability, adjacent segment disease/degeneration, device failure, heterotopic ossification, osteolysis. With ACDF, risks include instability, risk of nonunion and instrumentation failure, instability, adjacent segment disease/degeneration, bone graft complications, disease transmission with allograft bone. Patient agrees to proceed with surgery and understands all risks discussed. Discussed the use of neurophysiology monitoring during the procedure.   We also reviewed patient specific risks and mitigation considering history of depression and tobacco use. We reviewed infection prevention. Reviewed medications; instructed patient medications to stop before surgery (i.e. blood thinners, NSAIDs, DMARDs and vitamins)     Also reviewed with patient our narcotic policy. We will provide medications up to 60 days postop. In the event patient requires more medications, will need to consult their PCP and/or see a pain management physician. I have queried the [place] for Amandeep Lara to better assist in clinical decision making regarding prescriptions for controlled medications. After creating the database and considering the clinical picture I have determined that she is a candidate for a prescription for control medication in the initial postoperative period. All questions were answered. Patient verbalized understanding. Informed consent was obtained. Consent form was signed at today's visit. Patient will obtain a pre-operative chest Xray. Orders for preoperative labs (CBC, CMP, PTT, INR, type & screen, and ECG) were also placed at today's visit. Discussed pre-operative clearances, medical optimization and risk stratification. Ann Reyes needs pre-operative clearance from PCP and obtain PATs. She may obtain letter from PCP stating medical clearance for surgery given recent PCP appointment on 10/17/2023. Patient will need to abstain from tobacco use. Pre operative patient reported outcome measures were obtained. Patient will require pre-operative CT scan for surgical planning - CT Cervical spine ordered. Patient will require pre-op EMG. Referral placed for [facility]. Patient instructed to return to office/ER sooner if symptoms are not improving, getting worse, or new worrisome/neurologic symptoms arise.      Subjective:      Chief Complaint: Neck Pain    HPI:  Amandeep Lara is a 34 y.o. female presenting for initial visit with chief complaint of neck and right upper extremity pain since Novemeber. R hand dominant. She notes waking up with pain without inciting injury or event. Describes ongoing neck pain with radiation into her right scapular region and upper extremity with some numbness/tingling. Also with increasing headaches. Pain worse with neck range of motion. Shoulder pain worse with shoulder range of motion. Pain is constant and has been interfering with her daily activities and functioning. She notes dropping items frequently due to  strength weakness. Also notes changes in fine motor skills. Denies changes in balance or gait. Denies any michelle trauma. Denies fever or chills, no night sweats. Denies any bladder or bowel changes. Denies heart or lung disease. Denies diabetes or kidney disease. Conservative therapy includes the following:   Medications: flexeril, advil, tylenol without relief. Robaxin without relief. Has taken oral steroids in past without relief  Injections:   7/21/2023 - right C7-T1 interlaminar MESFIN - no improvement  Has upcoming C5-6 TAB scheduled for 9/12/2023  Has had right shoulder and elbow injections without relief  Physical Therapy: was in therapy without much improvement  Chiropractic Medicine: has not attempted  Accupunture/Massage Therapy: has not attempted   These therapeutic modalities were ineffective at providing sustained pain relief/functional improvement. 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 She recently underwent repeat right C6-7 interlaminar MESFIN on 9/12/2023 without relief. Continues with neck, right > left periscapular pain and radiation of pain and numbness into right upper extremity.  She notes feeling like her arm is heavy and weak. Continued right upper extremity weakness. She notes dropping items frequently due to  strength weakness. Also notes changes in fine motor skills such as difficulty putting in earrings. Recently started nabumetone without much relief. Taking robaxin without relief. Has tried physical therapy without relief. Cut back to about 0.5 ppd smoking. Objective:     Family History   Problem Relation Age of Onset    Mental illness Mother     Depression Mother     Early death Mother     Lung cancer Mother 62        with brain mets. Diabetes Father     Hypertension Father     Early death Father     Anxiety disorder Sister     Rashes / Skin problems Sister     Migraines Sister     Diabetes Maternal Grandmother     Arthritis Maternal Grandmother     Heart disease Maternal Grandmother     No Known Problems Maternal Grandfather     Breast cancer Paternal Grandmother         age unknown.     Anuerysm Paternal Grandfather     ADD / ADHD Brother     Anxiety disorder Brother     Melanoma Brother     Multiple sclerosis Paternal Aunt        Past Medical History:   Diagnosis Date    Anxiety     Bunion     left foot   OR   correction today 4/14/2023    Cervical radiculopathy     COVID     x 2-- Jan 2022 and Jan 2023    Depression     GERD (gastroesophageal reflux disease)     Headache(784.0)     Migraines     Seasonal allergies     Tobacco abuse     Wears contact lenses        Current Outpatient Medications   Medication Sig Dispense Refill    acyclovir (ZOVIRAX) 5 % ointment APPLY TOPICALLY EVERY 4 (FOUR) HOURS 15 g 0    Calcium Carbonate Antacid (TUMS PO) Take by mouth if needed      clonazePAM (KlonoPIN) 1 mg tablet Take 1 tablet (1 mg total) by mouth 2 (two) times a day 60 tablet 0    Diclofenac Sodium (VOLTAREN) 1 % Apply 2 g topically 4 (four) times a day 100 g 3    dicyclomine (BENTYL) 20 mg tablet TAKE 1 TABLET (20 MG TOTAL) BY MOUTH 3 (THREE) TIMES A DAY AS NEEDED (FOR ABDOMINAL CRAMPING) 30 tablet 1 DULoxetine (CYMBALTA) 30 mg delayed release capsule Take 1 capsule (30 mg total) by mouth daily Along with 60 mg capsule 90 capsule 0    DULoxetine (CYMBALTA) 60 mg delayed release capsule TAKE ONE CAPSULE BY MOUTH EVERY DAY 90 capsule 0    gabapentin (NEURONTIN) 100 mg capsule Take 1 capsule (100 mg total) by mouth 3 (three) times a day 90 capsule 0    ibuprofen (MOTRIN) 600 mg tablet TAKE 1 TABLET (600 MG TOTAL) BY MOUTH EVERY 6 (SIX) HOURS AS NEEDED FOR MILD PAIN 120 tablet 0    loratadine (CLARITIN) 10 mg tablet Take 1 tablet (10 mg total) by mouth daily (Patient taking differently: Take 10 mg by mouth daily as needed) 30 tablet 2    methocarbamol (Robaxin-750) 750 mg tablet Take 1 tablet (750 mg total) by mouth 2 (two) times a day as needed for muscle spasms 60 tablet 2    Multiple Vitamins-Minerals (MULTIVITAMIN ADULT PO) Take 1 tablet by mouth daily      nabumetone (RELAFEN) 500 mg tablet Take 1 tablet (500 mg total) by mouth 2 (two) times a day 60 tablet 2    ondansetron (ZOFRAN) 4 mg tablet TAKE 1 TABLET BY MOUTH EVERY 8 HOURS AS NEEDED FOR NAUSEA AND VOMITING 20 tablet 0    traZODone (DESYREL) 50 mg tablet Take 1 tablet (50 mg total) by mouth daily at bedtime as needed for sleep 90 tablet 0     No current facility-administered medications for this visit.        Past Surgical History:   Procedure Laterality Date    BUNIONECTOMY Right     HYSTERECTOMY      2020    LAPAROSCOPIC TOTAL HYSTERECTOMY      SC HALLUX RIGIDUS W/CHEILECTOMY 1ST MP JT W/IMPLT Left 04/14/2023    Procedure: BUNIONECTOMY with implant;  Surgeon: Carlos Cao DPM;  Location: [facility];  Service: Podiatry    TONSILLECTOMY         Social History     Socioeconomic History    Marital status: /Civil Union     Spouse name: Not on file    Number of children: Not on file    Years of education: Not on file    Highest education level: Not on file   Occupational History    Not on file   Tobacco Use    Smoking status: Every Day Packs/day: 0.50     Years: 20.00     Total pack years: 10.00     Types: Cigarettes     Start date: 4/11/2002    Smokeless tobacco: Never   Vaping Use    Vaping Use: Never used   Substance and Sexual Activity    Alcohol use: Not Currently    Drug use: No    Sexual activity: Yes     Partners: Male     Birth control/protection: None     Comment: Hysterectomy on July 1st 2020   Other Topics Concern    Not on file   Social History Narrative    Not on file     Social Determinants of Health     Financial Resource Strain: Not on file   Food Insecurity: Not on file   Transportation Needs: Not on file   Physical Activity: Not on file   Stress: Not on file   Social Connections: Not on file   Intimate Partner Violence: Not on file   Housing Stability: Not on file       Allergies   Allergen Reactions    Bactrim [Sulfamethoxazole-Trimethoprim] Hives    Latex Other (See Comments)     Latex allergy-rashes    Levofloxacin Hives    Quinolones Rash       Review of Systems  General- denies fever/chills  HEENT- denies hearing loss or sore throat  Eyes- denies eye pain or visual disturbances, denies red eyes  Respiratory- denies cough or SOB  Cardio- denies chest pain or palpitations  GI- denies abdominal pain  Endocrine- denies urinary frequency  Urinary- denies pain with urination  Musculoskeletal- Negative except noted above  Skin- denies rashes or wounds  Neurological- denies dizziness or headache  Psychiatric- denies anxiety or difficulty concentrating    Physical Exam  /80   Pulse 103   Ht 5' 4" (1.626 m)   Wt 52.5 kg (115 lb 11.9 oz)   LMP 01/29/2020   BMI 19.87 kg/m²     General/Constitutional: No apparent distress: well-nourished and well developed.   Lymphatic: No appreciable lymphadenopathy  Respiratory: Non-labored breathing  Vascular: No edema, swelling or tenderness, except as noted in detailed exam.  Integumentary: No impressive skin lesions present, except as noted in detailed exam.  Psych: Normal mood and affect, oriented to person, place and time. MSK: normal other than stated in HPI and exam  Gait & balance: no evidence of myelopathic gait, ambulates Independently     Cervical  spine range of motion:  -Forward flexion chin to chest  -Extension to 60  -Lateral bend 30 right, 30 left  -Rotation 45 right, 45 left. There is no point tenderness with palpation along the posterior cervical, thoracic, lumbar spine. Neurologic:  Upper Extremity Motor Function    Right  Left    Deltoid  5/5  5/5    Bicep  5/5  5/5    Wrist extension  4+/5  5/5    Tricep  4+/5  5/5    Finger flexion/  4-/5  5/5    Hand intrinsic  5/5  5/5      Lower Extremity Motor Function    Right  Left    Heel rise  5/5  5/5    Toe rise  5/5  5/5      Sensory: light touch is intact to bilateral upper and lower extremities     Reflexes:    Right Left   Biceps 2+ 2+   Triceps 2+ 2+   Brachioradialis 2+ 2+   Patellar 3+ 3+   Achilles 2+ 2+   Babinski neg neg     Other tests:  Spurling's: positive  Valdez's: positive right  Inverted radial reflex: negative  Clonus: negative  Tandem gait: negative  Carpal Tinel/Phalen: negative bilateral   Cubital Tinel: negative bilateral   Shoulder: painless active & passive ROM left; pain with active ROM right  Shoulder impingement tests: positive right    Diagnostic Tests   IMAGING: I have personally reviewed the images and these are my findings:  Cervical Spine X-rays from 9/1/2023: cervical spondylosis with loss of disc height notably C5-6, uncovertebral hypertrophy; osteophyte formation and mild facet hypertrophy, no apparent spondylolisthesis, no appreciated lytic/blastic lesions, no obvious instability    Cervical Spine MRI from 5/30/2023: multi level cervical disc degeneration with disc desiccation, loss of disc height, and foraminal stenosis most noted at C5-6    Procedures, if performed today     None performed       Portions of the record may have been created with voice recognition software. Occasional wrong word or "sound a like" substitutions may have occurred due to the inherent limitations of voice recognition software. Read the chart carefully and recognize, using context, where substitutions have occurred.

## 2023-10-30 DIAGNOSIS — G47.00 INSOMNIA, UNSPECIFIED TYPE: ICD-10-CM

## 2023-10-31 RX ORDER — TRAZODONE HYDROCHLORIDE 50 MG/1
50 TABLET ORAL
Qty: 90 TABLET | Refills: 0 | Status: SHIPPED | OUTPATIENT
Start: 2023-10-31

## 2023-11-02 ENCOUNTER — HOSPITAL ENCOUNTER (OUTPATIENT)
Dept: CT IMAGING | Facility: HOSPITAL | Age: 38
Discharge: HOME | End: 2023-11-02
Payer: COMMERCIAL

## 2023-11-02 DIAGNOSIS — M54.12 CERVICAL RADICULOPATHY: ICD-10-CM

## 2023-11-02 DIAGNOSIS — Z01.818 PRE-OPERATIVE CLEARANCE: ICD-10-CM

## 2023-11-02 PROCEDURE — 72125 CT NECK SPINE W/O DYE: CPT

## 2023-11-02 PROCEDURE — G1004 CDSM NDSC: HCPCS

## 2023-11-11 DIAGNOSIS — G47.00 INSOMNIA, UNSPECIFIED TYPE: ICD-10-CM

## 2023-11-11 DIAGNOSIS — B34.9 VIRAL INFECTION, UNSPECIFIED: ICD-10-CM

## 2023-11-11 DIAGNOSIS — K52.9 GASTROENTERITIS: ICD-10-CM

## 2023-11-11 DIAGNOSIS — F41.1 GENERALIZED ANXIETY DISORDER: ICD-10-CM

## 2023-11-13 RX ORDER — TRAZODONE HYDROCHLORIDE 50 MG/1
50 TABLET ORAL
Qty: 90 TABLET | Refills: 0 | OUTPATIENT
Start: 2023-11-13

## 2023-11-14 RX ORDER — CLONAZEPAM 1 MG/1
1 TABLET ORAL 2 TIMES DAILY
Qty: 60 TABLET | Refills: 0 | Status: SHIPPED | OUTPATIENT
Start: 2023-11-14

## 2023-11-14 RX ORDER — ONDANSETRON 4 MG/1
4 TABLET, FILM COATED ORAL EVERY 8 HOURS PRN
Qty: 20 TABLET | Refills: 0 | Status: SHIPPED | OUTPATIENT
Start: 2023-11-14

## 2023-11-22 ENCOUNTER — HOSPITAL ENCOUNTER (OUTPATIENT)
Dept: ULTRASOUND IMAGING | Facility: CLINIC | Age: 38
Discharge: HOME | End: 2023-11-22
Payer: COMMERCIAL

## 2023-11-22 ENCOUNTER — HOSPITAL ENCOUNTER (OUTPATIENT)
Dept: MAMMOGRAPHY | Facility: CLINIC | Age: 38
Discharge: HOME | End: 2023-11-22
Payer: COMMERCIAL

## 2023-11-22 VITALS — BODY MASS INDEX: 18.78 KG/M2 | WEIGHT: 110 LBS | HEIGHT: 64 IN

## 2023-11-22 DIAGNOSIS — R92.8 ABNORMAL SCREENING MAMMOGRAM: ICD-10-CM

## 2023-11-22 DIAGNOSIS — R92.8 ABNORMAL FINDING ON BREAST IMAGING: ICD-10-CM

## 2023-11-22 PROCEDURE — 77065 DX MAMMO INCL CAD UNI: CPT

## 2023-11-22 PROCEDURE — 76642 ULTRASOUND BREAST LIMITED: CPT

## 2023-11-26 DIAGNOSIS — R92.8 ABNORMAL MAMMOGRAM: Primary | ICD-10-CM

## 2023-11-28 ENCOUNTER — OFFICE VISIT (OUTPATIENT)
Dept: FAMILY MEDICINE CLINIC | Facility: CLINIC | Age: 38
End: 2023-11-28
Payer: COMMERCIAL

## 2023-11-28 VITALS
DIASTOLIC BLOOD PRESSURE: 74 MMHG | OXYGEN SATURATION: 98 % | SYSTOLIC BLOOD PRESSURE: 126 MMHG | TEMPERATURE: 98.6 F | HEART RATE: 80 BPM | WEIGHT: 113 LBS | BODY MASS INDEX: 19.4 KG/M2 | RESPIRATION RATE: 16 BRPM

## 2023-11-28 DIAGNOSIS — M50.120 CERVICAL DISC DISORDER WITH RADICULOPATHY OF MID-CERVICAL REGION: ICD-10-CM

## 2023-11-28 DIAGNOSIS — Z01.810 PREOP CARDIOVASCULAR EXAM: Primary | ICD-10-CM

## 2023-11-28 DIAGNOSIS — F41.1 GENERALIZED ANXIETY DISORDER: ICD-10-CM

## 2023-11-28 PROCEDURE — 99214 OFFICE O/P EST MOD 30 MIN: CPT | Performed by: FAMILY MEDICINE

## 2023-11-28 PROCEDURE — 93000 ELECTROCARDIOGRAM COMPLETE: CPT | Performed by: FAMILY MEDICINE

## 2023-11-28 RX ORDER — CLONAZEPAM 1 MG/1
1 TABLET ORAL 2 TIMES DAILY
Qty: 60 TABLET | Refills: 0 | Status: SHIPPED | OUTPATIENT
Start: 2023-12-11

## 2023-11-28 NOTE — PROGRESS NOTES
Assessment/Plan:   1. Preop cardiovascular exam/Cervical disc disorder with radiculopathy of mid-cervical region  Patient appears well today. She has a good functional capacity and no active cardiac problems. Her EKG today appeared to show normal sinus rhythm, no ST or T wave changes. Patient will be completing preop blood work in December. This type of procedure is considered intermediate risk. Will give final clearance once she completes blood work. - POCT ECG    2. Generalized anxiety disorder  Patient's anxiety has been mildly elevated however her current treatment of Cymbalta as well as her clonazepam have been effective. Continue with his current treatment. PDMP reviewed, no abnormality seen today. - clonazePAM (KlonoPIN) 1 mg tablet; Take 1 tablet (1 mg total) by mouth 2 (two) times a day Do not start before December 11, 2023. Dispense: 60 tablet; Refill: 0        Addendum  Reviewed patient's preop blood work. Her blood work all appeared clinically stable. She is cleared with low perioperative cardiovascular risk. No further testing is needed today. Diagnoses and all orders for this visit:    Preop cardiovascular exam  -     POCT ECG    Cervical disc disorder with radiculopathy of mid-cervical region    Generalized anxiety disorder  -     clonazePAM (KlonoPIN) 1 mg tablet; Take 1 tablet (1 mg total) by mouth 2 (two) times a day Do not start before December 11, 2023. Subjective:       Chief Complaint   Patient presents with    Pre-op Exam      Patient ID: Martin Reyes is a 34 y.o. female.     Martin Reyes  34 y.o.  female    SURGEON:Dr. Ana Acosta    SURGERY/PROCEDURE: Anterior Cervical discectomy and disc replacement/arthroplasty C5-6 (Bilateral: Spine Cervical)    DATE OF SURGERY: 12/28/23    PRIOR ANESTHESIA:yes    COMPLICATION: no    BLEEDING PROBLEM: no    PERTINENT PMH: no    EXERCISE CAPACITY:   CAN WALK 4 BLOCKS AND OR CLIMB 2 FLIGHTS: Yes    HOME LIVING SITUATION SAFE AND SECURE: Yes      TOBACCO: yes 10 cigarettes per day     ETOH: no     ILLEGAL DRUGS: no        Review of Systems   Constitutional:  Negative for activity change, chills, fatigue and fever. HENT:  Negative for congestion, ear pain, sinus pressure and sore throat. Eyes:  Negative for redness, itching and visual disturbance. Respiratory:  Negative for cough and shortness of breath. Cardiovascular:  Negative for chest pain and palpitations. Gastrointestinal:  Negative for abdominal pain, diarrhea and nausea. Endocrine: Negative for cold intolerance and heat intolerance. Genitourinary:  Negative for dysuria, flank pain and frequency. Musculoskeletal:  Negative for arthralgias, back pain, gait problem and myalgias. Skin:  Negative for color change. Allergic/Immunologic: Negative for environmental allergies. Neurological:  Negative for dizziness, numbness and headaches. Psychiatric/Behavioral:  Negative for behavioral problems and sleep disturbance. The following portions of the patient's history were reviewed and updated as appropriate : past family history, past medical history, past social history and past surgical history.     Current Outpatient Medications:     acyclovir (ZOVIRAX) 5 % ointment, APPLY TOPICALLY EVERY 4 (FOUR) HOURS, Disp: 15 g, Rfl: 0    Calcium Carbonate Antacid (TUMS PO), Take by mouth if needed, Disp: , Rfl:     [START ON 12/11/2023] clonazePAM (KlonoPIN) 1 mg tablet, Take 1 tablet (1 mg total) by mouth 2 (two) times a day Do not start before December 11, 2023., Disp: 60 tablet, Rfl: 0    Diclofenac Sodium (VOLTAREN) 1 %, Apply 2 g topically 4 (four) times a day, Disp: 100 g, Rfl: 3    dicyclomine (BENTYL) 20 mg tablet, TAKE 1 TABLET (20 MG TOTAL) BY MOUTH 3 (THREE) TIMES A DAY AS NEEDED (FOR ABDOMINAL CRAMPING), Disp: 30 tablet, Rfl: 1    DULoxetine (CYMBALTA) 30 mg delayed release capsule, Take 1 capsule (30 mg total) by mouth daily Along with 60 mg capsule, Disp: 90 capsule, Rfl: 0    DULoxetine (CYMBALTA) 60 mg delayed release capsule, TAKE ONE CAPSULE BY MOUTH EVERY DAY, Disp: 90 capsule, Rfl: 0    loratadine (CLARITIN) 10 mg tablet, Take 1 tablet (10 mg total) by mouth daily (Patient taking differently: Take 10 mg by mouth daily as needed), Disp: 30 tablet, Rfl: 2    methocarbamol (Robaxin-750) 750 mg tablet, Take 1 tablet (750 mg total) by mouth 2 (two) times a day as needed for muscle spasms, Disp: 60 tablet, Rfl: 2    Multiple Vitamins-Minerals (MULTIVITAMIN ADULT PO), Take 1 tablet by mouth daily, Disp: , Rfl:     nabumetone (RELAFEN) 500 mg tablet, Take 1 tablet (500 mg total) by mouth 2 (two) times a day, Disp: 60 tablet, Rfl: 2    ondansetron (ZOFRAN) 4 mg tablet, Take 1 tablet (4 mg total) by mouth every 8 (eight) hours as needed for nausea or vomiting, Disp: 20 tablet, Rfl: 0    traZODone (DESYREL) 50 mg tablet, Take 1 tablet by mouth daily at bedtime as needed for sleep, Disp: 90 tablet, Rfl: 0    gabapentin (NEURONTIN) 100 mg capsule, Take 1 capsule (100 mg total) by mouth 3 (three) times a day, Disp: 90 capsule, Rfl: 0    ibuprofen (MOTRIN) 600 mg tablet, TAKE 1 TABLET (600 MG TOTAL) BY MOUTH EVERY 6 (SIX) HOURS AS NEEDED FOR MILD PAIN, Disp: 120 tablet, Rfl: 0         Objective:         Vitals:    11/28/23 1405   BP: 126/74   BP Location: Left arm   Patient Position: Sitting   Cuff Size: Standard   Pulse: 80   Resp: 16   Temp: 98.6 °F (37 °C)   TempSrc: Temporal   SpO2: 98%   Weight: 51.3 kg (113 lb)     Physical Exam  Vitals reviewed. Constitutional:       Appearance: She is well-developed. HENT:      Head: Normocephalic and atraumatic. Nose: Nose normal.      Mouth/Throat:      Pharynx: No oropharyngeal exudate. Eyes:      General: No scleral icterus. Right eye: No discharge. Left eye: No discharge. Pupils: Pupils are equal, round, and reactive to light. Neck:      Trachea: No tracheal deviation.    Cardiovascular: Rate and Rhythm: Normal rate and regular rhythm. Pulses:           Dorsalis pedis pulses are 2+ on the right side and 2+ on the left side. Posterior tibial pulses are 2+ on the right side and 2+ on the left side. Heart sounds: Normal heart sounds. No murmur heard. No friction rub. No gallop. Pulmonary:      Effort: Pulmonary effort is normal. No respiratory distress. Breath sounds: Normal breath sounds. No wheezing or rales. Abdominal:      General: Bowel sounds are normal. There is no distension. Palpations: Abdomen is soft. Tenderness: There is no abdominal tenderness. There is no guarding or rebound. Musculoskeletal:         General: Normal range of motion. Cervical back: Normal range of motion and neck supple. Lymphadenopathy:      Head:      Right side of head: No submental or submandibular adenopathy. Left side of head: No submental or submandibular adenopathy. Cervical: No cervical adenopathy. Right cervical: No superficial, deep or posterior cervical adenopathy. Left cervical: No superficial, deep or posterior cervical adenopathy. Skin:     General: Skin is warm and dry. Findings: No erythema. Neurological:      Mental Status: She is alert and oriented to person, place, and time. Cranial Nerves: No cranial nerve deficit. Sensory: No sensory deficit. Psychiatric:         Mood and Affect: Mood is not anxious or depressed. Speech: Speech normal.         Behavior: Behavior normal.         Thought Content:  Thought content normal.         Judgment: Judgment normal.

## 2023-12-13 LAB
ALBUMIN SERPL-MCNC: 4.7 G/DL (ref 3.6–5.1)
ALBUMIN/GLOB SERPL: 1.8 (CALC) (ref 1–2.5)
ALP SERPL-CCNC: 51 U/L (ref 31–125)
ALT SERPL-CCNC: 5 U/L (ref 6–29)
APTT PPP: 27 SEC (ref 23–32)
AST SERPL-CCNC: 9 U/L (ref 10–30)
BASOPHILS # BLD AUTO: 67 CELLS/UL (ref 0–200)
BASOPHILS NFR BLD AUTO: 1.1 %
BILIRUB SERPL-MCNC: 0.7 MG/DL (ref 0.2–1.2)
BUN SERPL-MCNC: 9 MG/DL (ref 7–25)
BUN/CREAT SERPL: ABNORMAL (CALC) (ref 6–22)
CALCIUM SERPL-MCNC: 9.7 MG/DL (ref 8.6–10.2)
CHLORIDE SERPL-SCNC: 105 MMOL/L (ref 98–110)
CO2 SERPL-SCNC: 25 MMOL/L (ref 20–32)
CREAT SERPL-MCNC: 0.68 MG/DL (ref 0.5–0.99)
EOSINOPHIL # BLD AUTO: 140 CELLS/UL (ref 15–500)
EOSINOPHIL NFR BLD AUTO: 2.3 %
ERYTHROCYTE [DISTWIDTH] IN BLOOD BY AUTOMATED COUNT: 12 % (ref 11–15)
GFR/BSA.PRED SERPLBLD CYS-BASED-ARV: 113 ML/MIN/1.73M2
GLOBULIN SER CALC-MCNC: 2.6 G/DL (CALC) (ref 1.9–3.7)
GLUCOSE SERPL-MCNC: 97 MG/DL (ref 65–99)
HCT VFR BLD AUTO: 37.6 % (ref 35–45)
HGB BLD-MCNC: 12.8 G/DL (ref 11.7–15.5)
INR PPP: 1.1
LYMPHOCYTES # BLD AUTO: 2367 CELLS/UL (ref 850–3900)
LYMPHOCYTES NFR BLD AUTO: 38.8 %
MCH RBC QN AUTO: 30 PG (ref 27–33)
MCHC RBC AUTO-ENTMCNC: 34 G/DL (ref 32–36)
MCV RBC AUTO: 88.3 FL (ref 80–100)
MONOCYTES # BLD AUTO: 445 CELLS/UL (ref 200–950)
MONOCYTES NFR BLD AUTO: 7.3 %
NEUTROPHILS # BLD AUTO: 3081 CELLS/UL (ref 1500–7800)
NEUTROPHILS NFR BLD AUTO: 50.5 %
PLATELET # BLD AUTO: 310 THOUSAND/UL (ref 140–400)
PMV BLD REES-ECKER: 9.8 FL (ref 7.5–12.5)
POTASSIUM SERPL-SCNC: 3.9 MMOL/L (ref 3.5–5.3)
PROT SERPL-MCNC: 7.3 G/DL (ref 6.1–8.1)
PROTHROMBIN TIME: 11.1 SEC (ref 9–11.5)
RBC # BLD AUTO: 4.26 MILLION/UL (ref 3.8–5.1)
SODIUM SERPL-SCNC: 140 MMOL/L (ref 135–146)
WBC # BLD AUTO: 6.1 THOUSAND/UL (ref 3.8–10.8)

## 2023-12-15 ENCOUNTER — TELEPHONE (OUTPATIENT)
Dept: FAMILY MEDICINE CLINIC | Facility: CLINIC | Age: 38
End: 2023-12-15

## 2023-12-19 ENCOUNTER — APPOINTMENT (OUTPATIENT)
Dept: LAB | Facility: CLINIC | Age: 38
End: 2023-12-19
Payer: COMMERCIAL

## 2023-12-19 DIAGNOSIS — M54.12 CERVICAL RADICULOPATHY: ICD-10-CM

## 2023-12-19 DIAGNOSIS — Z01.818 PRE-OPERATIVE CLEARANCE: ICD-10-CM

## 2023-12-19 PROCEDURE — 86900 BLOOD TYPING SEROLOGIC ABO: CPT

## 2023-12-19 PROCEDURE — 86901 BLOOD TYPING SEROLOGIC RH(D): CPT

## 2023-12-19 PROCEDURE — 36415 COLL VENOUS BLD VENIPUNCTURE: CPT

## 2023-12-19 PROCEDURE — 86850 RBC ANTIBODY SCREEN: CPT

## 2023-12-20 ENCOUNTER — LAB REQUISITION (OUTPATIENT)
Dept: LAB | Facility: HOSPITAL | Age: 38
End: 2023-12-20
Payer: COMMERCIAL

## 2023-12-20 DIAGNOSIS — M54.12 RADICULOPATHY, CERVICAL REGION: ICD-10-CM

## 2023-12-20 LAB
ABO GROUP BLD: NORMAL
BLD GP AB SCN SERPL QL: NEGATIVE
RH BLD: POSITIVE
SPECIMEN EXPIRATION DATE: NORMAL

## 2023-12-26 ENCOUNTER — TELEPHONE (OUTPATIENT)
Age: 38
End: 2023-12-26

## 2023-12-27 DIAGNOSIS — F41.1 GENERALIZED ANXIETY DISORDER: ICD-10-CM

## 2023-12-27 RX ORDER — DULOXETIN HYDROCHLORIDE 60 MG/1
60 CAPSULE, DELAYED RELEASE ORAL DAILY
Qty: 90 CAPSULE | Refills: 0 | Status: SHIPPED | OUTPATIENT
Start: 2023-12-27

## 2024-01-05 DIAGNOSIS — F41.1 GENERALIZED ANXIETY DISORDER: ICD-10-CM

## 2024-01-05 DIAGNOSIS — M79.18 MYOFASCIAL PAIN SYNDROME: ICD-10-CM

## 2024-01-05 DIAGNOSIS — H92.09 EARACHE: ICD-10-CM

## 2024-01-05 DIAGNOSIS — B34.9 VIRAL INFECTION, UNSPECIFIED: ICD-10-CM

## 2024-01-05 DIAGNOSIS — K52.9 GASTROENTERITIS: ICD-10-CM

## 2024-01-05 RX ORDER — DULOXETIN HYDROCHLORIDE 30 MG/1
CAPSULE, DELAYED RELEASE ORAL
Qty: 90 CAPSULE | Refills: 0 | OUTPATIENT
Start: 2024-01-05

## 2024-01-05 RX ORDER — METHOCARBAMOL 750 MG/1
750 TABLET, FILM COATED ORAL 2 TIMES DAILY PRN
Qty: 60 TABLET | Refills: 0 | Status: SHIPPED | OUTPATIENT
Start: 2024-01-05

## 2024-01-05 RX ORDER — CLONAZEPAM 1 MG/1
1 TABLET ORAL 2 TIMES DAILY
Qty: 60 TABLET | Refills: 0 | Status: SHIPPED | OUTPATIENT
Start: 2024-01-05

## 2024-01-05 RX ORDER — ONDANSETRON 4 MG/1
4 TABLET, FILM COATED ORAL EVERY 8 HOURS PRN
Qty: 20 TABLET | Refills: 0 | Status: SHIPPED | OUTPATIENT
Start: 2024-01-05

## 2024-01-05 RX ORDER — DICYCLOMINE HCL 20 MG
20 TABLET ORAL 3 TIMES DAILY PRN
Qty: 30 TABLET | Refills: 0 | Status: SHIPPED | OUTPATIENT
Start: 2024-01-05

## 2024-01-05 RX ORDER — IBUPROFEN 600 MG/1
600 TABLET ORAL EVERY 6 HOURS PRN
Qty: 120 TABLET | Refills: 0 | Status: SHIPPED | OUTPATIENT
Start: 2024-01-05 | End: 2024-02-04

## 2024-01-29 DIAGNOSIS — G47.00 INSOMNIA, UNSPECIFIED TYPE: ICD-10-CM

## 2024-01-29 RX ORDER — TRAZODONE HYDROCHLORIDE 50 MG/1
50 TABLET ORAL
Qty: 90 TABLET | Refills: 0 | Status: SHIPPED | OUTPATIENT
Start: 2024-01-29

## 2024-02-01 ENCOUNTER — TELEPHONE (OUTPATIENT)
Dept: OBGYN CLINIC | Facility: HOSPITAL | Age: 39
End: 2024-02-01

## 2024-02-01 NOTE — TELEPHONE ENCOUNTER
Called and lm for pt to call and schedule an appointment to come in prior to surgery to re-evaluated her. Also let her know that we do not need new per op testing .

## 2024-02-05 DIAGNOSIS — F41.1 GENERALIZED ANXIETY DISORDER: ICD-10-CM

## 2024-02-06 RX ORDER — CLONAZEPAM 1 MG/1
1 TABLET ORAL 2 TIMES DAILY
Qty: 60 TABLET | Refills: 0 | Status: SHIPPED | OUTPATIENT
Start: 2024-02-06

## 2024-02-06 RX ORDER — DULOXETIN HYDROCHLORIDE 30 MG/1
30 CAPSULE, DELAYED RELEASE ORAL DAILY
Qty: 90 CAPSULE | Refills: 1 | Status: SHIPPED | OUTPATIENT
Start: 2024-02-06

## 2024-02-06 RX ORDER — DULOXETIN HYDROCHLORIDE 60 MG/1
60 CAPSULE, DELAYED RELEASE ORAL DAILY
Qty: 90 CAPSULE | Refills: 1 | Status: SHIPPED | OUTPATIENT
Start: 2024-02-06

## 2024-02-07 ENCOUNTER — OFFICE VISIT (OUTPATIENT)
Dept: OBGYN CLINIC | Facility: HOSPITAL | Age: 39
End: 2024-02-07
Payer: COMMERCIAL

## 2024-02-07 VITALS
HEART RATE: 87 BPM | DIASTOLIC BLOOD PRESSURE: 80 MMHG | HEIGHT: 64 IN | SYSTOLIC BLOOD PRESSURE: 119 MMHG | BODY MASS INDEX: 18.78 KG/M2 | WEIGHT: 110 LBS

## 2024-02-07 DIAGNOSIS — M48.02 CERVICAL SPINAL STENOSIS: Primary | ICD-10-CM

## 2024-02-07 PROCEDURE — 99213 OFFICE O/P EST LOW 20 MIN: CPT | Performed by: ORTHOPAEDIC SURGERY

## 2024-02-07 NOTE — H&P (VIEW-ONLY)
Assessment & Plan/Medical Decision Makin y.o. female with Neck Pain, right periscapular pain, right upper extremity pain and imaging findings most notable for Cervical Spondylosis, central and foraminal stenosis        The clinical, physical and imaging findings were reviewed with the patient.  Katina  has a constellation of findings consistent with Cervical Radiculopathy in the setting of cervical degenerative disease. Also with component of rotator cuff dysfunction. Katina describes ongoing neck and right upper extremity pain with weakness. She has tried oral medications, physical therapy, right shoulder injection and cervical injection without significant relief. She recently underwent right C6-7 interlaminar MESFIN on 2023 without significant lasting relief. Her pain and symptoms are greatly interfering with her daily functioning.  Physical exam showing right upper extremity weakness.   We discussed the differential diagnosis including peripheral nerve compression, etc. Katina' symptoms, exam findings and imaging are most consistent with cervical radiculopathy.   We discussed the natural history of cervical radiculopathy.  Discussed treatment options.  Reviewed the role of continued non operative treatment such as continued physical therapy, activity modification, repeat injections.  Given her radicular symptoms  have persisted and impairing her function, recommend consideration of surgical intervention.  Katina would like to proceed with cervical surgery.       We discussed surgical options.  In my opinion, would be anterior cervical discectomy and disc replacement/arthroplasty C5-6 to address cervical disc disease/herniation with stenosis.   We reviewed the options - such as ACDF versus disc arthroplasty versus posterior surgery for decompression.  Discussed rationale for disc arthroplasty as well as FDA indications and contraindications. Discussed the potential for possible fusion procedure based  upon intra operative findings.    If fusion is deemed appropriate, we reviewed the options - ICBG versus cage versus allograft +/- biologic/graft extenders supplementation for obtaining solid arthrodesis as well as the associated risk/benefit profiles and fusion efficacy as well as the FDA status of the instrumentation and products. After discussion with the patient we will plan on anterior cervical discectomy and fusion with allograft vs cage with graft extender supplementation, if disc arthroplasty in not feasible.  Explained at length the rationale for surgical invention and postoperative expectations.  We discussed and Katina expressed her understanding, that in general, spine surgery is more predictive in improving extremity/radicular discomfort rather than axial spine pain, and arresting the progression of spinal cord dysfunction rather than improving.      I reviewed with the patient possible risks of surgery which included the risk of risk of nerve injury including C5 nerve palsy, autonomic nervous system dysfunction, persistent and/or worsening pain, chronic pain, need for further surgery, vocal cord dysfunction including voice changes and hoarseness, swallowing difficulty with possible need for tube feeding, spinal fluid leak and meningitis, hematoma, blood loss, infection, DVT, pulmonary embolism, blindness, paralysis and even death, as well as other risks on consent form.  Also, with disc arthroplasty, risks include instability, adjacent segment disease/degeneration, device failure, heterotopic ossification, osteolysis.  With ACDF, risks include instability, risk of nonunion and instrumentation failure, instability, adjacent segment disease/degeneration, bone graft complications, disease transmission with allograft bone.  Patient agrees to proceed with surgery and understands all risks discussed.  Discussed the use of neurophysiology monitoring during the procedure.  We also reviewed patient specific  risks and mitigation considering history of depression and tobacco use.     We reviewed infection prevention.  Reviewed medications; instructed patient medications to stop before surgery (i.e. blood thinners, NSAIDs, DMARDs and vitamins)     Also reviewed with patient our narcotic policy.  We will provide medications up to 60 days postop.  In the event patient requires more medications, will need to consult their PCP and/or see a pain management physician.     I have queried the [place] for Katina Mendez to better assist in clinical decision making regarding prescriptions for controlled medications.  After creating the database and considering the clinical picture I have determined that she is a candidate for a prescription for control medication in the initial postoperative period.       All questions were answered.  Patient verbalized understanding. Informed consent was obtained. Consent form was signed at today's visit.       Patient will obtain a pre-operative chest Xray. Orders for preoperative labs (CBC, CMP, PTT, INR, type & screen, and ECG) were also placed at today's visit. Discussed pre-operative clearances, medical optimization and risk stratification. Katina needs pre-operative clearance from PCP and obtain PATs. She may obtain letter from PCP stating medical clearance for surgery given recent PCP appointment on 10/17/2023.  Patient will need to abstain from tobacco use; patient notes she has refrained.      Pre operative patient reported outcome measures were obtained.      Patient instructed to return to office/ER sooner if symptoms are not improving, getting worse, or new worrisome/neurologic symptoms arise.    2/7/24 Update  Today, we again reviewed her symptoms (which now include mild myelopathic symptoms with bilateral hand numbness and dropping items), exam findings, imaging findings and emg studies as well as limitations of studies.  We reviewed the  risks/benefits and alternatives.  Katina has recovered from covid with no long term sequelea.  She would like to proceed with surgery as previously detailed.  We also discussed the CT findings which does demonstrate mild spondylosis and the implications associated with disc arthroplasty.         Subjective:      Chief Complaint: Neck Pain    HPI:  Katina Mendez is a 39 y.o. female presenting for initial visit with chief complaint of neck and right upper extremity pain since Novemeber. R hand dominant. She notes waking up with pain without inciting injury or event. Describes ongoing neck pain with radiation into her right scapular region and upper extremity with some numbness/tingling. Also with increasing headaches. Pain worse with neck range of motion. Shoulder pain worse with shoulder range of motion. Pain is constant and has been interfering with her daily activities and functioning. She notes dropping items frequently due to  strength weakness. Also notes changes in fine motor skills. Denies changes in balance or gait. Denies any michelle trauma. Denies fever or chills, no night sweats. Denies any bladder or bowel changes.      Denies heart or lung disease. Denies diabetes or kidney disease.    Conservative therapy includes the following:   Medications: flexeril, advil, tylenol without relief. Robaxin without relief. Has taken oral steroids in past without relief  Injections:   7/21/2023 - right C7-T1 interlaminar MESFIN - no improvement  Has upcoming C5-6 TAB scheduled for 9/12/2023  Has had right shoulder and elbow injections without relief  Physical Therapy: was in therapy without much improvement  Chiropractic Medicine: has not attempted  Accupunture/Massage Therapy: has not attempted   These therapeutic modalities were ineffective at providing sustained pain relief/functional improvement.    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 She recently underwent repeat right C6-7 interlaminar MESFIN on 9/12/2023 without relief. Continues with neck, right > left periscapular pain and radiation of pain and numbness into right upper extremity. She notes feeling like her arm is heavy and weak. Continued right upper extremity weakness. She notes dropping items frequently due to  strength weakness. Also notes changes in fine motor skills such as difficulty putting in earrings. Recently started nabumetone without much relief. Taking robaxin without relief. Has tried physical therapy without relief.    Cut back to about 0.5 ppd smoking.    Update on 2/7/2024:  Katina is here for follow up. Her surgery had to be delayed due to covid.  She did obtain EMG in the interim and we reviewed the findings.  Her right arm has increasing pain and worsening function and worse numbness into hand.  She has dropped items, dexterity issues.     Objective:     Family History   Problem Relation Age of Onset    Mental illness Mother     Depression Mother     Early death Mother     Lung cancer Mother 58        with brain mets.    Diabetes Father     Hypertension Father     Early death Father     Anxiety disorder Sister     Rashes / Skin problems Sister     Migraines Sister     Diabetes Maternal Grandmother     Arthritis Maternal Grandmother     Heart disease Maternal Grandmother     No Known Problems Maternal Grandfather     Breast cancer Paternal Grandmother         age unknown.    Anuerysm Paternal Grandfather     ADD / ADHD Brother     Anxiety disorder Brother     Melanoma Brother     Multiple sclerosis Paternal Aunt        Past Medical History:   Diagnosis Date    Anxiety     Bunion     left foot   OR   correction today 4/14/2023    Cervical radiculopathy     COVID     x 2-- Jan 2022 and  Jan 2023    Depression     GERD (gastroesophageal reflux disease)     Headache(784.0)     Migraines     Seasonal allergies     Tobacco abuse     Wears contact lenses        Current Outpatient Medications   Medication Sig Dispense Refill    acyclovir (ZOVIRAX) 5 % ointment APPLY TOPICALLY EVERY 4 (FOUR) HOURS 15 g 0    Calcium Carbonate Antacid (TUMS PO) Take by mouth if needed      clonazePAM (KlonoPIN) 1 mg tablet Take 1 tablet (1 mg total) by mouth 2 (two) times a day 60 tablet 0    Diclofenac Sodium (VOLTAREN) 1 % Apply 2 g topically 4 (four) times a day 100 g 3    dicyclomine (BENTYL) 20 mg tablet Take 1 tablet (20 mg total) by mouth 3 (three) times a day as needed (for abdominal cramping) 30 tablet 0    DULoxetine (CYMBALTA) 30 mg delayed release capsule Take 1 capsule (30 mg total) by mouth daily Along with 60 mg capsule 90 capsule 1    DULoxetine (CYMBALTA) 60 mg delayed release capsule Take 1 capsule (60 mg total) by mouth daily 90 capsule 1    ibuprofen (MOTRIN) 600 mg tablet Take 1 tablet (600 mg total) by mouth every 6 (six) hours as needed for mild pain 120 tablet 0    loratadine (CLARITIN) 10 mg tablet Take 1 tablet (10 mg total) by mouth daily (Patient taking differently: Take 10 mg by mouth daily as needed) 30 tablet 2    methocarbamol (Robaxin-750) 750 mg tablet Take 1 tablet (750 mg total) by mouth 2 (two) times a day as needed for muscle spasms 60 tablet 0    Multiple Vitamins-Minerals (MULTIVITAMIN ADULT PO) Take 1 tablet by mouth daily      nabumetone (RELAFEN) 500 mg tablet Take 1 tablet (500 mg total) by mouth 2 (two) times a day 60 tablet 2    ondansetron (ZOFRAN) 4 mg tablet Take 1 tablet (4 mg total) by mouth every 8 (eight) hours as needed for nausea or vomiting 20 tablet 0    traZODone (DESYREL) 50 mg tablet Take 1 tablet by mouth daily at bedtime as needed for sleep 90 tablet 0     No current facility-administered medications for this visit.       Past Surgical History:   Procedure  Laterality Date    BUNIONECTOMY Right     HYSTERECTOMY      2020    LAPAROSCOPIC TOTAL HYSTERECTOMY      VT HALLUX RIGIDUS W/CHEILECTOMY 1ST MP JT W/IMPLT Left 04/14/2023    Procedure: BUNIONECTOMY with implant;  Surgeon: Will Cuevas DPM;  Location: [facility];  Service: Podiatry    TONSILLECTOMY         Social History     Socioeconomic History    Marital status: /Civil Union     Spouse name: Not on file    Number of children: Not on file    Years of education: Not on file    Highest education level: Not on file   Occupational History    Not on file   Tobacco Use    Smoking status: Every Day     Current packs/day: 0.50     Average packs/day: 0.5 packs/day for 21.8 years (10.9 ttl pk-yrs)     Types: Cigarettes     Start date: 4/11/2002    Smokeless tobacco: Never   Vaping Use    Vaping status: Never Used   Substance and Sexual Activity    Alcohol use: Not Currently    Drug use: No    Sexual activity: Yes     Partners: Male     Birth control/protection: None     Comment: Hysterectomy on July 1st 2020   Other Topics Concern    Not on file   Social History Narrative    Not on file     Social Determinants of Health     Financial Resource Strain: Not on file   Food Insecurity: Not on file   Transportation Needs: Not on file   Physical Activity: Not on file   Stress: Not on file   Social Connections: Not on file   Intimate Partner Violence: Not on file   Housing Stability: Not on file       Allergies   Allergen Reactions    Bactrim [Sulfamethoxazole-Trimethoprim] Hives    Latex Other (See Comments)     Latex allergy-rashes    Levofloxacin Hives    Quinolones Rash       Review of Systems  General- denies fever/chills  HEENT- denies hearing loss or sore throat  Eyes- denies eye pain or visual disturbances, denies red eyes  Respiratory- denies cough or SOB  Cardio- denies chest pain or palpitations  GI- denies abdominal pain  Endocrine- denies urinary frequency  Urinary- denies pain with urination  Musculoskeletal-  "Negative except noted above  Skin- denies rashes or wounds  Neurological- denies dizziness or headache  Psychiatric- denies anxiety or difficulty concentrating    Physical Exam  /80   Pulse 87   Ht 5' 4\" (1.626 m)   Wt 49.9 kg (110 lb)   LMP 01/29/2020   BMI 18.88 kg/m²     General/Constitutional: No apparent distress: well-nourished and well developed.  Lymphatic: No appreciable lymphadenopathy  Respiratory: Non-labored breathing  Vascular: No edema, swelling or tenderness, except as noted in detailed exam.  Integumentary: No impressive skin lesions present, except as noted in detailed exam.  Psych: Normal mood and affect, oriented to person, place and time.  MSK: normal other than stated in HPI and exam  Gait & balance: no evidence of myelopathic gait, ambulates Independently     Cervical  spine range of motion:  -Forward flexion chin to chest  -Extension to 60  -Lateral bend 30 right, 30 left  -Rotation 45 right, 45 left.    There is no point tenderness with palpation along the posterior cervical, thoracic, lumbar spine.     Neurologic:  Upper Extremity Motor Function    Right  Left    Deltoid  5/5  5/5    Bicep  5/5  5/5    Wrist extension  4+/5  5/5    Tricep  4+/5  5/5    Finger flexion/  4-/5  5/5    Hand intrinsic  5/5  5/5      Lower Extremity Motor Function    Right  Left    Heel rise  5/5  5/5    Toe rise  5/5  5/5      Sensory: light touch is intact to bilateral upper and lower extremities     Reflexes:    Right Left   Biceps 2+ 2+   Triceps 2+ 2+   Brachioradialis 2+ 2+   Patellar 3+ 3+   Achilles 2+ 2+   Babinski neg neg     Other tests:  Spurling's: positive  Valdez's: positive right  Inverted radial reflex: negative  Clonus: negative  Tandem gait: negative  Carpal Tinel/Phalen: negative bilateral   Cubital Tinel: negative bilateral   Shoulder: painless active & passive ROM left; pain with active ROM right Shoulder impingement tests: positive right    Diagnostic Tests   IMAGING: I have " "personally reviewed the images and these are my findings:  Cervical Spine X-rays from 9/1/2023: cervical spondylosis with loss of disc height notably C5-6, uncovertebral hypertrophy; no apparent spondylolisthesis, no appreciated lytic/blastic lesions, no obvious instability    Cervical Spine MRI from 5/30/2023: multi level cervical disc degeneration with disc desiccation, mild disc bulging at C4-5, mild loss of disc height at C5-6 with central stenosis but no cord signal abnormality appreciated and bilateral moderate/severe foraminal stenosis    Cervical Spine CT from 11/2/2023: multi level mild cervical spondylosis most noted at C5-6 with endplate sclerosis, mild PLL calcifications, no significant facet arthrosis, mild/mod uncovertebral sclerosis    Procedures, if performed today     None performed       Portions of the record may have been created with voice recognition software.  Occasional wrong word or \"sound a like\" substitutions may have occurred due to the inherent limitations of voice recognition software.  Read the chart carefully and recognize, using context, where substitutions have occurred.  "

## 2024-02-07 NOTE — PROGRESS NOTES
Assessment & Plan/Medical Decision Makin y.o. female with Neck Pain, right periscapular pain, right upper extremity pain and imaging findings most notable for Cervical Spondylosis, central and foraminal stenosis        The clinical, physical and imaging findings were reviewed with the patient.  Katina  has a constellation of findings consistent with Cervical Radiculopathy in the setting of cervical degenerative disease. Also with component of rotator cuff dysfunction. Katina describes ongoing neck and right upper extremity pain with weakness. She has tried oral medications, physical therapy, right shoulder injection and cervical injection without significant relief. She recently underwent right C6-7 interlaminar MESFIN on 2023 without significant lasting relief. Her pain and symptoms are greatly interfering with her daily functioning.  Physical exam showing right upper extremity weakness.   We discussed the differential diagnosis including peripheral nerve compression, etc. Katina' symptoms, exam findings and imaging are most consistent with cervical radiculopathy.   We discussed the natural history of cervical radiculopathy.  Discussed treatment options.  Reviewed the role of continued non operative treatment such as continued physical therapy, activity modification, repeat injections.  Given her radicular symptoms  have persisted and impairing her function, recommend consideration of surgical intervention.  Katina would like to proceed with cervical surgery.       We discussed surgical options.  In my opinion, would be anterior cervical discectomy and disc replacement/arthroplasty C5-6 to address cervical disc disease/herniation with stenosis.   We reviewed the options - such as ACDF versus disc arthroplasty versus posterior surgery for decompression.  Discussed rationale for disc arthroplasty as well as FDA indications and contraindications. Discussed the potential for possible fusion procedure based  upon intra operative findings.    If fusion is deemed appropriate, we reviewed the options - ICBG versus cage versus allograft +/- biologic/graft extenders supplementation for obtaining solid arthrodesis as well as the associated risk/benefit profiles and fusion efficacy as well as the FDA status of the instrumentation and products. After discussion with the patient we will plan on anterior cervical discectomy and fusion with allograft vs cage with graft extender supplementation, if disc arthroplasty in not feasible.  Explained at length the rationale for surgical invention and postoperative expectations.  We discussed and Katina expressed her understanding, that in general, spine surgery is more predictive in improving extremity/radicular discomfort rather than axial spine pain, and arresting the progression of spinal cord dysfunction rather than improving.      I reviewed with the patient possible risks of surgery which included the risk of risk of nerve injury including C5 nerve palsy, autonomic nervous system dysfunction, persistent and/or worsening pain, chronic pain, need for further surgery, vocal cord dysfunction including voice changes and hoarseness, swallowing difficulty with possible need for tube feeding, spinal fluid leak and meningitis, hematoma, blood loss, infection, DVT, pulmonary embolism, blindness, paralysis and even death, as well as other risks on consent form.  Also, with disc arthroplasty, risks include instability, adjacent segment disease/degeneration, device failure, heterotopic ossification, osteolysis.  With ACDF, risks include instability, risk of nonunion and instrumentation failure, instability, adjacent segment disease/degeneration, bone graft complications, disease transmission with allograft bone.  Patient agrees to proceed with surgery and understands all risks discussed.  Discussed the use of neurophysiology monitoring during the procedure.  We also reviewed patient specific  risks and mitigation considering history of depression and tobacco use.     We reviewed infection prevention.  Reviewed medications; instructed patient medications to stop before surgery (i.e. blood thinners, NSAIDs, DMARDs and vitamins)     Also reviewed with patient our narcotic policy.  We will provide medications up to 60 days postop.  In the event patient requires more medications, will need to consult their PCP and/or see a pain management physician.     I have queried the [place] for Katina Mendez to better assist in clinical decision making regarding prescriptions for controlled medications.  After creating the database and considering the clinical picture I have determined that she is a candidate for a prescription for control medication in the initial postoperative period.       All questions were answered.  Patient verbalized understanding. Informed consent was obtained. Consent form was signed at today's visit.       Patient will obtain a pre-operative chest Xray. Orders for preoperative labs (CBC, CMP, PTT, INR, type & screen, and ECG) were also placed at today's visit. Discussed pre-operative clearances, medical optimization and risk stratification. Katina needs pre-operative clearance from PCP and obtain PATs. She may obtain letter from PCP stating medical clearance for surgery given recent PCP appointment on 10/17/2023.  Patient will need to abstain from tobacco use; patient notes she has refrained.      Pre operative patient reported outcome measures were obtained.      Patient instructed to return to office/ER sooner if symptoms are not improving, getting worse, or new worrisome/neurologic symptoms arise.    2/7/24 Update  Today, we again reviewed her symptoms (which now include mild myelopathic symptoms with bilateral hand numbness and dropping items), exam findings, imaging findings and emg studies as well as limitations of studies.  We reviewed the  risks/benefits and alternatives.  Katina has recovered from covid with no long term sequelea.  She would like to proceed with surgery as previously detailed.  We also discussed the CT findings which does demonstrate mild spondylosis and the implications associated with disc arthroplasty.         Subjective:      Chief Complaint: Neck Pain    HPI:  Katina Mendez is a 39 y.o. female presenting for initial visit with chief complaint of neck and right upper extremity pain since Novemeber. R hand dominant. She notes waking up with pain without inciting injury or event. Describes ongoing neck pain with radiation into her right scapular region and upper extremity with some numbness/tingling. Also with increasing headaches. Pain worse with neck range of motion. Shoulder pain worse with shoulder range of motion. Pain is constant and has been interfering with her daily activities and functioning. She notes dropping items frequently due to  strength weakness. Also notes changes in fine motor skills. Denies changes in balance or gait. Denies any michelle trauma. Denies fever or chills, no night sweats. Denies any bladder or bowel changes.      Denies heart or lung disease. Denies diabetes or kidney disease.    Conservative therapy includes the following:   Medications: flexeril, advil, tylenol without relief. Robaxin without relief. Has taken oral steroids in past without relief  Injections:   7/21/2023 - right C7-T1 interlaminar MESFIN - no improvement  Has upcoming C5-6 TAB scheduled for 9/12/2023  Has had right shoulder and elbow injections without relief  Physical Therapy: was in therapy without much improvement  Chiropractic Medicine: has not attempted  Accupunture/Massage Therapy: has not attempted   These therapeutic modalities were ineffective at providing sustained pain relief/functional improvement.    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 She recently underwent repeat right C6-7 interlaminar MESFIN on 9/12/2023 without relief. Continues with neck, right > left periscapular pain and radiation of pain and numbness into right upper extremity. She notes feeling like her arm is heavy and weak. Continued right upper extremity weakness. She notes dropping items frequently due to  strength weakness. Also notes changes in fine motor skills such as difficulty putting in earrings. Recently started nabumetone without much relief. Taking robaxin without relief. Has tried physical therapy without relief.    Cut back to about 0.5 ppd smoking.    Update on 2/7/2024:  Katina is here for follow up. Her surgery had to be delayed due to covid.  She did obtain EMG in the interim and we reviewed the findings.  Her right arm has increasing pain and worsening function and worse numbness into hand.  She has dropped items, dexterity issues.     Objective:     Family History   Problem Relation Age of Onset    Mental illness Mother     Depression Mother     Early death Mother     Lung cancer Mother 58        with brain mets.    Diabetes Father     Hypertension Father     Early death Father     Anxiety disorder Sister     Rashes / Skin problems Sister     Migraines Sister     Diabetes Maternal Grandmother     Arthritis Maternal Grandmother     Heart disease Maternal Grandmother     No Known Problems Maternal Grandfather     Breast cancer Paternal Grandmother         age unknown.    Anuerysm Paternal Grandfather     ADD / ADHD Brother     Anxiety disorder Brother     Melanoma Brother     Multiple sclerosis Paternal Aunt        Past Medical History:   Diagnosis Date    Anxiety     Bunion     left foot   OR   correction today 4/14/2023    Cervical radiculopathy     COVID     x 2-- Jan 2022 and  Jan 2023    Depression     GERD (gastroesophageal reflux disease)     Headache(784.0)     Migraines     Seasonal allergies     Tobacco abuse     Wears contact lenses        Current Outpatient Medications   Medication Sig Dispense Refill    acyclovir (ZOVIRAX) 5 % ointment APPLY TOPICALLY EVERY 4 (FOUR) HOURS 15 g 0    Calcium Carbonate Antacid (TUMS PO) Take by mouth if needed      clonazePAM (KlonoPIN) 1 mg tablet Take 1 tablet (1 mg total) by mouth 2 (two) times a day 60 tablet 0    Diclofenac Sodium (VOLTAREN) 1 % Apply 2 g topically 4 (four) times a day 100 g 3    dicyclomine (BENTYL) 20 mg tablet Take 1 tablet (20 mg total) by mouth 3 (three) times a day as needed (for abdominal cramping) 30 tablet 0    DULoxetine (CYMBALTA) 30 mg delayed release capsule Take 1 capsule (30 mg total) by mouth daily Along with 60 mg capsule 90 capsule 1    DULoxetine (CYMBALTA) 60 mg delayed release capsule Take 1 capsule (60 mg total) by mouth daily 90 capsule 1    ibuprofen (MOTRIN) 600 mg tablet Take 1 tablet (600 mg total) by mouth every 6 (six) hours as needed for mild pain 120 tablet 0    loratadine (CLARITIN) 10 mg tablet Take 1 tablet (10 mg total) by mouth daily (Patient taking differently: Take 10 mg by mouth daily as needed) 30 tablet 2    methocarbamol (Robaxin-750) 750 mg tablet Take 1 tablet (750 mg total) by mouth 2 (two) times a day as needed for muscle spasms 60 tablet 0    Multiple Vitamins-Minerals (MULTIVITAMIN ADULT PO) Take 1 tablet by mouth daily      nabumetone (RELAFEN) 500 mg tablet Take 1 tablet (500 mg total) by mouth 2 (two) times a day 60 tablet 2    ondansetron (ZOFRAN) 4 mg tablet Take 1 tablet (4 mg total) by mouth every 8 (eight) hours as needed for nausea or vomiting 20 tablet 0    traZODone (DESYREL) 50 mg tablet Take 1 tablet by mouth daily at bedtime as needed for sleep 90 tablet 0     No current facility-administered medications for this visit.       Past Surgical History:   Procedure  Laterality Date    BUNIONECTOMY Right     HYSTERECTOMY      2020    LAPAROSCOPIC TOTAL HYSTERECTOMY      NV HALLUX RIGIDUS W/CHEILECTOMY 1ST MP JT W/IMPLT Left 04/14/2023    Procedure: BUNIONECTOMY with implant;  Surgeon: Will Cuevas DPM;  Location: [facility];  Service: Podiatry    TONSILLECTOMY         Social History     Socioeconomic History    Marital status: /Civil Union     Spouse name: Not on file    Number of children: Not on file    Years of education: Not on file    Highest education level: Not on file   Occupational History    Not on file   Tobacco Use    Smoking status: Every Day     Current packs/day: 0.50     Average packs/day: 0.5 packs/day for 21.8 years (10.9 ttl pk-yrs)     Types: Cigarettes     Start date: 4/11/2002    Smokeless tobacco: Never   Vaping Use    Vaping status: Never Used   Substance and Sexual Activity    Alcohol use: Not Currently    Drug use: No    Sexual activity: Yes     Partners: Male     Birth control/protection: None     Comment: Hysterectomy on July 1st 2020   Other Topics Concern    Not on file   Social History Narrative    Not on file     Social Determinants of Health     Financial Resource Strain: Not on file   Food Insecurity: Not on file   Transportation Needs: Not on file   Physical Activity: Not on file   Stress: Not on file   Social Connections: Not on file   Intimate Partner Violence: Not on file   Housing Stability: Not on file       Allergies   Allergen Reactions    Bactrim [Sulfamethoxazole-Trimethoprim] Hives    Latex Other (See Comments)     Latex allergy-rashes    Levofloxacin Hives    Quinolones Rash       Review of Systems  General- denies fever/chills  HEENT- denies hearing loss or sore throat  Eyes- denies eye pain or visual disturbances, denies red eyes  Respiratory- denies cough or SOB  Cardio- denies chest pain or palpitations  GI- denies abdominal pain  Endocrine- denies urinary frequency  Urinary- denies pain with urination  Musculoskeletal-  "Negative except noted above  Skin- denies rashes or wounds  Neurological- denies dizziness or headache  Psychiatric- denies anxiety or difficulty concentrating    Physical Exam  /80   Pulse 87   Ht 5' 4\" (1.626 m)   Wt 49.9 kg (110 lb)   LMP 01/29/2020   BMI 18.88 kg/m²     General/Constitutional: No apparent distress: well-nourished and well developed.  Lymphatic: No appreciable lymphadenopathy  Respiratory: Non-labored breathing  Vascular: No edema, swelling or tenderness, except as noted in detailed exam.  Integumentary: No impressive skin lesions present, except as noted in detailed exam.  Psych: Normal mood and affect, oriented to person, place and time.  MSK: normal other than stated in HPI and exam  Gait & balance: no evidence of myelopathic gait, ambulates Independently     Cervical  spine range of motion:  -Forward flexion chin to chest  -Extension to 60  -Lateral bend 30 right, 30 left  -Rotation 45 right, 45 left.    There is no point tenderness with palpation along the posterior cervical, thoracic, lumbar spine.     Neurologic:  Upper Extremity Motor Function    Right  Left    Deltoid  5/5  5/5    Bicep  5/5  5/5    Wrist extension  4+/5  5/5    Tricep  4+/5  5/5    Finger flexion/  4-/5  5/5    Hand intrinsic  5/5  5/5      Lower Extremity Motor Function    Right  Left    Heel rise  5/5  5/5    Toe rise  5/5  5/5      Sensory: light touch is intact to bilateral upper and lower extremities     Reflexes:    Right Left   Biceps 2+ 2+   Triceps 2+ 2+   Brachioradialis 2+ 2+   Patellar 3+ 3+   Achilles 2+ 2+   Babinski neg neg     Other tests:  Spurling's: positive  Valdez's: positive right  Inverted radial reflex: negative  Clonus: negative  Tandem gait: negative  Carpal Tinel/Phalen: negative bilateral   Cubital Tinel: negative bilateral   Shoulder: painless active & passive ROM left; pain with active ROM right Shoulder impingement tests: positive right    Diagnostic Tests   IMAGING: I have " "personally reviewed the images and these are my findings:  Cervical Spine X-rays from 9/1/2023: cervical spondylosis with loss of disc height notably C5-6, uncovertebral hypertrophy; no apparent spondylolisthesis, no appreciated lytic/blastic lesions, no obvious instability    Cervical Spine MRI from 5/30/2023: multi level cervical disc degeneration with disc desiccation, mild disc bulging at C4-5, mild loss of disc height at C5-6 with central stenosis but no cord signal abnormality appreciated and bilateral moderate/severe foraminal stenosis    Cervical Spine CT from 11/2/2023: multi level mild cervical spondylosis most noted at C5-6 with endplate sclerosis, mild PLL calcifications, no significant facet arthrosis, mild/mod uncovertebral sclerosis    Procedures, if performed today     None performed       Portions of the record may have been created with voice recognition software.  Occasional wrong word or \"sound a like\" substitutions may have occurred due to the inherent limitations of voice recognition software.  Read the chart carefully and recognize, using context, where substitutions have occurred.  "

## 2024-02-12 NOTE — PRE-PROCEDURE INSTRUCTIONS
Pre-Surgery Instructions:   Medication Instructions    acyclovir (ZOVIRAX) 5 % ointment Do not take day of surgery; continue as prescribed excluding DOS       Calcium Carbonate Antacid (TUMS PO) Do not take day of surgery; continue as prescribed excluding DOS     clonazePAM (KlonoPIN) 1 mg tablet Take Day of Surgery; unless usually taken at night     Diclofenac Sodium (VOLTAREN) 1 % Do not take day of surgery; continue as prescribed excluding DOS     dicyclomine (BENTYL) 20 mg tablet Take Day of Surgery; unless usually taken at night     DULoxetine (CYMBALTA) 30 mg delayed release capsule Take Day of Surgery; unless usually taken at night     DULoxetine (CYMBALTA) 60 mg delayed release capsule Take Day of Surgery; unless usually taken at night     ibuprofen (MOTRIN) 600 mg tablet Hold for at least 3 days    loratadine (CLARITIN) 10 mg tablet Do not take day of surgery; continue as prescribed excluding DOS     methocarbamol (Robaxin-750) 750 mg tablet Take Day of Surgery; unless usually taken at night     Multiple Vitamins-Minerals (MULTIVITAMIN ADULT PO) Avoid 1 week prior to surgery      nabumetone (RELAFEN) 500 mg tablet Hold for at least 3 days    ondansetron (ZOFRAN) 4 mg tablet Take Day of Surgery; unless usually taken at night     traZODone (DESYREL) 50 mg tablet Take Day of Surgery; unless usually taken at night     Medication instructions for day surgery reviewed. Please use only a sip of water to take your instructed medications. Avoid all over the counter vitamins, supplements and NSAIDS for one week prior to surgery per anesthesia guidelines. Tylenol is ok to take as needed.     You will receive a call one business day prior to surgery with an arrival time and hospital directions. If your surgery is scheduled on a Monday, the hospital will be calling you on the Friday prior to your surgery. If you have not heard from anyone by 8pm, please call the hospital supervisor through the hospital  at  [phone]. ([place] [phone] or [place] [phone]).    Do not eat or drink anything after midnight the night before your surgery, including candy, mints, lifesavers, or chewing gum. Do not drink alcohol 24hrs before your surgery. Try not to smoke at least 24hrs before your surgery.       Follow the pre surgery showering instructions as listed in the “My Surgical Experience Booklet” or otherwise provided by your surgeon's office. Do not use a blade to shave the surgical area 1 week before surgery. It is okay to use a clean electric clippers up to 24 hours before surgery. Do not apply any lotions, creams, including makeup, cologne, deodorant, or perfumes after showering on the day of your surgery. Do not use dry shampoo, hair spray, hair gel, or any type of hair products.     No contact lenses, eye make-up, or artificial eyelashes. Remove nail polish, including gel polish, and any artificial, gel, or acrylic nails if possible. Remove all jewelry including rings and body piercing jewelry.     Wear causal clothing that is easy to take on and off. Consider your type of surgery.    Keep any valuables, jewelry, piercings at home. Please bring any specially ordered equipment (sling, braces) if indicated.    Arrange for a responsible person to drive you to and from the hospital on the day of your surgery. Visitor Guidelines discussed.     Call the surgeon's office with any new illnesses, exposures, or additional questions prior to surgery.    Please reference your “My Surgical Experience Booklet” for additional information to prepare for your upcoming surgery.

## 2024-02-16 ENCOUNTER — APPOINTMENT (OUTPATIENT)
Dept: LAB | Facility: CLINIC | Age: 39
End: 2024-02-16
Payer: COMMERCIAL

## 2024-02-16 ENCOUNTER — LAB REQUISITION (OUTPATIENT)
Dept: LAB | Facility: HOSPITAL | Age: 39
End: 2024-02-16
Payer: COMMERCIAL

## 2024-02-16 DIAGNOSIS — Z01.818 ENCOUNTER FOR OTHER PREPROCEDURAL EXAMINATION: ICD-10-CM

## 2024-02-16 DIAGNOSIS — Z01.818 PRE-OP TESTING: ICD-10-CM

## 2024-02-16 PROCEDURE — 86901 BLOOD TYPING SEROLOGIC RH(D): CPT | Performed by: ORTHOPAEDIC SURGERY

## 2024-02-16 PROCEDURE — 86850 RBC ANTIBODY SCREEN: CPT | Performed by: ORTHOPAEDIC SURGERY

## 2024-02-16 PROCEDURE — 36415 COLL VENOUS BLD VENIPUNCTURE: CPT

## 2024-02-16 PROCEDURE — 86900 BLOOD TYPING SEROLOGIC ABO: CPT | Performed by: ORTHOPAEDIC SURGERY

## 2024-02-28 ENCOUNTER — ANESTHESIA EVENT (OUTPATIENT)
Dept: PERIOP | Facility: HOSPITAL | Age: 39
End: 2024-02-28
Payer: COMMERCIAL

## 2024-02-29 ENCOUNTER — HOSPITAL ENCOUNTER (OUTPATIENT)
Facility: HOSPITAL | Age: 39
Setting detail: OUTPATIENT SURGERY
Discharge: HOME | End: 2024-03-01
Attending: ORTHOPAEDIC SURGERY | Admitting: ORTHOPAEDIC SURGERY
Payer: COMMERCIAL

## 2024-02-29 ENCOUNTER — APPOINTMENT (OUTPATIENT)
Dept: RADIOLOGY | Facility: HOSPITAL | Age: 39
End: 2024-02-29
Payer: COMMERCIAL

## 2024-02-29 ENCOUNTER — ANESTHESIA (OUTPATIENT)
Dept: PERIOP | Facility: HOSPITAL | Age: 39
End: 2024-02-29
Payer: COMMERCIAL

## 2024-02-29 DIAGNOSIS — Z01.818 PRE-OP TESTING: Primary | ICD-10-CM

## 2024-02-29 DIAGNOSIS — K21.9 GERD WITHOUT ESOPHAGITIS: ICD-10-CM

## 2024-02-29 DIAGNOSIS — F41.9 ANXIETY: ICD-10-CM

## 2024-02-29 DIAGNOSIS — F32.A DEPRESSION, UNSPECIFIED DEPRESSION TYPE: ICD-10-CM

## 2024-02-29 DIAGNOSIS — M50.120 CERVICAL DISC DISORDER WITH RADICULOPATHY OF MID-CERVICAL REGION: ICD-10-CM

## 2024-02-29 DIAGNOSIS — Z98.890 S/P CERVICAL DISC REPLACEMENT: ICD-10-CM

## 2024-02-29 DIAGNOSIS — F41.1 GENERALIZED ANXIETY DISORDER: ICD-10-CM

## 2024-02-29 LAB — GLUCOSE SERPL-MCNC: 112 MG/DL (ref 65–140)

## 2024-02-29 PROCEDURE — 22856 TOT DISC ARTHRP 1NTRSPC CRV: CPT | Performed by: ORTHOPAEDIC SURGERY

## 2024-02-29 PROCEDURE — 72040 X-RAY EXAM NECK SPINE 2-3 VW: CPT

## 2024-02-29 PROCEDURE — 99222 1ST HOSP IP/OBS MODERATE 55: CPT | Performed by: INTERNAL MEDICINE

## 2024-02-29 PROCEDURE — C1776 JOINT DEVICE (IMPLANTABLE): HCPCS | Performed by: ORTHOPAEDIC SURGERY

## 2024-02-29 PROCEDURE — 99024 POSTOP FOLLOW-UP VISIT: CPT | Performed by: ORTHOPAEDIC SURGERY

## 2024-02-29 PROCEDURE — 82948 REAGENT STRIP/BLOOD GLUCOSE: CPT

## 2024-02-29 DEVICE — DISC 6972650 PRESTIGE
Type: IMPLANTABLE DEVICE | Site: SPINE CERVICAL | Status: FUNCTIONAL
Brand: PRESTIGE LP™

## 2024-02-29 RX ORDER — SENNOSIDES 8.6 MG
650 CAPSULE ORAL EVERY 8 HOURS PRN
Qty: 30 TABLET | Refills: 0 | Status: SHIPPED | OUTPATIENT
Start: 2024-02-29

## 2024-02-29 RX ORDER — HYDROMORPHONE HCL/PF 1 MG/ML
0.5 SYRINGE (ML) INJECTION
Status: DISCONTINUED | OUTPATIENT
Start: 2024-02-29 | End: 2024-02-29 | Stop reason: HOSPADM

## 2024-02-29 RX ORDER — KETAMINE HCL IN NACL, ISO-OSM 100MG/10ML
SYRINGE (ML) INJECTION AS NEEDED
Status: DISCONTINUED | OUTPATIENT
Start: 2024-02-29 | End: 2024-02-29

## 2024-02-29 RX ORDER — DOCUSATE SODIUM 100 MG/1
100 CAPSULE, LIQUID FILLED ORAL 2 TIMES DAILY
Status: DISCONTINUED | OUTPATIENT
Start: 2024-02-29 | End: 2024-03-01 | Stop reason: HOSPADM

## 2024-02-29 RX ORDER — METHOCARBAMOL 100 MG/ML
1000 INJECTION, SOLUTION INTRAMUSCULAR; INTRAVENOUS ONCE
Status: COMPLETED | OUTPATIENT
Start: 2024-02-29 | End: 2024-02-29

## 2024-02-29 RX ORDER — MAGNESIUM HYDROXIDE/ALUMINUM HYDROXICE/SIMETHICONE 120; 1200; 1200 MG/30ML; MG/30ML; MG/30ML
30 SUSPENSION ORAL EVERY 6 HOURS PRN
Status: DISCONTINUED | OUTPATIENT
Start: 2024-02-29 | End: 2024-03-01 | Stop reason: HOSPADM

## 2024-02-29 RX ORDER — CHLORHEXIDINE GLUCONATE ORAL RINSE 1.2 MG/ML
15 SOLUTION DENTAL ONCE
Status: COMPLETED | OUTPATIENT
Start: 2024-02-29 | End: 2024-02-29

## 2024-02-29 RX ORDER — CEFAZOLIN SODIUM 1 G/50ML
1000 SOLUTION INTRAVENOUS EVERY 8 HOURS
Status: COMPLETED | OUTPATIENT
Start: 2024-02-29 | End: 2024-03-01

## 2024-02-29 RX ORDER — SUCCINYLCHOLINE/SOD CL,ISO/PF 100 MG/5ML
SYRINGE (ML) INTRAVENOUS AS NEEDED
Status: DISCONTINUED | OUTPATIENT
Start: 2024-02-29 | End: 2024-02-29

## 2024-02-29 RX ORDER — DIPHENHYDRAMINE HYDROCHLORIDE 50 MG/ML
12.5 INJECTION INTRAMUSCULAR; INTRAVENOUS ONCE AS NEEDED
Status: DISCONTINUED | OUTPATIENT
Start: 2024-02-29 | End: 2024-02-29 | Stop reason: HOSPADM

## 2024-02-29 RX ORDER — DULOXETIN HYDROCHLORIDE 30 MG/1
30 CAPSULE, DELAYED RELEASE ORAL DAILY
Status: DISCONTINUED | OUTPATIENT
Start: 2024-03-01 | End: 2024-03-01 | Stop reason: HOSPADM

## 2024-02-29 RX ORDER — OXYCODONE HYDROCHLORIDE 10 MG/1
10 TABLET ORAL EVERY 4 HOURS PRN
Status: DISCONTINUED | OUTPATIENT
Start: 2024-02-29 | End: 2024-03-01 | Stop reason: HOSPADM

## 2024-02-29 RX ORDER — SODIUM CHLORIDE 9 MG/ML
INJECTION, SOLUTION INTRAVENOUS CONTINUOUS PRN
Status: DISCONTINUED | OUTPATIENT
Start: 2024-02-29 | End: 2024-02-29

## 2024-02-29 RX ORDER — ONDANSETRON 2 MG/ML
INJECTION INTRAMUSCULAR; INTRAVENOUS AS NEEDED
Status: DISCONTINUED | OUTPATIENT
Start: 2024-02-29 | End: 2024-02-29

## 2024-02-29 RX ORDER — ACETAMINOPHEN 325 MG/1
650 TABLET ORAL EVERY 6 HOURS SCHEDULED
Status: DISCONTINUED | OUTPATIENT
Start: 2024-02-29 | End: 2024-03-01 | Stop reason: HOSPADM

## 2024-02-29 RX ORDER — SENNOSIDES 8.6 MG
1 TABLET ORAL DAILY
Status: DISCONTINUED | OUTPATIENT
Start: 2024-02-29 | End: 2024-03-01 | Stop reason: HOSPADM

## 2024-02-29 RX ORDER — ACETAMINOPHEN 325 MG/1
650 TABLET ORAL EVERY 6 HOURS PRN
Status: DISCONTINUED | OUTPATIENT
Start: 2024-02-29 | End: 2024-02-29

## 2024-02-29 RX ORDER — DEXAMETHASONE SODIUM PHOSPHATE 10 MG/ML
INJECTION, SOLUTION INTRAMUSCULAR; INTRAVENOUS AS NEEDED
Status: DISCONTINUED | OUTPATIENT
Start: 2024-02-29 | End: 2024-02-29

## 2024-02-29 RX ORDER — GLYCOPYRROLATE 0.2 MG/ML
INJECTION INTRAMUSCULAR; INTRAVENOUS AS NEEDED
Status: DISCONTINUED | OUTPATIENT
Start: 2024-02-29 | End: 2024-02-29

## 2024-02-29 RX ORDER — FENTANYL CITRATE 50 UG/ML
INJECTION, SOLUTION INTRAMUSCULAR; INTRAVENOUS AS NEEDED
Status: DISCONTINUED | OUTPATIENT
Start: 2024-02-29 | End: 2024-02-29

## 2024-02-29 RX ORDER — DULOXETIN HYDROCHLORIDE 60 MG/1
60 CAPSULE, DELAYED RELEASE ORAL DAILY
Status: DISCONTINUED | OUTPATIENT
Start: 2024-03-01 | End: 2024-03-01 | Stop reason: HOSPADM

## 2024-02-29 RX ORDER — CEFAZOLIN SODIUM 1 G/3ML
INJECTION, POWDER, FOR SOLUTION INTRAMUSCULAR; INTRAVENOUS AS NEEDED
Status: DISCONTINUED | OUTPATIENT
Start: 2024-02-29 | End: 2024-02-29

## 2024-02-29 RX ORDER — METHOCARBAMOL 500 MG/1
500 TABLET, FILM COATED ORAL 4 TIMES DAILY PRN
Status: DISCONTINUED | OUTPATIENT
Start: 2024-02-29 | End: 2024-03-01 | Stop reason: HOSPADM

## 2024-02-29 RX ORDER — CLONAZEPAM 1 MG/1
1 TABLET ORAL 2 TIMES DAILY
Status: DISCONTINUED | OUTPATIENT
Start: 2024-02-29 | End: 2024-03-01 | Stop reason: HOSPADM

## 2024-02-29 RX ORDER — CALCIUM CARBONATE 500 MG/1
1000 TABLET, CHEWABLE ORAL DAILY PRN
Status: DISCONTINUED | OUTPATIENT
Start: 2024-02-29 | End: 2024-03-01 | Stop reason: HOSPADM

## 2024-02-29 RX ORDER — MAGNESIUM HYDROXIDE 1200 MG/15ML
LIQUID ORAL AS NEEDED
Status: DISCONTINUED | OUTPATIENT
Start: 2024-02-29 | End: 2024-02-29 | Stop reason: HOSPADM

## 2024-02-29 RX ORDER — MIDAZOLAM HYDROCHLORIDE 2 MG/2ML
INJECTION, SOLUTION INTRAMUSCULAR; INTRAVENOUS AS NEEDED
Status: DISCONTINUED | OUTPATIENT
Start: 2024-02-29 | End: 2024-02-29

## 2024-02-29 RX ORDER — PROPOFOL 10 MG/ML
INJECTION, EMULSION INTRAVENOUS CONTINUOUS PRN
Status: DISCONTINUED | OUTPATIENT
Start: 2024-02-29 | End: 2024-02-29

## 2024-02-29 RX ORDER — LORATADINE 10 MG/1
10 TABLET ORAL ONCE
Status: COMPLETED | OUTPATIENT
Start: 2024-02-29 | End: 2024-02-29

## 2024-02-29 RX ORDER — SODIUM CHLORIDE, SODIUM LACTATE, POTASSIUM CHLORIDE, CALCIUM CHLORIDE 600; 310; 30; 20 MG/100ML; MG/100ML; MG/100ML; MG/100ML
75 INJECTION, SOLUTION INTRAVENOUS CONTINUOUS
OUTPATIENT
Start: 2024-02-29

## 2024-02-29 RX ORDER — SODIUM CHLORIDE, SODIUM LACTATE, POTASSIUM CHLORIDE, CALCIUM CHLORIDE 600; 310; 30; 20 MG/100ML; MG/100ML; MG/100ML; MG/100ML
INJECTION, SOLUTION INTRAVENOUS CONTINUOUS PRN
Status: DISCONTINUED | OUTPATIENT
Start: 2024-02-29 | End: 2024-02-29

## 2024-02-29 RX ORDER — LIDOCAINE HYDROCHLORIDE 10 MG/ML
INJECTION, SOLUTION EPIDURAL; INFILTRATION; INTRACAUDAL; PERINEURAL AS NEEDED
Status: DISCONTINUED | OUTPATIENT
Start: 2024-02-29 | End: 2024-02-29

## 2024-02-29 RX ORDER — ONDANSETRON 2 MG/ML
4 INJECTION INTRAMUSCULAR; INTRAVENOUS ONCE AS NEEDED
Status: DISCONTINUED | OUTPATIENT
Start: 2024-02-29 | End: 2024-02-29 | Stop reason: HOSPADM

## 2024-02-29 RX ORDER — OXYCODONE HYDROCHLORIDE 5 MG/1
5 TABLET ORAL EVERY 4 HOURS PRN
Qty: 30 TABLET | Refills: 0 | Status: SHIPPED | OUTPATIENT
Start: 2024-02-29 | End: 2024-03-10

## 2024-02-29 RX ORDER — OXYCODONE HYDROCHLORIDE 5 MG/1
5 TABLET ORAL EVERY 4 HOURS PRN
Status: DISCONTINUED | OUTPATIENT
Start: 2024-02-29 | End: 2024-03-01 | Stop reason: HOSPADM

## 2024-02-29 RX ORDER — FENTANYL CITRATE/PF 50 MCG/ML
50 SYRINGE (ML) INJECTION
Status: DISCONTINUED | OUTPATIENT
Start: 2024-02-29 | End: 2024-02-29 | Stop reason: HOSPADM

## 2024-02-29 RX ORDER — ONDANSETRON 2 MG/ML
4 INJECTION INTRAMUSCULAR; INTRAVENOUS EVERY 6 HOURS PRN
Status: DISCONTINUED | OUTPATIENT
Start: 2024-02-29 | End: 2024-03-01 | Stop reason: HOSPADM

## 2024-02-29 RX ADMIN — ACETAMINOPHEN 650 MG: 325 TABLET, FILM COATED ORAL at 23:56

## 2024-02-29 RX ADMIN — FENTANYL CITRATE 50 MCG: 50 INJECTION INTRAMUSCULAR; INTRAVENOUS at 07:50

## 2024-02-29 RX ADMIN — METHOCARBAMOL 750 MG: 100 INJECTION INTRAMUSCULAR; INTRAVENOUS at 11:01

## 2024-02-29 RX ADMIN — REMIFENTANIL HYDROCHLORIDE 0.2 MCG/KG/MIN: 1 INJECTION, POWDER, LYOPHILIZED, FOR SOLUTION INTRAVENOUS at 07:41

## 2024-02-29 RX ADMIN — GLYCOPYRROLATE 0.1 MG: 0.2 INJECTION, SOLUTION INTRAMUSCULAR; INTRAVENOUS at 08:44

## 2024-02-29 RX ADMIN — FENTANYL CITRATE 50 MCG: 50 INJECTION INTRAMUSCULAR; INTRAVENOUS at 12:23

## 2024-02-29 RX ADMIN — ONDANSETRON 4 MG: 2 INJECTION INTRAMUSCULAR; INTRAVENOUS at 11:01

## 2024-02-29 RX ADMIN — OXYCODONE HYDROCHLORIDE 5 MG: 5 TABLET ORAL at 18:13

## 2024-02-29 RX ADMIN — CEFAZOLIN SODIUM 1000 MG: 1 SOLUTION INTRAVENOUS at 17:59

## 2024-02-29 RX ADMIN — MIDAZOLAM 2 MG: 1 INJECTION INTRAMUSCULAR; INTRAVENOUS at 07:33

## 2024-02-29 RX ADMIN — FENTANYL CITRATE 50 MCG: 50 INJECTION INTRAMUSCULAR; INTRAVENOUS at 10:50

## 2024-02-29 RX ADMIN — SODIUM CHLORIDE: 0.9 INJECTION, SOLUTION INTRAVENOUS at 07:42

## 2024-02-29 RX ADMIN — OXYCODONE HYDROCHLORIDE 10 MG: 10 TABLET ORAL at 22:19

## 2024-02-29 RX ADMIN — Medication 10 MG: at 07:53

## 2024-02-29 RX ADMIN — CHLORHEXIDINE GLUCONATE 15 ML: 1.2 SOLUTION ORAL at 06:09

## 2024-02-29 RX ADMIN — PROPOFOL 100 MCG/KG/MIN: 10 INJECTION, EMULSION INTRAVENOUS at 07:41

## 2024-02-29 RX ADMIN — HYDROMORPHONE HYDROCHLORIDE 0.5 MG: 1 INJECTION, SOLUTION INTRAMUSCULAR; INTRAVENOUS; SUBCUTANEOUS at 13:12

## 2024-02-29 RX ADMIN — FENTANYL CITRATE 50 MCG: 50 INJECTION INTRAMUSCULAR; INTRAVENOUS at 12:29

## 2024-02-29 RX ADMIN — LIDOCAINE HYDROCHLORIDE 50 MG: 10 INJECTION, SOLUTION EPIDURAL; INFILTRATION; INTRACAUDAL; PERINEURAL at 07:37

## 2024-02-29 RX ADMIN — HYDROMORPHONE HYDROCHLORIDE 0.5 MG: 1 INJECTION, SOLUTION INTRAMUSCULAR; INTRAVENOUS; SUBCUTANEOUS at 13:26

## 2024-02-29 RX ADMIN — ACETAMINOPHEN 650 MG: 325 TABLET, FILM COATED ORAL at 17:59

## 2024-02-29 RX ADMIN — FENTANYL CITRATE 50 MCG: 50 INJECTION INTRAMUSCULAR; INTRAVENOUS at 07:37

## 2024-02-29 RX ADMIN — SODIUM CHLORIDE, SODIUM LACTATE, POTASSIUM CHLORIDE, AND CALCIUM CHLORIDE: .6; .31; .03; .02 INJECTION, SOLUTION INTRAVENOUS at 06:49

## 2024-02-29 RX ADMIN — Medication 50 MG: at 07:51

## 2024-02-29 RX ADMIN — CEFAZOLIN 1000 MG: 1 INJECTION, POWDER, FOR SOLUTION INTRAMUSCULAR; INTRAVENOUS at 07:50

## 2024-02-29 RX ADMIN — PHENYLEPHRINE HYDROCHLORIDE 20 MCG/MIN: 10 INJECTION INTRAVENOUS at 08:52

## 2024-02-29 RX ADMIN — Medication 10 MG: at 07:37

## 2024-02-29 RX ADMIN — METHOCARBAMOL 500 MG: 500 TABLET ORAL at 20:02

## 2024-02-29 RX ADMIN — Medication 100 MG: at 07:37

## 2024-02-29 RX ADMIN — DEXAMETHASONE SODIUM PHOSPHATE 5 MG: 10 INJECTION, SOLUTION INTRAMUSCULAR; INTRAVENOUS at 08:12

## 2024-02-29 RX ADMIN — Medication 10 MG: at 07:55

## 2024-02-29 RX ADMIN — Medication 10 MG: at 07:46

## 2024-02-29 RX ADMIN — Medication 10 MG: at 10:50

## 2024-02-29 RX ADMIN — FENTANYL CITRATE 50 MCG: 50 INJECTION INTRAMUSCULAR; INTRAVENOUS at 10:52

## 2024-02-29 RX ADMIN — FENTANYL CITRATE 50 MCG: 50 INJECTION INTRAMUSCULAR; INTRAVENOUS at 07:54

## 2024-02-29 RX ADMIN — LORATADINE 10 MG: 10 TABLET ORAL at 18:48

## 2024-02-29 RX ADMIN — FENTANYL CITRATE 50 MCG: 50 INJECTION INTRAMUSCULAR; INTRAVENOUS at 07:45

## 2024-02-29 RX ADMIN — CLONAZEPAM 1 MG: 1 TABLET ORAL at 17:59

## 2024-02-29 RX ADMIN — PROPOFOL 150 MG: 10 INJECTION, EMULSION INTRAVENOUS at 07:37

## 2024-02-29 NOTE — ASSESSMENT & PLAN NOTE
Failed outpatient conservative measures and opted for cervical spine surgery  Pain controlled  No post operative issues noted  Continue encourage incentive spirometry; monitor fever curve  DVT prophylaxis in place and reviewed

## 2024-02-29 NOTE — ANESTHESIA POSTPROCEDURE EVALUATION
Post-Op Assessment Note    CV Status:  Stable    Pain management: adequate       Mental Status:  Sleepy   Hydration Status:  Euvolemic   PONV Controlled:  Controlled   Airway Patency:  Patent  Airway: intubated     Post Op Vitals Reviewed: Yes    No anethesia notable event occurred.    Staff: CRNA               BP   155/66   Temp 98   Pulse 109   Resp 15   SpO2 99

## 2024-02-29 NOTE — DISCHARGE INSTR - AVS FIRST PAGE
Dr. [facility]  Post-op Information and Instructions  Total Disc Arthroplasty    1. Follow-up  - You should return to the office for your follow-up appointment approximately 2 weeks post-op. This should have been scheduled prior to your surgery. If you do not have an appointment scheduled, please call [phone] to do so as soon as possible.  - At the first post op appointment we will check your incision, remove any sutures, and make sure that you are healing well.  - X-rays will be taken at this appointment to evaluate your hardware.  - If you need any refills on your pain medications, this will be addressed at your follow-up appointment.  - You can expect to have post-op appointments at 2 weeks, 6 weeks, 3 months, 6 months and 1 year after surgery. After that time, we will continue to follow you yearly to monitor your fusion.    2. Incision Care:  - You will have a surgical dressing over the area. Maintain this dressing until it starts to fall off. It is placed in a sterile environment and is important to allow the wound to begin healing in a sterile environment.  - You will have steri-strips over the incision - these look like little white pieces of tape. Please do not remove these as they will fall off on their own or be removed at your first post-op appointment.  - Once the initial sterile dressing is removed, keep the incision covered with a non-adhesive dressing. Dressing material can be purchased over the counter at any drug store. Materials include: square gauze and skin tape. Place dry dressing over dry incision and tape the sides.  - You may shower on the day that your dressing is removed. Try to keep incision dry for 2 weeks post-op. After that, your incision may get wet in the shower, but DO NOT submerge your incision (bath, hot tub, pool, lake, etc.) until you have been cleared to do so. You may gently wash it with soap and water.  Do not scrub the incision. Air-dry the incision or pat  dry with clean, fresh towel before reapplying the dry dressing.  - Your sutures are buried beneath the skin and will dissolve with time. You may have a small knot on either end of the incision which will be removed at your post-op appointment. The incision may be raised initially, although this will improve as the sutures dissolve.  - Please do not apply any ointments or salves to the incision unless instructed to do so.  - If you notice any drainage, foul odor, increased redness, swelling or pain of the incision, please call our office immediately.  These may be signs of an infection and should be evaluated.    3. Medications  - You will be given a prescription for pain medication when you are discharged from the hospital.  - It is recommended that you take the medication as prescribed for the first 24-48 hours after surgery, even if your pain is minimal.  It is much easier to control your pain when you stay on top of the medications than if you wait to take them until your pain is severe.  - After the first few days, you should try to take the pain medication less often, and only when needed.  - You may take Tylenol in place of, not in addition to, your pain medication if you wish.  - If you have had a fusion, do not take NSAIDs (Advil, Aleve, Ibuprofen, Motrin, Naproxen, etc.) for 3 months following your surgery. This may impede the healing of your fusion.  - If you have had a TDA (total disc arthroplasty), you may be given a prescription for an anti-inflammatory post-operatively. Please take the prescribed 15 mg Meloxicam (Mobic) once daily in the morning with food.  - DO NOT drive while you are taking narcotic pain medications.  - If there is an issue with any of your discharge medications, please call our office at [phone] to discuss.  - It is very common for surgery and narcotic pain medications to cause constipation.  It is very important that you eat a high fiber diet, drink lots of water, and also  consider taking a stool softener while taking pain medications to help with this.  It is not uncommon for you to go several days without a bowel movement after surgery.  If you are constipated and it is accompanied by severe abdominal pain, nausea and vomiting, inability to keep food or drink down, and/or a fever, please call our office as these may be signs of a more serious medical condition.    4. Activity:  - If you have had anterior cervical fusion, you may have a hoarse voice initially.  - You CANNOT lift greater than 5lbs, or a gallon of milk, until instructed. Do not lift greater than 5lbs overhead. Limit vigorous neck turning, twisting, bending.  - You should not drive while taking narcotic medications or until cleared to do so.  - You should try to avoid sitting for greater than 20 minutes at a time as this will cause your muscles to stiffen and spasm, making you more uncomfortable.  - You will start PT (physical therapy) around 3 months after surgery. We will discuss referral to physical therapy at your first or second post-op appointment  - You may resume your normal daily activities as tolerated.  - Walking is the best exercise and you should try to walk up to 1 mile throughout the day as you are recovering.  - You should NOT smoke following spinal fusion as this will impede your healing.    5. Diet:  - It is important to eat a well balanced diet to help your body heal.  - You should make sure that you are drinking plenty of fluids - water is best  - If you have had an ACDF (anterior cervical discectomy and fusion), you can expect that you will have a sore throat and possibly have difficulty swallowing after surgery.  This is normal and should improve.  If you feel that this is worsening instead of improving, please call the office.  If you feel that you are having any swelling, or difficulty breathing, please call 911 immediately.    6. Return to Work:  - You can expect to be out of work for at least 6  weeks.  We will discuss your return to work status at your post-op appointments, but please do not hesitate to call if you have any questions or concerns.  - Please make sure that any short-term disability paperwork is dropped off at the .    Please call our office if you have any of the following after surgery:  - Persistent fever greater than 100.5 degrees  - Foul odor, drainage, redness, swelling or increased pain around your incision site  - A headache that is worse with standing or sitting, and is alleviated with lying flat on your back.  - New onset arm or leg weakness, numbness or tingling  - Significantly increased pain that is not alleviated with pain medications, rest and ice  - New-onset calf pain    If you experience any chest pain or shortness of breath after you are discharged, call 911 immediately.

## 2024-02-29 NOTE — INTERVAL H&P NOTE
2/29/24 H&P Update  See detailed H&P office note below from 2/7/24, as well as 10/20/23.  H&P reviewed. After examining the patient I find no significant changes in the patients condition since the H&P had been written.  Continues with neck pain, R>L arm pain and weakness, as well as signs/symptoms of cervical myelopathy with hand numbness and dropping items. Significant other at bedside - imaging, clinical findings and procedure reviewed with patient.  Plan to proceed with decompression/arthroplasty surgery.  We did discuss the potential for fusion surgery pending intra op findings.      General: appears well, no acute distress  Respiratory: No SOB, no abnormal effort   Abdomen: Soft. No tenderness.    Cardiac: RRR, warm & well perfused     Vitals:    02/29/24 0601   BP: 111/77   Pulse: 67   Resp: 20   Temp: 98.5 °F (36.9 °C)   SpO2: 97%

## 2024-02-29 NOTE — CONSULTS
[facility]  Consult  Name: Katina Mendez 39 y.o. female I MRN: [id number]  Unit/Bed#: -01 I Date of Admission: 2/29/2024   Date of Service: 2/29/2024  Hospital Day: 0    Inpatient consult to Internal Medicine  Consult performed by: Pooja Abernathy PA-C  Consult ordered by: Diana Vicente PA-C          Assessment/Plan   S/P cervical discectomy  Assessment & Plan  Failed outpatient conservative measures and opted for cervical discectomy  Pain controlled  No post operative issues noted  Continue encourage incentive spirometry; monitor fever curve  DVT prophylaxis in place and reviewed      Anxiety  Assessment & Plan  Continue home medications.      PRE-OP HGB LEVEL: 12.8    Subjective/ HPI: Katina Mendez was seen and examined. Hx of Cervicalpain failed out patient conservative measures. Elected to undergo surgical intervention. We are asked to see patient for post op management of underlying medical co-morbidities as outlined above. Pt did well intra and post operatively with good hemodynamics. Pt currently comfortable and without any reported post op nausea.             ROS:   A 10 point ROS was performed; negative except as noted above.     Social History:    Substance Use History:   Social History     Substance and Sexual Activity   Alcohol Use Not Currently     Social History     Tobacco Use   Smoking Status Every Day    Current packs/day: 0.50    Average packs/day: 0.5 packs/day for 21.9 years (10.9 ttl pk-yrs)    Types: Cigarettes    Start date: 4/11/2002   Smokeless Tobacco Never     Social History     Substance and Sexual Activity   Drug Use No       Family History:    Family History   Problem Relation Age of Onset    Mental illness Mother     Depression Mother     Early death Mother     Lung cancer Mother 58        with brain mets.    Diabetes Father     Hypertension Father     Early death Father     Anxiety disorder Sister     Rashes / Skin  "problems Sister     Migraines Sister     Diabetes Maternal Grandmother     Arthritis Maternal Grandmother     Heart disease Maternal Grandmother     No Known Problems Maternal Grandfather     Breast cancer Paternal Grandmother         age unknown.    Anuerysm Paternal Grandfather     ADD / ADHD Brother     Anxiety disorder Brother     Melanoma Brother     Multiple sclerosis Paternal Aunt          Review of Scheduled Meds:  Current Facility-Administered Medications   Medication Dose Route Frequency Provider Last Rate    acetaminophen  650 mg Oral Q6H PRN Diana Vicente PA-C      aluminum-magnesium hydroxide-simethicone  30 mL Oral Q6H PRN Diana Vicente PA-C      calcium carbonate  1,000 mg Oral Daily PRN Diana Vicente PA-C      cefazolin  1,000 mg Intravenous Q8H Diana Vicente PA-C      clonazePAM  1 mg Oral BID Diana Vicente PA-C      docusate sodium  100 mg Oral BID Diana Vicente PA-C      [START ON 3/1/2024] DULoxetine  30 mg Oral Daily Diana Vicente PA-C      [START ON 3/1/2024] DULoxetine  60 mg Oral Daily Diana Vicente PA-C      methocarbamol  500 mg Oral 4x Daily PRN Diana Vicente PA-C      ondansetron  4 mg Intravenous Q6H PRN Diana Vicente PA-C      oxyCODONE  10 mg Oral Q4H PRN Diana Vicente PA-C      oxyCODONE  5 mg Oral Q4H PRN Diana Vicente PA-C      senna  1 tablet Oral Daily Diana Vicente PA-C         Labs:               Invalid input(s): \"LABGLOM\", \"CMP\"               Results from last 7 days   Lab Units 02/29/24  1154   POC GLUCOSE mg/dl 112       Lab Results   Component Value Date    URINECX 8335-8546 cfu/ml 05/16/2018    URINECX No Growth <1000 cfu/mL 05/30/2016       Input and Output Summary (last 24 hours):       Intake/Output Summary (Last 24 hours) at 2/29/2024 1506  Last data filed at 2/29/2024 1140  Gross per 24 hour   Intake 1900 ml   Output 15 ml   Net 1885 ml "       Imaging:     XR spine cervical 2 or 3 vw injury    (Results Pending)   XR spine cervical 2 or 3 vw injury    (Results Pending)       *Labs /Radiology studiesLabs reviewed  *Medications reviewed and reconciled as needed  *Please refer to order section for additional ordered labs studies  *Case discussed with primary attending during morning huddle case rounds    Vitals:   Temp (24hrs), Av.9 °F (36.6 °C), Min:97.4 °F (36.3 °C), Max:98.5 °F (36.9 °C)    Temp:  [97.4 °F (36.3 °C)-98.5 °F (36.9 °C)] 97.7 °F (36.5 °C)  HR:  [] 66  Resp:  [13-20] 14  BP: (109-155)/(59-77) 127/76  SpO2:  [94 %-99 %] 95 %  Body mass index is 18.02 kg/m².     Physical Exam:   General Appearance: no distress, conversive, Cervical brace in place  HEENT: PERRLA, conjuctiva normal; oropharynx clear; mucous membranes moist;   Neck:  Supple, no lymphadenopathy or thyromegaly  Lungs: clear bilaterally, normal respiratory effort, no retractions, expiratory effort normal  CV: S1 S2 regular, no murmurs rubs or galops   ABD: soft non tender, +BS x4  EXT: DP pulses intact, no lymphadenopathy, no edema   Skin: normal turgor, normal texture, no rash  Psych: affect normal, mood normal  Neuro: AAOx3          Invasive Devices       Peripheral Intravenous Line  Duration             Peripheral IV 24 Left Forearm <1 day    Peripheral IV 24 Right Forearm <1 day              Drain  Duration             Closed/Suction Drain Anterior;Right Neck Bulb 10 Fr. <1 day                       Code Status: No Order  Current Length of Stay: 0 day(s)    Total floor / unit time spent today 1 hour  Coordination of patient's care was performed in conjunction with primary service. Time invested included review of patient's labs, vitals, and management of their comorbidities with continued monitoring, examination of patient as well as answering patient questions, documenting her findings and creating progress note in electronic medical record,  ordering  appropriate diagnostic testing. In addition, this patient was discussed with medical team including physician and advanced extenders.           ** Please Note: Fluency Direct voice to text software may have been used in the creation of this document. Audio transcription errors may occur**

## 2024-02-29 NOTE — INTERIM OP NOTE
Anterior Cervical discectomy and disc replacement/arthroplasty C5-6  Postoperative Note  PATIENT NAME: Katina Mendez  : 1985  MRN: [id number]  BE OR ROOM 09    Surgery Date: 2024    Preop Diagnosis:  Cervical radiculopathy [M54.12]    Post-Op Diagnosis Codes:     * Cervical radiculopathy [M54.12]    Procedure(s) (LRB):  Anterior Cervical discectomy and disc replacement/arthroplasty C5-6 (Bilateral)    Surgeons and Role:     * Edu Del Rio MD - Primary     * Ramon Lake MD - Assisting     * Diana Vicente PA-C - Assisting    Specimens:  * No specimens in log *    Estimated Blood Loss:   15 mL    Anesthesia Type:   General     Findings:   Disc degeneration, uncovertebral hypertrophy    Complications:   None      SIGNATURE: Edu Del Rio MD   DATE: 2024   TIME: 5:34 PM

## 2024-02-29 NOTE — PROGRESS NOTES
Progress Note - Orthopedics   Katina Mendez 39 y.o. female MRN: [id number]  Unit/Bed#: -01      Subjective:    39 y.o.female POD 0 C5-6 disc arthroplasty. No acute events, no new complaints.     Labs:  0   Lab Value Date/Time    HCT 37.6 12/12/2023 1309    HCT 37.2 09/30/2023 1033    HCT 36.5 06/08/2018 2344    HGB 12.8 12/12/2023 1309    HGB 12.4 09/30/2023 1033    HGB 12.0 06/08/2018 2344    HGB 12.6 12/18/2014 1615    INR 1.1 12/12/2023 1309    WBC 6.1 12/12/2023 1309    WBC 7.3 09/30/2023 1033    WBC 8.50 06/08/2018 2344       Meds:    Current Facility-Administered Medications:     acetaminophen (TYLENOL) tablet 650 mg, 650 mg, Oral, Q6H PRN, Diana Vicente PA-C    aluminum-magnesium hydroxide-simethicone (MAALOX) oral suspension 30 mL, 30 mL, Oral, Q6H PRN, Diana Vicente PA-C    calcium carbonate (TUMS) chewable tablet 1,000 mg, 1,000 mg, Oral, Daily PRN, Diana Vicente PA-C    ceFAZolin (ANCEF) IVPB (premix in dextrose) 1,000 mg 50 mL, 1,000 mg, Intravenous, Q8H, Diana Vicente PA-C    clonazePAM (KlonoPIN) tablet 1 mg, 1 mg, Oral, BID, Diana Vicente PA-C    docusate sodium (COLACE) capsule 100 mg, 100 mg, Oral, BID, Diana Vicente PA-C    [START ON 3/1/2024] DULoxetine (CYMBALTA) delayed release capsule 30 mg, 30 mg, Oral, Daily, Diana Vicente PA-C    [START ON 3/1/2024] DULoxetine (CYMBALTA) delayed release capsule 60 mg, 60 mg, Oral, Daily, Diana Vicente PA-C    methocarbamol (ROBAXIN) tablet 500 mg, 500 mg, Oral, 4x Daily PRN, Diana Vicente PA-C    ondansetron (ZOFRAN) injection 4 mg, 4 mg, Intravenous, Q6H PRN, Diana Vicente PA-C    oxyCODONE (ROXICODONE) immediate release tablet 10 mg, 10 mg, Oral, Q4H PRN, Diana Vicente PA-C    oxyCODONE (ROXICODONE) IR tablet 5 mg, 5 mg, Oral, Q4H PRN, Diana Vicente PA-C    senna (SENOKOT) tablet 8.6 mg, 1 tablet, Oral, Daily, Diana Wilkerson  "SALVADOR Vicente    Blood Culture:   No results found for: \"BLOODCX\"    Wound Culture:   No results found for: \"WOUNDCULT\"    Ins and Outs:  I/O last 24 hours:  In: 1900 [I.V.:1900]  Out: 15 [Blood:15]          Physical:  Vitals:    02/29/24 1634   BP: 117/79   Pulse: 65   Resp: 16   Temp:    SpO2: 96%     Musculoskeletal: bilateral Upper Extremity  Skin warm, dry . No erythema or ecchymosis.  Dressing c/d/I, drain in place  TTP charles,incisionally  Sensation intact to median/radial/ulnar nerve distribution   Motor intact anterior interosseous nerve/posterior interosseous nerve/median/radial/ulnar nerve distributions  2+ radial pulse  Digits warm and well perfused  Capillary refill < 2 seconds    Assessment:    39 y.o.female POD 0 C5-6 disc arthroplasty .     Plan:  WBAT BUE  Will monitor for ABLA and administer IVF/prbc as indicated for Greater than 2 gram drop or Hgb < 7   PT/OT  Pain control  DVT ppx SCDs, Early ambulation  Dispo: pending PT and med clearance    Ramon Lake MD      "

## 2024-02-29 NOTE — OP NOTE
OPERATIVE REPORT  PATIENT NAME: Katina Mendez    :  1985  MRN: [id number]  Pt Location: [facility] OR ROOM 09    SURGERY DATE: 2024    Surgeons and Role:     * Edu Del Rio MD - Primary     * Ramon Lake MD - Assisting     * Diana Vicente PA-C - Assisting    Preop Diagnosis:  Cervical radiculopathy [M54.12]    Post-Op Diagnosis Codes:     * Cervical radiculopathy [M54.12]    Procedure(s):  Bilateral - Anterior Cervical discectomy and disc replacement/arthroplasty C5-6    Specimen(s):  * No specimens in log *    Estimated Blood Loss:   15 mL    Drains:  Closed/Suction Drain Anterior;Right Neck Bulb 10 Fr. (Active)   Site Description Unable to view 24 1601   Dressing Status Clean;Dry;Intact 24 1601   Drainage Appearance Serosanguineous 24 1601   Status To bulb suction 24 1601   Number of days: 0       Anesthesia Type:   General    Operative Indications:  Cervical radiculopathy [M54.12]  39 year old female who presented with neck pain, right arm pain and weakness consistent with cervical radiculopathy, also with mild myelopathy with bilateral hand numbness.   Patient failed extensive non operative treatment options.   The risks, benefits and alternatives to surgery were discussed and patient elected to proceed with surgical intervention.     Katina Mendez is a 39 y.o. female with cervical myeloradiculopathy.  Plan for anterior cervical decompression and disc replacement.  Consent previously obtained in outpatient setting.  Prior to surgery I again explained in detail to the patient and the patient's  the possible risks of surgery which include the risk of nerve injury, persistent, and/or worsening pain, spinal fluid leak, infection, and/or meningitis, excessive bleeding, paralysis, death, blood vessel injury, need for further surgery, instability, autonomic nervous system dysfunction, as well as the potential for unforeseen medical and surgical  complications.  We also discussed risks associated with disc replacement surgery.  We specifically counseled the patient and the patient's  on possible adverse consequences of or relating to anterior cervical surgery including swallowing difficulties with the possible need for tube feeding, recurrent and or superior laryngeal nerve injury, esophageal injury, and voice changes and hoarseness.  I reiterated the an understanding, that in general, spine surgery is more predictive of improving extremity discomfort rather than axial spine pain and arresting the progression of spinal cord dysfunction rather than improving it.  We discussed the possibility of requiring fusion surgery based on intra operative findings.  Katina Mendez had no further questions.      Operative Findings:  Disc degeneration, uncovertebral hypertrophy    Complications:   None    Procedure and Technique:  Patient was identified in the preoperative holding area.  The operative site was appropriately marked.      Patient was taken to the operating room.  Patient was transferred supine onto the Rolan table.  Sequential compression devices were applied to bilateral lower extremities.  The patient was placed under general endotracheal anesthesia.  Antibiotics were administered.   Neurophysiology monitoring leads were placed and baselines obtained.  During this time and the entire operation, care was taken to maintain appropriate perfusion pressures.  All bony prominences were properly padded.  A bolster was placed under the neck and the shoulders were loosely secured at the sides to access to the anterior cervical spine.  There were no changes in neurophysiologic monitoring during positioning. Preprocedure fluoroscopic images were obtained in AP and lateral position.  The operative field was then prepped and draped in the normal sterile fashion.  A time-out was performed.  All parties were in agreement.  A headlamp and surgical loops were  utilized during the procedure approach.       A transverse incision was made to the right side of the anterior cervical spine.  Sharp dissection was taken down through the skin and subcutaneous tissue down to the level of the platysma, which was divided.  Blunt dissection was taken medial to the sternocleidomastoid muscle.  A finger was used to palpate the carotid pulse and defined the carotid artery.  Blunt dissection was then utilized to the pretracheal fascia down to the retropharyngeal space where the anterior cervical spine was palpated.  An intraoperative x-ray was taken to confirm appropriate level of exposure.  Soft tissues including longus coli muscles anterior to the spinal elements were elevated in a subperiosteal manner.  Retractors were placed and distracted.  The endotracheal cuff was then deflated and then reinflated to minimize injury to the recurrent laryngeal nerve.       A knife was used to incise the annulus fibrosis and a pituitary rongeur was used to remove the outer annulus and nucleus pulposus of the C5-6 level.  Remaining annular and nuclear material was removed from ventral to dorsal across the disc space and out to the width of the uncovertebral joints bilaterally.  Progressively smaller curettes were used to approach the posterior disc space.  At this point [place] distraction pins were placed under fluoroscopic assistance. Traction was applied to the disc space to allow access to the posterior aspect of the interbody space.  There was no change in the neurophysiologic monitoring. At this time, the operative microscope was utilized.  With a 3-0 curette the posterior annulus was removed and the posterior longitudinal ligament was identified.  Posterior uncovertebral resection was performed as necessary with a high-speed margoth followed by a 2 mm Kerrison rongeur across the medial aspect of the uncovertebral joints bilaterally.  The posterior longitudinal ligament was removed by dissecting  through its layers with a small curette and a nerve hook and then removing it with a 1 and 2 mm Kerrison rongeur.  The spinal cord and nerve roots were well decompressed and at the completion of the decompression.  A nerve hook was passed behind the vertebral bodies and out the neuroforamina to confirm complete decompression at the disc space and the entry zone of the foramina.  Hemostasis was obtained.     A curette were used to gently decorticate the endplates of the vertebral body above and below the interspace.  The cortical endplate was preserved. A margoth was used to square off the anterior inferior vertebral body lip of C5.  Bone wax was applied to areas of punctate cortical bleeding.  [place] pin distraction was removed.  Trials were then sequentially fitted into the disc space.  An 5mm trail was confirmed to be in appropriate position on AP and lateral fluoroscopy without excess facet distraction.  A cannulated drill guide was introduced to the trial and confirmed on fluoro to be in the appropriate position.   holes were drilled in the inferior C5 and superior C6 vertebral bodies. A rail punch was used to created channels under fluro guidance.  The rail was then removed and the endplates were inspected and noted to be appropriately prepared.  A Medtronic Prestige LP 5mm x 16mm TDR was then implanted under fluoroscopic guidance.  The implant was noted to be secured.  Final AP and lateral radiographs demonstrated appropriate implant position.       Neurophysiologic monitoring was noted to be unchanged.  The wound was again copiously irrigated and hemostasis was obtained.  The deep fascia including the platysma and superficial fascia were closed with 2-0 Vicryl suture.  The dermis was closed with a running 3-0 Monocryl suture.  All counts were correct.  Steri strips and a sterile dressing was applied.  A soft cervical collar was applied to the patient.  The patient was transferred supine onto a stretcher,  extubated by anesthesia, and transported to the PACU in stable condition.    I was present for the entire procedure    Patient Disposition:  PACU     SIGNATURE: Edu Del Rio MD  DATE: February 29, 2024  TIME: 5:35 PM

## 2024-02-29 NOTE — PROGRESS NOTES
Progress Note - Orthopedics   Katina Mendez 39 y.o. female MRN: [id number]  Unit/Bed#: -01      Subjective:    39 y.o.female POD 1 C5-6 disc arthroplasty. No acute events, no new complaints.     Labs:  0   Lab Value Date/Time    HCT 37.6 12/12/2023 1309    HCT 37.2 09/30/2023 1033    HCT 36.5 06/08/2018 2344    HGB 12.8 12/12/2023 1309    HGB 12.4 09/30/2023 1033    HGB 12.0 06/08/2018 2344    HGB 12.6 12/18/2014 1615    INR 1.1 12/12/2023 1309    WBC 6.1 12/12/2023 1309    WBC 7.3 09/30/2023 1033    WBC 8.50 06/08/2018 2344       Meds:    Current Facility-Administered Medications:     acetaminophen (TYLENOL) tablet 650 mg, 650 mg, Oral, Q6H AVILA, Diana Vicente PA-C, 650 mg at 02/29/24 1759    aluminum-magnesium hydroxide-simethicone (MAALOX) oral suspension 30 mL, 30 mL, Oral, Q6H PRN, Diana Vicente PA-C    calcium carbonate (TUMS) chewable tablet 1,000 mg, 1,000 mg, Oral, Daily PRN, Diana Vicente PA-C    ceFAZolin (ANCEF) IVPB (premix in dextrose) 1,000 mg 50 mL, 1,000 mg, Intravenous, Q8H, Diana Vicente PA-C, Last Rate: 100 mL/hr at 02/29/24 1759, 1,000 mg at 02/29/24 1759    clonazePAM (KlonoPIN) tablet 1 mg, 1 mg, Oral, BID, Diana Vicente PA-C, 1 mg at 02/29/24 1759    docusate sodium (COLACE) capsule 100 mg, 100 mg, Oral, BID, Diana Vicente PA-C    [START ON 3/1/2024] DULoxetine (CYMBALTA) delayed release capsule 30 mg, 30 mg, Oral, Daily, Diana Vicente PA-C    [START ON 3/1/2024] DULoxetine (CYMBALTA) delayed release capsule 60 mg, 60 mg, Oral, Daily, Diana Vicente PA-C    methocarbamol (ROBAXIN) tablet 500 mg, 500 mg, Oral, 4x Daily PRN, Diana Vicente PA-C    ondansetron (ZOFRAN) injection 4 mg, 4 mg, Intravenous, Q6H PRN, Diana Vicente PA-C    oxyCODONE (ROXICODONE) immediate release tablet 10 mg, 10 mg, Oral, Q4H PRN, Diana Vciente PA-C    oxyCODONE (ROXICODONE) IR tablet 5 mg, 5 mg, Oral,  "Q4H PRN, Diana Vicente PA-C, 5 mg at 02/29/24 1813    senna (SENOKOT) tablet 8.6 mg, 1 tablet, Oral, Daily, Diana Vicente PA-C    Blood Culture:   No results found for: \"BLOODCX\"    Wound Culture:   No results found for: \"WOUNDCULT\"    Ins and Outs:  I/O last 24 hours:  In: 1900 [I.V.:1900]  Out: 15 [Blood:15]          Physical:  Vitals:    02/29/24 1742   BP: 113/74   Pulse: 65   Resp: 16   Temp: 97.6 °F (36.4 °C)   SpO2: 96%     Musculoskeletal: bilateral Upper Extremity  Skin warm, dry . No erythema or ecchymosis.  Dressing c/d/I, drain in place  TTP charles,incisionally  Sensation intact C5-T1 nerve distributions  2+ radial pulse  Digits warm and well perfused  Capillary refill < 2 seconds  Upper Extremity Motor Function   Right  Left    Deltoid  5/5  5/5    Bicep  5/5  5/5    Wrist extension  5/5  5/5    Tricep  5/5  5/5    Finger flexion/  5/5  5/5    Hand intrinsic  5/5  5/5        Assessment:    39 y.o.female POD 1 C5-6 disc arthroplasty.     Plan:  WBAT BUE  Monitor drain output  Will monitor for ABLA and administer IVF/prbc as indicated for Greater than 2 gram drop or Hgb < 7   PT/OT  Pain control  DVT ppx SCDs, Early ambulation  Dispo: pending PT and med clearance    Ramon Lake MD      "

## 2024-02-29 NOTE — ANESTHESIA PREPROCEDURE EVALUATION
Procedure:  Anterior Cervical discectomy and disc replacement/arthroplasty C5-6 (Bilateral: Spine Cervical)    Relevant Problems   CARDIO   (+) Migraines      GI/HEPATIC   (+) GERD without esophagitis      MUSCULOSKELETAL   (+) Lower back pain      NEURO/PSYCH   (+) Anxiety   (+) Depression   (+) Generalized anxiety disorder   (+) Migraines        Physical Exam    Airway    Mallampati score: I  TM Distance: >3 FB  Neck ROM: full     Dental   No notable dental hx     Cardiovascular  Cardiovascular exam normal    Pulmonary  Pulmonary exam normal     Other Findings  post-pubertal.      Anesthesia Plan  ASA Score- 2     Anesthesia Type- general with ASA Monitors.         Additional Monitors:     Airway Plan: ETT.    Comment: No issues with prev anesthesia  No current respiratory/illness concerns. Smoker  Cannot remove ear piercings. Instructed to attempt, as they are very close to surgical site and theres a high chance of burns in the area as we are using Bovie..       Plan Factors-    Chart reviewed.   Existing labs reviewed. Patient summary reviewed.    Patient is a current smoker.  Patient instructed to abstain from smoking on day of procedure. Patient smoked on day of surgery.            Induction- intravenous.    Postoperative Plan- Plan for postoperative opioid use. Planned trial extubation    Informed Consent- Anesthetic plan and risks discussed with patient.  I personally reviewed this patient with the CRNA. Discussed and agreed on the Anesthesia Plan with the CRNA..

## 2024-02-29 NOTE — ASSESSMENT & PLAN NOTE
Failed outpatient conservative measures and opted for cervical discectomy  Pain controlled  No post operative issues noted  Continue encourage incentive spirometry; monitor fever curve  DVT prophylaxis in place and reviewed

## 2024-03-01 ENCOUNTER — APPOINTMENT (OUTPATIENT)
Dept: RADIOLOGY | Facility: HOSPITAL | Age: 39
End: 2024-03-01
Payer: COMMERCIAL

## 2024-03-01 VITALS
DIASTOLIC BLOOD PRESSURE: 63 MMHG | HEIGHT: 64 IN | OXYGEN SATURATION: 96 % | WEIGHT: 105 LBS | RESPIRATION RATE: 17 BRPM | SYSTOLIC BLOOD PRESSURE: 112 MMHG | BODY MASS INDEX: 17.93 KG/M2 | TEMPERATURE: 97.1 F | HEART RATE: 75 BPM

## 2024-03-01 LAB
ANION GAP SERPL CALCULATED.3IONS-SCNC: 9 MMOL/L
BUN SERPL-MCNC: 5 MG/DL (ref 5–25)
CALCIUM SERPL-MCNC: 8.9 MG/DL (ref 8.4–10.2)
CHLORIDE SERPL-SCNC: 106 MMOL/L (ref 96–108)
CO2 SERPL-SCNC: 26 MMOL/L (ref 21–32)
CREAT SERPL-MCNC: 0.64 MG/DL (ref 0.6–1.3)
ERYTHROCYTE [DISTWIDTH] IN BLOOD BY AUTOMATED COUNT: 12.5 % (ref 11.6–15.1)
GFR SERPL CREATININE-BSD FRML MDRD: 111 ML/MIN/1.73SQ M
GLUCOSE P FAST SERPL-MCNC: 98 MG/DL (ref 65–99)
GLUCOSE SERPL-MCNC: 98 MG/DL (ref 65–140)
HCT VFR BLD AUTO: 33.5 % (ref 34.8–46.1)
HGB BLD-MCNC: 10.8 G/DL (ref 11.5–15.4)
MCH RBC QN AUTO: 29.7 PG (ref 26.8–34.3)
MCHC RBC AUTO-ENTMCNC: 32.2 G/DL (ref 31.4–37.4)
MCV RBC AUTO: 92 FL (ref 82–98)
PLATELET # BLD AUTO: 246 THOUSANDS/UL (ref 149–390)
PMV BLD AUTO: 9.9 FL (ref 8.9–12.7)
POTASSIUM SERPL-SCNC: 4.1 MMOL/L (ref 3.5–5.3)
RBC # BLD AUTO: 3.64 MILLION/UL (ref 3.81–5.12)
SODIUM SERPL-SCNC: 141 MMOL/L (ref 135–147)
WBC # BLD AUTO: 10.97 THOUSAND/UL (ref 4.31–10.16)

## 2024-03-01 PROCEDURE — NC001 PR NO CHARGE: Performed by: ORTHOPAEDIC SURGERY

## 2024-03-01 PROCEDURE — 99024 POSTOP FOLLOW-UP VISIT: CPT | Performed by: ORTHOPAEDIC SURGERY

## 2024-03-01 PROCEDURE — 97166 OT EVAL MOD COMPLEX 45 MIN: CPT

## 2024-03-01 PROCEDURE — 97163 PT EVAL HIGH COMPLEX 45 MIN: CPT

## 2024-03-01 PROCEDURE — 72040 X-RAY EXAM NECK SPINE 2-3 VW: CPT

## 2024-03-01 PROCEDURE — 99232 SBSQ HOSP IP/OBS MODERATE 35: CPT | Performed by: INTERNAL MEDICINE

## 2024-03-01 PROCEDURE — 80048 BASIC METABOLIC PNL TOTAL CA: CPT

## 2024-03-01 PROCEDURE — 85027 COMPLETE CBC AUTOMATED: CPT

## 2024-03-01 RX ORDER — MELOXICAM 15 MG/1
15 TABLET ORAL DAILY
Qty: 18 TABLET | Refills: 0 | Status: SHIPPED | OUTPATIENT
Start: 2024-03-01

## 2024-03-01 RX ADMIN — CEFAZOLIN SODIUM 1000 MG: 1 SOLUTION INTRAVENOUS at 01:56

## 2024-03-01 RX ADMIN — DULOXETINE HYDROCHLORIDE 30 MG: 60 CAPSULE, DELAYED RELEASE ORAL at 08:44

## 2024-03-01 RX ADMIN — CLONAZEPAM 1 MG: 1 TABLET ORAL at 08:43

## 2024-03-01 RX ADMIN — SENNOSIDES 8.6 MG: 8.6 TABLET, FILM COATED ORAL at 08:44

## 2024-03-01 RX ADMIN — DULOXETINE HYDROCHLORIDE 60 MG: 60 CAPSULE, DELAYED RELEASE ORAL at 08:44

## 2024-03-01 RX ADMIN — METHOCARBAMOL 500 MG: 500 TABLET ORAL at 01:55

## 2024-03-01 RX ADMIN — DOCUSATE SODIUM 100 MG: 100 CAPSULE, LIQUID FILLED ORAL at 08:44

## 2024-03-01 RX ADMIN — ACETAMINOPHEN 650 MG: 325 TABLET, FILM COATED ORAL at 06:55

## 2024-03-01 RX ADMIN — OXYCODONE HYDROCHLORIDE 10 MG: 10 TABLET ORAL at 02:16

## 2024-03-01 NOTE — OCCUPATIONAL THERAPY NOTE
38 YO Female SEEN FOR INITIAL OCCUPATIONAL THERAPY EVALUATION S/P C5-6 DISC ARTHROPLASTY ON 2/29/24. PT CURRENTLY HAS THE FOLLOWING RESTRICTIONS;SPINAL PRECAUTIONS AND SOFT COLLAR FOR COMFORT. PROBLEMS LIST/PMH INCLUDES Anxiety, Bunion, Cervical radiculopathy, COVID, Depression, GERD (gastroesophageal reflux disease), Headache(784.0), Migraines, Seasonal allergies, Tobacco abuse, and Wears contact lenses. PT IS FROM HOME WITH FAMILY WHERE  REPORTS BEING INDEPENDENT WITH ADLS/IADLS/DRIVING PTA. PT CURRENTLY REQUIRES OVERALL WITH ADLS, TRANSFERS AND FUNCTIONAL MOBILITY WITH WITHOUT USE OF RW. PT IS LIMITED 2' {impairments:40634}. PT EDUCATED ON {EDUCATION:92033}. FROM AN OCCUPATIONAL THERAPY PERSPECTIVE, {D/CPLAN:26839}. WILL CONT TO FOLLOW TO ADDRESS THE BELOW DESCRIBED GOALS.

## 2024-03-01 NOTE — DISCHARGE SUMMARY
ORTHOPEDICS DISCHARGE SUMMARY  Katina Mendez 39 y.o. female MRN: [id number]  Unit/Bed#: -01    Attending Physician: Eliane    Admitting diagnosis: Cervical radiculopathy [M54.12]    Discharge diagnosis: Cervical radiculopathy [M54.12]    Date of admission: 2/29/2024    Date of discharge: 03/01/24    Procedure: Bilateral anterior cervical discectomy and disc replacement/arthroplasty C5-6    HPI:  This is a 39 y.o. year old female that presented to the office with signs and symptoms of cervical radiculopathy . They tried and failed conservative treatment measures and wished to proceed with surgical intervention. The risks, benefits, and complications of the procedure were discussed with the patient and informed consent was obtained.    Hospital Course:  The patient was admitted to the hospital on 2/29/2024 and underwent an uncomplicated  C5-6 disc arthroplasty . They were transferred to the floor after a brief stay in the post-anesthesia care unit. Their pain was well managed with IV and oral pain medications. They began therapy on post operative day #1.  Daily discussion was had with the patient, nursing staff, orthopaedic team, and family members if present.  All questions were answered to the patients satisfaction.      0   Lab Value Date/Time    HGB 10.8 (L) 03/01/2024 0515    HGB 12.8 12/12/2023 1309    HGB 12.4 09/30/2023 1033    HGB 12.0 06/08/2018 2344    HGB 12.6 12/18/2014 1615       Greater than 2 gram drop which qualifies for diagnosis of acute blood loss anemia.  Vital signs remained stable and pt was resuscitated with IVF as needed   Body mass index is 18.02 kg/m².     Discharge Instructions:  The patient was discharged weight bearing as tolerated to all extremities. Take pain medications as instructed. Patient has a cervical collar on for comfort.    Discharge Medications:  For the complete list of discharge medications, please refer to the patient's medication reconciliation.

## 2024-03-01 NOTE — PLAN OF CARE
Problem: Nutrition/Hydration-ADULT  Goal: Nutrient/Hydration intake appropriate for improving, restoring or maintaining nutritional needs  Description: Monitor and assess patient's nutrition/hydration status for malnutrition. Collaborate with interdisciplinary team and initiate plan and interventions as ordered.  Monitor patient's weight and dietary intake as ordered or per policy. Utilize nutrition screening tool and intervene as necessary. Determine patient's food preferences and provide high-protein, high-caloric foods as appropriate.     INTERVENTIONS:  - Monitor oral intake, urinary output, labs, and treatment plans  - Assess nutrition and hydration status and recommend course of action  - Evaluate amount of meals eaten  - Assist patient with eating if necessary   - Allow adequate time for meals  - Recommend/ encourage appropriate diets, oral nutritional supplements, and vitamin/mineral supplements  - Order, calculate, and assess calorie counts as needed  - Recommend, monitor, and adjust tube feedings and TPN/PPN based on assessed needs  - Assess need for intravenous fluids  - Provide specific nutrition/hydration education as appropriate  - Include patient/family/caregiver in decisions related to nutrition  Outcome: Progressing     Problem: PAIN - ADULT  Goal: Verbalizes/displays adequate comfort level or baseline comfort level  Description: Interventions:  - Encourage patient to monitor pain and request assistance  - Assess pain using appropriate pain scale  - Administer analgesics based on type and severity of pain and evaluate response  - Implement non-pharmacological measures as appropriate and evaluate response  - Consider cultural and social influences on pain and pain management  - Notify physician/advanced practitioner if interventions unsuccessful or patient reports new pain  Outcome: Progressing     Problem: INFECTION - ADULT  Goal: Absence or prevention of progression during  hospitalization  Description: INTERVENTIONS:  - Assess and monitor for signs and symptoms of infection  - Monitor lab/diagnostic results  - Monitor all insertion sites, i.e. indwelling lines, tubes, and drains  - Monitor endotracheal if appropriate and nasal secretions for changes in amount and color  - [place] appropriate cooling/warming therapies per order  - Administer medications as ordered  - Instruct and encourage patient and family to use good hand hygiene technique  - Identify and instruct in appropriate isolation precautions for identified infection/condition  Outcome: Progressing  Goal: Absence of fever/infection during neutropenic period  Description: INTERVENTIONS:  - Monitor WBC    Outcome: Progressing     Problem: SAFETY ADULT  Goal: Patient will remain free of falls  Description: INTERVENTIONS:  - Educate patient/family on patient safety including physical limitations  - Instruct patient to call for assistance with activity   - Consult OT/PT to assist with strengthening/mobility   - Keep Call bell within reach  - Keep bed low and locked with side rails adjusted as appropriate  - Keep care items and personal belongings within reach  - Initiate and maintain comfort rounds  - Make Fall Risk Sign visible to staff  - Offer Toileting every  Hours, in advance of need  - Initiate/Maintain alarm  - Obtain necessary fall risk management equipment:   - Apply yellow socks and bracelet for high fall risk patients  - Consider moving patient to room near nurses station  Outcome: Progressing  Goal: Maintain or return to baseline ADL function  Description: INTERVENTIONS:  -  Assess patient's ability to carry out ADLs; assess patient's baseline for ADL function and identify physical deficits which impact ability to perform ADLs (bathing, care of mouth/teeth, toileting, grooming, dressing, etc.)  - Assess/evaluate cause of self-care deficits   - Assess range of motion  - Assess patient's mobility; develop plan if  impaired  - Assess patient's need for assistive devices and provide as appropriate  - Encourage maximum independence but intervene and supervise when necessary  - Involve family in performance of ADLs  - Assess for home care needs following discharge   - Consider OT consult to assist with ADL evaluation and planning for discharge  - Provide patient education as appropriate  Outcome: Progressing  Goal: Maintains/Returns to pre admission functional level  Description: INTERVENTIONS:  - Perform AM-PAC 6 Click Basic Mobility/ Daily Activity assessment daily.  - Set and communicate daily mobility goal to care team and patient/family/caregiver.   - Collaborate with rehabilitation services on mobility goals if consulted  - Perform Range of Motion 3 times a day.  - Reposition patient every 2 hours.  - Dangle patient 3 times a day  - Stand patient 3 times a day  - Ambulate patient 3 times a day  - Out of bed to chair 3 times a day   - Out of bed for meals   Problem: DISCHARGE PLANNING  Goal: Discharge to home or other facility with appropriate resources  Description: INTERVENTIONS:  - Identify barriers to discharge w/patient and caregiver  - Arrange for needed discharge resources and transportation as appropriate  - Identify discharge learning needs (meds, wound care, etc.)  - Arrange for interpretive services to assist at discharge as needed  - Refer to Case Management Department for coordinating discharge planning if the patient needs post-hospital services based on physician/advanced practitioner order or complex needs related to functional status, cognitive ability, or social support system  Outcome: Progressing     Problem: Knowledge Deficit  Goal: Patient/family/caregiver demonstrates understanding of disease process, treatment plan, medications, and discharge instructions  Description: Complete learning assessment and assess knowledge base.  Interventions:  - Provide teaching at level of understanding  - Provide  teaching via preferred learning methods  Outcome: Progressing    times a day  - Out of bed for toileting  - Record patient progress and toleration of activity level   Outcome: Progressing

## 2024-03-01 NOTE — PROGRESS NOTES
[facility]  Progress Note  Name: Katina Mendez I  MRN: [id number]  Unit/Bed#: [facility] I Date of Admission: 2/29/2024   Date of Service: 3/1/2024 I Hospital Day: 0    Assessment/Plan   S/P cervical discectomy  Assessment & Plan  Failed outpatient conservative measures and opted for cervical discectomy  Pain controlled  No post operative issues noted  Continue encourage incentive spirometry; monitor fever curve  DVT prophylaxis in place and reviewed      Anxiety  Assessment & Plan  Continue home medications.         The above assessment and plan was reviewed and updated as determined by my evaluation of the patient on 3/1/2024.    Labs:   Results from last 7 days   Lab Units 03/01/24  0515   WBC Thousand/uL 10.97*   HEMOGLOBIN g/dL 10.8*   HEMATOCRIT % 33.5*   PLATELETS Thousands/uL 246     Results from last 7 days   Lab Units 03/01/24  0515   SODIUM mmol/L 141   POTASSIUM mmol/L 4.1   CHLORIDE mmol/L 106   CO2 mmol/L 26   BUN mg/dL 5   CREATININE mg/dL 0.64   CALCIUM mg/dL 8.9             Results from last 7 days   Lab Units 02/29/24  1154   POC GLUCOSE mg/dl 112       Review of Scheduled Meds:  Current Facility-Administered Medications   Medication Dose Route Frequency Provider Last Rate    acetaminophen  650 mg Oral Q6H AVILA Diana Vicente PA-C      aluminum-magnesium hydroxide-simethicone  30 mL Oral Q6H PRN Diana Vicente PA-C      calcium carbonate  1,000 mg Oral Daily PRN Diana Vicente PA-C      clonazePAM  1 mg Oral BID Diana Vicente PA-C      docusate sodium  100 mg Oral BID Diana Vicente PA-C      DULoxetine  30 mg Oral Daily Diana Vicente PA-C      DULoxetine  60 mg Oral Daily Diana Vicente PA-C      methocarbamol  500 mg Oral 4x Daily PRN Diana Vicente PA-C      ondansetron  4 mg Intravenous Q6H PRN Diana Vicente PA-C      oxyCODONE  10 mg Oral Q4H PRN Diana Vicente,  SALVADOR      oxyCODONE  5 mg Oral Q4H PRN Diana Vicente PA-C      senna  1 tablet Oral Daily Diana Vicente PA-C         Subjective/ HPI: Katina Mendez seen and examined. Patients overnight issues or events were reviewed with nursing or staff during rounds or morning huddle session. New or overnight issues include the following:     Pt without any overnight events or reported nursing issues      ROS:   A 10 point ROS was performed; negative except as noted above.       Imaging:     XR spine cervical 2 or 3 vw injury   Final Result by Aleyda Gipson MD (02/29 1633)      Fluoroscopic guidance provided for surgical procedure.  Please refer to the separate procedure notes for additional details.            Localization procedure was performed, with the OR notified of the level at approximately 9:11 a.m. on  2/29/2024 via telephone conversation with the OR x-ray technologist .            Workstation performed: [id number]         XR spine cervical 2 or 3 vw injury    (Results Pending)       *Labs /Radiology studies Reviewed  *Medications  reviewed and reconciled as needed  *Please refer to order section for additional ordered labs studies  *Case discussed with primary attending during morning huddle case rounds    Physical Examination:  Vitals:   Vitals:    02/29/24 1742 02/29/24 2157 03/01/24 0235 03/01/24 0756   BP: 113/74 116/69 116/68 112/63   BP Location:       Pulse: 65 55 (!) 50 75   Resp: 16 17 18 17   Temp: 97.6 °F (36.4 °C) 97.5 °F (36.4 °C) 97.9 °F (36.6 °C) (!) 97.1 °F (36.2 °C)   TempSrc:       SpO2: 96% 96% 97% 96%   Weight:       Height:           General Appearance: no distress, conversive  HEENT: PERRLA, conjuctiva normal; oropharynx clear; mucous membranes moist;   Neck:  Supple, no lymphadenopathy or thyromegaly  Lungs: CTA, normal respiratory effort, no retractions, expiratory effort normal  CV: regular rate and rhythm , PMI normal   ABD: soft non tender, no masses , no hepatic or  splenomegaly  EXT: DP pulses intact, no lymphadenopathy, no edema; cervical surgical dressing/soft collar in place  Skin: normal turgor, normal texture, no rash  Psych: affect normal, mood normal  Neuro: AAOx3    : voiding    The above physical exam was reviewed and updated as determined by my evaluation of the patient on 3/1/2024.    Invasive Devices       Peripheral Intravenous Line  Duration             Peripheral IV 02/29/24 Left Forearm 1 day                       VTE Pharmacologic Prophylaxis:  ambulatory  Code Status: No Order  Current Length of Stay: 0 day(s)      Total floor / unit time spent today 30 minutes  Coordination of patient's care was performed in conjunction with primary service. Time invested included review of patient's labs, vitals, and management of their comorbidities with continued monitoring, examination of patient as well as answering patient questions, documenting her findings and creating progress note in electronic medical record,  ordering appropriate diagnostic testing.     ** Please Note:  voice to text software may have been used in the creation of this document. Although proof errors in transcription or interpretation are a potential of such software**

## 2024-03-01 NOTE — PHYSICAL THERAPY NOTE
Physical Therapy Evaluation     Patient's Name: Katina Mendez    Admitting Diagnosis  Cervical radiculopathy [M54.12]    Problem List  Patient Active Problem List   Diagnosis    Generalized anxiety disorder    Lower back pain    Insomnia    GERD without esophagitis    Left breast lump    Benign paroxysmal positional vertigo due to bilateral vestibular disorder    Herpes simplex infection    Abnormal finding on thyroid function test    Atypical nevus    Cervical radiculopathy    Anxiety    Tobacco abuse    Migraines    Depression    Mild protein-calorie malnutrition (HCC)    Neck pain    S/P cervical discectomy       Past Medical History  Past Medical History:   Diagnosis Date    Anxiety     Bunion     left foot   OR   correction today 4/14/2023    Cervical radiculopathy     COVID     x 2-- Jan 2022 and Jan 2023    Depression     GERD (gastroesophageal reflux disease)     Headache(784.0)     Migraines     Seasonal allergies     Tobacco abuse     Wears contact lenses        Past Surgical History  Past Surgical History:   Procedure Laterality Date    BUNIONECTOMY Right     DISCECTOMY SPINE CERVICAL ANTERIOR W/ ARTHROPLASTY Bilateral 2/29/2024    Procedure: Anterior Cervical discectomy and disc replacement/arthroplasty C5-6;  Surgeon: Edu Del Rio MD;  [facility];  Service: Orthopedics    HYSTERECTOMY      2020    LAPAROSCOPIC TOTAL HYSTERECTOMY      MD HALLUX RIGIDUS W/CHEILECTOMY 1ST MP JT W/IMPLT Left 04/14/2023    Procedure: BUNIONECTOMY with implant;  Surgeon: Will Cuevas DPM;  [facility]: [facility];  Service: Podiatry    TONSILLECTOMY          03/01/24 0855   PT Last Visit   PT Visit Date 03/01/24   Note Type   Note type Evaluation   Pain Assessment   Pain Assessment Tool 0-10   Pain Score 5   Pain Location/Orientation Location: Neck   Hospital Pain Intervention(s) Ambulation/increased activity;Repositioned   Restrictions/Precautions   Weight Bearing Precautions Per Order No   Braces or  "Orthoses (S)  C/S Collar  (soft collar)   Other Precautions Pain   Home Living   Type of Home House   Home Layout Two level   Additional Comments Prior to this admission patient resided with her spouse and mother in law in a 2 story home where at her baseline she has been independent with mobility (no use of AD, denies falls) and ADLS. Family has been assisting with iADLS in setting of patient's neck pain and R UE radiculopathy. She is not working at this time and enjoys television.   Prior Function   Level of [place] Independent with ADLs;Independent with functional mobility;Needs assistance with IADLS   Lives With Spouse;Other (Comment)  (mother in law)   Receives Help From Family   IADLs Independent with medication management;Family/Friend/Other provides medication management;Family/Friend/Other provides meals   Falls in the last 6 months 0   Vocational On disability   General   Additional Pertinent History 39 y.o. female admitted to [facility] on 2/29/2024 for the following planned procedure: C5-C6 disc arthroplasty performed by orthopedics. Soft cervical collar. Cervical spine precautions. Patient reports that she was told by orthopedics that a few times per day she is supposed to remove collar and move her neck forward/backward and side to side.   Family/Caregiver Present No   Cognition   Overall Cognitive Status WFL   Orientation Level Oriented X4   Subjective   Subjective \"I am going home today\"   RUE Assessment   RUE Assessment WFL  (reports no improvement in R UE strength/numbness post-op yet)   LUE Assessment   LUE Assessment WFL   RLE Assessment   RLE Assessment WFL   LLE Assessment   LLE Assessment WFL   Bed Mobility   Supine to Sit 5  Supervision   Sit to Supine Unable to assess   Additional Comments post evaluation patient OOB in chair   Transfers   Sit to Stand 5  Supervision   Stand to Sit 5  Supervision   Additional Comments transfers without AD   Ambulation/Elevation   Gait pattern " WNL  (reduced gait speed)   Gait Assistance 5  Supervision   Assistive Device None   Distance 30 feet x2   Stair Management Assistance 5  Supervision   Additional items Verbal cues   Stair Management Technique One rail R;Foreward;Nonreciprocal   Number of Stairs 10   Balance   Static Sitting Normal   Static Standing Good   Ambulatory Fair   Endurance Deficit   Endurance Deficit Yes   Endurance Deficit Description pain   Activity Tolerance   Activity Tolerance Patient tolerated treatment well   Medical Staff Made Aware This high complexity evaluation was performed with an occupational therapist due to the patient's co-morbidities, clinically unstable presentation, and present impairments.   Nurse Made Aware disha to see per RN   Assessment   Prognosis Good   Problem List Pain;Orthopedic restrictions;Impaired sensation   Assessment PT completed evaluation of 39 y.o. female admitted to [facility] on 2/29/2024 for the following planned procedure: C5-C6 disc arthroplasty performed by orthopedics. Soft cervical collar. Cervical spine precautions. Patient reports that she was told by orthopedics that a few times per day she is supposed to remove collar and move her neck forward/backward and side to side.     Patient's current status instabilities include ongoing pain, use of cervical brace, falls risk, continuous O2/HR monitoring, and a regression in function from baseline.  PMH is significant for anxiety, depression, lower back pain, migraines. Prior to this admission patient resided with her spouse and mother in law in a 2 story home where at her baseline she has been independent with mobility (no use of AD, denies falls) and ADLS. Family has been assisting with iADLS in setting of patient's neck pain and R UE radiculopathy. She is not working at this time and enjoys television.      Patient presents at time of PT evaluation functioning below baseline and currently w/ overall mobility deficits 2* to: R UE  weakness and numbness, gait deviations, decreased activity tolerance and fall risk. During PT evaluation, patient currently is requiring supervision with increased time for bed mobility, transfers, ambulation, and stair negotiation. Patient ambulated 60 feet without use of AD, gait speed is reduced, no LOB. During stair negotiation she was educated to perform in non-reciprocal manner for improved safety and performed 10 steps in this manner.     PT d/c recommendation is for return home w/ family support and OPPT as appropriate per physician. Patient denies questions/concerns about d/c home. PT will sign off at this time.   Goals   Patient Goals to go home today   PT Treatment Day 0   Plan   PT Frequency Other (Comment)  (d/c inpt PT)   Discharge Recommendation   Rehab Resource Intensity Level, PT No post-acute rehabilitation needs  (OPPT when appropriate per physician)   Equipment Recommended   (none)   AM-PAC Basic Mobility Inpatient   Turning in Flat Bed Without Bedrails 4   Lying on Back to Sitting on Edge of Flat Bed Without Bedrails 4   Moving Bed to Chair 4   Standing Up From Chair Using Arms 4   Walk in Room 4   Climb 3-5 Stairs With Railing 4   Basic Mobility Inpatient Raw Score 24   Basic Mobility Standardized Score 57.68   Highest Level Of Mobility   JH-HLM Goal 8: Walk 250 feet or more   JH-HLM Achieved 7: Walk 25 feet or more     The patient's AM-PAC Basic Mobility Inpatient Standardized Score is greater than 42.9, suggesting this patient may benefit from discharge to home. Please also refer to the recommendation of the Physical Therapist for safe discharge planning.        Natalie Cruz, PT, DPT

## 2024-03-01 NOTE — OCCUPATIONAL THERAPY NOTE
Occupational Therapy Evaluation     Patient Name: Katina Mendez  Today's Date: 3/1/2024  Problem List  Principal Problem:    Cervical radiculopathy  Active Problems:    Anxiety    S/P cervical discectomy    Past Medical History  Past Medical History:   Diagnosis Date    Anxiety     Bunion     left foot   OR   correction today 4/14/2023    Cervical radiculopathy     COVID     x 2-- Jan 2022 and Jan 2023    Depression     GERD (gastroesophageal reflux disease)     Headache(784.0)     Migraines     Seasonal allergies     Tobacco abuse     Wears contact lenses      Past Surgical History  Past Surgical History:   Procedure Laterality Date    BUNIONECTOMY Right     DISCECTOMY SPINE CERVICAL ANTERIOR W/ ARTHROPLASTY Bilateral 2/29/2024    Procedure: Anterior Cervical discectomy and disc replacement/arthroplasty C5-6;  Surgeon: Edu Del Rio MD;  [facility];  Service: Orthopedics    HYSTERECTOMY      2020    LAPAROSCOPIC TOTAL HYSTERECTOMY      IA HALLUX RIGIDUS W/CHEILECTOMY 1ST MP JT W/IMPLT Left 04/14/2023    Procedure: BUNIONECTOMY with implant;  Surgeon: Will Cuevas DPM;  [facility];  Service: Podiatry    TONSILLECTOMY           03/01/24 0905   OT Last Visit   OT Visit Date 03/01/24   Note Type   Note type Evaluation   Pain Assessment   Pain Assessment Tool 0-10   Pain Score 5   Pain Location/Orientation Location: Neck   Patient's Stated Pain Goal No pain   Hospital Pain Intervention(s) Repositioned;Ambulation/increased activity;Emotional support   Restrictions/Precautions   Weight Bearing Precautions Per Order No   Braces or Orthoses (S)  C/S Collar  (SOFT COLLAR)   Other Precautions Pain;Spinal precautions   Home Living   Type of Home House   Home Layout Two level   Bathroom Shower/Tub Tub/shower unit   Bathroom Toilet Standard   Prior Function   Level of [place] Independent with ADLs;Independent with functional mobility   Lives With Spouse;Family   Receives Help From Family  "  IADLs Family/Friend/Other provides meals;Family/Friend/Other provides transportation;Independent with medication management   Falls in the last 6 months 0   Vocational On disability   Lifestyle   [employer] PT REPORTS BEING I WITH ADLS/LT IADLS- FAMILY ASSISTS WITH HEAVY IADLS/DRIVING AS NEEDED   Reciprocal Relationships LIVES WITH SUPPORTIVE SPOUSE AND MIL.   Service to Others ON DISABILITY   Intrinsic Gratification ENJOYS WATCHING TV   ADL   Eating Assistance 7  Independent   Grooming Assistance 5  Supervision/Setup   UB Bathing Assistance 5  Supervision/Setup   LB Bathing Assistance 5  Supervision/Setup   UB Dressing Assistance 5  Supervision/Setup   LB Dressing Assistance 5  Supervision/Setup   Toileting Assistance  5  Supervision/Setup   Functional Assistance 5  Supervision/Setup   Bed Mobility   Supine to Sit 5  Supervision   Sit to Supine Unable to assess   Additional Comments PT LEFT OOB WITH ALL NEEDS IN REACH   Transfers   Sit to Stand 5  Supervision   Stand to Sit 5  Supervision   Functional Mobility   Functional Mobility 5  Supervision   Balance   Static Sitting Normal   Static Standing Good   Ambulatory Fair   Activity Tolerance   Activity Tolerance Patient tolerated treatment well   Medical Staff Made Aware PT SEEN FOR CO-EVAL WITH SKILLED PHYSICAL THERAPIST 2' NEW PRECAUTIONS/LIMITATIONS, AND LIMITED ACTIVITY TOLERANCE WHICH IMPACT PERFORMANCE AND ARE A REGRESSION FROM PT'S BASELINE.   Nurse Made Aware APPROPRIATE TO SEE PER RN.   RUE Assessment   RUE Assessment X  (MILD WEAKNESS. + SENSORY DEFICITS WITH NO CHANGES POST-OP YET. RHD WITH + REPORTED \"CLUMSINESS\")   LUE Assessment   LUE Assessment WFL   Hand Function   Gross Motor Coordination Functional   Fine Motor Coordination Functional   Sensation   Light Touch Partial deficits in the RUE   Psychosocial   Psychosocial (WDL) WDL   Cognition   Overall Cognitive Status WFL   Arousal/Participation Alert;Cooperative   Attention Within functional limits "   Orientation Level Oriented X4   Memory Within functional limits   Following Commands Follows all commands and directions without difficulty   Comments PT IS PLEASANT AND COOPERATIVE.   Assessment   Assessment 40 YO Female SEEN FOR INITIAL OCCUPATIONAL THERAPY EVALUATION S/P C5-6 DISC ARTHROPLASTY ON 2/29/24. PT CURRENTLY HAS THE FOLLOWING RESTRICTIONS;SPINAL PRECAUTIONS AND SOFT COLLAR FOR COMFORT. PROBLEMS LIST/PMH INCLUDES Anxiety, Bunion, Cervical radiculopathy, COVID, Depression, GERD (gastroesophageal reflux disease), Headache(784.0), Migraines, Seasonal allergies, Tobacco abuse, and Wears contact lenses. PT IS FROM HOME WITH FAMILY WHERE  REPORTS BEING INDEPENDENT WITH ADLS AND HAS ASSIST WITH IADLS AS NEEDED PTA. PT CURRENTLY REQUIRES OVERALL S WITH ADLS, TRANSFERS AND FUNCTIONAL MOBILITY WITHOUT USE OF AD. PT IS EXPERIENCING EXPECTED LIMITATIONS 2' PAIN, FATIGUE, IMPAIRED BALANCE, SPINAL PRECAUTIONS, LIMITED RUE ROM/STRENGTH, RUE SENSORY DEFICITS, and OVERALL LIMITED ACTIVITY TOLERANCE. PT EDUCATED ON SPINAL PRECAUTIONS, DEEP BREATHING TECHNIQUES T/O ACTIVITY, SLOWING OF PACE, ENERGY CONSERVATION TECHNIQUES FOR CARRY OVER UPON D/C, INCREASED FAMILY SUPPORT, and CONTINUE PARTICIPATION IN SELF-CARE/MOBILITY WITH STAFF WHILE IN THE HOSPITAL .The patient's raw score on the AM-PAC Daily Activity Inpatient Short Form is 19. A raw score of greater than or equal to 19 suggests the patient may benefit from discharge to home. Please refer to the recommendation of the Occupational Therapist for safe discharge planning.  FROM AN OCCUPATIONAL THERAPY PERSPECTIVE, PT CAN RETURN HOME WITH INCREASED FAMILY SUPPORT WHEN MEDICALLY CLEARED. ALL QUESTIONS/CONCERNS ADDRESSED. NO ADDITIONAL ACUTE CARE OT NEEDS. D/C OT.   Goals   Patient Goals TO RETURN HOME   Discharge Recommendation   Rehab Resource Intensity Level, OT No post-acute rehabilitation needs   AM-PAC Daily Activity Inpatient   Lower Body Dressing 3   Bathing 3    Toileting 3   Upper Body Dressing 3   Grooming 3   Eating 4   Daily Activity Raw Score 19   Daily Activity Standardized Score (Calc for Raw Score >=11) 40.22   AM-PAC Applied Cognition Inpatient   Following a Speech/Presentation 4   Understanding Ordinary Conversation 4   Taking Medications 4   Remembering Where Things Are Placed or Put Away 4   Remembering List of 4-5 Errands 4   Taking Care of Complicated Tasks 4   Applied Cognition Raw Score 24   Applied Cognition Standardized Score 62.21       Documentation completed by JUWAN Rivas, OTR/L  MOCA Certified ID# [id number]     Risks/benefits discussed with patient/surrogate

## 2024-03-05 ENCOUNTER — TELEPHONE (OUTPATIENT)
Dept: OBGYN CLINIC | Facility: HOSPITAL | Age: 39
End: 2024-03-05

## 2024-03-06 DIAGNOSIS — F41.1 GENERALIZED ANXIETY DISORDER: ICD-10-CM

## 2024-03-06 DIAGNOSIS — M79.18 MYOFASCIAL PAIN SYNDROME: ICD-10-CM

## 2024-03-06 DIAGNOSIS — G47.00 INSOMNIA, UNSPECIFIED TYPE: ICD-10-CM

## 2024-03-06 DIAGNOSIS — K52.9 GASTROENTERITIS: ICD-10-CM

## 2024-03-06 DIAGNOSIS — B34.9 VIRAL INFECTION, UNSPECIFIED: ICD-10-CM

## 2024-03-06 RX ORDER — TRAZODONE HYDROCHLORIDE 50 MG/1
50 TABLET ORAL
Qty: 90 TABLET | Refills: 0 | Status: SHIPPED | OUTPATIENT
Start: 2024-03-06

## 2024-03-06 RX ORDER — ONDANSETRON 4 MG/1
4 TABLET, FILM COATED ORAL EVERY 8 HOURS PRN
Qty: 20 TABLET | Refills: 0 | Status: SHIPPED | OUTPATIENT
Start: 2024-03-06

## 2024-03-07 RX ORDER — METHOCARBAMOL 750 MG/1
750 TABLET, FILM COATED ORAL 2 TIMES DAILY PRN
Qty: 60 TABLET | Refills: 0 | Status: SHIPPED | OUTPATIENT
Start: 2024-03-07

## 2024-03-07 RX ORDER — CLONAZEPAM 1 MG/1
1 TABLET ORAL 2 TIMES DAILY
Qty: 60 TABLET | Refills: 0 | Status: SHIPPED | OUTPATIENT
Start: 2024-03-07

## 2024-03-08 DIAGNOSIS — Z98.890 S/P CERVICAL DISC REPLACEMENT: ICD-10-CM

## 2024-03-08 DIAGNOSIS — M50.120 CERVICAL DISC DISORDER WITH RADICULOPATHY OF MID-CERVICAL REGION: ICD-10-CM

## 2024-03-11 DIAGNOSIS — Z98.890 S/P CERVICAL DISC REPLACEMENT: ICD-10-CM

## 2024-03-11 DIAGNOSIS — M50.120 CERVICAL DISC DISORDER WITH RADICULOPATHY OF MID-CERVICAL REGION: ICD-10-CM

## 2024-03-11 RX ORDER — OXYCODONE HYDROCHLORIDE 5 MG/1
5 TABLET ORAL EVERY 4 HOURS PRN
Qty: 30 TABLET | Refills: 0 | OUTPATIENT
Start: 2024-03-11 | End: 2024-03-21

## 2024-03-11 NOTE — TELEPHONE ENCOUNTER
Because this is postoperative, she will need to discuss further pain medication with her spinal specialist.  Thank you

## 2024-03-11 NOTE — TELEPHONE ENCOUNTER
Spoke w/pt who confirmed that this request was sent to Dr. Pepe in error. Pt thought is was going to her spinal specialist. Pt will reach out to her specialist for this refill request.

## 2024-03-11 NOTE — TELEPHONE ENCOUNTER
Reason for call:   [x] Refill   [] Prior Auth  [] Other:     Office:   [] PCP/Provider -   [x] Specialty/Provider - Ortho     Medication: Oxycodone     Dose/Frequency: 5 mg IR tablet taken by mouth every 4 hours PRN for moderate pain for up to 10 days     Quantity: 30    Pharmacy:   [id number] - [place] -   [address] [phone]     Does the patient have enough for 3 days?   [] Yes   [x] No - Send as HP to POD

## 2024-03-12 RX ORDER — OXYCODONE HYDROCHLORIDE 5 MG/1
5 TABLET ORAL EVERY 4 HOURS PRN
Qty: 30 TABLET | Refills: 0 | Status: SHIPPED | OUTPATIENT
Start: 2024-03-12 | End: 2024-03-22

## 2024-03-12 NOTE — TELEPHONE ENCOUNTER
Patient in pain. Advised patient of 72 hour protocol and medication still pending provider sign off. Call completed. Med already forward to provider      3/12/24 12:38 PM  Nguyen Mayberry routed this conversation to Edu Del Rio MD

## 2024-03-15 ENCOUNTER — HOSPITAL ENCOUNTER (OUTPATIENT)
Dept: RADIOLOGY | Facility: HOSPITAL | Age: 39
Discharge: HOME | End: 2024-03-15
Attending: ORTHOPAEDIC SURGERY
Payer: COMMERCIAL

## 2024-03-15 ENCOUNTER — OFFICE VISIT (OUTPATIENT)
Dept: OBGYN CLINIC | Facility: HOSPITAL | Age: 39
End: 2024-03-15

## 2024-03-15 VITALS
HEART RATE: 68 BPM | DIASTOLIC BLOOD PRESSURE: 79 MMHG | HEIGHT: 64 IN | WEIGHT: 104.94 LBS | SYSTOLIC BLOOD PRESSURE: 115 MMHG | BODY MASS INDEX: 17.92 KG/M2

## 2024-03-15 DIAGNOSIS — Z98.890 S/P CERVICAL DISC REPLACEMENT: Primary | ICD-10-CM

## 2024-03-15 DIAGNOSIS — Z98.890 S/P CERVICAL DISC REPLACEMENT: ICD-10-CM

## 2024-03-15 PROCEDURE — 72040 X-RAY EXAM NECK SPINE 2-3 VW: CPT

## 2024-03-15 PROCEDURE — 99024 POSTOP FOLLOW-UP VISIT: CPT | Performed by: ORTHOPAEDIC SURGERY

## 2024-03-15 NOTE — PROGRESS NOTES
"POST OP NOTE       Katina Mendez  here today s/p C5-6 disc arthroplasty         Surgery date: 2/29/2024    Subjective: Preoperative symptoms were neck and right > left periscapular pain with radiation into right upper extremity. Today, she reports some neck pain and stiffness, especially when sleeping at night. Some mild continued periscapular pain. Reports itchiness around anterior neck, noting she has reaction with anything adhesive. Some continued neck stiffness when turning the the right. Improvement in strength. Has been wearing soft cervical collar when at home as needed. Notes some continued soreness with swallowing. Patient admits to compliance with activity restrictions. Denies any fevers, chills, and night sweats. No cough, no shortness of breath.  No chest pain, no palpitations.  No dysphagia. Continues to smoke.    Post-Op Medications:  5mg oxycodone q4hr - taking as needed  750 mg robaxin  - taking as needed  15mg meloxicam once daily - taking as prescribed    Objective:  /79   Pulse 68   Ht 5' 4\" (1.626 m)   Wt 47.6 kg (104 lb 15 oz)   LMP 01/29/2020   BMI 18.01 kg/m²     Strength:  Upper Extremity Motor Function     Right  Left    Deltoid  5/5  5/5    Bicep  5/5  5/5    Wrist extension  5/5  5/5    Tricep  5/5  5/5    Finger flexion/  5/5  5/5    Hand intrinsic  5/5  5/5      Sensation: intact  DTR: intact  Gait: fluid, non-antalgic    Incision healing extremely well. Steri's intact. No signs of erythema, discharge, or purulence.  There is no appreciable effusion.    Calf: soft, non tender, no swelling or erythema     Imaging:  I have reviewed and independently visualized the imaging studies (03/15/24)  myself and my interpretation is the following: Instrumentation in place and in satisfactory alignment.    Assessment: s/p C5-6 disc arthroplasty on 2/29/2024.    Plan:  Steri strips were removed.     Patient was instructed to do the following   -Able to walk as much as tolerated. " Continue to limit heavy lifting, vigorous turning, twisting or bending.  -Continue to wean out of soft cervical collar. She should no longer be wearing soft cervical collar at 6 week post-op visit.  -May shower and allow water/soap to run over incision. Do not scrub or submerge incision.  -Able to drive as long as no longer taking narcotics. Continue to take meloxicam as prescribed. Continues to take medications as needed.  -Take pain medication as prescribed if needed. No refills needed at today's visit.  -Counseled on deleterious effects of smoking in charles operative setting as well on overall health and dorothy health   -Follow up in 4 weeks for 6 week post-op visit.  Katina Mendez was instructed to call the office with any concerns or questions.

## 2024-03-22 DIAGNOSIS — M50.120 CERVICAL DISC DISORDER WITH RADICULOPATHY OF MID-CERVICAL REGION: ICD-10-CM

## 2024-03-22 DIAGNOSIS — Z98.890 S/P CERVICAL DISC REPLACEMENT: ICD-10-CM

## 2024-03-22 RX ORDER — OXYCODONE HYDROCHLORIDE 5 MG/1
5 TABLET ORAL EVERY 4 HOURS PRN
Qty: 30 TABLET | Refills: 0 | Status: CANCELLED | OUTPATIENT
Start: 2024-03-22 | End: 2024-04-01

## 2024-03-22 NOTE — TELEPHONE ENCOUNTER
Reason for call:   [x] Refill   [] Prior Auth  [] Other:     Office:   [] PCP/Provider -   [x] Specialty/Provider - Cinthia/Eliane  - Ortho     Medication: Roxicodone     Dose/Frequency: 5mg - every 4 hours PRN     Quantity: 30    Pharmacy: [id number]     Does the patient have enough for 3 days?   [] Yes   [x] No - Send as HP to POD

## 2024-03-25 DIAGNOSIS — Z98.890 S/P CERVICAL DISC REPLACEMENT: Primary | ICD-10-CM

## 2024-03-25 RX ORDER — OXYCODONE HYDROCHLORIDE 5 MG/1
5 TABLET ORAL EVERY 6 HOURS PRN
Qty: 20 TABLET | Refills: 0 | Status: SHIPPED | OUTPATIENT
Start: 2024-03-25

## 2024-03-25 RX ORDER — NALOXONE HYDROCHLORIDE 4 MG/.1ML
SPRAY NASAL
Qty: 1 EACH | Refills: 1 | Status: SHIPPED | OUTPATIENT
Start: 2024-03-25 | End: 2025-03-25

## 2024-04-07 DIAGNOSIS — G47.00 INSOMNIA, UNSPECIFIED TYPE: ICD-10-CM

## 2024-04-07 DIAGNOSIS — M79.18 MYOFASCIAL PAIN SYNDROME: ICD-10-CM

## 2024-04-07 DIAGNOSIS — F41.1 GENERALIZED ANXIETY DISORDER: ICD-10-CM

## 2024-04-08 ENCOUNTER — TELEPHONE (OUTPATIENT)
Age: 39
End: 2024-04-08

## 2024-04-08 DIAGNOSIS — Z98.890 S/P CERVICAL DISC REPLACEMENT: ICD-10-CM

## 2024-04-08 RX ORDER — TRAZODONE HYDROCHLORIDE 50 MG/1
50 TABLET ORAL
Qty: 90 TABLET | Refills: 0 | Status: SHIPPED | OUTPATIENT
Start: 2024-04-08

## 2024-04-08 RX ORDER — CLONAZEPAM 1 MG/1
1 TABLET ORAL 2 TIMES DAILY
Qty: 60 TABLET | Refills: 0 | Status: SHIPPED | OUTPATIENT
Start: 2024-04-08

## 2024-04-08 RX ORDER — OXYCODONE HYDROCHLORIDE 5 MG/1
5 TABLET ORAL EVERY 6 HOURS PRN
Qty: 30 TABLET | Refills: 0 | Status: SHIPPED | OUTPATIENT
Start: 2024-04-08

## 2024-04-08 RX ORDER — CYCLOBENZAPRINE HCL 10 MG
10 TABLET ORAL 3 TIMES DAILY PRN
Qty: 30 TABLET | Refills: 0 | Status: SHIPPED | OUTPATIENT
Start: 2024-04-08

## 2024-04-08 RX ORDER — METHOCARBAMOL 750 MG/1
750 TABLET, FILM COATED ORAL 2 TIMES DAILY PRN
Qty: 60 TABLET | Refills: 0 | Status: SHIPPED | OUTPATIENT
Start: 2024-04-08

## 2024-04-08 NOTE — TELEPHONE ENCOUNTER
Patient called the RX Refill Line. Message is being forwarded to the office.     Patient is requesting a refill on  oxyCODONE (ROXICODONE) 5 immediate release tablet.  But shed like it be changed back to every 4 hours as it controls her pain better. Please sen new script to [facility] in [place]. Pt is out of medication.    Please contact patient at  [phone]

## 2024-04-12 ENCOUNTER — HOSPITAL ENCOUNTER (OUTPATIENT)
Dept: RADIOLOGY | Facility: HOSPITAL | Age: 39
Discharge: HOME | End: 2024-04-12
Attending: ORTHOPAEDIC SURGERY
Payer: COMMERCIAL

## 2024-04-12 ENCOUNTER — OFFICE VISIT (OUTPATIENT)
Dept: OBGYN CLINIC | Facility: HOSPITAL | Age: 39
End: 2024-04-12

## 2024-04-12 VITALS
WEIGHT: 104.94 LBS | BODY MASS INDEX: 17.92 KG/M2 | SYSTOLIC BLOOD PRESSURE: 120 MMHG | HEART RATE: 103 BPM | DIASTOLIC BLOOD PRESSURE: 82 MMHG | HEIGHT: 64 IN

## 2024-04-12 DIAGNOSIS — Z98.890 S/P CERVICAL DISC REPLACEMENT: Primary | ICD-10-CM

## 2024-04-12 DIAGNOSIS — Z98.890 S/P CERVICAL DISC REPLACEMENT: ICD-10-CM

## 2024-04-12 PROCEDURE — 72040 X-RAY EXAM NECK SPINE 2-3 VW: CPT

## 2024-04-12 PROCEDURE — 99024 POSTOP FOLLOW-UP VISIT: CPT | Performed by: ORTHOPAEDIC SURGERY

## 2024-04-12 NOTE — PROGRESS NOTES
"POST OP NOTE       Katina Mendez  here today s/p C5-6 disc arthroplasty         Surgery date: 2/29/2024    Subjective: Preoperative symptoms were neck and right > left periscapular pain with radiation into right upper extremity. Today, she reports some continued neck pain and stiffness. No longer wearing soft cervical collar. She reports difficulty with sleeping at night due to pain. She has been able to move right upper extremity better than compared to pre-operatively. Patient admits to compliance with activity restrictions. Denies any fevers, chills, and night sweats. No cough, no shortness of breath.  No chest pain, no palpitations.  No dysphagia. Continues to smoke.    Post-Op Medications:  5mg oxycodone q4hr - no longer taking  750 mg robaxin  - taking as needed  15mg meloxicam once daily - no longer taking    Objective:  /82   Pulse 103   Ht 5' 4\" (1.626 m)   Wt 47.6 kg (104 lb 15 oz)   LMP 01/29/2020   BMI 18.01 kg/m²     Strength:  Upper Extremity Motor Function     Right  Left    Deltoid  5/5  5/5    Bicep  5/5  5/5    Wrist extension  5/5  5/5    Tricep  5/5  5/5    Finger flexion/  5/5  5/5    Hand intrinsic  5/5  5/5      Sensation: intact  DTR: intact  Gait: fluid, non-antalgic  Cervical ROM intact flexion extension, lateral bend and rotation     Incision healing extremely well. No signs of erythema, discharge, or purulence.  There is no appreciable effusion.    Calf: soft, non tender, no swelling or erythema     Imaging:  I have reviewed and independently visualized the imaging studies (04/12/24)  myself and my interpretation is the following: Instrumentation in place and in satisfactory alignment.    Assessment: s/p C5-6 disc arthroplasty on 2/29/2024.    Plan:  Patient was instructed to do the following   -Able to walk as much as tolerated. Will start formal physical therapy to work on  cervical ROM, strengthening, and stretching exercises. Referral to physical therapy provide at " today's visit.  -Able to drive as long as no longer taking narcotics. Continue to take medications as needed.  -Take pain medication as prescribed if needed. No refills needed at today's visit.  -Counseled on deleterious effects of smoking in [facility] operative setting as well on overall health and dorothy health   -Follow up in 6 weeks for 3 month post-op visit. Will get updated X-ray at that visit.  Katina Mendez was instructed to call the office with any concerns or questions.

## 2024-04-17 ENCOUNTER — EVALUATION (OUTPATIENT)
Dept: PHYSICAL THERAPY | Facility: CLINIC | Age: 39
End: 2024-04-17
Payer: COMMERCIAL

## 2024-04-17 DIAGNOSIS — Z98.890 S/P CERVICAL DISC REPLACEMENT: ICD-10-CM

## 2024-04-17 PROCEDURE — 97162 PT EVAL MOD COMPLEX 30 MIN: CPT

## 2024-04-17 PROCEDURE — 97112 NEUROMUSCULAR REEDUCATION: CPT

## 2024-04-17 NOTE — PROGRESS NOTES
PT Evaluation     Today's date: 2024  Patient name: Katina Mendez  : 1985  MRN: [id number]  Referring provider: Diana Vicente  Dx:   Encounter Diagnosis     ICD-10-CM    1. S/P cervical disc replacement  Z98.890 Ambulatory referral to Physical Therapy     PT plan of care cert/re-cert          Start Time: 1217  Stop Time: 1304  Total time in clinic (min): 47 minutes    Assessment  Assessment details: Katina Mendez is a 39 y.o. female presenting to physical therapy today for s/s consistent with cervical and RUE pain s/p cervical disc replacement.    Current objective impairments: decreased cervical AROM, decreased shoulder AROM, pain limiting all movements along with weakness. Pt also demonstrated significant pain in UE along with weakness due to adverse neurodynamics with provocative testing positive for median and ulnar and significantly decreased PROM through testing motions. Pt with decreased DNF strength and endurance with pain in completing chin tuck as well along with decreased  strength on R side.    Response to today's tx: fair, increased pain throughout all movements. HEP educated and completed with good understanding.    Pt will benefit from skilled physical therapy to address above listed impairments and return to PLOF.    Impairments: abnormal coordination, abnormal muscle firing, abnormal muscle tone, abnormal or restricted ROM, abnormal movement, activity intolerance, impaired physical strength, lacks appropriate home exercise program, pain with function and poor posture   Functional limitations: decreased cervical and shoulder AROM, pain with all movement of UE and neck, decreased lifting ability  Symptom irritability: highUnderstanding of Dx/Px/POC: good   Prognosis: good    Goals  STG (2-3 weeks):  Pt will demonstrate independence with HEP  2. Pt will improve recruitment of DNF from 3s to 8s in order to better stabilize cervical spine    LTG (6-8 weeks):  Pt will improve  shoulder AROM by 20 degrees in both flexion and abduction  2. Pt will have pain <2/10 with OH reaching in order to complete ADLs requiring UE usage  3. Pt will improve functional status measure from 32 to >48 to demonstrate improved completion of ADLs.  4. Pt will report ability to lift 3 yo son without increase of pain >3/10        Plan  Plan details: Pt will benefit from skilled physical therapy to treat aforementioned impairments, decrease pain and return to PLOF.     Patient would benefit from: skilled physical therapy  Planned modality interventions: low level laser therapy, TENS, thermotherapy: hydrocollator packs and cryotherapy  Planned therapy interventions: IASTM, joint mobilization, kinesiology taping, manual therapy, nerve gliding, neuromuscular re-education, patient education, postural training, self care, strengthening, stretching, therapeutic activities, therapeutic exercise, home exercise program, functional ROM exercises, flexibility, balance/weight bearing training and balance  Frequency: 2x week  Duration in visits: 16  Duration in weeks: 8  Plan of Care beginning date: 4/17/2024  Plan of Care expiration date: 6/12/2024  Treatment plan discussed with: patient        Subjective Evaluation    History of Present Illness  Date of onset: 2/29/2024  Mechanism of injury: surgery  Mechanism of injury: Pt reports DELIA:  R arm weakness and numbness, surgery s/p cervical disc replacement C5-6  Prior to surgery, pain and discomfort for over a year, started as feeling of pulled muscle in shoulder. Woke up one day with inability to move arm.     Since surgery, has not seen much improvement, no increased pain relief. Cannot lift 2 year old son    Previous tx to date: surgery, injections, PT    24 hour pattern: constant pain, worse at night trying to get comfortable    Red Flags: clear          Recurrent probem    Quality of life: good    Patient Goals  Patient goals for therapy: decreased pain, increased motion,  independence with ADLs/IADLs, return to sport/leisure activities and increased strength  Patient goal: lift son  Pain  Current pain ratin  At best pain ratin  At worst pain rating: 10  Location: starts in back of nec, radiates between shoulder blades, down anterior R shoulder down to fingers n/t, severe burning in elbow  Quality: burning, radiating, sharp and dull ache (burning)  Relieving factors: medications (oxy, flexeril, sleeping in bed propped through pillows)  Aggravating factors: overhead activity and lifting (cervical motions, any movements with arm)  Progression: no change    Social Support  Lives with: spouse and parents    Employment status: not working  Hand dominance: right      Diagnostic Tests  X-ray: normal (normal hardware s/p disc replacement)  Treatments  Current treatment: physical therapy        Objective     Tenderness     Additional Tenderness Details  TTP R UT, R supraspinatus, R infraspinatus, R lateral arm     Neurological Testing     Sensation   Cervical/Thoracic   Left   Intact: light touch    Right   Intact: light touch    Reflexes   Left   Biceps (C5/C6): normal (2+)  Brachioradialis (C6): normal (2+)  Triceps (C7): normal (2+)    Right   Biceps (C5/C6): normal (2+)  Brachioradialis (C6): normal (2+)  Triceps (C7): normal (2+)    Active Range of Motion   Cervical/Thoracic Spine       Cervical    Flexion: 35 (base neck, down arm) degrees  with pain Restriction level: moderate  Extension: 10 (base neck, mild into shoulder) degrees     with pain  Left lateral flexion: 15 (pain base neck, UT, down arm) degrees     with pain  Right lateral flexion: 15 degrees      Left rotation: 40 (pain base neck, down arm) degrees with pain  Right rotation: 53 (pain base neck) degrees    with pain  Left Shoulder   Flexion: WFL  Abduction: WFL  External rotation BTH: C3 with pain  Internal rotation BTB: T8 with pain    Right Shoulder   Flexion: 142 degrees with pain  Abduction: 80 degrees with  pain  External rotation BTH: with pain  Internal rotation BTB: Active internal rotation behind the back: unable to lift OH. with pain    Strength/Myotome Testing   Cervical Spine   Neck extension: 3+  Neck flexion: 3+    Left   Neck lateral flexion (C3): 3-    Right   Neck lateral flexion (C3): 3-    Left Shoulder     Planes of Motion   Flexion: 4+   Extension: 4+   Abduction: 4+   External rotation at 0°: 4+   Internal rotation at 0°: 4+     Right Shoulder     Planes of Motion   Flexion: 3-   Abduction: 2+   External rotation at 0°: 3+   Internal rotation at 0°: 3+     Left Elbow   Flexion: 4+  Extension: 4-    Right Elbow   Flexion: 4-  Extension: 3+    Additional Strength Details   Strength  R: 5lbs  L: 48lbs      Tests   Cervical   Positive neck flexor muscle endurance test.    Right Shoulder   Positive ULTT1 and ULTT2.     Additional Tests Details  DNF endurance: 2s             Precautions:   Patient Active Problem List   Diagnosis    Generalized anxiety disorder    Lower back pain    Insomnia    GERD without esophagitis    Left breast lump    Benign paroxysmal positional vertigo due to bilateral vestibular disorder    Herpes simplex infection    Abnormal finding on thyroid function test    Atypical nevus    Cervical radiculopathy    Anxiety    Tobacco abuse    Migraines    Depression    Mild protein-calorie malnutrition (HCC)    Neck pain    S/P cervical discectomy           Manuals 4/17            Shoulder PROM             Cervical PROM             UT STM                          Neuro Re-Ed             Supine generic nerve glide 10x            DNF             Chin tuck  10x            RTC isos             Cervical isos                                       Ther Ex             Table slides flexion             Table slides abduction             pulleys             SNAGs             Cane flexion             Cane ER             Bicep curl                          Ther Activity                                        Gait Training                                       Modalities

## 2024-04-18 ENCOUNTER — OFFICE VISIT (OUTPATIENT)
Dept: FAMILY MEDICINE CLINIC | Facility: CLINIC | Age: 39
End: 2024-04-18
Payer: COMMERCIAL

## 2024-04-18 VITALS
TEMPERATURE: 97.8 F | HEIGHT: 64 IN | OXYGEN SATURATION: 98 % | HEART RATE: 101 BPM | DIASTOLIC BLOOD PRESSURE: 66 MMHG | SYSTOLIC BLOOD PRESSURE: 110 MMHG | BODY MASS INDEX: 19.09 KG/M2 | WEIGHT: 111.8 LBS

## 2024-04-18 DIAGNOSIS — M54.12 CERVICAL RADICULOPATHY: Primary | ICD-10-CM

## 2024-04-18 DIAGNOSIS — J01.90 ACUTE SINUSITIS, RECURRENCE NOT SPECIFIED, UNSPECIFIED LOCATION: ICD-10-CM

## 2024-04-18 DIAGNOSIS — G95.20 CERVICAL SPINAL CORD COMPRESSION (HCC): ICD-10-CM

## 2024-04-18 DIAGNOSIS — F41.1 GENERALIZED ANXIETY DISORDER: ICD-10-CM

## 2024-04-18 DIAGNOSIS — M50.120 CERVICAL DISC DISORDER WITH RADICULOPATHY OF MID-CERVICAL REGION: ICD-10-CM

## 2024-04-18 PROCEDURE — 99214 OFFICE O/P EST MOD 30 MIN: CPT | Performed by: FAMILY MEDICINE

## 2024-04-18 RX ORDER — GABAPENTIN 100 MG/1
CAPSULE ORAL
Qty: 81 CAPSULE | Refills: 0 | Status: SHIPPED | OUTPATIENT
Start: 2024-04-18

## 2024-04-18 RX ORDER — AMOXICILLIN AND CLAVULANATE POTASSIUM 875; 125 MG/1; MG/1
1 TABLET, FILM COATED ORAL EVERY 12 HOURS SCHEDULED
Qty: 14 TABLET | Refills: 0 | Status: SHIPPED | OUTPATIENT
Start: 2024-04-18 | End: 2024-04-25

## 2024-04-18 NOTE — PROGRESS NOTES
Assessment/Plan:   1. Acute sinusitis, recurrence not specified, unspecified location  Appear secondary to acute sinusitis.  Continue supportive care.  Maintain hydration.  Abgott Mucinex.  Started with Augmentin.  Follow-up if any symptoms persist.  - amoxicillin-clavulanate (AUGMENTIN) 875-125 mg per tablet; Take 1 tablet by mouth every 12 (twelve) hours for 7 days  Dispense: 14 tablet; Refill: 0    2. Generalized anxiety disorder  Stable.  At this time, continue with her current treatment of Cymbalta as well as her clonazepam.    3. Cervical radiculopathy  Patient has been having persistent pain.  At this time, she has been following with orthopedics regularly.  She will be starting with physical therapy.  Reviewed her medical treatment options.  At this time, continue with her current treatment of Cymbalta, Flexeril.  Given the persistence of her pain, she was advised to restart her nabumetone.  Will start treatment as well with gabapentin.  Will slowly titrate this dose for her relief.  Follow-up if any symptoms persist.  - gabapentin (NEURONTIN) 100 mg capsule; Take 1 tablet QHS for 3 days, then take 2 tablets QHS for 3 days, then 3 tablets QHS.  Dispense: 81 capsule; Refill: 0               Diagnoses and all orders for this visit:    Cervical radiculopathy  -     gabapentin (NEURONTIN) 100 mg capsule; Take 1 tablet QHS for 3 days, then take 2 tablets QHS for 3 days, then 3 tablets QHS.    Acute sinusitis, recurrence not specified, unspecified location  -     amoxicillin-clavulanate (AUGMENTIN) 875-125 mg per tablet; Take 1 tablet by mouth every 12 (twelve) hours for 7 days    Generalized anxiety disorder    Cervical disc disorder with radiculopathy of mid-cervical region    Cervical spinal cord compression (HCC)          Subjective:       Chief Complaint   Patient presents with    Follow-up     6 month      Patient ID: Katina Mendez is a 39 y.o. female is today for a follow-up on her chronic conditions.  She  has generalized anxiety disorder, cervical radiculopathy.  She is been taking her medications regularly.  She denies adverse reactions with her medications.  She states that she has still been having persistent pain numbness and decreased strength in her upper extremity.  She states that she did not undergo C5-C6 disc arthroplasty    HPI    Review of Systems   Constitutional:  Negative for activity change, chills, fatigue and fever.   HENT:  Negative for congestion, ear pain, sinus pressure and sore throat.    Eyes:  Negative for redness, itching and visual disturbance.   Respiratory:  Negative for cough and shortness of breath.    Cardiovascular:  Negative for chest pain and palpitations.   Gastrointestinal:  Negative for abdominal pain, diarrhea and nausea.   Endocrine: Negative for cold intolerance and heat intolerance.   Genitourinary:  Negative for dysuria, flank pain and frequency.   Musculoskeletal:  Positive for arthralgias, myalgias, neck pain and neck stiffness. Negative for back pain and gait problem.   Skin:  Negative for color change.   Allergic/Immunologic: Negative for environmental allergies.   Neurological:  Negative for dizziness, numbness and headaches.   Psychiatric/Behavioral:  Negative for behavioral problems and sleep disturbance.        The following portions of the patient's history were reviewed and updated as appropriate : past family history, past medical history, past social history and past surgical history.    Current Outpatient Medications:     acetaminophen (TYLENOL) 650 mg CR tablet, Take 1 tablet (650 mg total) by mouth every 8 (eight) hours as needed for mild pain, Disp: 30 tablet, Rfl: 0    acyclovir (ZOVIRAX) 5 % ointment, APPLY TOPICALLY EVERY 4 (FOUR) HOURS, Disp: 15 g, Rfl: 0    amoxicillin-clavulanate (AUGMENTIN) 875-125 mg per tablet, Take 1 tablet by mouth every 12 (twelve) hours for 7 days, Disp: 14 tablet, Rfl: 0    Calcium Carbonate Antacid (TUMS PO), Take by mouth if  needed, Disp: , Rfl:     clonazePAM (KlonoPIN) 1 mg tablet, Take 1 tablet (1 mg total) by mouth 2 (two) times a day, Disp: 60 tablet, Rfl: 0    cyclobenzaprine (FLEXERIL) 10 mg tablet, Take 1 tablet (10 mg total) by mouth 3 (three) times a day as needed for muscle spasms May make you drowsy or dizzy., Disp: 30 tablet, Rfl: 0    Diclofenac Sodium (VOLTAREN) 1 %, Apply 2 g topically 4 (four) times a day, Disp: 100 g, Rfl: 3    dicyclomine (BENTYL) 20 mg tablet, Take 1 tablet (20 mg total) by mouth 3 (three) times a day as needed (for abdominal cramping), Disp: 30 tablet, Rfl: 0    DULoxetine (CYMBALTA) 30 mg delayed release capsule, Take 1 capsule (30 mg total) by mouth daily Along with 60 mg capsule, Disp: 90 capsule, Rfl: 1    DULoxetine (CYMBALTA) 60 mg delayed release capsule, Take 1 capsule (60 mg total) by mouth daily, Disp: 90 capsule, Rfl: 1    gabapentin (NEURONTIN) 100 mg capsule, Take 1 tablet QHS for 3 days, then take 2 tablets QHS for 3 days, then 3 tablets QHS., Disp: 81 capsule, Rfl: 0    ibuprofen (MOTRIN) 600 mg tablet, Take 1 tablet (600 mg total) by mouth every 6 (six) hours as needed for mild pain, Disp: 120 tablet, Rfl: 0    loratadine (CLARITIN) 10 mg tablet, Take 1 tablet (10 mg total) by mouth daily (Patient taking differently: Take 10 mg by mouth daily as needed), Disp: 30 tablet, Rfl: 2    meloxicam (Mobic) 15 mg tablet, Take 1 tablet (15 mg total) by mouth daily Take once daily in the morning with food. Do not take any other anti-inflammatory medications such as motrin (ibuprofen), aleve (naproxen), diclofenac, or relafen (nabumetone), Disp: 18 tablet, Rfl: 0    methocarbamol (Robaxin-750) 750 mg tablet, Take 1 tablet (750 mg total) by mouth 2 (two) times a day as needed for muscle spasms, Disp: 60 tablet, Rfl: 0    Multiple Vitamins-Minerals (MULTIVITAMIN ADULT PO), Take 1 tablet by mouth daily, Disp: , Rfl:     nabumetone (RELAFEN) 500 mg tablet, Take 1 tablet (500 mg total) by mouth 2  "(two) times a day, Disp: 60 tablet, Rfl: 2    ondansetron (ZOFRAN) 4 mg tablet, Take 1 tablet (4 mg total) by mouth every 8 (eight) hours as needed for nausea or vomiting, Disp: 20 tablet, Rfl: 0    oxyCODONE (ROXICODONE) 5 immediate release tablet, Take 1 tablet (5 mg total) by mouth every 6 (six) hours as needed for severe pain Max Daily Amount: 20 mg, Disp: 30 tablet, Rfl: 0    traZODone (DESYREL) 50 mg tablet, Take 1 tablet (50 mg total) by mouth daily at bedtime as needed for sleep, Disp: 90 tablet, Rfl: 0    naloxone (NARCAN) 4 mg/0.1 mL nasal spray, Administer 1 spray into a nostril. If no response after 2-3 minutes, give another dose in the other nostril using a new spray. (Patient not taking: Reported on 4/18/2024), Disp: 1 each, Rfl: 1         Objective:         Vitals:    04/18/24 1330   BP: 110/66   BP Location: Left arm   Patient Position: Sitting   Cuff Size: Adult   Pulse: 101   Temp: 97.8 °F (36.6 °C)   TempSrc: Temporal   SpO2: 98%   Weight: 50.7 kg (111 lb 12.8 oz)   Height: 5' 4\" (1.626 m)     Physical Exam  Vitals reviewed.   Constitutional:       General: She is not in acute distress.     Appearance: Normal appearance. She is well-developed.   HENT:      Head: Normocephalic and atraumatic.      Right Ear: Tympanic membrane, ear canal and external ear normal. There is no impacted cerumen.      Left Ear: Tympanic membrane, ear canal and external ear normal. There is no impacted cerumen.      Nose: Nose normal. No congestion or rhinorrhea.      Mouth/Throat:      Mouth: Mucous membranes are moist.      Pharynx: No oropharyngeal exudate or posterior oropharyngeal erythema.   Eyes:      General: No scleral icterus.        Right eye: No discharge.         Left eye: No discharge.      Extraocular Movements: Extraocular movements intact.      Conjunctiva/sclera: Conjunctivae normal.      Pupils: Pupils are equal, round, and reactive to light.   Neck:      Trachea: No tracheal deviation. "   Cardiovascular:      Rate and Rhythm: Normal rate and regular rhythm.      Pulses: Normal pulses.           Dorsalis pedis pulses are 2+ on the right side and 2+ on the left side.        Posterior tibial pulses are 2+ on the right side and 2+ on the left side.      Heart sounds: Normal heart sounds. No murmur heard.     No friction rub. No gallop.   Pulmonary:      Effort: Pulmonary effort is normal. No respiratory distress.      Breath sounds: Normal breath sounds. No wheezing, rhonchi or rales.   Abdominal:      General: Bowel sounds are normal. There is no distension.      Palpations: Abdomen is soft.      Tenderness: There is no abdominal tenderness. There is no guarding or rebound.   Musculoskeletal:         General: Normal range of motion.      Cervical back: Normal range of motion and neck supple.      Right lower leg: No edema.      Left lower leg: No edema.   Lymphadenopathy:      Head:      Right side of head: No submental or submandibular adenopathy.      Left side of head: No submental or submandibular adenopathy.      Cervical: No cervical adenopathy.      Right cervical: No superficial, deep or posterior cervical adenopathy.     Left cervical: No superficial, deep or posterior cervical adenopathy.   Skin:     General: Skin is warm and dry.      Findings: No erythema.   Neurological:      General: No focal deficit present.      Mental Status: She is alert and oriented to person, place, and time.      Cranial Nerves: No cranial nerve deficit.      Sensory: Sensation is intact. No sensory deficit.      Motor: Motor function is intact.   Psychiatric:         Attention and Perception: Attention and perception normal.         Mood and Affect: Mood is not anxious or depressed.         Speech: Speech normal.         Behavior: Behavior normal.         Thought Content: Thought content normal.         Judgment: Judgment normal.

## 2024-04-24 ENCOUNTER — OFFICE VISIT (OUTPATIENT)
Dept: PHYSICAL THERAPY | Facility: CLINIC | Age: 39
End: 2024-04-24
Payer: COMMERCIAL

## 2024-04-24 DIAGNOSIS — Z98.890 S/P CERVICAL DISC REPLACEMENT: Primary | ICD-10-CM

## 2024-04-24 PROCEDURE — 97112 NEUROMUSCULAR REEDUCATION: CPT

## 2024-04-24 PROCEDURE — 97110 THERAPEUTIC EXERCISES: CPT

## 2024-04-24 PROCEDURE — 97140 MANUAL THERAPY 1/> REGIONS: CPT

## 2024-04-24 NOTE — PROGRESS NOTES
Daily Note     Today's date: 2024  Patient name: Katina Mendez  : 1985  MRN: [id number]  Referring provider: Diana Vicente*  Dx:   Encounter Diagnosis     ICD-10-CM    1. S/P cervical disc replacement  Z98.890           Start Time: 1215  Stop Time: 1307  Total time in clinic (min): 52 minutes    Subjective: Pt reports she is still having a to of burning pain into arm and down shoulder blade. Been trying exercises however they do increase pain.       Objective: See treatment diary below      Assessment: Tolerated treatment well. Patient demonstrated fatigue post treatment and would benefit from continued PT. Pt with increased pain throughout shoulder and cervical PROM with increase in radicular pain during cervical traction as well. No mobility limitations felt during passive cervical rotation ROM, empty end feel with pain, limitations noted with SB ing bilaterally. Tolerated cervical and shoulder isometrics well overall. Increased irritability decreased intensity of exercises during session with more need for pain relieving strategies.        Plan: Continue per plan of care.  Progress treatment as tolerated.       Precautions:   Patient Active Problem List   Diagnosis    Generalized anxiety disorder    Lower back pain    Insomnia    GERD without esophagitis    Left breast lump    Benign paroxysmal positional vertigo due to bilateral vestibular disorder    Herpes simplex infection    Abnormal finding on thyroid function test    Atypical nevus    Cervical radiculopathy    Anxiety    Tobacco abuse    Migraines    Depression    Mild protein-calorie malnutrition (HCC)    Neck pain    S/P cervical discectomy           Manuals            Shoulder PROM  RC           Cervical PROM  RC           UT STM  RC           Generic manual nerve glides  RC                        Neuro Re-Ed             Supine generic nerve glide 10x 10x R           DNF             Chin tuck  10x 2x10           RTC isos  " Flx/abd/ext     5\" x8 ea           Cervical isos  SB 4\" hold 10x ea                                     Ther Ex             Table slides flexion             Table slides abduction             pulleys  4 min 10\" hold           SNAGs  10x ea rotation           Cane flexion             Cane ER             Bicep curl             Finger ladder flexion  8x                        Ther Activity                                       Gait Training                                       Modalities                                            "

## 2024-04-26 ENCOUNTER — OFFICE VISIT (OUTPATIENT)
Dept: PHYSICAL THERAPY | Facility: CLINIC | Age: 39
End: 2024-04-26
Payer: COMMERCIAL

## 2024-04-26 DIAGNOSIS — Z98.890 S/P CERVICAL DISC REPLACEMENT: Primary | ICD-10-CM

## 2024-04-26 PROCEDURE — G0283 ELEC STIM OTHER THAN WOUND: HCPCS

## 2024-04-26 PROCEDURE — 97140 MANUAL THERAPY 1/> REGIONS: CPT

## 2024-04-26 PROCEDURE — 97112 NEUROMUSCULAR REEDUCATION: CPT

## 2024-04-26 PROCEDURE — 97014 ELECTRIC STIMULATION THERAPY: CPT

## 2024-04-26 PROCEDURE — 97110 THERAPEUTIC EXERCISES: CPT

## 2024-04-26 NOTE — PROGRESS NOTES
Daily Note     Today's date: 2024  Patient name: Katina Mendez  : 1985  MRN: [id number]  Referring provider: Diana Vicente*  Dx:   Encounter Diagnosis     ICD-10-CM    1. S/P cervical disc replacement  Z98.890           Start Time: 1158  Stop Time: 1245  Total time in clinic (min): 47 minutes    Subjective: Pt reports increased pain and soreness following last visit.       Objective: See treatment diary below      Assessment: Tolerated treatment fair. Patient demonstrated fatigue post treatment and would benefit from continued PT. Pt with decreased exercise tolerance today due to pain increased after last session. Emphasis on pain modulation today with use of TENs throughout exercises along with mobility based program today over isometric based exercises last session. Pt with increased pain during all cervical and R shoulder ROM with empty end feels. Increase of RTC muscle guarding with shoulder PROM and decreased ability to relax arm.       Plan: Continue per plan of care.  Progress treatment as tolerated.       Precautions:   Patient Active Problem List   Diagnosis    Generalized anxiety disorder    Lower back pain    Insomnia    GERD without esophagitis    Left breast lump    Benign paroxysmal positional vertigo due to bilateral vestibular disorder    Herpes simplex infection    Abnormal finding on thyroid function test    Atypical nevus    Cervical radiculopathy    Anxiety    Tobacco abuse    Migraines    Depression    Mild protein-calorie malnutrition (HCC)    Neck pain    S/P cervical discectomy           Manuals           Shoulder PROM  RC RC          Cervical PROM  RC RC          OA mob bilat   RC gr 2-3          SOR   RC          UT STM  RC           Generic manual nerve glides  RC RC          Laser R UT and shoulder   RC                       Neuro Re-Ed             Supine generic nerve glide 10x 10x R           Median nerve glide at side   15x R          DNF            "  Chin tuck  10x 2x10 2x10 supine          RTC isos  Flx/abd/ext     5\" x8 ea           Cervical isos  SB 4\" hold 10x ea              Green 30x                        Ther Ex             Table slides flexion   15x 3\" hold          Supine cane ER   15x           Table slides abduction   15x 3\" hold          pulleys  4 min 10\" hold 4 min 10\" hold           SNAGs rotation  10x ea rotation 10x ea           SNAG extension   10x           pendulum   20x ea H/V          Bicep curl             Finger ladder flexion  8x                        Ther Activity                                       Gait Training                                       Modalities             TENS R shoulder   Throughout tx 30 min                              "

## 2024-05-01 ENCOUNTER — OFFICE VISIT (OUTPATIENT)
Dept: PHYSICAL THERAPY | Facility: CLINIC | Age: 39
End: 2024-05-01
Payer: COMMERCIAL

## 2024-05-01 DIAGNOSIS — Z98.890 S/P CERVICAL DISC REPLACEMENT: Primary | ICD-10-CM

## 2024-05-01 PROCEDURE — 97140 MANUAL THERAPY 1/> REGIONS: CPT

## 2024-05-01 PROCEDURE — 97112 NEUROMUSCULAR REEDUCATION: CPT

## 2024-05-01 NOTE — PROGRESS NOTES
Daily Note     Today's date: 2024  Patient name: Katina Mendez  : 1985  MRN: [id number]  Referring provider: Diana Vicente*  Dx:   Encounter Diagnosis     ICD-10-CM    1. S/P cervical disc replacement  Z98.890           Start Time: 1210  Stop Time: 1303  Total time in clinic (min): 53 minutes    Subjective: Pt with continued increase in pain after last session.         Objective: See treatment diary below      Assessment: Tolerated treatment fair. Patient demonstrated fatigue post treatment and would benefit from continued PT. Pt continues to have significantly increased pain throughout tx with all ROM, active and passive. Continuing to emphasize AAROM overall along with DNF strengthening within tolerated ranges, yet pain impacts progression overall. Trialed laser today down arm for nerve pain following median/radial distribution with reports of mild decrease in arm pain following. Will continue utilizing for pain relief.       Plan: Continue per plan of care.  Progress treatment as tolerated.       Precautions:   Patient Active Problem List   Diagnosis    Generalized anxiety disorder    Lower back pain    Insomnia    GERD without esophagitis    Left breast lump    Benign paroxysmal positional vertigo due to bilateral vestibular disorder    Herpes simplex infection    Abnormal finding on thyroid function test    Atypical nevus    Cervical radiculopathy    Anxiety    Tobacco abuse    Migraines    Depression    Mild protein-calorie malnutrition (HCC)    Neck pain    S/P cervical discectomy           Manuals          Shoulder PROM  RC RC          Cervical PROM  RC RC RC         OA mob bilat   RC gr 2-3 RC gr 2-3         SOR   RC RC         UT STM  RC           Generic manual nerve glides  RC RC          Laser R UT and shoulder   RC RC R cervical/lateral arm                      Neuro Re-Ed             Supine generic nerve glide 10x 10x R           Median nerve glide at side    "15x R 20x R         Ulnar nerve glide prayer    20x ea         DNF             Chin tuck  10x 2x10 2x10 supine 2x10 supine         RTC isos  Flx/abd/ext     5\" x8 ea           Cervical isos  SB 4\" hold 10x ea              Green 30x                        Ther Ex             Table slides flexion   15x 3\" hold          Cervical rotation AROM supine    15x ea         Supine cane ER   15x           Supine cane flexion   10 x3\"          Table slides abduction   15x 3\" hold          pulleys  4 min 10\" hold 4 min 10\" hold  4 min 10\" hold          SNAGs rotation  10x ea rotation 10x ea  10x ea         SNAG extension   10x  10x          pendulum   20x ea H/V          Bicep curl             Finger ladder flexion  8x                        Ther Activity                                       Gait Training                                       Modalities             TENS R shoulder   Throughout tx 30 min                                "

## 2024-05-03 ENCOUNTER — APPOINTMENT (OUTPATIENT)
Dept: PHYSICAL THERAPY | Facility: CLINIC | Age: 39
End: 2024-05-03
Payer: COMMERCIAL

## 2024-05-07 ENCOUNTER — OFFICE VISIT (OUTPATIENT)
Dept: PHYSICAL THERAPY | Facility: CLINIC | Age: 39
End: 2024-05-07
Payer: COMMERCIAL

## 2024-05-07 DIAGNOSIS — Z98.890 S/P CERVICAL DISC REPLACEMENT: Primary | ICD-10-CM

## 2024-05-07 DIAGNOSIS — F41.1 GENERALIZED ANXIETY DISORDER: ICD-10-CM

## 2024-05-07 PROCEDURE — 97140 MANUAL THERAPY 1/> REGIONS: CPT

## 2024-05-07 PROCEDURE — 97110 THERAPEUTIC EXERCISES: CPT

## 2024-05-07 RX ORDER — CLONAZEPAM 1 MG/1
1 TABLET ORAL 2 TIMES DAILY
Qty: 60 TABLET | Refills: 0 | Status: SHIPPED | OUTPATIENT
Start: 2024-05-07

## 2024-05-07 RX ORDER — DULOXETIN HYDROCHLORIDE 30 MG/1
30 CAPSULE, DELAYED RELEASE ORAL DAILY
Qty: 90 CAPSULE | Refills: 1 | Status: SHIPPED | OUTPATIENT
Start: 2024-05-07

## 2024-05-07 NOTE — PROGRESS NOTES
Daily Note     Today's date: 2024  Patient name: Katina Mendez  : 1985  MRN: [id number]  Referring provider: Diana Vicente*  Dx:   Encounter Diagnosis     ICD-10-CM    1. S/P cervical disc replacement  Z98.890           Start Time: 1200  Stop Time: 1250  Total time in clinic (min): 50 minutes    Subjective: Pt reports shoulder and neck pain have been horrible over the weekend. Possible decrease in pain after laser last week.       Objective: See treatment diary below      Assessment: Tolerated treatment fair. Patient would benefit from continued PT. Pt with continued high irritability and decreased tolerance to exercise overall. Educated on progress report next session given high pain and possible need for alternate treatment. Pt with increased muscle tone in neck that relaxed slightly with passive cervical rotation. Added lower trap setting for reciprocal inhibition of UT with good response. Trialed laser again down nerve pathway in arm due to continued pain and burning sensation.       Plan: Progress note during next visit.      Precautions:   Patient Active Problem List   Diagnosis    Generalized anxiety disorder    Lower back pain    Insomnia    GERD without esophagitis    Left breast lump    Benign paroxysmal positional vertigo due to bilateral vestibular disorder    Herpes simplex infection    Abnormal finding on thyroid function test    Atypical nevus    Cervical radiculopathy    Anxiety    Tobacco abuse    Migraines    Depression    Mild protein-calorie malnutrition (HCC)    Neck pain    S/P cervical discectomy           Manuals         Shoulder PROM  RC RC          Cervical PROM  RC RC RC RC        OA mob bilat   RC gr 2-3 RC gr 2-3         SOR   RC RC RC        UT STM  RC           Generic manual nerve glides  RC RC          Laser R UT and shoulder   RC RC R cervical/lateral arm RC R cervical/lateral arm                     Neuro Re-Ed             Supine generic  "nerve glide 10x 10x R           Median nerve glide at side   15x R 20x R 20x R        Ulnar nerve glide prayer    20x ea 10x ea        DNF             Chin tuck  10x 2x10 2x10 supine 2x10 supine 2x10 supine        RTC isos  Flx/abd/ext     5\" x8 ea           Cervical isos  SB 4\" hold 10x ea              Green 30x                        Ther Ex             Table slides flexion   15x 3\" hold          Cervical rotation AROM supine    15x ea 10x ea        Supine cane ER   15x           LT set     20x bilat        Supine cane flexion   10 x3\"          Table slides abduction   15x 3\" hold          pulleys  4 min 10\" hold 4 min 10\" hold  4 min 10\" hold  4 min 10\" hold        SNAGs rotation  10x ea rotation 10x ea  10x ea         SNAG extension   10x  10x          pendulum   20x ea H/V          Bicep curl             Finger ladder flexion  8x                        Ther Activity                                       Gait Training                                       Modalities             TENS R shoulder   Throughout tx 30 min                                  "

## 2024-05-09 ENCOUNTER — EVALUATION (OUTPATIENT)
Dept: PHYSICAL THERAPY | Facility: CLINIC | Age: 39
End: 2024-05-09
Payer: COMMERCIAL

## 2024-05-09 DIAGNOSIS — Z98.890 S/P CERVICAL DISC REPLACEMENT: Primary | ICD-10-CM

## 2024-05-09 DIAGNOSIS — Z98.890 S/P CERVICAL DISC REPLACEMENT: ICD-10-CM

## 2024-05-09 PROCEDURE — 97110 THERAPEUTIC EXERCISES: CPT

## 2024-05-09 PROCEDURE — 97112 NEUROMUSCULAR REEDUCATION: CPT

## 2024-05-09 PROCEDURE — 97140 MANUAL THERAPY 1/> REGIONS: CPT

## 2024-05-09 RX ORDER — CYCLOBENZAPRINE HCL 10 MG
10 TABLET ORAL 3 TIMES DAILY PRN
Qty: 30 TABLET | Refills: 0 | OUTPATIENT
Start: 2024-05-09

## 2024-05-09 NOTE — TELEPHONE ENCOUNTER
Reason for call:   [x] Refill   [] Prior Auth  [] Other:     Office:   [x] PCP/Provider -  Diana Vicente PA-C   [] Specialty/Provider -     Medication: (FLEXERIL) 10 mg / 1 tab TID PRN / 30 tabs      Pharmacy: [id number] - [place] - [id number]      Does the patient have enough for 3 days?   [] Yes   [x] No - Send as HP to POD

## 2024-05-09 NOTE — PROGRESS NOTES
PT Re-Evaluation     Today's date: 2024  Patient name: Katina Mendez  : 1985  MRN: [id number]  Referring provider: Diana Vicente*  Dx:   Encounter Diagnosis     ICD-10-CM    1. S/P cervical disc replacement  Z98.890 PT plan of care cert/re-cert            Start Time: 1243  Stop Time: 1325  Total time in clinic (min): 42 minutes    Assessment  Assessment details: Re-eval 24: Pt has attended 6 visits to [facility] since SOC for cervical disc replacement and RUE pain due to adverse neurodynamics. Pt has made minimal improvements overall due to significant nerve pain and muscular hypertonicity that limit movement. Pt with normal reflexes and neuro assessment overall yet positive medial and ulnar nerve tension testing on R. Pt demonstrates significantly limited cervical AROM that improves with passive mobility, however, requires significant muscle relaxation before allowing passive movement. Pt also demonstrates DNF weakness which makes her unable to rise from a supine position without assistance. Pt with significantly limited shoulder active and passive ROM in all planes due to significant pain. Pt with <3/5 shoulder strength on R as well due to significant pain. Pt with significant pain responses to all movement indicating central pain sensitization causing exacerbation. Discussed placing pt on hold today given limited progress with PT thus far and possible need for alternate pain relieving strategies before continuation of skilled PT.       IE 24: Katina Mendez is a 39 y.o. female presenting to physical therapy today for s/s consistent with cervical and RUE pain s/p cervical disc replacement.    Current objective impairments: decreased cervical AROM, decreased shoulder AROM, pain limiting all movements along with weakness. Pt also demonstrated significant pain in UE along with weakness due to adverse neurodynamics with provocative testing positive for median and ulnar and significantly  decreased PROM through testing motions. Pt with decreased DNF strength and endurance with pain in completing chin tuck as well along with decreased  strength on R side.    Response to today's tx: fair, increased pain throughout all movements. HEP educated and completed with good understanding.    Pt will benefit from skilled physical therapy to address above listed impairments and return to PLOF.    Impairments: abnormal coordination, abnormal muscle firing, abnormal muscle tone, abnormal or restricted ROM, abnormal movement, activity intolerance, impaired physical strength, lacks appropriate home exercise program, pain with function and poor posture   Functional limitations: decreased cervical and shoulder AROM, pain with all movement of UE and neck, decreased lifting ability  Symptom irritability: highUnderstanding of Dx/Px/POC: good   Prognosis: good    Goals  STG (2-3 weeks):  Pt will demonstrate independence with HEP  2. Pt will improve recruitment of DNF from 3s to 8s in order to better stabilize cervical spine NOT MET    LTG (6-8 weeks):  Pt will improve shoulder AROM by 20 degrees in both flexion and abduction NOT MET  2. Pt will have pain <2/10 with OH reaching in order to complete ADLs requiring UE usage NOT MET  3. Pt will improve functional status measure from 32 to >48 to demonstrate improved completion of ADLs. NOT MET  4. Pt will report ability to lift 1 yo son without increase of pain >3/10 NOT MET        Plan  Plan details: Hold for 30 days.     Patient would benefit from: skilled physical therapy  Planned modality interventions: low level laser therapy, TENS, thermotherapy: hydrocollator packs and cryotherapy  Planned therapy interventions: IASTM, joint mobilization, kinesiology taping, manual therapy, nerve gliding, neuromuscular re-education, patient education, postural training, self care, strengthening, stretching, therapeutic activities, therapeutic exercise, home exercise program,  functional ROM exercises, flexibility, balance/weight bearing training and balance  Treatment plan discussed with: patient        Subjective Evaluation    History of Present Illness  Date of onset: 2024  Mechanism of injury: surgery  Mechanism of injury: Re-eval 24: Pt reports no change since beginning of PT. Still having significant burning pain down arm, cannot lift arm well, cannot lift son. Difficulty with turning neck. Cannot sleep most nights and has to take medications to ease pain in the slightest.     IE : Pt reports DELIA:  R arm weakness and numbness, surgery s/p cervical disc replacement C5-6  Prior to surgery, pain and discomfort for over a year, started as feeling of pulled muscle in shoulder. Woke up one day with inability to move arm.     Since surgery, has not seen much improvement, no increased pain relief. Cannot lift 2 year old son    Previous tx to date: surgery, injections, PT    24 hour pattern: constant pain, worse at night trying to get comfortable    Red Flags: clear          Recurrent probem    Quality of life: good    Patient Goals  Patient goals for therapy: decreased pain, increased motion, independence with ADLs/IADLs, return to sport/leisure activities and increased strength  Patient goal: lift son  Pain  Current pain ratin  At best pain ratin  At worst pain rating: 10  Location: starts in back of neck, radiates between shoulder blades, down anterior R shoulder down to fingers n/t, severe burning in elbow  Quality: burning, radiating, sharp and dull ache (burning)  Relieving factors: medications (oxy, flexeril, sleeping in bed propped through pillows)  Aggravating factors: overhead activity and lifting (cervical motions, any movements with arm)  Progression: no change    Social Support  Lives with: spouse and parents    Employment status: not working  Hand dominance: right      Diagnostic Tests  X-ray: normal (normal hardware s/p disc  replacement)  Treatments  Current treatment: physical therapy        Objective     Tenderness     Additional Tenderness Details  TTP R UT, R supraspinatus, R infraspinatus, R lateral arm     Neurological Testing     Sensation   Cervical/Thoracic   Left   Intact: light touch    Right   Intact: light touch    Reflexes   Left   Biceps (C5/C6): normal (2+)  Brachioradialis (C6): normal (2+)  Triceps (C7): normal (2+)  Valdez's reflex: negative    Right   Biceps (C5/C6): normal (2+)  Brachioradialis (C6): normal (2+)  Triceps (C7): normal (2+)    Active Range of Motion   Cervical/Thoracic Spine       Cervical    Flexion: 38 (base neck, down arm) degrees  with pain Restriction level: moderate  Extension: 15 (base neck, mild into shoulder) degrees     with pain  Left lateral flexion: 25 (pain base neck, UT, down arm) degrees     with pain  Right lateral flexion: 15 degrees      Left rotation: 40 (pain base neck, down arm) degrees with pain  Right rotation: 53 (pain base neck) degrees    with pain  Left Shoulder   Flexion: WFL  Abduction: WFL  External rotation BTH: C3 with pain  Internal rotation BTB: T8 with pain    Right Shoulder   Flexion: 120 degrees with pain  Abduction: 100 degrees with pain  External rotation BTH: T2 with pain  Internal rotation BTB: T11 with pain    Passive Range of Motion     Right Shoulder   Flexion: 126 degrees with pain  Abduction: 106 degrees with pain    Strength/Myotome Testing   Cervical Spine   Neck extension: 3+  Neck flexion: 3+    Left   Neck lateral flexion (C3): 3-    Right   Neck lateral flexion (C3): 3-    Left Shoulder     Planes of Motion   Flexion: 4+   Extension: 4+   Abduction: 4+   External rotation at 0°: 4+   Internal rotation at 0°: 4+     Right Shoulder     Planes of Motion   Flexion: 3-   Abduction: 2+   External rotation at 0°: 3+   Internal rotation at 0°: 3+     Left Elbow   Flexion: 4+  Extension: 4-    Right Elbow   Flexion: 4-  Extension: 3+    Additional Strength  "Details   Strength  R: 7lbs  L: 58lbs      Tests   Cervical   Positive neck flexor muscle endurance test.    Right Shoulder   Positive ULTT1 and ULTT2.     Additional Tests Details  DNF endurance: 2s             Precautions:   Patient Active Problem List   Diagnosis    Generalized anxiety disorder    Lower back pain    Insomnia    GERD without esophagitis    Left breast lump    Benign paroxysmal positional vertigo due to bilateral vestibular disorder    Herpes simplex infection    Abnormal finding on thyroid function test    Atypical nevus    Cervical radiculopathy    Anxiety    Tobacco abuse    Migraines    Depression    Mild protein-calorie malnutrition (HCC)    Neck pain    S/P cervical discectomy    Cervical spinal cord compression (HCC)         Manuals 4/17 4/23 4/26 5/1 5/7 5/9       Shoulder PROM  RC RC          Cervical PROM  RC RC RC RC RC       OA mob bilat   RC gr 2-3 RC gr 2-3         SOR   RC RC RC RC       UT STM  RC           Generic manual nerve glides  RC RC          Laser R UT and shoulder   RC RC R cervical/lateral arm RC R cervical/lateral arm                                  Neuro Re-Ed             Re-eval      RC       Supine generic nerve glide 10x 10x R           Median nerve glide at side   15x R 20x R 20x R 15x ea       Ulnar nerve glide prayer    20x ea 10x ea 15x ea       DNF             Chin tuck  10x 2x10 2x10 supine 2x10 supine 2x10 supine 10x supine       RTC isos  Flx/abd/ext     5\" x8 ea           Cervical isos  SB 4\" hold 10x ea              Green 30x                        Ther Ex             Table slides flexion   15x 3\" hold          Cervical rotation AROM supine    15x ea 10x ea        Supine cane ER   15x           LT set     20x bilat 20x bilat       Supine cane flexion   10 x3\"          Table slides abduction   15x 3\" hold          pulleys  4 min 10\" hold 4 min 10\" hold  4 min 10\" hold  4 min 10\" hold 3 min 5\" hold       SNAGs rotation  10x ea rotation 10x ea  10x ea "  10x ea        SNAG extension   10x  10x          pendulum   20x ea H/V          Bicep curl             Finger ladder flexion  8x                        Ther Activity                                       Gait Training                                       Modalities             TENS R shoulder   Throughout tx 30 min

## 2024-05-10 DIAGNOSIS — Z98.890 S/P CERVICAL DISC REPLACEMENT: ICD-10-CM

## 2024-05-10 RX ORDER — CYCLOBENZAPRINE HCL 10 MG
10 TABLET ORAL 3 TIMES DAILY PRN
Qty: 30 TABLET | Refills: 1 | Status: SHIPPED | OUTPATIENT
Start: 2024-05-10

## 2024-05-12 DIAGNOSIS — M54.12 CERVICAL RADICULOPATHY: ICD-10-CM

## 2024-05-14 ENCOUNTER — APPOINTMENT (OUTPATIENT)
Dept: PHYSICAL THERAPY | Facility: CLINIC | Age: 39
End: 2024-05-14
Payer: COMMERCIAL

## 2024-05-14 RX ORDER — GABAPENTIN 100 MG/1
CAPSULE ORAL
Qty: 81 CAPSULE | Refills: 5 | Status: SHIPPED | OUTPATIENT
Start: 2024-05-14

## 2024-05-16 ENCOUNTER — APPOINTMENT (OUTPATIENT)
Dept: PHYSICAL THERAPY | Facility: CLINIC | Age: 39
End: 2024-05-16
Payer: COMMERCIAL

## 2024-05-22 ENCOUNTER — OFFICE VISIT (OUTPATIENT)
Dept: PODIATRY | Facility: CLINIC | Age: 39
End: 2024-05-22
Payer: COMMERCIAL

## 2024-05-22 VITALS — DIASTOLIC BLOOD PRESSURE: 71 MMHG | SYSTOLIC BLOOD PRESSURE: 106 MMHG | HEART RATE: 99 BPM

## 2024-05-22 DIAGNOSIS — M79.675 PAIN IN TOE OF LEFT FOOT: ICD-10-CM

## 2024-05-22 DIAGNOSIS — L60.0 INGROWING NAIL: Primary | ICD-10-CM

## 2024-05-22 PROCEDURE — 11750 EXCISION NAIL&NAIL MATRIX: CPT | Performed by: PODIATRIST

## 2024-05-22 RX ORDER — LIDOCAINE HYDROCHLORIDE 10 MG/ML
1 INJECTION, SOLUTION EPIDURAL; INFILTRATION; INTRACAUDAL; PERINEURAL ONCE
Status: COMPLETED | OUTPATIENT
Start: 2024-05-22 | End: 2024-05-23

## 2024-05-22 RX ORDER — LIDOCAINE HYDROCHLORIDE AND EPINEPHRINE 10; 10 MG/ML; UG/ML
1 INJECTION, SOLUTION INFILTRATION; PERINEURAL ONCE
Status: COMPLETED | OUTPATIENT
Start: 2024-05-22 | End: 2024-05-23

## 2024-05-22 NOTE — PROGRESS NOTES
Patient, a 39-year-old female presents with a painful left great toenail.  Patient has an elevated hyponychium and has ingrown nail pain as the nail grows out.  This has been a chronic problem.  Patient desires permanent nail removal.    Discussed treatment options recommending total matrixectomy.  The goal of this procedure is permanent erradication of the offending nail.  Procedure performed as follows:  Anesthesia via 3 cc of a 1:1 mixture of 1 percent xylocaine with epinephrine and 1 percent xylocaine plain.  A Betadine prep was performed.  The left great toenail was avulsed to the eponychium and removed in its entirety.  A total matrixectomy was performed utilizing phenol in a standard manner.  A bacitracin dressing was applied.  The patient is to soak in warm water twice a day in a more followed by a Neosporin dressing. Nail removal    Date/Time: 5/22/2024 2:45 PM    Performed by: Will Cuevas DPM  Authorized by: Will Cuevas DPM    Patient location:  [facility]inicUniversal Protocol:  Consent: Verbal consent obtained.  Risks and benefits: risks, benefits and alternatives were discussed  Consent given by: patient  Patient understanding: patient states understanding of the procedure being performed  Patient identity confirmed: verbally with patient    Location:     Foot:  L big toe  Pre-procedure details:     Skin preparation:  Betadine  Anesthesia (see MAR for exact dosages):     Anesthesia method:  Nerve block    Block needle gauge:  25 G    Block anesthetic:  Lidocaine 1% WITH epi and lidocaine 1% w/o epi    Block injection procedure:  Anatomic landmarks identified    Block outcome:  Anesthesia achieved  Nail Removal:     Nail removed:  Complete    Nail bed sutured: no    Ingrown nail:     Wedge excision of skin: no      Nail matrix removed or ablated:  Complete  Post-procedure details:     Dressing:  4x4 sterile gauze, antibiotic ointment and gauze roll    Patient tolerance of procedure:  Tolerated well, no  immediate complications

## 2024-05-23 RX ADMIN — LIDOCAINE HYDROCHLORIDE AND EPINEPHRINE 1 ML: 10; 10 INJECTION, SOLUTION INFILTRATION; PERINEURAL at 07:36

## 2024-05-23 RX ADMIN — LIDOCAINE HYDROCHLORIDE 1 ML: 10 INJECTION, SOLUTION EPIDURAL; INFILTRATION; INTRACAUDAL; PERINEURAL at 07:35

## 2024-05-24 ENCOUNTER — OFFICE VISIT (OUTPATIENT)
Dept: OBGYN CLINIC | Facility: HOSPITAL | Age: 39
End: 2024-05-24

## 2024-05-24 ENCOUNTER — HOSPITAL ENCOUNTER (OUTPATIENT)
Dept: RADIOLOGY | Facility: HOSPITAL | Age: 39
Discharge: HOME | End: 2024-05-24
Attending: ORTHOPAEDIC SURGERY
Payer: COMMERCIAL

## 2024-05-24 VITALS
WEIGHT: 111.77 LBS | SYSTOLIC BLOOD PRESSURE: 118 MMHG | DIASTOLIC BLOOD PRESSURE: 79 MMHG | BODY MASS INDEX: 19.08 KG/M2 | HEART RATE: 85 BPM | HEIGHT: 64 IN

## 2024-05-24 DIAGNOSIS — Z98.890 S/P CERVICAL DISC REPLACEMENT: ICD-10-CM

## 2024-05-24 DIAGNOSIS — M75.21 BICEPS TENDONITIS ON RIGHT: ICD-10-CM

## 2024-05-24 DIAGNOSIS — M25.511 RIGHT SHOULDER PAIN, UNSPECIFIED CHRONICITY: ICD-10-CM

## 2024-05-24 DIAGNOSIS — Z98.890 S/P CERVICAL DISC REPLACEMENT: Primary | ICD-10-CM

## 2024-05-24 PROCEDURE — 72040 X-RAY EXAM NECK SPINE 2-3 VW: CPT

## 2024-05-24 PROCEDURE — 99024 POSTOP FOLLOW-UP VISIT: CPT | Performed by: ORTHOPAEDIC SURGERY

## 2024-05-24 NOTE — PROGRESS NOTES
"POST OP NOTE       Katina Mendez  here today s/p C5-6 disc arthroplasty         Surgery date: 2/29/2024    Subjective: Preoperative symptoms were neck and right > left periscapular pain with radiation into right upper extremity. Today, she reports some continued neck pain and stiffness with range of motion. She reports difficulty with sleeping at night due to right pain. She has been able to move right upper extremity better than compared to pre-operatively. Continues with some pain into right upper extremity. She has been to 4 formal physical therapy appointment without relief. Patient admits to compliance with activity restrictions. Denies any fevers, chills, and night sweats. No cough, no shortness of breath.  No chest pain, no palpitations.  No dysphagia. Continues to smoke.    Post-Op Medications:  5mg oxycodone q4hr - no longer taking  750 mg robaxin  - taking as needed  15mg meloxicam once daily - no longer taking  Gabapentin - taking as prescribed    Objective:  /79   Pulse 85   Ht 5' 4\" (1.626 m)   Wt 50.7 kg (111 lb 12.4 oz)   LMP 01/29/2020   BMI 19.19 kg/m²     Strength:  Upper Extremity Motor Function     Right  Left    Deltoid  5/5  5/5    Bicep  5/5  5/5    Wrist extension  5/5  5/5    Tricep  5/5  5/5    Finger flexion/  5/5  5/5    Hand intrinsic  5/5  5/5      Sensation: intact  DTR: intact  Gait: fluid, non-antalgic  Cervical ROM intact flexion extension, lateral bend and rotation     Incision healing extremely well. No signs of erythema, discharge, or purulence.  There is no appreciable effusion.    Calf: soft, non tender, no swelling or erythema   +TTP right bicep tendon region    Imaging:  I have reviewed and independently visualized the imaging studies (05/24/24)  myself and my interpretation is the following: Instrumentation in place and in satisfactory alignment.    Assessment: s/p C5-6 disc arthroplasty on 2/29/2024.    Plan:  Patient was instructed to do the following "   -Discussed post op recovery with Katina in detail.  She has had substantial improvement in right shoulder ROM since surgery but does continue with right shoulder pain.  We noted pre op that she had concomitant rotator cuff issues and this appears to be her primary issue at this time.    -Able to walk as much as tolerated. Will also plan to work on shoulder ROM, stretching and strengthening exercises. Referral provided to physical therapy. Continue HEP for cervical spine.  -Able to drive as long as no longer taking narcotics. Continue to take medications as needed.  -Take pain medication as prescribed if needed. No refills needed at today's visit.  - Referral to pain management for possible US guided right bicep tendon injection  -Counseled on deleterious effects of smoking in [facility] operative setting as well on overall health and dorothy health   - Per patient request, provided note in chart.  -Follow up in 3 months for 6 month post-op visit. Will get updated X-ray at that visit.  Katina Mendez was instructed to call the office with any concerns or questions.

## 2024-05-24 NOTE — LETTER
May 24, 2024     Patient: Katina Mendez  YOB: 1985  Date of Visit: 5/24/2024      To Whom it May Concern:    Katina Mendez is under my professional care. Katina was seen in my office on 5/24/2024. Katina should continue to limit heavy lifting, no greater than 5-10lbs. Conitnue to limit vigorous turning, twisting, bending until pain improves and continue conservative treatment.    If you have any questions or concerns, please don't hesitate to call.         Sincerely,          Edu Del Rio MD        CC: No Recipients

## 2024-05-29 ENCOUNTER — OFFICE VISIT (OUTPATIENT)
Dept: PODIATRY | Facility: CLINIC | Age: 39
End: 2024-05-29

## 2024-05-29 VITALS — DIASTOLIC BLOOD PRESSURE: 75 MMHG | SYSTOLIC BLOOD PRESSURE: 114 MMHG | HEART RATE: 92 BPM

## 2024-05-29 DIAGNOSIS — M79.675 PAIN IN TOE OF LEFT FOOT: Primary | ICD-10-CM

## 2024-05-29 DIAGNOSIS — L60.0 INGROWING NAIL: ICD-10-CM

## 2024-05-29 PROCEDURE — 99024 POSTOP FOLLOW-UP VISIT: CPT | Performed by: PODIATRIST

## 2024-05-29 RX ORDER — GABAPENTIN 300 MG/1
300 CAPSULE ORAL 2 TIMES DAILY
Qty: 60 CAPSULE | Refills: 0 | Status: SHIPPED | OUTPATIENT
Start: 2024-05-29 | End: 2024-06-28

## 2024-05-29 NOTE — PROGRESS NOTES
Patient presents 1 week post total matrixectomy left great toe.  Surgical site healing unremarkably but is sensitive due to the exposed nailbed.  There is no evidence of infection.  Patient told to discontinue soaks and Neosporin but should use dry sterile dressing until eschar forms.    Patient dealing with paresthesia secondary to cervical nerve surgery in February 2024.  She has paresthesia and numbness and pain in her right arm.  She is taking gabapentin 300 mg at bedtime atenolol failed.  She is seeing pain management next week.    Recommended increasing dosage to twice daily and this will help toe pain as well.  Appropriate 30-day prescription provided.  Reappoint 6 weeks.

## 2024-06-04 ENCOUNTER — TELEPHONE (OUTPATIENT)
Age: 39
End: 2024-06-04

## 2024-06-04 DIAGNOSIS — L60.0 INGROWING NAIL: Primary | ICD-10-CM

## 2024-06-04 RX ORDER — CEPHALEXIN 500 MG/1
500 CAPSULE ORAL
Qty: 30 CAPSULE | Refills: 0 | Status: SHIPPED | OUTPATIENT
Start: 2024-06-04 | End: 2024-06-14

## 2024-06-04 NOTE — TELEPHONE ENCOUNTER
Caller: Katina     Doctor and/or Office: Faith Dunlap CB#: [phone]     Escalation: Care Would like someone to call her.. Katina has concerns about her toe it is red and swollen and very painful.  Please advise thank you.

## 2024-06-05 DIAGNOSIS — F41.1 GENERALIZED ANXIETY DISORDER: ICD-10-CM

## 2024-06-06 RX ORDER — CLONAZEPAM 1 MG/1
1 TABLET ORAL 2 TIMES DAILY
Qty: 60 TABLET | Refills: 0 | Status: SHIPPED | OUTPATIENT
Start: 2024-06-06

## 2024-06-18 ENCOUNTER — OFFICE VISIT (OUTPATIENT)
Dept: PAIN MEDICINE | Facility: MEDICAL CENTER | Age: 39
End: 2024-06-18
Payer: COMMERCIAL

## 2024-06-18 VITALS
HEART RATE: 78 BPM | OXYGEN SATURATION: 96 % | DIASTOLIC BLOOD PRESSURE: 70 MMHG | WEIGHT: 110 LBS | BODY MASS INDEX: 18.78 KG/M2 | HEIGHT: 64 IN | SYSTOLIC BLOOD PRESSURE: 118 MMHG

## 2024-06-18 DIAGNOSIS — M25.511 CHRONIC RIGHT SHOULDER PAIN: ICD-10-CM

## 2024-06-18 DIAGNOSIS — M75.81 RIGHT ROTATOR CUFF TENDONITIS: ICD-10-CM

## 2024-06-18 DIAGNOSIS — G89.29 CHRONIC RIGHT SHOULDER PAIN: ICD-10-CM

## 2024-06-18 DIAGNOSIS — Z98.890 S/P CERVICAL DISCECTOMY: Primary | ICD-10-CM

## 2024-06-18 DIAGNOSIS — M54.2 NECK PAIN: ICD-10-CM

## 2024-06-18 DIAGNOSIS — M54.12 CERVICAL RADICULOPATHY: ICD-10-CM

## 2024-06-18 DIAGNOSIS — M75.21 BICEPS TENDONITIS, RIGHT: ICD-10-CM

## 2024-06-18 PROCEDURE — 99214 OFFICE O/P EST MOD 30 MIN: CPT

## 2024-06-18 RX ORDER — MELOXICAM 15 MG/1
15 TABLET ORAL DAILY
Qty: 30 TABLET | Refills: 0 | Status: SHIPPED | OUTPATIENT
Start: 2024-06-18

## 2024-06-18 RX ORDER — GABAPENTIN 300 MG/1
300 CAPSULE ORAL 3 TIMES DAILY
Qty: 90 CAPSULE | Refills: 0 | Status: SHIPPED | OUTPATIENT
Start: 2024-06-18

## 2024-06-18 NOTE — PROGRESS NOTES
Assessment:  1. S/P cervical discectomy    2. Neck pain    3. Cervical radiculopathy    4. Chronic right shoulder pain    5. Right rotator cuff tendonitis    6. Biceps tendonitis, right        Plan:  We will schedule the patient for right biceps tendon sheath injection as recommended by Dr. Del Rio.  Complete benefits and risks of this procedure including hyperglycemia, bleeding, infection, local tissue reaction, nerve injury and allergic reaction were discussed at today's visit. The approach was demonstrated using models and literature was provided for home review. The patient was agreeable, verbalized uderstanding, and wishes to proceed with scheduling the procedure.  Increase gabapentin to 300 mg 3 times daily.  Patient is currently taking 300 mg twice daily without much benefit.  Restart meloxicam 15 mg once daily.  Patient has tolerated this medication well in the past without side effects.  She is aware she should not take any other NSAIDs while using this medication.  Patient is unable to continue with physical therapy at this time due to severe pain. She will restart PT once her pain is better controlled per recommendations from her physical therapist.  She may benefit from repeat cervical epidural in the future as well depending on her response to biceps tendon sheath injection.  Follow up after injection, or sooner if needed.     My impressions and treatment recommendations were discussed in detail with the patient who verbalized understanding and had no further questions.  Discharge instructions were provided. I personally saw and examined the patient and I agree with the above discussed plan of care.    No orders of the defined types were placed in this encounter.    New Medications Ordered This Visit   Medications    gabapentin (Neurontin) 300 mg capsule     Sig: Take 1 capsule (300 mg total) by mouth 3 (three) times a day     Dispense:  90 capsule     Refill:  0    meloxicam (MOBIC) 15 mg tablet      Sig: Take 1 tablet (15 mg total) by mouth daily     Dispense:  30 tablet     Refill:  0       History of Present Illness:  Katina Mendez is a 39 y.o. female who presents for a follow up office visit in regards to pain in the neck that radiates into the right upper extremity diffusely.  The patient reports that her pain is constant, and she is currently rating it an 8/10 in intensity. She describes the quality of her pain as burning, throbbing, numbness, and pins-and-needles.  Neck pain is described as stiffness with limitation in range of motion as well as a throbbing pain. The patient does have a lot of pain localized to the anterior right shoulder as well. She reports numbness and tingling specifically throughout the right upper extremity into the first, second, and third digits of the right hand.    The patient is about 4 months status post cervical discectomy with Dr. Del Rio.  Unfortunately, the patient reports little improvement in her pain since the surgery.  She is aware that the healing process can take a year or sometimes longer.  She was seen for a postoperative visit on 5/24/2024, at which point it was noted that she may have concomitant right rotator cuff issues which appeared to be to be her primary issue at the time per ortho spine surgery note. Right biceps tendon sheath injection was recommended for pain relief by Dr. Del Rio.     Diagnostic studies include MRI of the right shoulder which revealed supraspinatus and infraspinatus tendinosis without evidence of full-thickness rotator cuff tear.  Acromioclavicular joint mildly arthritic.    She was previously treated for right lateral epicondylitis as well as right shoulder bursitis by orthopedics in the past without relief.  She failed to respond to right elbow injection and right SA/SD bursa injection.  It was at this time that treatment was directed towards the neck for cervical radiculopathy.  She underwent 2 cervical epidural steroid injections  prior to surgery without relief.    I have personally reviewed and/or updated the patient's past medical history, past surgical history, family history, social history, current medications, allergies, and vital signs today.     Review of Systems   Respiratory:  Negative for shortness of breath.    Cardiovascular:  Negative for chest pain.   Gastrointestinal:  Negative for constipation, diarrhea, nausea and vomiting.   Musculoskeletal:  Positive for myalgias, neck pain and neck stiffness. Negative for arthralgias, gait problem and joint swelling.   Skin:  Negative for rash.   Neurological:  Negative for dizziness, seizures and weakness.   All other systems reviewed and are negative.      Patient Active Problem List   Diagnosis    Generalized anxiety disorder    Lower back pain    Insomnia    GERD without esophagitis    Left breast lump    Benign paroxysmal positional vertigo due to bilateral vestibular disorder    Herpes simplex infection    Abnormal finding on thyroid function test    Atypical nevus    Cervical radiculopathy    Anxiety    Tobacco abuse    Migraines    Depression    Mild protein-calorie malnutrition (HCC)    Neck pain    S/P cervical discectomy    Cervical spinal cord compression (HCC)       Past Medical History:   Diagnosis Date    Anxiety     Bunion     left foot   OR   correction today 4/14/2023    Cervical radiculopathy     COVID     x 2-- Jan 2022 and Jan 2023    Depression     GERD (gastroesophageal reflux disease)     Headache(784.0)     Migraines     Seasonal allergies     Tobacco abuse     Wears contact lenses        Past Surgical History:   Procedure Laterality Date    BUNIONECTOMY Right     DISCECTOMY SPINE CERVICAL ANTERIOR W/ ARTHROPLASTY Bilateral 2/29/2024    Procedure: Anterior Cervical discectomy and disc replacement/arthroplasty C5-6;  Surgeon: Edu Del Rio MD;  Location: [facility];  Service: Orthopedics    HYSTERECTOMY      2020    LAPAROSCOPIC TOTAL HYSTERECTOMY      OK  HALLUX RIGIDUS W/CHEILECTOMY 1ST MP JT W/IMPLT Left 04/14/2023    Procedure: BUNIONECTOMY with implant;  Surgeon: Will Cuevas DPM;  Location: [facility];  Service: Podiatry    TONSILLECTOMY         Family History   Problem Relation Age of Onset    Mental illness Mother     Depression Mother     Early death Mother     Lung cancer Mother 58        with brain mets.    Diabetes Father     Hypertension Father     Early death Father     Anxiety disorder Sister     Rashes / Skin problems Sister     Migraines Sister     Diabetes Maternal Grandmother     Arthritis Maternal Grandmother     Heart disease Maternal Grandmother     No Known Problems Maternal Grandfather     Breast cancer Paternal Grandmother         age unknown.    Anuerysm Paternal Grandfather     ADD / ADHD Brother     Anxiety disorder Brother     Melanoma Brother     Multiple sclerosis Paternal Aunt        Social History     Occupational History    Not on file   Tobacco Use    Smoking status: Every Day     Current packs/day: 0.50     Average packs/day: 0.5 packs/day for 22.2 years (11.1 ttl pk-yrs)     Types: Cigarettes     Start date: 4/11/2002    Smokeless tobacco: Never   Vaping Use    Vaping status: Never Used   Substance and Sexual Activity    Alcohol use: Not Currently    Drug use: No    Sexual activity: Yes     Partners: Male     Birth control/protection: None     Comment: Hysterectomy on July 1st 2020       Current Outpatient Medications on File Prior to Visit   Medication Sig    acyclovir (ZOVIRAX) 5 % ointment APPLY TOPICALLY EVERY 4 (FOUR) HOURS    Calcium Carbonate Antacid (TUMS PO) Take by mouth if needed    clonazePAM (KlonoPIN) 1 mg tablet Take 1 tablet (1 mg total) by mouth 2 (two) times a day    cyclobenzaprine (FLEXERIL) 10 mg tablet Take 1 tablet (10 mg total) by mouth 3 (three) times a day as needed for muscle spasms May make you drowsy or dizzy.    Diclofenac Sodium (VOLTAREN) 1 % Apply 2 g topically 4 (four) times a day    dicyclomine  (BENTYL) 20 mg tablet Take 1 tablet (20 mg total) by mouth 3 (three) times a day as needed (for abdominal cramping)    DULoxetine (CYMBALTA) 30 mg delayed release capsule Take 1 capsule (30 mg total) by mouth daily Along with 60 mg capsule    DULoxetine (CYMBALTA) 60 mg delayed release capsule Take 1 capsule (60 mg total) by mouth daily    loratadine (CLARITIN) 10 mg tablet Take 1 tablet (10 mg total) by mouth daily (Patient taking differently: Take 10 mg by mouth daily as needed)    Multiple Vitamins-Minerals (MULTIVITAMIN ADULT PO) Take 1 tablet by mouth daily    nabumetone (RELAFEN) 500 mg tablet Take 1 tablet (500 mg total) by mouth 2 (two) times a day    ondansetron (ZOFRAN) 4 mg tablet Take 1 tablet (4 mg total) by mouth every 8 (eight) hours as needed for nausea or vomiting    traZODone (DESYREL) 50 mg tablet Take 1 tablet (50 mg total) by mouth daily at bedtime as needed for sleep    [DISCONTINUED] gabapentin (Neurontin) 300 mg capsule Take 1 capsule (300 mg total) by mouth 2 (two) times a day    acetaminophen (TYLENOL) 650 mg CR tablet Take 1 tablet (650 mg total) by mouth every 8 (eight) hours as needed for mild pain    ibuprofen (MOTRIN) 600 mg tablet Take 1 tablet (600 mg total) by mouth every 6 (six) hours as needed for mild pain    naloxone (NARCAN) 4 mg/0.1 mL nasal spray Administer 1 spray into a nostril. If no response after 2-3 minutes, give another dose in the other nostril using a new spray. (Patient not taking: Reported on 4/18/2024)    oxyCODONE (ROXICODONE) 5 immediate release tablet Take 1 tablet (5 mg total) by mouth every 6 (six) hours as needed for severe pain Max Daily Amount: 20 mg    [DISCONTINUED] gabapentin (NEURONTIN) 100 mg capsule Take 1 tablet at bedtime for 3 days, then take 2 tablets at bedtime for 3 days, then 3 tablets at bedtime.    [DISCONTINUED] meloxicam (Mobic) 15 mg tablet Take 1 tablet (15 mg total) by mouth daily Take once daily in the morning with food. Do not take  "any other anti-inflammatory medications such as motrin (ibuprofen), aleve (naproxen), diclofenac, or relafen (nabumetone)     No current facility-administered medications on file prior to visit.       Allergies   Allergen Reactions    Bactrim [Sulfamethoxazole-Trimethoprim] Hives    Latex Other (See Comments)     Latex allergy-rashes    Levofloxacin Hives    Chlorhexidine Rash    Quinolones Rash       Physical Exam:    /70   Pulse 78   Ht 5' 4\" (1.626 m)   Wt 49.9 kg (110 lb)   LMP 01/29/2020   SpO2 96%   BMI 18.88 kg/m²     Constitutional:normal, well developed, well nourished, alert, in no distress and non-toxic and no overt pain behavior.  Eyes:anicteric  HEENT:grossly intact  Neck:supple, symmetric, trachea midline and no masses   Pulmonary:even and unlabored  Cardiovascular:No edema or pitting edema present  Skin:Normal without rashes or lesions and well hydrated  Psychiatric:Mood and affect appropriate  Neurologic:Cranial Nerves II-XII grossly intact  Musculoskeletal:normal, except for decreased  strength on the right.  Upper extremity strength is otherwise normal.  Mildly decreased sensation to light touch in the first second and third digits of the right hand.  Cervical spine range of motion is limited and painful in all planes.  Diffusely tender to palpation over the right biceps tendon and anterior right shoulder.  AC joint mildly tender to palpation.    "

## 2024-06-19 DIAGNOSIS — F41.1 GENERALIZED ANXIETY DISORDER: ICD-10-CM

## 2024-06-19 DIAGNOSIS — G47.00 INSOMNIA, UNSPECIFIED TYPE: ICD-10-CM

## 2024-06-19 RX ORDER — DULOXETIN HYDROCHLORIDE 30 MG/1
30 CAPSULE, DELAYED RELEASE ORAL DAILY
Qty: 90 CAPSULE | Refills: 0 | Status: SHIPPED | OUTPATIENT
Start: 2024-06-19

## 2024-06-19 RX ORDER — TRAZODONE HYDROCHLORIDE 50 MG/1
50 TABLET ORAL
Qty: 90 TABLET | Refills: 0 | Status: SHIPPED | OUTPATIENT
Start: 2024-06-19

## 2024-06-19 RX ORDER — DULOXETIN HYDROCHLORIDE 60 MG/1
60 CAPSULE, DELAYED RELEASE ORAL DAILY
Qty: 90 CAPSULE | Refills: 0 | Status: SHIPPED | OUTPATIENT
Start: 2024-06-19

## 2024-06-20 ENCOUNTER — PROCEDURE VISIT (OUTPATIENT)
Dept: PAIN MEDICINE | Facility: MEDICAL CENTER | Age: 39
End: 2024-06-20
Payer: COMMERCIAL

## 2024-06-20 VITALS
WEIGHT: 110 LBS | HEIGHT: 64 IN | HEART RATE: 84 BPM | DIASTOLIC BLOOD PRESSURE: 78 MMHG | BODY MASS INDEX: 18.78 KG/M2 | SYSTOLIC BLOOD PRESSURE: 112 MMHG

## 2024-06-20 DIAGNOSIS — M79.18 MYOFASCIAL PAIN SYNDROME: Primary | ICD-10-CM

## 2024-06-20 PROCEDURE — 20550 NJX 1 TENDON SHEATH/LIGAMENT: CPT | Performed by: PHYSICAL MEDICINE & REHABILITATION

## 2024-06-20 PROCEDURE — 76942 ECHO GUIDE FOR BIOPSY: CPT | Performed by: PHYSICAL MEDICINE & REHABILITATION

## 2024-06-20 RX ORDER — METHYLPREDNISOLONE ACETATE 40 MG/ML
40 INJECTION, SUSPENSION INTRA-ARTICULAR; INTRALESIONAL; INTRAMUSCULAR; SOFT TISSUE ONCE
Status: COMPLETED | OUTPATIENT
Start: 2024-06-20 | End: 2024-06-20

## 2024-06-20 RX ORDER — BUPIVACAINE HYDROCHLORIDE 2.5 MG/ML
10 INJECTION, SOLUTION EPIDURAL; INFILTRATION; INTRACAUDAL ONCE
Status: COMPLETED | OUTPATIENT
Start: 2024-06-20 | End: 2024-06-20

## 2024-06-20 RX ORDER — GABAPENTIN 100 MG/1
CAPSULE ORAL
COMMUNITY
Start: 2024-06-18

## 2024-06-20 RX ADMIN — METHYLPREDNISOLONE ACETATE 40 MG: 40 INJECTION, SUSPENSION INTRA-ARTICULAR; INTRALESIONAL; INTRAMUSCULAR; SOFT TISSUE at 13:17

## 2024-06-20 RX ADMIN — BUPIVACAINE HYDROCHLORIDE 10 ML: 2.5 INJECTION, SOLUTION EPIDURAL; INFILTRATION; INTRACAUDAL at 13:17

## 2024-06-20 NOTE — PROGRESS NOTES
Indication: Shoulder pain  Preprocedure diagnosis: 1. Bicipital tendonitis  2. Shoulder pain  Postprocedure diagnosis: 1. Bicipital tendonitis  2. Shoulder pain  Procedure: Ultrasound-guided right biceps tendon sheath injection  After discussing the risks, benefits, and alternatives to the procedure, the patient expressed understanding and wished to proceed. The patient was brought to the procedure suite and placed in the supine position. A procedural pause was conducted to verify: correct patient identity, procedure to be performed and as applicable, correct side and site, correct patient position, and availability of implants, special equipment or special requirements. A simple surgical tray was used. Prior to the procedure, the shoulder was examined with a 12-MHz linear transducer to visualize the long head biceps tendon and determine the optimal needle path. Following this, the shoulder was prepared with a ChloraPrep scrub, then re-examined using the same transducer, a sterile ultrasound transducer cover, and sterile ultrasound transducer gel. Thereafter, using continuous ultrasound guidance, a 2.5 inch 25 gauge needle was advanced into the long head biceps tendon sheath. After visualization of the tip in the target area and negative aspiration for blood, a mixture 40 mg Depo-Medrol in 3 mL of 0.25% bupivacaine was injected into the joint. Following the injection the needle was withdrawn. The patient tolerated the procedure well and there were no apparent complications. After an appropriate amount of observation, the patient was dismissed from the clinic in good condition under their own power.

## 2024-07-03 ENCOUNTER — TELEPHONE (OUTPATIENT)
Dept: PAIN MEDICINE | Facility: CLINIC | Age: 39
End: 2024-07-03

## 2024-07-05 DIAGNOSIS — H92.09 EARACHE: ICD-10-CM

## 2024-07-05 DIAGNOSIS — F41.1 GENERALIZED ANXIETY DISORDER: ICD-10-CM

## 2024-07-06 RX ORDER — IBUPROFEN 600 MG/1
600 TABLET ORAL EVERY 6 HOURS PRN
Qty: 120 TABLET | Refills: 1 | Status: SHIPPED | OUTPATIENT
Start: 2024-07-06 | End: 2024-09-04

## 2024-07-08 RX ORDER — CLONAZEPAM 1 MG/1
1 TABLET ORAL 2 TIMES DAILY
Qty: 60 TABLET | Refills: 0 | Status: SHIPPED | OUTPATIENT
Start: 2024-07-08

## 2024-07-10 ENCOUNTER — OFFICE VISIT (OUTPATIENT)
Dept: PODIATRY | Facility: CLINIC | Age: 39
End: 2024-07-10
Payer: COMMERCIAL

## 2024-07-10 VITALS — HEART RATE: 81 BPM | SYSTOLIC BLOOD PRESSURE: 101 MMHG | DIASTOLIC BLOOD PRESSURE: 67 MMHG

## 2024-07-10 DIAGNOSIS — L60.0 INGROWING NAIL: Primary | ICD-10-CM

## 2024-07-10 PROCEDURE — 99212 OFFICE O/P EST SF 10 MIN: CPT | Performed by: PODIATRIST

## 2024-07-10 NOTE — PROGRESS NOTES
Patient presents 7 weeks post total matrixectomy left hallux.  Patient is now doing well and the nailbed is virtually healed.  Only slight discomfort related.  No additional treatment is needed at this time.    Patient is taking gabapentin 900 mg daily for pain management.  She continues to have difficulty with the right arm.

## 2024-07-11 ENCOUNTER — NURSE TRIAGE (OUTPATIENT)
Age: 39
End: 2024-07-11

## 2024-07-11 NOTE — TELEPHONE ENCOUNTER
RN called pt. Able to schedule for urgent new patient at another practice for early next week. Advised Sitz baths, continue hot compresses, can also apply Vaseline or Aquaphor to help create a barrier when urinating to reduce irritation and burning. Advised if over the weekend she notes worsening pain, swelling or area tender and hot to the touch, or if she develops a fever or body aches/chills - go to ED. Pt verbalized an understanding. No further questions.

## 2024-07-11 NOTE — TELEPHONE ENCOUNTER
"Patient called in stating that she has a lump on her left labia and pt has tried warm compresses without relief. Patient states that this started a month ago. Pt reporting pain in the lump 7-8/10 and itching in her vagina 6-7/10. Pt denies  fever,  vaginal bleeding, pain with urination, injury to genital area, vaginal foreign body    Patient scheduled for tomorrow 7/12/24     Reason for Disposition   MILD-MODERATE pain and present > 24 hours (Exception: chronic pain)    Answer Assessment - Initial Assessment Questions  1. SYMPTOM: \"What's the main symptom you're concerned about?\" (e.g., pain, itching, dryness)      Lump on labia  2. LOCATION: \"Where is the  lump located?\" (e.g., inside/outside, left/right)      Left labia  3. ONSET: \"When did the  lump  start?\"      About a month ago  4. PAIN: \"Is there any pain?\" If Yes, ask: \"How bad is it?\" (Scale: 1-10; mild, moderate, severe)      Pt reporting it is painful 7-8/10  5. ITCHING: \"Is there any itching?\" If Yes, ask: \"How bad is it?\" (Scale: 1-10; mild, moderate, severe)      Pt stating there is itching 6-7/10  6. CAUSE: \"What do you think is causing the discharge?\" \"Have you had the same problem before? What happened then?\"      Pt denies  7. OTHER SYMPTOMS: \"Do you have any other symptoms?\" (e.g., fever, itching, vaginal bleeding, pain with urination, injury to genital area, vaginal foreign body)      Pt denies  fever,  vaginal bleeding, pain with urination, injury to genital area, vaginal foreign body  8. PREGNANCY: \"Is there any chance you are pregnant?\" \"When was your last menstrual period?\"     Pt had a hysterectomy    Protocols used: Vaginal Symptoms-ADULT-[place]    "

## 2024-07-11 NOTE — TELEPHONE ENCOUNTER
Regarding: Lump/Itching  ----- Message from Libby CLAIRE sent at 7/11/2024  3:06 PM EDT -----  Pt called stating she had spoken to a nurse earlier who had sched her for an appt tomorrow due to some on going issues with a lump on labia and itching and when pt looked at appt on [facility] she was sched a month out and needs to see some one asap for issues. Pt is a NP establishing care and asked if a nurse can please call her back to discuss symptoms further and get a sooner appt.

## 2024-07-15 ENCOUNTER — OFFICE VISIT (OUTPATIENT)
Dept: PAIN MEDICINE | Facility: MEDICAL CENTER | Age: 39
End: 2024-07-15
Payer: COMMERCIAL

## 2024-07-15 VITALS
DIASTOLIC BLOOD PRESSURE: 76 MMHG | WEIGHT: 113 LBS | BODY MASS INDEX: 19.29 KG/M2 | HEART RATE: 90 BPM | HEIGHT: 64 IN | SYSTOLIC BLOOD PRESSURE: 113 MMHG

## 2024-07-15 DIAGNOSIS — M54.12 CERVICAL RADICULOPATHY: Primary | ICD-10-CM

## 2024-07-15 DIAGNOSIS — M54.2 NECK PAIN: ICD-10-CM

## 2024-07-15 DIAGNOSIS — M25.511 CHRONIC RIGHT SHOULDER PAIN: ICD-10-CM

## 2024-07-15 DIAGNOSIS — G89.29 CHRONIC RIGHT SHOULDER PAIN: ICD-10-CM

## 2024-07-15 DIAGNOSIS — Z98.890 S/P CERVICAL DISCECTOMY: ICD-10-CM

## 2024-07-15 PROCEDURE — 99214 OFFICE O/P EST MOD 30 MIN: CPT

## 2024-07-15 NOTE — PROGRESS NOTES
Assessment:  1. Cervical radiculopathy    2. S/P cervical discectomy    3. Neck pain    4. Chronic right shoulder pain        Plan:  Patient reports 0% relief of right upper extremity pain following recent right biceps tendon sheath injection.  Patient has elected to proceed with C7-T1 TAB to address her neck and upper extremity pain.  Advised patient that we will have to delay the injection until she is at least 6 months postop from cervical discectomy.  This will be scheduled for 8/29/2024 or later.  In the meantime, patient will increase gabapentin to 600 mg 3 times daily.  She was provided with instructions on how to safely titrate up to this dose.  Patient verbalized understanding.  She will call the office if she experiences any side effects after increasing her dose.  Continue meloxicam as prescribed.  Patient did not require refills of this medication today.  Follow-up after injection, or sooner if needed.    My impressions and treatment recommendations were discussed in detail with the patient who verbalized understanding and had no further questions.  Discharge instructions were provided. I personally saw and examined the patient and I agree with the above discussed plan of care.    Orders Placed This Encounter   Procedures    FL spine and pain procedure     Please schedule for after 6 months post-op (08/29/24 or later)     Standing Status:   Future     Standing Expiration Date:   7/15/2028     Order Specific Question:   Reason for Exam:     Answer:   Right C7-T1 TAB     Order Specific Question:   Is the patient pregnant?     Answer:   No     Order Specific Question:   Anticoagulant hold needed?     Answer:   No     No orders of the defined types were placed in this encounter.      History of Present Illness:  Katina Mendez is a 39 y.o. female who presents for a follow up office visit after undergoing recent right biceps tendon sheath injection.  Patient reports 0% relief of her right upper extremity  pain following this procedure.  She continues to have pain localized to the neck and right upper extremity diffusely that is constant, and currently rated an 8/10 in intensity.  She describes the quality of her pain as burning, throbbing, pressure-like, numbness, and pins-and-needles. Neck pain is described as stiffness with limitation in range of motion as well as a throbbing pain. The patient does have a lot of pain localized to the anterior right shoulder as well. She reports numbness and tingling specifically throughout the right upper extremity into the first, second, and third digits of the right hand    The patient is about 5 months status post cervical discectomy with Dr. Del Rio.  Unfortunately, the patient reports little improvement in her pain since the surgery.  She is aware that the healing process can take a year or sometimes longer.  She was seen for a postoperative visit on 5/24/2024, at which point it was noted that she may have concomitant right rotator cuff issues which appeared to be to be her primary issue at the time.  Recent biceps tendon sheath injection did not alleviate her pain.    Diagnostic studies include MRI of the right shoulder which revealed supraspinatus and infraspinatus tendinosis without evidence of full-thickness rotator cuff tear.  Acromioclavicular joint mildly arthritic. She was previously treated for right lateral epicondylitis as well as right shoulder bursitis by orthopedics in the past without relief.  She failed to respond to right elbow injection and right SA/SD bursa injection.  It was at this time that treatment was directed towards the neck for cervical radiculopathy.  She underwent 2 cervical epidural steroid injections prior to surgery without relief.    I have personally reviewed and/or updated the patient's past medical history, past surgical history, family history, social history, current medications, allergies, and vital signs today.     Review of Systems    Constitutional:  Negative for fever.   HENT:  Negative for hearing loss.    Eyes:  Negative for visual disturbance.   Respiratory:  Negative for cough.    Cardiovascular:  Negative for leg swelling.   Gastrointestinal:  Negative for abdominal pain and nausea.   Endocrine: Negative for polydipsia.   Genitourinary:  Negative for difficulty urinating.   Musculoskeletal:  Positive for neck pain. Negative for gait problem and myalgias.   Skin:  Negative for rash.   Neurological:  Positive for weakness. Negative for numbness.   Hematological:  Does not bruise/bleed easily.   Psychiatric/Behavioral:  Negative for dysphoric mood and sleep disturbance.        Patient Active Problem List   Diagnosis    Generalized anxiety disorder    Lower back pain    Insomnia    GERD without esophagitis    Left breast lump    Benign paroxysmal positional vertigo due to bilateral vestibular disorder    Herpes simplex infection    Abnormal finding on thyroid function test    Atypical nevus    Cervical radiculopathy    Anxiety    Tobacco abuse    Migraines    Depression    Mild protein-calorie malnutrition (HCC)    Neck pain    S/P cervical discectomy    Cervical spinal cord compression (HCC)       Past Medical History:   Diagnosis Date    Anxiety     Bunion     left foot   OR   correction today 4/14/2023    Cervical radiculopathy     COVID     x 2-- Jan 2022 and Jan 2023    Depression     GERD (gastroesophageal reflux disease)     Headache(784.0)     Migraines     Seasonal allergies     Tobacco abuse     Wears contact lenses        Past Surgical History:   Procedure Laterality Date    BUNIONECTOMY Right     DISCECTOMY SPINE CERVICAL ANTERIOR W/ ARTHROPLASTY Bilateral 2/29/2024    Procedure: Anterior Cervical discectomy and disc replacement/arthroplasty C5-6;  Surgeon: Edu Del Rio MD;  Location: [facility];  Service: Orthopedics    HYSTERECTOMY      2020    LAPAROSCOPIC TOTAL HYSTERECTOMY      ID HALLUX RIGIDUS W/CHEILECTOMY 1ST MP SHUT  W/IMPLT Left 04/14/2023    Procedure: BUNIONECTOMY with implant;  Surgeon: Will Cuevas DPM;  Location: [facility];  Service: Podiatry    TONSILLECTOMY         Family History   Problem Relation Age of Onset    Mental illness Mother     Depression Mother     Early death Mother     Lung cancer Mother 58        with brain mets.    Diabetes Father     Hypertension Father     Early death Father     Anxiety disorder Sister     Rashes / Skin problems Sister     Migraines Sister     Diabetes Maternal Grandmother     Arthritis Maternal Grandmother     Heart disease Maternal Grandmother     No Known Problems Maternal Grandfather     Breast cancer Paternal Grandmother         age unknown.    Anuerysm Paternal Grandfather     ADD / ADHD Brother     Anxiety disorder Brother     Melanoma Brother     Multiple sclerosis Paternal Aunt        Social History     Occupational History    Not on file   Tobacco Use    Smoking status: Every Day     Current packs/day: 0.50     Average packs/day: 0.5 packs/day for 22.3 years (11.1 ttl pk-yrs)     Types: Cigarettes     Start date: 4/11/2002    Smokeless tobacco: Never   Vaping Use    Vaping status: Never Used   Substance and Sexual Activity    Alcohol use: Not Currently    Drug use: No    Sexual activity: Yes     Partners: Male     Birth control/protection: None     Comment: Hysterectomy on July 1st 2020       Current Outpatient Medications on File Prior to Visit   Medication Sig    acyclovir (ZOVIRAX) 5 % ointment APPLY TOPICALLY EVERY 4 (FOUR) HOURS    Calcium Carbonate Antacid (TUMS PO) Take by mouth if needed    clonazePAM (KlonoPIN) 1 mg tablet Take 1 tablet (1 mg total) by mouth 2 (two) times a day    cyclobenzaprine (FLEXERIL) 10 mg tablet Take 1 tablet (10 mg total) by mouth 3 (three) times a day as needed for muscle spasms May make you drowsy or dizzy.    Diclofenac Sodium (VOLTAREN) 1 % Apply 2 g topically 4 (four) times a day    dicyclomine (BENTYL) 20 mg tablet Take 1 tablet  (20 mg total) by mouth 3 (three) times a day as needed (for abdominal cramping)    DULoxetine (CYMBALTA) 30 mg delayed release capsule Take 1 capsule (30 mg total) by mouth daily Along with 60 mg capsule    DULoxetine (CYMBALTA) 60 mg delayed release capsule Take 1 capsule (60 mg total) by mouth daily    gabapentin (Neurontin) 300 mg capsule Take 1 capsule (300 mg total) by mouth 3 (three) times a day    ibuprofen (MOTRIN) 600 mg tablet Take 1 tablet (600 mg total) by mouth every 6 (six) hours as needed for mild pain    loratadine (CLARITIN) 10 mg tablet Take 1 tablet (10 mg total) by mouth daily (Patient taking differently: Take 10 mg by mouth daily as needed)    meloxicam (MOBIC) 15 mg tablet Take 1 tablet (15 mg total) by mouth daily    Multiple Vitamins-Minerals (MULTIVITAMIN ADULT PO) Take 1 tablet by mouth daily    nabumetone (RELAFEN) 500 mg tablet Take 1 tablet (500 mg total) by mouth 2 (two) times a day    ondansetron (ZOFRAN) 4 mg tablet Take 1 tablet (4 mg total) by mouth every 8 (eight) hours as needed for nausea or vomiting    traZODone (DESYREL) 50 mg tablet Take 1 tablet (50 mg total) by mouth daily at bedtime as needed for sleep    acetaminophen (TYLENOL) 650 mg CR tablet Take 1 tablet (650 mg total) by mouth every 8 (eight) hours as needed for mild pain    naloxone (NARCAN) 4 mg/0.1 mL nasal spray Administer 1 spray into a nostril. If no response after 2-3 minutes, give another dose in the other nostril using a new spray. (Patient not taking: Reported on 4/18/2024)    oxyCODONE (ROXICODONE) 5 immediate release tablet Take 1 tablet (5 mg total) by mouth every 6 (six) hours as needed for severe pain Max Daily Amount: 20 mg    [DISCONTINUED] gabapentin (NEURONTIN) 100 mg capsule      No current facility-administered medications on file prior to visit.       Allergies   Allergen Reactions    Bactrim [Sulfamethoxazole-Trimethoprim] Hives    Latex Other (See Comments)     Latex allergy-rashes     "Levofloxacin Hives    Chlorhexidine Rash    Quinolones Rash       Physical Exam:    /76   Pulse 90   Ht 5' 4\" (1.626 m)   Wt 51.3 kg (113 lb)   LMP 01/29/2020   BMI 19.40 kg/m²     Constitutional:normal, well developed, well nourished, alert, in no distress and non-toxic and no overt pain behavior.  Eyes:anicteric  HEENT:grossly intact  Neck:supple, symmetric, trachea midline and no masses   Pulmonary:even and unlabored  Cardiovascular:No edema or pitting edema present  Skin:Normal without rashes or lesions and well hydrated  Psychiatric:Mood and affect appropriate  Neurologic:Cranial Nerves II-XII grossly intact  Musculoskeletal:normal, except for decreased  strength on the right.  Upper extremity strength is otherwise normal. Mildly decreased sensation to light touch in the first second and third digits of the right hand. Cervical spine range of motion is limited and painful in all planes. Positive Spurling's.    "

## 2024-07-15 NOTE — PATIENT INSTRUCTIONS
Increase gabapentin to 600 mg 3 times daily using left over 100 mg capsules.     Start by increasing to 300 mg in the morning, 300 mg in the afternoon, and 600mg nightly x 3-5 days; then increase to 300mg in the morning, 600 mg in the afternoon, and 600mg nightly x 3-5 days, then increase to 600 mg 3 times daily and remain on this dose.    Call the office with an update in 1 to 2 weeks.  Call the office if you experience any side effects after increasing dose of this medication.

## 2024-07-18 DIAGNOSIS — Z98.890 S/P CERVICAL DISCECTOMY: ICD-10-CM

## 2024-07-18 RX ORDER — GABAPENTIN 300 MG/1
300 CAPSULE ORAL 3 TIMES DAILY
Qty: 90 CAPSULE | Refills: 0 | OUTPATIENT
Start: 2024-07-18

## 2024-07-19 RX ORDER — GABAPENTIN 300 MG/1
300 CAPSULE ORAL 3 TIMES DAILY
Qty: 90 CAPSULE | Refills: 0 | Status: SHIPPED | OUTPATIENT
Start: 2024-07-19

## 2024-07-19 NOTE — TELEPHONE ENCOUNTER
RN s/w pt regarding previous.  Per pt she does need a refill, this does help her pain and she does not notice any side effects.    Send refill to her [facility] pharmacy on file?

## 2024-08-01 ENCOUNTER — OFFICE VISIT (OUTPATIENT)
Dept: OBGYN CLINIC | Facility: CLINIC | Age: 39
End: 2024-08-01
Payer: COMMERCIAL

## 2024-08-01 VITALS
SYSTOLIC BLOOD PRESSURE: 120 MMHG | BODY MASS INDEX: 19.39 KG/M2 | DIASTOLIC BLOOD PRESSURE: 80 MMHG | HEIGHT: 64 IN | WEIGHT: 113.6 LBS

## 2024-08-01 DIAGNOSIS — R10.2 PELVIC PAIN: ICD-10-CM

## 2024-08-01 DIAGNOSIS — N89.8 VAGINAL CYST: Primary | ICD-10-CM

## 2024-08-01 PROCEDURE — 99203 OFFICE O/P NEW LOW 30 MIN: CPT | Performed by: OBSTETRICS & GYNECOLOGY

## 2024-08-01 RX ORDER — LIDOCAINE AND PRILOCAINE 25; 25 MG/G; MG/G
CREAM TOPICAL AS NEEDED
Qty: 30 G | Refills: 2 | Status: SHIPPED | OUTPATIENT
Start: 2024-08-01

## 2024-08-02 NOTE — PROGRESS NOTES
"[facility]  [address], [address], [place]    Assessment/Plan:  No problem-specific Assessment & Plan notes found for this encounter.    Diagnoses and all orders for this visit:    Vaginal cyst  -     lidocaine-prilocaine (EMLA) cream; Apply topically as needed for mild pain          Subjective:   Katina Mendez is a 39 y.o.  female.  CC: Vaginal lump and pelvic pain    HPI: HPI  Patient presents with 2 complaints.  She reports a vaginal that is extremely painful.  She reports that it increases in size and decreases.  Denies any odor or discharge.  Does report some spotting from the area.    Patient also complains of chronic pelvic pain.  She had a hysterectomy in  but continues to have cyclic pelvic pain, however worsens at certain times of the month.  She was evaluated for endometriosis with diagnostic laparoscopy prior to her hysterectomy and was started on Lupron with no alleviation of her symptoms.  Patient is on gabapentin which provides minimal relief.  We discussed options which include a bilateral oophorectomy.  Patient is interested in proceeding with this procedure.  We did discuss the role of estrogen therapy after her procedure with bone and heart protection.    ROS: Review of Systems   Constitutional: Negative.    HENT: Negative.     Eyes: Negative.    Respiratory: Negative.     Cardiovascular: Negative.    Gastrointestinal: Negative.    Genitourinary: Negative.    Musculoskeletal: Negative.    All other systems reviewed and are negative.      PFSH: The following portions of the patient's history were reviewed and updated as appropriate: allergies, current medications, past family history, past medical history, obstetric history, gynecologic history, past social history, past surgical history and problem list.       Objective:  /80   Ht 5' 4\" (1.626 m)   Wt 51.5 kg (113 lb 9.6 oz)   LMP 2020   BMI 19.50 kg/m²    Physical Exam  Vitals " reviewed.   Constitutional:       Appearance: Normal appearance.   Cardiovascular:      Rate and Rhythm: Normal rate.   Pulmonary:      Effort: Pulmonary effort is normal. No respiratory distress.   Genitourinary:     General: Normal vulva.      Exam position: Lithotomy position.      Labia:         Right: No rash or tenderness.         Left: No rash or tenderness.       Vagina: Normal.      Cervix: Normal.      Uterus: Normal.       Adnexa: Right adnexa normal and left adnexa normal.          Comments: Piece of redundant hymenal ring, tender   Neurological:      Mental Status: She is alert.   Psychiatric:         Mood and Affect: Mood normal.         Behavior: Behavior normal.

## 2024-08-05 ENCOUNTER — TELEPHONE (OUTPATIENT)
Dept: OBGYN CLINIC | Facility: MEDICAL CENTER | Age: 39
End: 2024-08-05

## 2024-08-05 DIAGNOSIS — Z98.890 S/P CERVICAL DISC REPLACEMENT: ICD-10-CM

## 2024-08-05 DIAGNOSIS — G47.00 INSOMNIA, UNSPECIFIED TYPE: ICD-10-CM

## 2024-08-05 DIAGNOSIS — F41.1 GENERALIZED ANXIETY DISORDER: ICD-10-CM

## 2024-08-05 NOTE — TELEPHONE ENCOUNTER
Spoke with patient and she will take Drs next available surgery date of December 9, 2024.. Will enter case in at a later time

## 2024-08-05 NOTE — TELEPHONE ENCOUNTER
----- Message from Елена Silva MD sent at 2024  1:31 PM EDT -----  [facility] GYN Department  Surgery Scheduling Sheet    Patient Name: Katina Mendez  : 1985    Provider: Елена Silva MD     Needed: no; Language: N/A    Procedure: exam under anesthesia, bilateral salpingo-oopherectomy, and diagnostic laparoscopy, vaginal cyst excision    Diagnosis: vaginal cyst, pelvic pain    Special Needs or Equipment: none    Anesthesia: General anesthesia    Length of stay: outpatient  Does patient have comorbid conditions that will require close perioperative monitoring prior to safe discharge: no    The patient has comorbid conditions that will require close perioperative monitoring prior to safe discharge, including N/A.   This may require acute care beyond the usual and routine recovery period. As such, inpatient admission post-operatively is expected and appropriate, and anticipated hospital length of stay will be >2 midnights.    Pre-Admission Testing Needed: no   Labs that should be ordered: cbc    Order PAT that is recommended in prep for procedure?: No    Medical Clearance Needed: no; Provider: N/A    MA Form Signed (tubals/hysterectomy): Not Indicated    Surgical Drink Given: no     How many days out of work: 1 week(s)     How many days no drivin day(s)       Is pre op appt needed?  no  Interval for post op appt: 2 week(s)     For Surgical Scheduler:     Surgery Scheduled On:  [place]: [facility]    Pre-op Appt:   Post op Appt:  Consult/Medical clearance appt:

## 2024-08-06 RX ORDER — CLONAZEPAM 1 MG/1
1 TABLET ORAL 2 TIMES DAILY
Qty: 60 TABLET | Refills: 0 | Status: SHIPPED | OUTPATIENT
Start: 2024-08-06

## 2024-08-06 RX ORDER — DULOXETIN HYDROCHLORIDE 30 MG/1
30 CAPSULE, DELAYED RELEASE ORAL DAILY
Qty: 90 CAPSULE | Refills: 0 | OUTPATIENT
Start: 2024-08-06

## 2024-08-06 RX ORDER — TRAZODONE HYDROCHLORIDE 50 MG/1
50 TABLET ORAL
Qty: 90 TABLET | Refills: 0 | OUTPATIENT
Start: 2024-08-06

## 2024-08-06 RX ORDER — CYCLOBENZAPRINE HCL 10 MG
10 TABLET ORAL 3 TIMES DAILY PRN
Qty: 30 TABLET | Refills: 0 | Status: SHIPPED | OUTPATIENT
Start: 2024-08-06

## 2024-08-06 RX ORDER — DULOXETIN HYDROCHLORIDE 60 MG/1
60 CAPSULE, DELAYED RELEASE ORAL DAILY
Qty: 90 CAPSULE | Refills: 0 | OUTPATIENT
Start: 2024-08-06

## 2024-08-12 ENCOUNTER — TELEPHONE (OUTPATIENT)
Dept: OBGYN CLINIC | Facility: MEDICAL CENTER | Age: 39
End: 2024-08-12

## 2024-08-12 NOTE — TELEPHONE ENCOUNTER
----- Message from Jess CLAIRE sent at 2024  4:15 PM EDT -----    ----- Message -----  From: Елена Silva MD  Sent: 2024   1:33 PM EDT  To: Jess Potter    [facility] OB GYN Department  Surgery Scheduling Sheet    Patient Name: Katina Mendez  : 1985    Provider: Елена Silva MD     Needed: no; Language: N/A    Procedure: exam under anesthesia, bilateral salpingo-oopherectomy, and diagnostic laparoscopy, vaginal cyst excision    Diagnosis: vaginal cyst, pelvic pain    Special Needs or Equipment: none    Anesthesia: General anesthesia    Length of stay: outpatient  Does patient have comorbid conditions that will require close perioperative monitoring prior to safe discharge: no    The patient has comorbid conditions that will require close perioperative monitoring prior to safe discharge, including N/A.   This may require acute care beyond the usual and routine recovery period. As such, inpatient admission post-operatively is expected and appropriate, and anticipated hospital length of stay will be >2 midnights.    Pre-Admission Testing Needed: no   Labs that should be ordered: cbc    Order PAT that is recommended in prep for procedure?: No    Medical Clearance Needed: no; Provider: N/A    MA Form Signed (tubals/hysterectomy): Not Indicated    Surgical Drink Given: no     How many days out of work: 1 week(s)     How many days no drivin day(s)       Is pre op appt needed?  no  Interval for post op appt: 2 week(s)     For Surgical Scheduler:     Surgery Scheduled On:  [place]: [facility]    Pre-op Appt:   Post op Appt:  Consult/Medical clearance appt:

## 2024-08-12 NOTE — TELEPHONE ENCOUNTER
Patients surgery date was decided on Dec 9, 2024.. Case entered and labs ordered.. My chart messaged patient about her labs and how to do them.. teams number given..

## 2024-08-22 ENCOUNTER — TELEPHONE (OUTPATIENT)
Age: 39
End: 2024-08-22

## 2024-08-22 DIAGNOSIS — M54.12 CERVICAL RADICULOPATHY: Primary | ICD-10-CM

## 2024-08-22 RX ORDER — GABAPENTIN 600 MG/1
600 TABLET ORAL 3 TIMES DAILY
Qty: 90 TABLET | Refills: 2 | Status: SHIPPED | OUTPATIENT
Start: 2024-08-22

## 2024-08-22 NOTE — TELEPHONE ENCOUNTER
Caller: Patient    Doctor: Lauren Watson    Reason for call: Pt has titrated her Gabapentin up to 600mg TID for about 2 weeks and is tolerating the dose well with no side effects. Pt is requesting a new script be called in to reflect new dosage.    Verified Pharmacy on file is correct.    Pt also states pharmacy is still filling her 100mg capsule script without her requesting it and is requesting the dr cancel out that script so they stop filling it.    Call back#: [phone]

## 2024-08-22 NOTE — TELEPHONE ENCOUNTER
CG, pt was last seen in [facility] with CG on 7/15 and plan was to increase gabapentin to 600mg TID    Pt did same camron well no s/e    Please send new script to reflect new dose and d/c gabapentin 100mg caps order (per pt request)

## 2024-08-23 NOTE — TELEPHONE ENCOUNTER
Caller: bunny    Doctor: babita    Reason for call: pt is calling back    Call back#: [phone]

## 2024-08-23 NOTE — TELEPHONE ENCOUNTER
S/W pt.  Advised pt of the same.  She stated she picked it up yesterday.  Pt verbalized understanding.

## 2024-09-03 ENCOUNTER — HOSPITAL ENCOUNTER (OUTPATIENT)
Dept: RADIOLOGY | Facility: MEDICAL CENTER | Age: 39
Discharge: HOME | End: 2024-09-03
Admitting: PHYSICAL MEDICINE & REHABILITATION
Payer: COMMERCIAL

## 2024-09-03 VITALS
SYSTOLIC BLOOD PRESSURE: 120 MMHG | HEART RATE: 84 BPM | TEMPERATURE: 98.5 F | DIASTOLIC BLOOD PRESSURE: 76 MMHG | RESPIRATION RATE: 20 BRPM | OXYGEN SATURATION: 100 %

## 2024-09-03 DIAGNOSIS — M54.12 CERVICAL RADICULOPATHY: ICD-10-CM

## 2024-09-03 PROCEDURE — 62321 NJX INTERLAMINAR CRV/THRC: CPT | Performed by: PHYSICAL MEDICINE & REHABILITATION

## 2024-09-03 RX ORDER — METHYLPREDNISOLONE ACETATE 80 MG/ML
80 INJECTION, SUSPENSION INTRA-ARTICULAR; INTRALESIONAL; INTRAMUSCULAR; PARENTERAL; SOFT TISSUE ONCE
Status: COMPLETED | OUTPATIENT
Start: 2024-09-03 | End: 2024-09-03

## 2024-09-03 RX ADMIN — METHYLPREDNISOLONE ACETATE 80 MG: 80 INJECTION, SUSPENSION INTRA-ARTICULAR; INTRALESIONAL; INTRAMUSCULAR; PARENTERAL; SOFT TISSUE at 16:17

## 2024-09-03 RX ADMIN — IOHEXOL 2 ML: 300 INJECTION, SOLUTION INTRAVENOUS at 16:17

## 2024-09-03 NOTE — DISCHARGE INSTRUCTIONS
Epidural Steroid Injection   WHAT YOU NEED TO KNOW:   An epidural steroid injection (MESFIN) is a procedure to inject steroid medicine into the epidural space. The epidural space is between your spinal cord and vertebrae. Steroids reduce inflammation and fluid buildup in your spine that may be causing pain. You may be given pain medicine along with the steroids.          ACTIVITY  Do not drive or operate machinery today.  No strenuous activity today - bending, lifting, etc.  You may resume normal activites starting tomorrow - start slowly and as tolerated.  You may shower today, but no tub baths or hot tubs.  You may have numbness for several hours from the local anesthetic. Please use caution and common sense, especially with weight-bearing activities.    CARE OF THE INJECTION SITE  If you have soreness or pain, apply ice to the area today (20 minutes on/20 minutes off).  Starting tomorrow, you may use warm, moist heat or ice if needed.  You may have an increase or change in your discomfort for 36-48 hours after your treatment.  Apply ice and continue with any pain medication you have been prescribed.  Notify the [facility] if you have any of the following: redness, drainage, swelling, headache, stiff neck or fever above 100°F.    SPECIAL INSTRUCTIONS  Our office will contact you in approximately 14 days for a progress report.    MEDICATIONS  Continue to take all routine medications.  Our office may have instructed you to hold some medications.  Resume ibuprofen, meloxicam and nabumetone tomorrow after 4:30 pm.    As no general anesthesia was used in today's procedure, you should not experience any side effects related to anesthesia.     If you are diabetic, the steroids used in today's injection may temporarily increase your blood sugar levels after the first few days after your injection. Please keep a close eye on your sugars and alert the doctor who manages your diabetes if your sugars are significantly  high from your baseline or you are symptomatic.     If you have a problem specifically related to your procedure, please call our office at [phone].  Problems not related to your procedure should be directed to your primary care physician.

## 2024-09-03 NOTE — H&P
History of Present Illness: The patient is a 39 y.o. female who presents with complaints of right neck and arm pain    Past Medical History:   Diagnosis Date    Anxiety     Bunion     left foot   OR   correction today 4/14/2023    Cervical radiculopathy     COVID     x 2-- Jan 2022 and Jan 2023    Depression     GERD (gastroesophageal reflux disease)     Headache(784.0)     Migraines     Seasonal allergies     Tobacco abuse     Wears contact lenses        Past Surgical History:   Procedure Laterality Date    BUNIONECTOMY Right     DISCECTOMY SPINE CERVICAL ANTERIOR W/ ARTHROPLASTY Bilateral 2/29/2024    Procedure: Anterior Cervical discectomy and disc replacement/arthroplasty C5-6;  Surgeon: Edu Del Rio MD;  Location:  MAIN OR;  Service: Orthopedics    HYSTERECTOMY      2020    LAPAROSCOPIC TOTAL HYSTERECTOMY      NM HALLUX RIGIDUS W/CHEILECTOMY 1ST MP JT W/IMPLT Left 04/14/2023    Procedure: BUNIONECTOMY with implant;  Surgeon: Will Cuevas DPM;  Location: [facility];  Service: Podiatry    TONSILLECTOMY           Current Outpatient Medications:     acetaminophen (TYLENOL) 650 mg CR tablet, Take 1 tablet (650 mg total) by mouth every 8 (eight) hours as needed for mild pain, Disp: 30 tablet, Rfl: 0    acyclovir (ZOVIRAX) 5 % ointment, APPLY TOPICALLY EVERY 4 (FOUR) HOURS, Disp: 15 g, Rfl: 0    Calcium Carbonate Antacid (TUMS PO), Take by mouth if needed, Disp: , Rfl:     clonazePAM (KlonoPIN) 1 mg tablet, Take 1 tablet (1 mg total) by mouth 2 (two) times a day, Disp: 60 tablet, Rfl: 0    cyclobenzaprine (FLEXERIL) 10 mg tablet, Take 1 tablet (10 mg total) by mouth 3 (three) times a day as needed for muscle spasms May make you drowsy or dizzy., Disp: 30 tablet, Rfl: 0    Diclofenac Sodium (VOLTAREN) 1 %, Apply 2 g topically 4 (four) times a day, Disp: 100 g, Rfl: 3    dicyclomine (BENTYL) 20 mg tablet, Take 1 tablet (20 mg total) by mouth 3 (three) times a day as needed (for abdominal cramping), Disp: 30  tablet, Rfl: 0    DULoxetine (CYMBALTA) 30 mg delayed release capsule, Take 1 capsule (30 mg total) by mouth daily Along with 60 mg capsule, Disp: 90 capsule, Rfl: 0    DULoxetine (CYMBALTA) 60 mg delayed release capsule, Take 1 capsule (60 mg total) by mouth daily, Disp: 90 capsule, Rfl: 0    gabapentin (Neurontin) 600 MG tablet, Take 1 tablet (600 mg total) by mouth 3 (three) times a day, Disp: 90 tablet, Rfl: 2    ibuprofen (MOTRIN) 600 mg tablet, Take 1 tablet (600 mg total) by mouth every 6 (six) hours as needed for mild pain, Disp: 120 tablet, Rfl: 1    lidocaine-prilocaine (EMLA) cream, Apply topically as needed for mild pain, Disp: 30 g, Rfl: 2    loratadine (CLARITIN) 10 mg tablet, Take 1 tablet (10 mg total) by mouth daily (Patient taking differently: Take 10 mg by mouth daily as needed), Disp: 30 tablet, Rfl: 2    meloxicam (MOBIC) 15 mg tablet, Take 1 tablet (15 mg total) by mouth daily, Disp: 30 tablet, Rfl: 0    Multiple Vitamins-Minerals (MULTIVITAMIN ADULT PO), Take 1 tablet by mouth daily, Disp: , Rfl:     nabumetone (RELAFEN) 500 mg tablet, Take 1 tablet (500 mg total) by mouth 2 (two) times a day, Disp: 60 tablet, Rfl: 2    naloxone (NARCAN) 4 mg/0.1 mL nasal spray, Administer 1 spray into a nostril. If no response after 2-3 minutes, give another dose in the other nostril using a new spray. (Patient not taking: Reported on 4/18/2024), Disp: 1 each, Rfl: 1    ondansetron (ZOFRAN) 4 mg tablet, Take 1 tablet (4 mg total) by mouth every 8 (eight) hours as needed for nausea or vomiting, Disp: 20 tablet, Rfl: 0    oxyCODONE (ROXICODONE) 5 immediate release tablet, Take 1 tablet (5 mg total) by mouth every 6 (six) hours as needed for severe pain Max Daily Amount: 20 mg, Disp: 30 tablet, Rfl: 0    traZODone (DESYREL) 50 mg tablet, Take 1 tablet (50 mg total) by mouth daily at bedtime as needed for sleep, Disp: 90 tablet, Rfl: 0    Allergies   Allergen Reactions    Bactrim [Sulfamethoxazole-Trimethoprim]  Hives    Latex Other (See Comments)     Latex allergy-rashes    Levofloxacin Hives    Chlorhexidine Rash    Quinolones Rash       Physical Exam:   Vitals:    09/03/24 1553   BP: 104/67   Pulse: 99   Resp: 16   Temp: 98.5 °F (36.9 °C)   SpO2: 97%     General: Awake, Alert, Oriented x 3, Mood and affect appropriate  Respiratory: Respirations even and unlabored  Cardiovascular: Peripheral pulses intact; no edema  Musculoskeletal Exam: right neck and arm pain    ASA Score: 2    Patient/Chart Verification  Patient ID Verified: Verbal  Consents Confirmed: Procedural, To be obtained in the Pre-Procedure area  H&P( within 30 days) Verified: To be obtained in the Procedural area  Allergies Reviewed: Yes  Anticoag/NSAID held?: Yes (ibuprofen last dose 9/1, meloxicam last dose 9/1- JE aware, Nabumetone last dose over 1 month ago)  Currently on antibiotics?: No  Pregnancy denied?: Yes    Assessment:   1. Cervical radiculopathy        Plan: Right C7-T1 TAB

## 2024-09-04 DIAGNOSIS — F41.1 GENERALIZED ANXIETY DISORDER: ICD-10-CM

## 2024-09-04 DIAGNOSIS — Z98.890 S/P CERVICAL DISC REPLACEMENT: ICD-10-CM

## 2024-09-04 RX ORDER — CLONAZEPAM 1 MG/1
1 TABLET ORAL 2 TIMES DAILY
Qty: 60 TABLET | Refills: 0 | Status: SHIPPED | OUTPATIENT
Start: 2024-09-04

## 2024-09-04 RX ORDER — CYCLOBENZAPRINE HCL 10 MG
10 TABLET ORAL 3 TIMES DAILY PRN
Qty: 30 TABLET | Refills: 0 | Status: SHIPPED | OUTPATIENT
Start: 2024-09-04

## 2024-09-15 DIAGNOSIS — G47.00 INSOMNIA, UNSPECIFIED TYPE: ICD-10-CM

## 2024-09-16 RX ORDER — TRAZODONE HYDROCHLORIDE 50 MG/1
50 TABLET, FILM COATED ORAL
Qty: 90 TABLET | Refills: 1 | Status: SHIPPED | OUTPATIENT
Start: 2024-09-16

## 2024-09-17 ENCOUNTER — TELEPHONE (OUTPATIENT)
Dept: RADIOLOGY | Facility: MEDICAL CENTER | Age: 39
End: 2024-09-17

## 2024-09-17 NOTE — TELEPHONE ENCOUNTER
Patient Reports       no improvement. Patient has an appointment in October but she would like to get  in sooner.

## 2024-09-20 ENCOUNTER — OFFICE VISIT (OUTPATIENT)
Dept: PAIN MEDICINE | Facility: MEDICAL CENTER | Age: 39
End: 2024-09-20
Payer: COMMERCIAL

## 2024-09-20 VITALS
DIASTOLIC BLOOD PRESSURE: 82 MMHG | HEIGHT: 64 IN | HEART RATE: 83 BPM | BODY MASS INDEX: 20.32 KG/M2 | SYSTOLIC BLOOD PRESSURE: 117 MMHG | WEIGHT: 119 LBS

## 2024-09-20 DIAGNOSIS — M67.813 TENDINOSIS OF RIGHT SHOULDER: ICD-10-CM

## 2024-09-20 DIAGNOSIS — M25.511 CHRONIC RIGHT SHOULDER PAIN: Primary | ICD-10-CM

## 2024-09-20 DIAGNOSIS — M54.2 NECK PAIN: ICD-10-CM

## 2024-09-20 DIAGNOSIS — M54.12 CERVICAL RADICULOPATHY: ICD-10-CM

## 2024-09-20 DIAGNOSIS — Z98.890 S/P CERVICAL DISCECTOMY: ICD-10-CM

## 2024-09-20 DIAGNOSIS — G89.29 CHRONIC RIGHT SHOULDER PAIN: Primary | ICD-10-CM

## 2024-09-20 PROCEDURE — 99214 OFFICE O/P EST MOD 30 MIN: CPT

## 2024-09-20 RX ORDER — GABAPENTIN 100 MG/1
100 CAPSULE ORAL
COMMUNITY
Start: 2024-08-15

## 2024-09-20 NOTE — PROGRESS NOTES
Assessment:  1. Chronic right shoulder pain    2. Tendinosis of right shoulder    3. Cervical radiculopathy    4. S/P cervical discectomy    5. Neck pain        Plan:  Will refer to orthopedics for further workup of right shoulder pain as the patient's pain has failed to respond to multiple interventional treatments for the cervical spine.   Continue gabapentin and meloxicam as prescribed.  Patient did not require refills of these medications today.  Follow up pending ortho recommendations.     My impressions and treatment recommendations were discussed in detail with the patient who verbalized understanding and had no further questions.  Discharge instructions were provided. I personally saw and examined the patient and I agree with the above discussed plan of care.    Orders Placed This Encounter   Procedures    Ambulatory referral to Orthopedic Surgery     Standing Status:   Future     Standing Expiration Date:   9/20/2025     Referral Priority:   Routine     Referral Type:   Consult - AMB     Referral Reason:   Specialty Services Required     Referred to Provider:   Chapito Alaniz DO     Requested Specialty:   Orthopedic Surgery     Number of Visits Requested:   1     Expiration Date:   9/20/2025     New Medications Ordered This Visit   Medications    gabapentin (NEURONTIN) 100 mg capsule     Sig: Take 100 mg by mouth       History of Present Illness:  Katina Mendez is a 39 y.o. female who presents for a follow up office visit in regards to ongoing severe pain localized to the neck and right shoulder accompanied by numbness which extends from the neck into the right upper extremity diffusely.    The patient has failed to respond to a variety of interventional approaches at this point. Recent TAB provided 0% relief of her pain. She also reported little/no relief after undergoing recent right biceps tendon sheath injection which was recommended by ortho spine surgery. She continues to have pain localized to the  neck and right shoulder which extends into the right upper extremity diffusely. This is constant, and currently rated an 8/10 in intensity.  She describes the quality of her pain as burning, throbbing, pressure-like, numbness, and pins-and-needles. Neck pain is described as stiffness with limitation in range of motion as well as a throbbing pain. The patient does have a lot of pain localized to the anterior and superior right shoulder as well.  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  She reports numbness and tingling specifically throughout the right upper extremity into the first, second, and third digits of the right hand.     The patient is about 5 months status post cervical discectomy with Dr. Del Rio.  Unfortunately, the patient reports little improvement in her pain since the surgery.  She is aware that the healing process can take a year or sometimes longer.  She was seen for a postoperative visit on 5/24/2024, at which point it was noted that she may have concomitant right rotator cuff issues which appeared to be to be her primary issue at the time.  Recent biceps tendon sheath injection did not alleviate her pain.     Diagnostic studies include right upper extremity EMG which reveals chronic right C6 radiculopathy as well as MRI of the right shoulder which revealed supraspinatus and infraspinatus tendinosis without evidence of full-thickness rotator cuff tear.  Acromioclavicular joint mildly arthritic. She was previously treated for right lateral epicondylitis as well as right shoulder bursitis by orthopedics without relief.  She failed to respond to right elbow injection and right SA/SD bursa injection.  It was at this time that treatment was directed towards the neck for cervical radiculopathy.  She underwent 2 cervical epidural steroid injections prior to surgery without relief.    She notes no improvement with  recent increase in gabapentin     I have personally reviewed and/or updated the patient's past medical history, past surgical history, family history, social history, current medications, allergies, and vital signs today.     Review of Systems   Constitutional:  Negative for fever.   HENT:  Negative for hearing loss.    Eyes:  Negative for visual disturbance.   Respiratory:  Negative for cough.    Cardiovascular:  Negative for leg swelling.   Gastrointestinal:  Positive for nausea. Negative for abdominal pain.   Endocrine: Negative for polydipsia.   Genitourinary:  Negative for difficulty urinating.   Musculoskeletal:  Positive for gait problem and neck pain. Negative for myalgias.   Skin:  Negative for rash.   Neurological:  Positive for weakness. Negative for numbness.   Hematological:  Does not bruise/bleed easily.   Psychiatric/Behavioral:  Negative for dysphoric mood and sleep disturbance.        Patient Active Problem List   Diagnosis    Generalized anxiety disorder    Lower back pain    Insomnia    GERD without esophagitis    Left breast lump    Benign paroxysmal positional vertigo due to bilateral vestibular disorder    Herpes simplex infection    Abnormal finding on thyroid function test    Atypical nevus    Cervical radiculopathy    Anxiety    Tobacco abuse    Migraines    Depression    Mild protein-calorie malnutrition (HCC)    Neck pain    S/P cervical discectomy    Cervical spinal cord compression (HCC)       Past Medical History:   Diagnosis Date    Anxiety     Bunion     left foot   OR   correction today 4/14/2023    Cervical radiculopathy     COVID     x 2-- Jan 2022 and Jan 2023    Depression     GERD (gastroesophageal reflux disease)     Headache(784.0)     Migraines     Seasonal allergies     Tobacco abuse     Wears contact lenses        Past Surgical History:   Procedure Laterality Date    BUNIONECTOMY Right     DISCECTOMY SPINE CERVICAL ANTERIOR W/ ARTHROPLASTY Bilateral 2/29/2024    Procedure:  Anterior Cervical discectomy and disc replacement/arthroplasty C5-6;  Surgeon: Edu Del Rio MD;  Location: [facility];  Service: Orthopedics    HYSTERECTOMY      2020    LAPAROSCOPIC TOTAL HYSTERECTOMY      MI HALLUX RIGIDUS W/CHEILECTOMY 1ST MP JT W/IMPLT Left 04/14/2023    Procedure: BUNIONECTOMY with implant;  Surgeon: Will Cuevas DPM;  Location: [facility];  Service: Podiatry    TONSILLECTOMY         Family History   Problem Relation Age of Onset    Mental illness Mother     Depression Mother     Early death Mother     Lung cancer Mother 58        with brain mets.    Diabetes Father     Hypertension Father     Early death Father     Anxiety disorder Sister     Rashes / Skin problems Sister     Migraines Sister     Diabetes Maternal Grandmother     Arthritis Maternal Grandmother     Heart disease Maternal Grandmother     Breast cancer Maternal Grandmother     No Known Problems Maternal Grandfather     Breast cancer Paternal Grandmother         age unknown.    Anuerysm Paternal Grandfather     ADD / ADHD Brother     Anxiety disorder Brother     Melanoma Brother     Multiple sclerosis Paternal Aunt        Social History     Occupational History    Not on file   Tobacco Use    Smoking status: Every Day     Current packs/day: 0.50     Average packs/day: 0.5 packs/day for 22.4 years (11.2 ttl pk-yrs)     Types: Cigarettes     Start date: 4/11/2002    Smokeless tobacco: Never   Vaping Use    Vaping status: Never Used   Substance and Sexual Activity    Alcohol use: Not Currently    Drug use: No    Sexual activity: Yes     Partners: Male     Birth control/protection: None     Comment: Hysterectomy on July 1st 2020       Current Outpatient Medications on File Prior to Visit   Medication Sig    acyclovir (ZOVIRAX) 5 % ointment APPLY TOPICALLY EVERY 4 (FOUR) HOURS    Calcium Carbonate Antacid (TUMS PO) Take by mouth if needed    clonazePAM (KlonoPIN) 1 mg tablet Take 1 tablet (1 mg total) by mouth 2 (two) times a  day    cyclobenzaprine (FLEXERIL) 10 mg tablet Take 1 tablet (10 mg total) by mouth 3 (three) times a day as needed for muscle spasms May make you drowsy or dizzy.    Diclofenac Sodium (VOLTAREN) 1 % Apply 2 g topically 4 (four) times a day    dicyclomine (BENTYL) 20 mg tablet Take 1 tablet (20 mg total) by mouth 3 (three) times a day as needed (for abdominal cramping)    DULoxetine (CYMBALTA) 30 mg delayed release capsule Take 1 capsule (30 mg total) by mouth daily Along with 60 mg capsule    DULoxetine (CYMBALTA) 60 mg delayed release capsule Take 1 capsule (60 mg total) by mouth daily    gabapentin (Neurontin) 600 MG tablet Take 1 tablet (600 mg total) by mouth 3 (three) times a day    lidocaine-prilocaine (EMLA) cream Apply topically as needed for mild pain    loratadine (CLARITIN) 10 mg tablet Take 1 tablet (10 mg total) by mouth daily (Patient taking differently: Take 10 mg by mouth daily as needed)    meloxicam (MOBIC) 15 mg tablet Take 1 tablet (15 mg total) by mouth daily    Multiple Vitamins-Minerals (MULTIVITAMIN ADULT PO) Take 1 tablet by mouth daily    nabumetone (RELAFEN) 500 mg tablet Take 1 tablet (500 mg total) by mouth 2 (two) times a day    ondansetron (ZOFRAN) 4 mg tablet Take 1 tablet (4 mg total) by mouth every 8 (eight) hours as needed for nausea or vomiting    traZODone (DESYREL) 50 mg tablet Take 1 tablet (50 mg total) by mouth daily at bedtime as needed for sleep    acetaminophen (TYLENOL) 650 mg CR tablet Take 1 tablet (650 mg total) by mouth every 8 (eight) hours as needed for mild pain    gabapentin (NEURONTIN) 100 mg capsule Take 100 mg by mouth (Patient not taking: Reported on 9/20/2024)    ibuprofen (MOTRIN) 600 mg tablet Take 1 tablet (600 mg total) by mouth every 6 (six) hours as needed for mild pain    naloxone (NARCAN) 4 mg/0.1 mL nasal spray Administer 1 spray into a nostril. If no response after 2-3 minutes, give another dose in the other nostril using a new spray. (Patient not  "taking: Reported on 4/18/2024)    oxyCODONE (ROXICODONE) 5 immediate release tablet Take 1 tablet (5 mg total) by mouth every 6 (six) hours as needed for severe pain Max Daily Amount: 20 mg     No current facility-administered medications on file prior to visit.       Allergies   Allergen Reactions    Bactrim [Sulfamethoxazole-Trimethoprim] Hives    Latex Other (See Comments)     Latex allergy-rashes    Levofloxacin Hives    Chlorhexidine Rash    Quinolones Rash       Physical Exam:    /82   Pulse 83   Ht 5' 4\" (1.626 m)   Wt 54 kg (119 lb)   LMP 01/29/2020   BMI 20.43 kg/m²     Constitutional:normal, well developed, well nourished, alert, in no distress and non-toxic and no overt pain behavior.  Eyes:anicteric  HEENT:grossly intact  Neck:supple, symmetric, trachea midline and no masses   Pulmonary:even and unlabored  Cardiovascular:No edema or pitting edema present  Skin:Normal without rashes or lesions and well hydrated  Psychiatric:Mood and affect appropriate  Neurologic:Cranial Nerves II-XII grossly intact  Musculoskeletal:normal, except for decreased  strength on the right.  Upper extremity strength is otherwise normal. Mildly decreased sensation to light touch in the first second and third digits of the right hand. Cervical spine range of motion is limited and painful in all planes. Positive Spurling's.  Positive George sign on the left.  Upper extremity DTRs are physiologic and symmetric.  Tender to palpation over the superior and anterior aspects of the right shoulder as well as the right biceps tendon diffusely.  Severe pain with overhead reaching and reaching behind the back using the right arm.    "

## 2024-09-24 ENCOUNTER — HOSPITAL ENCOUNTER (OUTPATIENT)
Dept: ULTRASOUND IMAGING | Facility: CLINIC | Age: 39
Discharge: HOME | End: 2024-09-24
Payer: COMMERCIAL

## 2024-09-24 ENCOUNTER — PATIENT MESSAGE (OUTPATIENT)
Dept: FAMILY MEDICINE CLINIC | Facility: CLINIC | Age: 39
End: 2024-09-24

## 2024-09-24 ENCOUNTER — HOSPITAL ENCOUNTER (OUTPATIENT)
Dept: MAMMOGRAPHY | Facility: CLINIC | Age: 39
Discharge: HOME | End: 2024-09-24
Payer: COMMERCIAL

## 2024-09-24 DIAGNOSIS — R92.8 ABNORMAL MAMMOGRAM: ICD-10-CM

## 2024-09-24 PROCEDURE — 77066 DX MAMMO INCL CAD BI: CPT

## 2024-09-24 PROCEDURE — 76642 ULTRASOUND BREAST LIMITED: CPT

## 2024-09-24 PROCEDURE — G0279 TOMOSYNTHESIS, MAMMO: HCPCS

## 2024-09-25 ENCOUNTER — OFFICE VISIT (OUTPATIENT)
Dept: OBGYN CLINIC | Facility: MEDICAL CENTER | Age: 39
End: 2024-09-25
Payer: COMMERCIAL

## 2024-09-25 VITALS
SYSTOLIC BLOOD PRESSURE: 124 MMHG | HEART RATE: 92 BPM | WEIGHT: 119 LBS | BODY MASS INDEX: 20.32 KG/M2 | DIASTOLIC BLOOD PRESSURE: 83 MMHG | HEIGHT: 64 IN

## 2024-09-25 DIAGNOSIS — G89.29 CHRONIC RIGHT SHOULDER PAIN: ICD-10-CM

## 2024-09-25 DIAGNOSIS — M54.12 RADICULOPATHY, CERVICAL REGION: Primary | ICD-10-CM

## 2024-09-25 DIAGNOSIS — N64.4 BREAST PAIN, LEFT: Primary | ICD-10-CM

## 2024-09-25 DIAGNOSIS — M67.813 TENDINOSIS OF RIGHT SHOULDER: ICD-10-CM

## 2024-09-25 DIAGNOSIS — Z00.6 ENCOUNTER FOR EXAMINATION FOR NORMAL COMPARISON OR CONTROL IN CLINICAL RESEARCH PROGRAM: ICD-10-CM

## 2024-09-25 DIAGNOSIS — M25.511 CHRONIC RIGHT SHOULDER PAIN: ICD-10-CM

## 2024-09-25 PROCEDURE — 99214 OFFICE O/P EST MOD 30 MIN: CPT | Performed by: ORTHOPAEDIC SURGERY

## 2024-09-25 PROCEDURE — 20610 DRAIN/INJ JOINT/BURSA W/O US: CPT | Performed by: ORTHOPAEDIC SURGERY

## 2024-09-25 RX ORDER — TRIAMCINOLONE ACETONIDE 40 MG/ML
40 INJECTION, SUSPENSION INTRA-ARTICULAR; INTRAMUSCULAR
Status: COMPLETED | OUTPATIENT
Start: 2024-09-25 | End: 2024-09-25

## 2024-09-25 RX ORDER — BUPIVACAINE HYDROCHLORIDE 2.5 MG/ML
2 INJECTION, SOLUTION INFILTRATION; PERINEURAL
Status: COMPLETED | OUTPATIENT
Start: 2024-09-25 | End: 2024-09-25

## 2024-09-25 RX ADMIN — BUPIVACAINE HYDROCHLORIDE 2 ML: 2.5 INJECTION, SOLUTION INFILTRATION; PERINEURAL at 11:30

## 2024-09-25 RX ADMIN — TRIAMCINOLONE ACETONIDE 40 MG: 40 INJECTION, SUSPENSION INTRA-ARTICULAR; INTRAMUSCULAR at 11:30

## 2024-09-25 NOTE — PROGRESS NOTES
Ortho Sports Medicine Shoulder New Patient Visit     Assesment:   39 y.o. female right shoulder pain with cervical radiculopathy.    Plan:    Conservative treatment:    Patient describes radicular symptoms including tingling, arm and hand falling asleep, and burning in the elbow. Patient was treated by [facility] without improvement. Patient has good rotator cuff strength on exam today.   Prior MRI of the right shoulder was reviewed with the patient which demonstrated degenerative RTC tendinosis but no definitive RTC tear. Explained we could order a MRI arthrogram if no improvement with CSI and shoulder targeted PT. Would like Dr. Del Rio's input if a MRI arthrogram of the right shoulder would be of benefit for her or if he feels her symptoms are from the cervical spine.  Referral to Dr. Del Rio (patient's spine surgeon) to evaluate for cervical involvement.   CSI of right subacromial bursa was performed. Patient was instructed to monitor duration and percentage of pain relief.  PT for the right shoulder and neck as patient reports she only attended PT for her neck in the past. Script provided.      Imaging:    All imaging from today was reviewed by myself and explained to the patient.       Injection:    The risks and benefits of the injection (which include but are not limited to: infection, bleeding,damage to nerve/artery, need for further intervention), as well as the risks and benefits of all alternative treatments were explained and understood.  The patient elected to proceed with injection. The procedure was done with aseptic technique, and the patient tolerated the procedure well with no complications.    A corticosteroid injection of the subacromial space was performed.  The patient should take 1-2 days off of activity, with gradual return to activity as tolerated.  Ice to the shoulder 1-2 times daily for 20 minutes, for next 24-48 hrs.    Large joint arthrocentesis: R subacromial bursa  [place]  Protocol:  procedure performed by consultantConsent: Verbal consent obtained.  Risks and benefits: risks, benefits and alternatives were discussed  Consent given by: patient  Timeout called at: 9/25/2024 12:11 PM.  Patient understanding: patient states understanding of the procedure being performed  Patient identity confirmed: verbally with patient  Supporting Documentation  Indications: pain   Procedure Details  Location: shoulder - R subacromial bursa  Needle size: 22 G  Ultrasound guidance: no  Approach: posterolateral  Medications administered: 2 mL bupivacaine 0.25 %; 40 mg triamcinolone acetonide 40 mg/mL    Patient tolerance: patient tolerated the procedure well with no immediate complications          Surgery:     No surgery is recommended at this point, continue with conservative treatment plan as noted.      Follow up:    Return if symptoms worsen or fail to improve.        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  Patient was treated by Dr. Floyd, then Dr. Castillo or right shoulder pain. She was referred to Sp[facility] and [facility] to discuss C5-C6 DDD and right C5 radiculopathy. She had C5-C6 discectomy and disc replacement surgery performed by Dr. Del Rio as described below. She continues to have persistent right shoulder pain, radicular tingling and burning in the shoulder.    The patient denies a history of diabetes.  The patient denies a history of thyroid disorder.      Pain is located anterior, lateral.  The patient rates the pain as a 8/10.  The pain has been present for 2 years.  Pain is located anterior and lateral. Pain worsens with lifting, overhead movements.    Patient denies any injury or trauma. The pain is characterized as sharp, achy, burning.   The pain is present daily.      Patient reports weakness with holding a cup, dropping items, inability to hold her 2 year old. Patient has tingling and burning in the RUE worse at her elbow.    Patient had MRI right shoulder with findings in imaging section below.    Shoulder Surgical History:  02/29/2024: anterior cervical discectomy and disc replacement/arthroplasty C5-C6.      Past Medical, Social and Family History:  Past Medical History:   Diagnosis Date    Anxiety     Bunion     left foot   OR   correction today 4/14/2023    Cervical radiculopathy     COVID     x 2-- Jan 2022 and Jan 2023    Depression     GERD (gastroesophageal reflux disease)     Headache(784.0)     Migraines     Seasonal allergies     Tobacco abuse     Wears contact lenses      Past Surgical History:   Procedure Laterality Date    BUNIONECTOMY Right     DISCECTOMY SPINE CERVICAL ANTERIOR W/ ARTHROPLASTY Bilateral 2/29/2024    Procedure: Anterior Cervical discectomy and disc replacement/arthroplasty C5-6;  Surgeon: Edu Del Rio MD;  Location: [facility];  Service: Orthopedics    HYSTERECTOMY      2020    LAPAROSCOPIC TOTAL HYSTERECTOMY      RI HALLUX RIGIDUS W/CHEILECTOMY 1ST MP JT W/IMPLT Left 04/14/2023    Procedure: BUNIONECTOMY with implant;  Surgeon: Will Cuevas DPM;  Location: [facility];  Service: Podiatry    TONSILLECTOMY       Allergies   Allergen Reactions    Bactrim [Sulfamethoxazole-Trimethoprim] Hives    Latex Other (See Comments)     Latex allergy-rashes    Levofloxacin Hives    Chlorhexidine Rash    Quinolones Rash     Current Outpatient Medications on File Prior to Visit   Medication Sig Dispense Refill    acyclovir (ZOVIRAX) 5 % ointment APPLY TOPICALLY EVERY 4 (FOUR) HOURS 15 g 0    Calcium Carbonate Antacid (TUMS PO) Take by mouth if needed      clonazePAM (KlonoPIN) 1 mg tablet Take 1 tablet (1 mg total) by mouth 2 (two) times a day 60 tablet 0    cyclobenzaprine (FLEXERIL) 10 mg tablet Take 1 tablet (10 mg  total) by mouth 3 (three) times a day as needed for muscle spasms May make you drowsy or dizzy. 30 tablet 0    Diclofenac Sodium (VOLTAREN) 1 % Apply 2 g topically 4 (four) times a day 100 g 3    dicyclomine (BENTYL) 20 mg tablet Take 1 tablet (20 mg total) by mouth 3 (three) times a day as needed (for abdominal cramping) 30 tablet 0    DULoxetine (CYMBALTA) 30 mg delayed release capsule Take 1 capsule (30 mg total) by mouth daily Along with 60 mg capsule 90 capsule 0    DULoxetine (CYMBALTA) 60 mg delayed release capsule Take 1 capsule (60 mg total) by mouth daily 90 capsule 0    gabapentin (Neurontin) 600 MG tablet Take 1 tablet (600 mg total) by mouth 3 (three) times a day 90 tablet 2    lidocaine-prilocaine (EMLA) cream Apply topically as needed for mild pain 30 g 2    loratadine (CLARITIN) 10 mg tablet Take 1 tablet (10 mg total) by mouth daily (Patient taking differently: Take 10 mg by mouth daily as needed) 30 tablet 2    meloxicam (MOBIC) 15 mg tablet Take 1 tablet (15 mg total) by mouth daily 30 tablet 0    Multiple Vitamins-Minerals (MULTIVITAMIN ADULT PO) Take 1 tablet by mouth daily      nabumetone (RELAFEN) 500 mg tablet Take 1 tablet (500 mg total) by mouth 2 (two) times a day 60 tablet 2    ondansetron (ZOFRAN) 4 mg tablet Take 1 tablet (4 mg total) by mouth every 8 (eight) hours as needed for nausea or vomiting 20 tablet 0    traZODone (DESYREL) 50 mg tablet Take 1 tablet (50 mg total) by mouth daily at bedtime as needed for sleep 90 tablet 1    acetaminophen (TYLENOL) 650 mg CR tablet Take 1 tablet (650 mg total) by mouth every 8 (eight) hours as needed for mild pain 30 tablet 0    gabapentin (NEURONTIN) 100 mg capsule Take 100 mg by mouth (Patient not taking: Reported on 9/20/2024)      ibuprofen (MOTRIN) 600 mg tablet Take 1 tablet (600 mg total) by mouth every 6 (six) hours as needed for mild pain 120 tablet 1    naloxone (NARCAN) 4 mg/0.1 mL nasal spray Administer 1 spray into a nostril. If no  response after 2-3 minutes, give another dose in the other nostril using a new spray. (Patient not taking: Reported on 4/18/2024) 1 each 1    oxyCODONE (ROXICODONE) 5 immediate release tablet Take 1 tablet (5 mg total) by mouth every 6 (six) hours as needed for severe pain Max Daily Amount: 20 mg 30 tablet 0     No current facility-administered medications on file prior to visit.     Social History     Socioeconomic History    Marital status: /Civil Union     Spouse name: Not on file    Number of children: Not on file    Years of education: Not on file    Highest education level: Not on file   Occupational History    Not on file   Tobacco Use    Smoking status: Every Day     Current packs/day: 0.50     Average packs/day: 0.5 packs/day for 22.5 years (11.2 ttl pk-yrs)     Types: Cigarettes     Start date: 4/11/2002    Smokeless tobacco: Never   Vaping Use    Vaping status: Never Used   Substance and Sexual Activity    Alcohol use: Not Currently    Drug use: No    Sexual activity: Yes     Partners: Male     Birth control/protection: None     Comment: Hysterectomy on July 1st 2020   Other Topics Concern    Not on file   Social History Narrative    Not on file     Social Determinants of Health     Financial Resource Strain: Not on file   Food Insecurity: Not on file   Transportation Needs: Not on file   Physical Activity: Not on file   Stress: Not on file   Social Connections: Unknown (6/18/2024)    Received from [facility]     How often do you feel lonely or isolated from those around you? (Adult - for ages 18 years and over): Not on file   Intimate Partner Violence: Not on file   Housing Stability: Not on file         I have reviewed the past medical, surgical, social and family history, medications and allergies as documented in the EMR.    Review of systems: ROS is negative other than that noted in the HPI.  Constitutional: Negative for fatigue and fever.   HENT: Negative for sore throat.  "   Respiratory: Negative for shortness of breath.    Cardiovascular: Negative for chest pain.   Gastrointestinal: Negative for abdominal pain.   Endocrine: Negative for cold intolerance and heat intolerance.   Genitourinary: Negative for flank pain.   Musculoskeletal: Negative for back pain.   Skin: Negative for rash.   Allergic/Immunologic: Negative for immunocompromised state.   Neurological: Negative for dizziness.   Psychiatric/Behavioral: Negative for agitation.      Physical Exam:    Blood pressure 124/83, pulse 92, height 5' 4\" (1.626 m), weight 54 kg (119 lb), last menstrual period 01/29/2020, unknown if currently breastfeeding.    General/Constitutional: NAD, well developed, well nourished  HENT: Normocephalic, atraumatic  CV: Intact distal pulses, regular rate  Resp: No respiratory distress or labored breathing  GI: Soft and non-tender   Lymphatic: No lymphadenopathy palpated  Neuro: Alert and Oriented x 3, no focal deficits  Psych: Normal mood, normal affect, normal judgement, normal behavior  Skin: Warm, dry, no rashes, no erythema      Shoulder focused exam:       RIGHT LEFT    Scapula Atrophy Negative Negative     Winging Negative Negative     Protraction Negative Negative    Rotator cuff SS 5/5 5/5     IS 5/5 5/5     SubS 5/5 5/5    ROM     170     ER0 60 60     ER90 Deferred due to pain    Deferred due to pain     IR90 Deferred due to pain    Deferred due to pain     IRb T6    T6    TTP: AC Positive Negative     Biceps Positive Negative     Coracoid Positive Negative    Special Tests: O'Briens Negative Negative     Nava-shear Negative Negative     Cross body Adduction Negative Negative     Speeds  Negative Negative     Bre's Negative Negative     Whipple Negative Negative       Neer Negative Negative     Donnelly Negative Negative    Instability: Apprehension & relocation not tested not tested     Load & shift not tested not tested    Other: Crank Negative Negative                 UE NV Exam: " +2 Radial pulses bilaterally  Sensation intact to light touch C5-T1 bilaterally, Radial/median/ulnar nerve motor intact      Bilateral elbow, wrist, and and forearm ROM full, painless with passive ROM, no ttp or crepitance throughout extremities below shoulder joint    Cervical ROM is full without pain, numbness or tingling      Shoulder Imaging      MRI of the right shoulder were reviewed, which demonstrate some tendinosis but no rotator cuff or labral tear.  I have reviewed the radiology report and do not currently have a radiology reading from [facility], but will check the result once the reading is performed.      Scribe Attestation      I,:  Asha Tapia am acting as a scribe while in the presence of the attending physician.:       I,:  Chapito Alaniz, DO personally performed the services described in this documentation    as scribed in my presence.:

## 2024-09-27 ENCOUNTER — TELEPHONE (OUTPATIENT)
Dept: HEMATOLOGY ONCOLOGY | Facility: CLINIC | Age: 39
End: 2024-09-27

## 2024-09-27 NOTE — TELEPHONE ENCOUNTER
Referral has been received for Surgical Oncology  .   Chart has been reviewed and patient's imaging and reason for referral does not indicate a need for Surgical Oncologist. Referral will be closed at this time. Patient should be seen by General Surgery .Referring provider has been notified via in basket message. No further action needed at this time.

## 2024-10-02 ENCOUNTER — TELEPHONE (OUTPATIENT)
Dept: PAIN MEDICINE | Facility: CLINIC | Age: 39
End: 2024-10-02

## 2024-10-02 DIAGNOSIS — F41.1 GENERALIZED ANXIETY DISORDER: ICD-10-CM

## 2024-10-02 RX ORDER — CLONAZEPAM 1 MG/1
1 TABLET ORAL 2 TIMES DAILY
Qty: 60 TABLET | Refills: 0 | Status: SHIPPED | OUTPATIENT
Start: 2024-10-02

## 2024-10-02 NOTE — TELEPHONE ENCOUNTER
Caller: Katina    Doctor: Lauren    Reason for call: patient saw dr. Luna on 9/25 and he is recommending her back to Spine and pain patient thought we had nothing to offer? She is scheduled for a f/u on 10/18 at 10 am     Please advise on next step she is not sure what to do    Call back#: [phone]

## 2024-10-03 NOTE — TELEPHONE ENCOUNTER
Ortho note states she should follow up with Dr. Del Rio. It looks like the referral to us was an error. She should schedule appt with Dr. Del Rio.

## 2024-10-03 NOTE — TELEPHONE ENCOUNTER
S/w pt and advised of same. Appt cancelled with CG. Pt verbalized understanding and plans to contact Dr Del Rio.

## 2024-10-08 ENCOUNTER — PATIENT OUTREACH (OUTPATIENT)
Dept: HEMATOLOGY ONCOLOGY | Facility: CLINIC | Age: 39
End: 2024-10-08

## 2024-10-08 ENCOUNTER — DOCUMENTATION (OUTPATIENT)
Dept: HEMATOLOGY ONCOLOGY | Facility: CLINIC | Age: 39
End: 2024-10-08

## 2024-10-08 ENCOUNTER — OFFICE VISIT (OUTPATIENT)
Dept: OBGYN CLINIC | Facility: HOSPITAL | Age: 39
End: 2024-10-08
Payer: COMMERCIAL

## 2024-10-08 ENCOUNTER — HOSPITAL ENCOUNTER (OUTPATIENT)
Dept: RADIOLOGY | Facility: HOSPITAL | Age: 39
Discharge: HOME | End: 2024-10-08
Attending: ORTHOPAEDIC SURGERY
Payer: COMMERCIAL

## 2024-10-08 VITALS
DIASTOLIC BLOOD PRESSURE: 81 MMHG | SYSTOLIC BLOOD PRESSURE: 125 MMHG | BODY MASS INDEX: 20.54 KG/M2 | HEART RATE: 90 BPM | HEIGHT: 64 IN | WEIGHT: 120.3 LBS

## 2024-10-08 DIAGNOSIS — M25.511 CHRONIC RIGHT SHOULDER PAIN: ICD-10-CM

## 2024-10-08 DIAGNOSIS — Z98.890 S/P CERVICAL DISC REPLACEMENT: ICD-10-CM

## 2024-10-08 DIAGNOSIS — G89.29 CHRONIC RIGHT SHOULDER PAIN: ICD-10-CM

## 2024-10-08 DIAGNOSIS — Z98.890 S/P CERVICAL DISC REPLACEMENT: Primary | ICD-10-CM

## 2024-10-08 PROCEDURE — 99213 OFFICE O/P EST LOW 20 MIN: CPT | Performed by: ORTHOPAEDIC SURGERY

## 2024-10-08 PROCEDURE — 72050 X-RAY EXAM NECK SPINE 4/5VWS: CPT

## 2024-10-08 NOTE — PROGRESS NOTES
Incoming call from Babita FLORES (Dr. Pepe) regarding referral to Surgical Oncology (Dr. Cueva) that has been closed.   She would like a call back regarding why referral has been closed and patient referred to general surgery. Please call at [phone].     Introduced myself as one of the Oncology Nurse Navigators and explained that I will gather some information and pass on to the correct team member to follow-up.   Best contact information for call back [phone].    Pt was referred by Dr. My Jc states he/she was referred for left breast pain.    Have you had a biopsy?  No  If yes, have you had surgery or any treatment for this already?  No  Where was this completed? N/A    Have you had any scans or additional testing?  Yes    Has all of your testing been completed at [facility]? No  If no, where has testing been completed? In PACS    If outside records retrieval needed  Do you have an email that I can send a release of medical information form for you to sign so we may obtain your records? N/A    Advised Babita that the Nurse Navigator that specializes in breast cancer will reach out to him/her within 24 hours to further assist her with the information she was looking for.  Verbalized understanding and expressed appreciation of phone call.

## 2024-10-08 NOTE — PROGRESS NOTES
Assessment & Plan/Medical Decision Makin y.o. female with Neck Pain and Right Shoulder Pain, history of C5-6 disc arthroplasty on 2024    The clinical, physical and imaging findings were reviewed with the patient.  Katina  has multifactorial neck and arm pain.    Fortunately patient remains neurologically intact and functional however continues with decreased right shoulder range of motion, pain with shoulder range of motion.  We discussed the treatment options including physical therapy, at home exercises, activity modifications, chiropractic medicine, oral medications, interventional spine procedures.  At this time recommend continued workup and evaluation of right shoulder pathology.  Patient recently saw Dr. Alaniz with consideration of right shoulder MR arthrogram.  Would recommend obtaining this study for further treatment planning as patient does continue to demonstrate significant right shoulder range of motion dysfunction.  In addition recommend physical therapy for right shoulder-PT referral was provided recently by Dr. Alaniz.  We did discuss further treatment workups including reimaging of the cervical spine.    Patient instructed to return to office/ER sooner if symptoms are not improving, getting worse, or new worrisome/neurologic symptoms arise.  Patient will follow up in approx. 3 months for re-evaluation after further conservative treatments.       Subjective:      Chief Complaint: Neck Pain    HPI:  Katina Mendez is a 39 y.o. female presenting for initial visit with chief complaint of neck pain and right shoulder pain.  Patient is status post C5-6 disc arthroplasty on 2024.  Preoperatively her symptoms were consistent with both cervical radiculopathy and right shoulder pathology.  Her postoperative course was relatively uneventful.  Patient initially reported improvement in right arm range of motion and function postoperatively.  She continue with neck stiffness  postoperatively.  History of preoperative and postoperative tobacco use.  Today she notes residual neck pain and right shoulder pain that radiates into her right anterior arm.  She holds her arm at her side and abducted position and guarded.  Recently evaluated by Dr. Alaniz and provided CSI of right subacromial bursa.  She notes she has difficulty with daily activities such as washing her hair and stopping her bra due to right shoulder dysfunction and pain.    In addition she was reevaluated by pain management and underwent right biceps tendon injection as well as epidural injection with no significant pain relief.  She does continue with medications including Flexeril and gabapentin.    Objective:     Family History   Problem Relation Age of Onset    Mental illness Mother     Depression Mother     Early death Mother     Lung cancer Mother 58        with brain mets.    Diabetes Father     Hypertension Father     Early death Father     Anxiety disorder Sister     Rashes / Skin problems Sister     Migraines Sister     Diabetes Maternal Grandmother     Arthritis Maternal Grandmother     Heart disease Maternal Grandmother     Breast cancer Maternal Grandmother     No Known Problems Maternal Grandfather     Breast cancer Paternal Grandmother         age unknown.    Anuerysm Paternal Grandfather     ADD / ADHD Brother     Anxiety disorder Brother     Melanoma Brother     Multiple sclerosis Paternal Aunt        Past Medical History:   Diagnosis Date    Anxiety     Bunion     left foot   OR   correction today 4/14/2023    Cervical radiculopathy     COVID     x 2-- Jan 2022 and Jan 2023    Depression     GERD (gastroesophageal reflux disease)     Headache(784.0)     Migraines     Seasonal allergies     Tobacco abuse     Wears contact lenses        Current Outpatient Medications   Medication Sig Dispense Refill    acyclovir (ZOVIRAX) 5 % ointment APPLY TOPICALLY EVERY 4 (FOUR) HOURS 15 g 0    Calcium Carbonate Antacid  (TUMS PO) Take by mouth if needed      clonazePAM (KlonoPIN) 1 mg tablet Take 1 tablet (1 mg total) by mouth 2 (two) times a day 60 tablet 0    cyclobenzaprine (FLEXERIL) 10 mg tablet Take 1 tablet (10 mg total) by mouth 3 (three) times a day as needed for muscle spasms May make you drowsy or dizzy. 30 tablet 0    Diclofenac Sodium (VOLTAREN) 1 % Apply 2 g topically 4 (four) times a day 100 g 3    dicyclomine (BENTYL) 20 mg tablet Take 1 tablet (20 mg total) by mouth 3 (three) times a day as needed (for abdominal cramping) 30 tablet 0    DULoxetine (CYMBALTA) 30 mg delayed release capsule Take 1 capsule (30 mg total) by mouth daily Along with 60 mg capsule 90 capsule 0    DULoxetine (CYMBALTA) 60 mg delayed release capsule Take 1 capsule (60 mg total) by mouth daily 90 capsule 0    gabapentin (Neurontin) 600 MG tablet Take 1 tablet (600 mg total) by mouth 3 (three) times a day 90 tablet 2    lidocaine-prilocaine (EMLA) cream Apply topically as needed for mild pain 30 g 2    loratadine (CLARITIN) 10 mg tablet Take 1 tablet (10 mg total) by mouth daily (Patient taking differently: Take 10 mg by mouth daily as needed) 30 tablet 2    meloxicam (MOBIC) 15 mg tablet Take 1 tablet (15 mg total) by mouth daily 30 tablet 0    Multiple Vitamins-Minerals (MULTIVITAMIN ADULT PO) Take 1 tablet by mouth daily      nabumetone (RELAFEN) 500 mg tablet Take 1 tablet (500 mg total) by mouth 2 (two) times a day 60 tablet 2    ondansetron (ZOFRAN) 4 mg tablet Take 1 tablet (4 mg total) by mouth every 8 (eight) hours as needed for nausea or vomiting 20 tablet 0    traZODone (DESYREL) 50 mg tablet Take 1 tablet (50 mg total) by mouth daily at bedtime as needed for sleep 90 tablet 1    acetaminophen (TYLENOL) 650 mg CR tablet Take 1 tablet (650 mg total) by mouth every 8 (eight) hours as needed for mild pain 30 tablet 0    gabapentin (NEURONTIN) 100 mg capsule Take 100 mg by mouth (Patient not taking: Reported on 9/20/2024)      ibuprofen  (MOTRIN) 600 mg tablet Take 1 tablet (600 mg total) by mouth every 6 (six) hours as needed for mild pain 120 tablet 1    naloxone (NARCAN) 4 mg/0.1 mL nasal spray Administer 1 spray into a nostril. If no response after 2-3 minutes, give another dose in the other nostril using a new spray. (Patient not taking: Reported on 4/18/2024) 1 each 1    oxyCODONE (ROXICODONE) 5 immediate release tablet Take 1 tablet (5 mg total) by mouth every 6 (six) hours as needed for severe pain Max Daily Amount: 20 mg 30 tablet 0     No current facility-administered medications for this visit.       Past Surgical History:   Procedure Laterality Date    BUNIONECTOMY Right     DISCECTOMY SPINE CERVICAL ANTERIOR W/ ARTHROPLASTY Bilateral 2/29/2024    Procedure: Anterior Cervical discectomy and disc replacement/arthroplasty C5-6;  Surgeon: Edu Del Rio MD;  Location:  MAIN OR;  Service: Orthopedics    HYSTERECTOMY      2020    LAPAROSCOPIC TOTAL HYSTERECTOMY      CO HALLUX RIGIDUS W/CHEILECTOMY 1ST MP JT W/IMPLT Left 04/14/2023    Procedure: BUNIONECTOMY with implant;  Surgeon: Will Cuevas DPM;  Location: [facility];  Service: Podiatry    TONSILLECTOMY         Social History     Socioeconomic History    Marital status: /Civil Union     Spouse name: Not on file    Number of children: Not on file    Years of education: Not on file    Highest education level: Not on file   Occupational History    Not on file   Tobacco Use    Smoking status: Every Day     Current packs/day: 0.50     Average packs/day: 0.5 packs/day for 22.5 years (11.2 ttl pk-yrs)     Types: Cigarettes     Start date: 4/11/2002    Smokeless tobacco: Never   Vaping Use    Vaping status: Never Used   Substance and Sexual Activity    Alcohol use: Not Currently    Drug use: No    Sexual activity: Yes     Partners: Male     Birth control/protection: None     Comment: Hysterectomy on July 1st 2020   Other Topics Concern    Not on file   Social History Narrative     "Not on file     Social Determinants of Health     Financial Resource Strain: Not on file   Food Insecurity: Not on file   Transportation Needs: Not on file   Physical Activity: Not on file   Stress: Not on file   Social Connections: Unknown (6/18/2024)    Received from [employer]     How often do you feel lonely or isolated from those around you? (Adult - for ages 18 years and over): Not on file   Intimate Partner Violence: Not on file   Housing Stability: Not on file       Allergies   Allergen Reactions    Bactrim [Sulfamethoxazole-Trimethoprim] Hives    Latex Other (See Comments)     Latex allergy-rashes    Levofloxacin Hives    Chlorhexidine Rash    Quinolones Rash       Review of Systems  General- denies fever/chills  HEENT- denies hearing loss or sore throat  Eyes- denies eye pain or visual disturbances, denies red eyes  Respiratory- denies cough or SOB  Cardio- denies chest pain or palpitations  GI- denies abdominal pain  Endocrine- denies urinary frequency  Urinary- denies pain with urination  Musculoskeletal- Negative except noted above  Skin- denies rashes or wounds  Neurological- denies dizziness or headache  Psychiatric- denies anxiety or difficulty concentrating    Physical Exam  /81   Pulse 90   Ht 5' 4\" (1.626 m)   Wt 54.6 kg (120 lb 4.8 oz)   LMP 01/29/2020   BMI 20.65 kg/m²     General/Constitutional: No apparent distress: well-nourished and well developed.  Lymphatic: No appreciable lymphadenopathy  Respiratory: Non-labored breathing  Vascular: No edema, swelling or tenderness, except as noted in detailed exam.  Integumentary: No impressive skin lesions present, except as noted in detailed exam.  Psych: Normal mood and affect, oriented to person, place and time.  MSK: normal other than stated in HPI and exam  Gait & balance: no evidence of myelopathic gait, ambulates Independently     Cervical  spine range of motion:  -Forward flexion chin to chest  -Extension to " "60  -Lateral bend 20 right, 20 left  -Rotation 15 right, 15 left.    There is no point tenderness with palpation along the posterior cervical, thoracic, lumbar spine.   Well-healed anterior surgical site  Decreased internal rotation on the right compared to left with pain  Continued pain with palpation of the proximal biceps tendon sheath on the right    Neurologic:  Upper Extremity Motor Function    Right  Left    Deltoid  5/5  5/5    Bicep  5/5  5/5    Wrist extension  5/5  5/5    Tricep  5/5  5/5    Finger flexion/  4+/5  5/5    Hand intrinsic  5/5  5/5        Sensory: light touch is intact to bilateral upper and lower extremities     Diagnostic Tests   IMAGING: I have personally reviewed the images and these are my findings:  Cervical Spine X-rays from 10/8/2024: C5-6 disc arthroplasty intact, with mild motion at the disc on flexion-extension radiographs, stable appearing mild adjacent segment spondylosis    Electronic Medical Records were reviewed including pain management notes, Dr. Alaniz notes    Procedures, if performed today     None performed       Portions of the record may have been created with voice recognition software.  Occasional wrong word or \"sound a like\" substitutions may have occurred due to the inherent limitations of voice recognition software.  Read the chart carefully and recognize, using context, where substitutions have occurred.     "

## 2024-10-08 NOTE — PROGRESS NOTES
Breast Cancer Nurse Navigator    Received warm transfer message from Anastasiya LOGAN. Chart reviewed by ROLAN    Diagnosis: abnormal mammogram, breast pain    Recent Imagin2024 Mammo diagnostic bilateral w 3d & cad/US breast left limited (diagnostic)    Recent Pathology: n/a    Does patient have a JOSELUIS ? no    Genetic testing:     Family history of cancer:   History of breast cancer in Maternal Grandmother, Paternal Grandmother.   Previous Breast Cancer Diagnosis:  No    Any further needs:     Other:    Information forwarded to [facility]:  Jamal: 31.62%

## 2024-10-09 DIAGNOSIS — G89.29 CHRONIC RIGHT SHOULDER PAIN: Primary | ICD-10-CM

## 2024-10-09 DIAGNOSIS — M25.511 CHRONIC RIGHT SHOULDER PAIN: Primary | ICD-10-CM

## 2024-10-15 ENCOUNTER — APPOINTMENT (OUTPATIENT)
Dept: LAB | Facility: CLINIC | Age: 39
End: 2024-10-15

## 2024-10-15 DIAGNOSIS — Z00.6 ENCOUNTER FOR EXAMINATION FOR NORMAL COMPARISON OR CONTROL IN CLINICAL RESEARCH PROGRAM: ICD-10-CM

## 2024-10-15 PROCEDURE — 36415 COLL VENOUS BLD VENIPUNCTURE: CPT

## 2024-10-17 ENCOUNTER — OFFICE VISIT (OUTPATIENT)
Dept: FAMILY MEDICINE CLINIC | Facility: CLINIC | Age: 39
End: 2024-10-17
Payer: COMMERCIAL

## 2024-10-17 VITALS
HEIGHT: 64 IN | TEMPERATURE: 97.7 F | OXYGEN SATURATION: 99 % | BODY MASS INDEX: 20.38 KG/M2 | HEART RATE: 77 BPM | SYSTOLIC BLOOD PRESSURE: 102 MMHG | DIASTOLIC BLOOD PRESSURE: 68 MMHG | WEIGHT: 119.4 LBS

## 2024-10-17 DIAGNOSIS — F41.1 GENERALIZED ANXIETY DISORDER: ICD-10-CM

## 2024-10-17 DIAGNOSIS — G47.00 INSOMNIA, UNSPECIFIED TYPE: ICD-10-CM

## 2024-10-17 DIAGNOSIS — F32.2 MODERATELY SEVERE MAJOR DEPRESSION (HCC): ICD-10-CM

## 2024-10-17 DIAGNOSIS — Z13.29 SCREENING FOR THYROID DISORDER: ICD-10-CM

## 2024-10-17 DIAGNOSIS — F43.12 CHRONIC POST-TRAUMATIC STRESS DISORDER (PTSD): ICD-10-CM

## 2024-10-17 DIAGNOSIS — Z13.220 SCREENING FOR CHOLESTEROL LEVEL: ICD-10-CM

## 2024-10-17 DIAGNOSIS — M54.12 CERVICAL RADICULOPATHY: ICD-10-CM

## 2024-10-17 DIAGNOSIS — Z13.1 SCREENING FOR DIABETES MELLITUS: ICD-10-CM

## 2024-10-17 DIAGNOSIS — Z00.00 ANNUAL PHYSICAL EXAM: ICD-10-CM

## 2024-10-17 DIAGNOSIS — Z23 ENCOUNTER FOR IMMUNIZATION: Primary | ICD-10-CM

## 2024-10-17 DIAGNOSIS — Z13.0 SCREENING FOR IRON DEFICIENCY ANEMIA: ICD-10-CM

## 2024-10-17 PROCEDURE — 99396 PREV VISIT EST AGE 40-64: CPT | Performed by: FAMILY MEDICINE

## 2024-10-17 PROCEDURE — 90656 IIV3 VACC NO PRSV 0.5 ML IM: CPT

## 2024-10-17 PROCEDURE — 99214 OFFICE O/P EST MOD 30 MIN: CPT | Performed by: FAMILY MEDICINE

## 2024-10-17 PROCEDURE — 90471 IMMUNIZATION ADMIN: CPT

## 2024-10-17 RX ORDER — BUSPIRONE HYDROCHLORIDE 5 MG/1
5 TABLET ORAL 2 TIMES DAILY
Qty: 60 TABLET | Refills: 5 | Status: SHIPPED | OUTPATIENT
Start: 2024-10-17

## 2024-10-17 NOTE — PATIENT INSTRUCTIONS
"Patient Education     Routine physical for adults   The Basics   Written by the doctors and editors at [facility]   What is a physical? -- A physical is a routine visit, or \"check-up,\" with your doctor. You might also hear it called a \"wellness visit\" or \"preventive visit.\"  During each visit, the doctor will:   Ask about your physical and mental health   Ask about your habits, behaviors, and lifestyle   Do an exam   Give you vaccines if needed   Talk to you about any medicines you take   Give advice about your health   Answer your questions  Getting regular check-ups is an important part of taking care of your health. It can help your doctor find and treat any problems you have. But it's also important for preventing health problems.  A routine physical is different from a \"sick visit.\" A sick visit is when you see a doctor because of a health concern or problem. Since physicals are scheduled ahead of time, you can think about what you want to ask the doctor.  How often should I get a physical? -- It depends on your age and health. In general, for people age 21 years and older:   If you are younger than 50 years, you might be able to get a physical every 3 years.   If you are 50 years or older, your doctor might recommend a physical every year.  If you have an ongoing health condition, like diabetes or high blood pressure, your doctor will probably want to see you more often.  What happens during a physical? -- In general, each visit will include:   Physical exam - The doctor or nurse will check your height, weight, heart rate, and blood pressure. They will also look at your eyes and ears. They will ask about how you are feeling and whether you have any symptoms that bother you.   Medicines - It's a good idea to bring a list of all the medicines you take to each doctor visit. Your doctor will talk to you about your medicines and answer any questions. Tell them if you are having any side effects that bother you. You " "should also tell them if you are having trouble paying for any of your medicines.   Habits and behaviors - This includes:   Your diet   Your exercise habits   Whether you smoke, drink alcohol, or use drugs   Whether you are sexually active   Whether you feel safe at home  Your doctor will talk to you about things you can do to improve your health and lower your risk of health problems. They will also offer help and support. For example, if you want to quit smoking, they can give you advice and might prescribe medicines. If you want to improve your diet or get more physical activity, they can help you with this, too.   Lab tests, if needed - The tests you get will depend on your age and situation. For example, your doctor might want to check your:   Cholesterol   Blood sugar   Iron level   Vaccines - The recommended vaccines will depend on your age, health, and what vaccines you already had. Vaccines are very important because they can prevent certain serious or deadly infections.   Discussion of screening - \"Screening\" means checking for diseases or other health problems before they cause symptoms. Your doctor can recommend screening based on your age, risk, and preferences. This might include tests to check for:   Cancer, such as breast, prostate, cervical, ovarian, colorectal, prostate, lung, or skin cancer   Sexually transmitted infections, such as chlamydia and gonorrhea   Mental health conditions like depression and anxiety  Your doctor will talk to you about the different types of screening tests. They can help you decide which screenings to have. They can also explain what the results might mean.   Answering questions - The physical is a good time to ask the doctor or nurse questions about your health. If needed, they can refer you to other doctors or specialists, too.  Adults older than 65 years often need other care, too. As you get older, your doctor will talk to you about:   How to prevent falling at " home   Hearing or vision tests   Memory testing   How to take your medicines safely   Making sure that you have the help and support you need at home  All topics are updated as new evidence becomes available and our peer review process is complete.  This topic retrieved from [employer] on: May 02, 2024.  Topic 183322 Version 1.0  Release: 32.4.3 - C32.122  © 2024 UpToDate, Inc. and/or its affiliates. All rights reserved.  Consumer Information Use and Disclaimer   Disclaimer: This generalized information is a limited summary of diagnosis, treatment, and/or medication information. It is not meant to be comprehensive and should be used as a tool to help the user understand and/or assess potential diagnostic and treatment options. It does NOT include all information about conditions, treatments, medications, side effects, or risks that may apply to a specific patient. It is not intended to be medical advice or a substitute for the medical advice, diagnosis, or treatment of a health care provider based on the health care provider's examination and assessment of a patient's specific and unique circumstances. Patients must speak with a health care provider for complete information about their health, medical questions, and treatment options, including any risks or benefits regarding use of medications. This information does not endorse any treatments or medications as safe, effective, or approved for treating a specific patient. UpToDate, Inc. and its affiliates disclaim any warranty or liability relating to this information or the use thereof.The use of this information is governed by the Terms of Use, available at https://www.woltersSyntricityuwer.com/en/know/clinical-effectiveness-terms. 2024© UpToDate, Inc. and its affiliates and/or licensors. All rights reserved.  Copyright   © 2024 UpToDate, Inc. and/or its affiliates. All rights reserved.

## 2024-10-17 NOTE — PROGRESS NOTES
Adult Annual Physical  Name: Katina Mendez      : 1985      MRN: [id number]  Encounter Provider: Marilyn Pepe DO  Encounter Date: 10/17/2024   Encounter department: [facility]    Assessment & Plan  Encounter for immunization    Orders:  •  influenza vaccine preservative-free 0.5 mL IM (Fluzone, Afluria, Fluarix, Flulaval)      Annual physical exam           Immunizations and preventive care screenings were discussed with patient today. Appropriate education was printed on patient's after visit summary.    Counseling:  Alcohol/drug use: discussed moderation in alcohol intake, the recommendations for healthy alcohol use, and avoidance of illicit drug use.  Dental Health: discussed importance of regular tooth brushing, flossing, and dental visits.  Injury prevention: discussed safety/seat belts, safety helmets, smoke detectors, carbon monoxide detectors, and smoking near bedding or upholstery.  Sexual health: discussed sexually transmitted diseases, partner selection, use of condoms, avoidance of unintended pregnancy, and contraceptive alternatives.  Exercise: the importance of regular exercise/physical activity was discussed. Recommend exercise 3-5 times per week for at least 30 minutes.          History of Present Illness     Adult Annual Physical:  Patient presents for annual physical.     Diet and Physical Activity:  - Diet/Nutrition: poor diet.  - Exercise: no formal exercise.    Depression Screening:    - PHQ-9 Score: 23    General Health:  - Sleep: sleeps poorly.  - Hearing: normal hearing bilateral ears.  - Vision: no vision problems.  - Dental: regular dental visits.    /GYN Health:  - Follows with GYN: yes.   - Contraception:. Hysterectomy      Review of Systems   Constitutional:  Negative for activity change, chills, fatigue and fever.   HENT:  Negative for congestion, ear pain, sinus pressure and sore throat.    Eyes:  Negative for redness, itching and visual  disturbance.   Respiratory:  Negative for cough and shortness of breath.    Cardiovascular:  Negative for chest pain and palpitations.   Gastrointestinal:  Negative for abdominal pain, diarrhea and nausea.   Endocrine: Negative for cold intolerance and heat intolerance.   Genitourinary:  Negative for dysuria, flank pain and frequency.   Musculoskeletal:  Positive for arthralgias, back pain and neck pain. Negative for gait problem and myalgias.   Skin:  Negative for color change.   Allergic/Immunologic: Negative for environmental allergies.   Neurological:  Negative for dizziness, numbness and headaches.   Psychiatric/Behavioral:  Negative for behavioral problems and sleep disturbance. The patient is nervous/anxious.      Medical History Reviewed by provider this encounter:  Tobacco  Allergies  Meds  Problems  Med Hx  Surg Hx  Fam Hx       Current Outpatient Medications on File Prior to Visit   Medication Sig Dispense Refill   • acyclovir (ZOVIRAX) 5 % ointment APPLY TOPICALLY EVERY 4 (FOUR) HOURS 15 g 0   • Calcium Carbonate Antacid (TUMS PO) Take by mouth if needed     • clonazePAM (KlonoPIN) 1 mg tablet Take 1 tablet (1 mg total) by mouth 2 (two) times a day 60 tablet 0   • cyclobenzaprine (FLEXERIL) 10 mg tablet Take 1 tablet (10 mg total) by mouth 3 (three) times a day as needed for muscle spasms May make you drowsy or dizzy. 30 tablet 0   • Diclofenac Sodium (VOLTAREN) 1 % Apply 2 g topically 4 (four) times a day 100 g 3   • dicyclomine (BENTYL) 20 mg tablet Take 1 tablet (20 mg total) by mouth 3 (three) times a day as needed (for abdominal cramping) 30 tablet 0   • DULoxetine (CYMBALTA) 30 mg delayed release capsule Take 1 capsule (30 mg total) by mouth daily Along with 60 mg capsule 90 capsule 0   • DULoxetine (CYMBALTA) 60 mg delayed release capsule Take 1 capsule (60 mg total) by mouth daily 90 capsule 0   • gabapentin (Neurontin) 600 MG tablet Take 1 tablet (600 mg total) by mouth 3 (three) times a  day 90 tablet 2   • ibuprofen (MOTRIN) 600 mg tablet Take 1 tablet (600 mg total) by mouth every 6 (six) hours as needed for mild pain 120 tablet 1   • lidocaine-prilocaine (EMLA) cream Apply topically as needed for mild pain 30 g 2   • loratadine (CLARITIN) 10 mg tablet Take 1 tablet (10 mg total) by mouth daily (Patient taking differently: Take 10 mg by mouth daily as needed) 30 tablet 2   • Multiple Vitamins-Minerals (MULTIVITAMIN ADULT PO) Take 1 tablet by mouth daily     • ondansetron (ZOFRAN) 4 mg tablet Take 1 tablet (4 mg total) by mouth every 8 (eight) hours as needed for nausea or vomiting 20 tablet 0   • traZODone (DESYREL) 50 mg tablet Take 1 tablet (50 mg total) by mouth daily at bedtime as needed for sleep 90 tablet 1   • acetaminophen (TYLENOL) 650 mg CR tablet Take 1 tablet (650 mg total) by mouth every 8 (eight) hours as needed for mild pain 30 tablet 0   • gabapentin (NEURONTIN) 100 mg capsule Take 100 mg by mouth (Patient not taking: Reported on 9/20/2024)     • meloxicam (MOBIC) 15 mg tablet Take 1 tablet (15 mg total) by mouth daily (Patient not taking: Reported on 10/17/2024) 30 tablet 0   • nabumetone (RELAFEN) 500 mg tablet Take 1 tablet (500 mg total) by mouth 2 (two) times a day (Patient not taking: Reported on 10/17/2024) 60 tablet 2   • naloxone (NARCAN) 4 mg/0.1 mL nasal spray Administer 1 spray into a nostril. If no response after 2-3 minutes, give another dose in the other nostril using a new spray. (Patient not taking: Reported on 4/18/2024) 1 each 1   • oxyCODONE (ROXICODONE) 5 immediate release tablet Take 1 tablet (5 mg total) by mouth every 6 (six) hours as needed for severe pain Max Daily Amount: 20 mg 30 tablet 0     No current facility-administered medications on file prior to visit.      Social History     Tobacco Use   • Smoking status: Every Day     Current packs/day: 0.50     Average packs/day: 0.5 packs/day for 22.5 years (11.3 ttl pk-yrs)     Types: Cigarettes     Start  "date: 4/11/2002   • Smokeless tobacco: Never   Vaping Use   • Vaping status: Never Used   Substance and Sexual Activity   • Alcohol use: Not Currently   • Drug use: No   • Sexual activity: Yes     Partners: Male     Birth control/protection: None     Comment: Hysterectomy on July 1st 2020       Objective     /68 (BP Location: Left arm, Patient Position: Sitting, Cuff Size: Adult)   Pulse 77   Temp 97.7 °F (36.5 °C)   Ht 5' 4\" (1.626 m)   Wt 54.2 kg (119 lb 6.4 oz)   LMP 01/29/2020   SpO2 99%   BMI 20.49 kg/m²     Physical Exam  Vitals reviewed.   Constitutional:       General: She is not in acute distress.     Appearance: Normal appearance. She is well-developed.   HENT:      Head: Normocephalic and atraumatic.      Right Ear: Tympanic membrane, ear canal and external ear normal. There is no impacted cerumen.      Left Ear: Tympanic membrane, ear canal and external ear normal. There is no impacted cerumen.      Nose: Nose normal. No congestion or rhinorrhea.      Mouth/Throat:      Mouth: Mucous membranes are moist.      Pharynx: No oropharyngeal exudate or posterior oropharyngeal erythema.   Eyes:      General: No scleral icterus.        Right eye: No discharge.         Left eye: No discharge.      Extraocular Movements: Extraocular movements intact.      Conjunctiva/sclera: Conjunctivae normal.      Pupils: Pupils are equal, round, and reactive to light.   Neck:      Trachea: No tracheal deviation.   Cardiovascular:      Rate and Rhythm: Normal rate and regular rhythm.      Pulses: Normal pulses.           Dorsalis pedis pulses are 2+ on the right side and 2+ on the left side.        Posterior tibial pulses are 2+ on the right side and 2+ on the left side.      Heart sounds: Normal heart sounds. No murmur heard.     No friction rub. No gallop.   Pulmonary:      Effort: Pulmonary effort is normal. No respiratory distress.      Breath sounds: Normal breath sounds. No wheezing, rhonchi or rales. "   Abdominal:      General: Bowel sounds are normal. There is no distension.      Palpations: Abdomen is soft.      Tenderness: There is no abdominal tenderness. There is no guarding or rebound.   Musculoskeletal:         General: Normal range of motion.      Cervical back: Normal range of motion and neck supple.      Right lower leg: No edema.      Left lower leg: No edema.   Lymphadenopathy:      Head:      Right side of head: No submental or submandibular adenopathy.      Left side of head: No submental or submandibular adenopathy.      Cervical: No cervical adenopathy.      Right cervical: No superficial, deep or posterior cervical adenopathy.     Left cervical: No superficial, deep or posterior cervical adenopathy.   Skin:     General: Skin is warm and dry.      Findings: No erythema.   Neurological:      General: No focal deficit present.      Mental Status: She is alert and oriented to person, place, and time.      Cranial Nerves: No cranial nerve deficit.      Sensory: Sensation is intact. No sensory deficit.      Motor: Motor function is intact.   Psychiatric:         Attention and Perception: Attention and perception normal.         Mood and Affect: Mood is not anxious or depressed.         Speech: Speech normal.         Behavior: Behavior normal.         Thought Content: Thought content normal.         Judgment: Judgment normal.

## 2024-10-18 NOTE — ASSESSMENT & PLAN NOTE
Symptoms appear persisting.  Patient will be having an MRI of her cervical spine and shoulder.  Continue with her current treatment as well as regular follow-up with orthopedics.  Orders:    Magnesium; Future

## 2024-10-18 NOTE — PROGRESS NOTES
Ambulatory Visit  Name: Katina Mendez      : 1985      MRN: [id number]  Encounter Provider: Marilyn Pepe DO  Encounter Date: 10/17/2024   Encounter department: [facility]    Assessment & Plan  Chronic post-traumatic stress disorder (PTSD)  Reviewed patient's symptoms today.  She was counseled in depth.  At this time, given her family/grief stress, she was advised that she would highly benefit from seeing and speaking with a therapist regularly.  Multiple resources were given to her today.  She was advised on importance of speaking with family such as her  regularly concerning this.  Her exercise is limited secondary to her orthopedic problems.  Reviewed medical treatment options.  Will continue with her current treatment of Cymbalta and as well as her clonazepam.   start treatment with BuSpar 5 mg twice daily.  Orders:    busPIRone (BUSPAR) 5 mg tablet; Take 1 tablet (5 mg total) by mouth 2 (two) times a day    Cervical radiculopathy  Symptoms appear persisting.  Patient will be having an MRI of her cervical spine and shoulder.  Continue with her current treatment as well as regular follow-up with orthopedics.  Orders:    Magnesium; Future    Insomnia, unspecified type         Encounter for immunization    Orders:    influenza vaccine preservative-free 0.5 mL IM (Fluzone, Afluria, Fluarix, Flulaval)    Annual physical exam         Screening for iron deficiency anemia         Screening for diabetes mellitus    Orders:    Comprehensive metabolic panel; Future    Hemoglobin A1C With EAG; Future    Comprehensive metabolic panel    Hemoglobin A1C With EAG    Screening for cholesterol level    Orders:    Lipid Panel with Direct LDL reflex; Future    Lipid Panel with Direct LDL reflex    Generalized anxiety disorder         Screening for thyroid disorder    Orders:    TSH, 3rd generation with Free T4 reflex; Future    TSH, 3rd generation with Free T4 reflex    Moderately severe  major depression (HCC)  Depression Screening Follow-up Plan: Patient's depression screening was positive with a PHQ-9 score of 23. Patient assessed for underlying major depression. They have no active suicidal ideations. Brief counseling provided and recommend additional follow-up/re-evaluation next office visit.            History of Present Illness     HPI  Patient is a 39-year-old female presents today for a follow-up on her chronic conditions.  She has cervical radiculopathy, insomnia, generalized anxiety.  She has been taking medications regularly.  She has been having persistent problems with her cervical neck pain.  She has been following with orthopedics as well as pain management.  She is unfortunately still been having persistent pain without any explanation of her discomfort.  She is been taking multiple medications however has not had any significant relief.   Patient has also been under significant stress secondary to her chronic health as well as multiple family stressors.  She states that the stressors are all due to grief related to the passing of her father.  This was approximately 20 years ago however she has never discussed any of these problems with anyone in her family for over 20 years.  She has been having panic attacks as well as stress related to her grief as well as trauma associated with this loss  History obtained from : patient  Review of Systems   Constitutional:  Negative for activity change, chills, fatigue and fever.   HENT:  Negative for congestion, ear pain, sinus pressure and sore throat.    Eyes:  Negative for redness, itching and visual disturbance.   Respiratory:  Negative for cough and shortness of breath.    Cardiovascular:  Negative for chest pain and palpitations.   Gastrointestinal:  Negative for abdominal pain, diarrhea and nausea.   Endocrine: Negative for cold intolerance and heat intolerance.   Genitourinary:  Negative for dysuria, flank pain and frequency.    Musculoskeletal:  Negative for arthralgias, back pain, gait problem and myalgias.   Skin:  Negative for color change.   Allergic/Immunologic: Negative for environmental allergies.   Neurological:  Negative for dizziness, numbness and headaches.   Psychiatric/Behavioral:  Negative for behavioral problems and sleep disturbance.      Medical History Reviewed by provider this encounter:  Tobacco  Allergies  Meds  Problems  Med Hx  Surg Hx  Fam Hx       Current Outpatient Medications on File Prior to Visit   Medication Sig Dispense Refill    acyclovir (ZOVIRAX) 5 % ointment APPLY TOPICALLY EVERY 4 (FOUR) HOURS 15 g 0    Calcium Carbonate Antacid (TUMS PO) Take by mouth if needed      clonazePAM (KlonoPIN) 1 mg tablet Take 1 tablet (1 mg total) by mouth 2 (two) times a day 60 tablet 0    cyclobenzaprine (FLEXERIL) 10 mg tablet Take 1 tablet (10 mg total) by mouth 3 (three) times a day as needed for muscle spasms May make you drowsy or dizzy. 30 tablet 0    Diclofenac Sodium (VOLTAREN) 1 % Apply 2 g topically 4 (four) times a day 100 g 3    dicyclomine (BENTYL) 20 mg tablet Take 1 tablet (20 mg total) by mouth 3 (three) times a day as needed (for abdominal cramping) 30 tablet 0    DULoxetine (CYMBALTA) 30 mg delayed release capsule Take 1 capsule (30 mg total) by mouth daily Along with 60 mg capsule 90 capsule 0    DULoxetine (CYMBALTA) 60 mg delayed release capsule Take 1 capsule (60 mg total) by mouth daily 90 capsule 0    gabapentin (Neurontin) 600 MG tablet Take 1 tablet (600 mg total) by mouth 3 (three) times a day 90 tablet 2    ibuprofen (MOTRIN) 600 mg tablet Take 1 tablet (600 mg total) by mouth every 6 (six) hours as needed for mild pain 120 tablet 1    lidocaine-prilocaine (EMLA) cream Apply topically as needed for mild pain 30 g 2    loratadine (CLARITIN) 10 mg tablet Take 1 tablet (10 mg total) by mouth daily (Patient taking differently: Take 10 mg by mouth daily as needed) 30 tablet 2    Multiple  Vitamins-Minerals (MULTIVITAMIN ADULT PO) Take 1 tablet by mouth daily      ondansetron (ZOFRAN) 4 mg tablet Take 1 tablet (4 mg total) by mouth every 8 (eight) hours as needed for nausea or vomiting 20 tablet 0    traZODone (DESYREL) 50 mg tablet Take 1 tablet (50 mg total) by mouth daily at bedtime as needed for sleep 90 tablet 1    acetaminophen (TYLENOL) 650 mg CR tablet Take 1 tablet (650 mg total) by mouth every 8 (eight) hours as needed for mild pain 30 tablet 0    gabapentin (NEURONTIN) 100 mg capsule Take 100 mg by mouth (Patient not taking: Reported on 9/20/2024)      meloxicam (MOBIC) 15 mg tablet Take 1 tablet (15 mg total) by mouth daily (Patient not taking: Reported on 10/17/2024) 30 tablet 0    nabumetone (RELAFEN) 500 mg tablet Take 1 tablet (500 mg total) by mouth 2 (two) times a day (Patient not taking: Reported on 10/17/2024) 60 tablet 2    naloxone (NARCAN) 4 mg/0.1 mL nasal spray Administer 1 spray into a nostril. If no response after 2-3 minutes, give another dose in the other nostril using a new spray. (Patient not taking: Reported on 4/18/2024) 1 each 1    oxyCODONE (ROXICODONE) 5 immediate release tablet Take 1 tablet (5 mg total) by mouth every 6 (six) hours as needed for severe pain Max Daily Amount: 20 mg 30 tablet 0     No current facility-administered medications on file prior to visit.      Social History     Tobacco Use    Smoking status: Every Day     Current packs/day: 0.50     Average packs/day: 0.5 packs/day for 22.5 years (11.3 ttl pk-yrs)     Types: Cigarettes     Start date: 4/11/2002    Smokeless tobacco: Never   Vaping Use    Vaping status: Never Used   Substance and Sexual Activity    Alcohol use: Not Currently    Drug use: No    Sexual activity: Yes     Partners: Male     Birth control/protection: None     Comment: Hysterectomy on July 1st 2020         Objective     /68 (BP Location: Left arm, Patient Position: Sitting, Cuff Size: Adult)   Pulse 77   Temp 97.7 °F  "(36.5 °C)   Ht 5' 4\" (1.626 m)   Wt 54.2 kg (119 lb 6.4 oz)   LMP 01/29/2020   SpO2 99%   BMI 20.49 kg/m²     Physical Exam  Vitals reviewed.   Constitutional:       General: She is not in acute distress.     Appearance: Normal appearance. She is well-developed.   HENT:      Head: Normocephalic and atraumatic.      Right Ear: Tympanic membrane, ear canal and external ear normal. There is no impacted cerumen.      Left Ear: Tympanic membrane, ear canal and external ear normal. There is no impacted cerumen.      Nose: Nose normal. No congestion or rhinorrhea.      Mouth/Throat:      Mouth: Mucous membranes are moist.      Pharynx: No oropharyngeal exudate or posterior oropharyngeal erythema.   Eyes:      General: No scleral icterus.        Right eye: No discharge.         Left eye: No discharge.      Extraocular Movements: Extraocular movements intact.      Conjunctiva/sclera: Conjunctivae normal.      Pupils: Pupils are equal, round, and reactive to light.   Neck:      Trachea: No tracheal deviation.   Cardiovascular:      Rate and Rhythm: Normal rate and regular rhythm.      Pulses: Normal pulses.           Dorsalis pedis pulses are 2+ on the right side and 2+ on the left side.        Posterior tibial pulses are 2+ on the right side and 2+ on the left side.      Heart sounds: Normal heart sounds. No murmur heard.     No friction rub. No gallop.   Pulmonary:      Effort: Pulmonary effort is normal. No respiratory distress.      Breath sounds: Normal breath sounds. No wheezing, rhonchi or rales.   Abdominal:      General: Bowel sounds are normal. There is no distension.      Palpations: Abdomen is soft.      Tenderness: There is no abdominal tenderness. There is no guarding or rebound.   Musculoskeletal:         General: Normal range of motion.      Cervical back: Normal range of motion and neck supple.      Right lower leg: No edema.      Left lower leg: No edema.   Lymphadenopathy:      Head:      Right side of " head: No submental or submandibular adenopathy.      Left side of head: No submental or submandibular adenopathy.      Cervical: No cervical adenopathy.      Right cervical: No superficial, deep or posterior cervical adenopathy.     Left cervical: No superficial, deep or posterior cervical adenopathy.   Skin:     General: Skin is warm and dry.      Findings: No erythema.   Neurological:      General: No focal deficit present.      Mental Status: She is alert and oriented to person, place, and time.      Cranial Nerves: No cranial nerve deficit.      Sensory: Sensation is intact. No sensory deficit.      Motor: Motor function is intact.   Psychiatric:         Attention and Perception: Attention and perception normal.         Mood and Affect: Mood is not anxious or depressed.         Speech: Speech normal.         Behavior: Behavior normal.         Thought Content: Thought content normal.         Judgment: Judgment normal.       Depression Screening Follow-up Plan: Patient's depression screening was positive with a PHQ-2 score of . Their PHQ-9 score was 23. Patient assessed for underlying major depression. They have no active suicidal ideations. Brief counseling provided and recommend additional follow-up/re-evaluation next office visit.

## 2024-10-21 NOTE — TELEPHONE ENCOUNTER
Pt called stating that when she went to  the duloxetine, she was told it was too early to refill  Is there a way to word it so that it can be filled to add to what she already has for her correct increased dosage?     Please advise [phone] No

## 2024-10-25 ENCOUNTER — TELEPHONE (OUTPATIENT)
Dept: SURGICAL ONCOLOGY | Facility: CLINIC | Age: 39
End: 2024-10-25

## 2024-10-25 ENCOUNTER — OFFICE VISIT (OUTPATIENT)
Dept: SURGICAL ONCOLOGY | Facility: CLINIC | Age: 39
End: 2024-10-25
Payer: COMMERCIAL

## 2024-10-25 VITALS
HEART RATE: 86 BPM | WEIGHT: 120.5 LBS | TEMPERATURE: 97.1 F | BODY MASS INDEX: 20.57 KG/M2 | DIASTOLIC BLOOD PRESSURE: 76 MMHG | HEIGHT: 64 IN | SYSTOLIC BLOOD PRESSURE: 130 MMHG | OXYGEN SATURATION: 99 %

## 2024-10-25 DIAGNOSIS — N64.4 BREAST PAIN, LEFT: ICD-10-CM

## 2024-10-25 DIAGNOSIS — N64.52 DISCHARGE FROM LEFT NIPPLE: ICD-10-CM

## 2024-10-25 DIAGNOSIS — R92.343 EXTREMELY DENSE TISSUE OF BOTH BREASTS ON MAMMOGRAPHY: ICD-10-CM

## 2024-10-25 DIAGNOSIS — N63.24 MASS OF LOWER INNER QUADRANT OF LEFT BREAST: Primary | ICD-10-CM

## 2024-10-25 DIAGNOSIS — Z91.89 AT HIGH RISK FOR BREAST CANCER: ICD-10-CM

## 2024-10-25 LAB
APOB+LDLR+PCSK9 GENE MUT ANL BLD/T: NOT DETECTED
BRCA1+BRCA2 DEL+DUP + FULL MUT ANL BLD/T: NOT DETECTED
MLH1+MSH2+MSH6+PMS2 GN DEL+DUP+FUL M: NOT DETECTED

## 2024-10-25 PROCEDURE — 99204 OFFICE O/P NEW MOD 45 MIN: CPT

## 2024-10-25 NOTE — LETTER
October 25, 2024     Marilyn Pepe DO  [address]  [address]  [facility] [address]    Patient: Katina Mendez   YOB: 1985   Date of Visit: 10/25/2024       Dear Dr. Pepe:    Thank you for referring Katina Mendez to me for evaluation. Below are my notes for this consultation.    If you have questions, please do not hesitate to call me. I look forward to following your patient along with you.         Sincerely,        BORIS Alberto        CC: No Recipients    BORIS Alberto  10/25/2024  2:20 PM  Incomplete               Surgical Oncology Consult       [facility] SURGICAL ONCOLOGY [address] [address]    Katina Mendez  1985  [id number]  [facility] SURGICAL ONCOLOGY [place]  [address]  [place] [address]    1. Mass of lower inner quadrant of left breast  Assessment & Plan:  Palpable nodule on exam is concerning.  There was no correlate on recent diagnostic mammogram.  Given her breast density, high risk status, and presence of daily left nipple discharge, I think it would be prudent to obtain MRI now.  I will call her with results once available.  Orders:  -     MRI breast bilateral w and wo contrast w cad; Future; Expected date: 10/25/2024  2. Breast pain, left  -     Ambulatory Referral to Surgical Oncology  -     MRI breast bilateral w and wo contrast w cad; Future; Expected date: 10/25/2024  3. Extremely dense tissue of both breasts on mammography  -     MRI breast bilateral w and wo contrast w cad; Future; Expected date: 10/25/2024  4. At high risk for breast cancer  Assessment & Plan:  Given her extremely dense breast tissue, she would benefit from supplemental imaging.  Annual MRI would be ideal. However, if patient is unable to tolerate this, we can consider ABUS instead. [place] genetic testing is pending.  If this results negative, I will refer her to  Genetics to discuss additional testing.   Orders:  -     MRI breast bilateral w and wo contrast w cad; Future; Expected date: 10/25/2024  5. Discharge from left nipple  -     MRI breast bilateral w and wo contrast w cad; Future; Expected date: 10/25/2024      Chief Complaint   Patient presents with   • New Patient Visit       Return for Imaging - See orders.      History of Present Illness: This is a 40 y/o female who presents to the office today complaining of left breast pain.  She states it has been present for several months, does not go away, and is mostly in the lateral left and inner left breast.  She also endorses clear or yellow nipple discharge only in the left breast, which occurs almost daily.  She underwent diagnostic breast imaging most recently on September 24, which showed round calcifications in the left breast. Her breasts were noted to be extremely dense.  The patient denies any breast swelling or erythema.  She reports that both of her grandmothers had breast cancer, although she does not know when they were diagnosed.  She also reports that 2 paternal uncles had colon cancer.  The patient reports menarche at age 8 and has never been pregnant.  Although she has undergone subtotal hysterectomy, she still has both ovaries and experiences monthly hormonal symptoms.  Her lifetime risk for breast cancer is in the 25-35% range.  Genetic testing is currently pending.      Review of Systems   Constitutional: Negative.  Negative for chills and fever.   HENT: Negative.     Respiratory: Negative.  Negative for shortness of breath.    Cardiovascular: Negative.    Gastrointestinal: Negative.    Musculoskeletal:  Positive for back pain, myalgias and neck pain.   Skin: Negative.  Negative for color change and wound.   Neurological:  Positive for dizziness and weakness.   Hematological: Negative.  Negative for adenopathy.   Psychiatric/Behavioral:  Positive for agitation and sleep disturbance. The patient is  nervous/anxious.                Patient Active Problem List   Diagnosis   • Generalized anxiety disorder   • Lower back pain   • Insomnia   • GERD without esophagitis   • Left breast lump   • Benign paroxysmal positional vertigo due to bilateral vestibular disorder   • Herpes simplex infection   • Abnormal finding on thyroid function test   • Atypical nevus   • Cervical radiculopathy   • Anxiety   • Tobacco abuse   • Migraines   • Depression   • Mild protein-calorie malnutrition (HCC)   • Neck pain   • S/P cervical discectomy   • Cervical spinal cord compression (HCC)   • Extremely dense tissue of both breasts on mammography   • At high risk for breast cancer     Past Medical History:   Diagnosis Date   • Allergic    • Anxiety    • Bunion     left foot   OR   correction today 4/14/2023   • Cervical radiculopathy    • COVID     x 2-- Jan 2022 and Jan 2023   • Depression    • GERD (gastroesophageal reflux disease)    • Headache(784.0)    • Migraines    • Seasonal allergies    • Tobacco abuse    • Wears contact lenses      Past Surgical History:   Procedure Laterality Date   • BUNIONECTOMY Right    • DISCECTOMY SPINE CERVICAL ANTERIOR W/ ARTHROPLASTY Bilateral 2/29/2024    Procedure: Anterior Cervical discectomy and disc replacement/arthroplasty C5-6;  Surgeon: Edu Del Rio MD;  Location:  MAIN OR;  Service: Orthopedics   • HYSTERECTOMY      2020   • LAPAROSCOPIC TOTAL HYSTERECTOMY     • OR HALLUX RIGIDUS W/CHEILECTOMY 1ST MP JT W/IMPLT Left 04/14/2023    Procedure: BUNIONECTOMY with implant;  Surgeon: Will Cuevas DPM;  Location: [facility] Main OR;  Service: Podiatry   • TONSILLECTOMY       Family History   Problem Relation Age of Onset   • Mental illness Mother    • Depression Mother    • Early death Mother    • Lung cancer Mother 58        with brain mets.   • Cancer Mother    • Anxiety disorder Mother    • OCD Mother    • Psychiatric Illness Mother    • Diabetes Father    • Hypertension Father    • Early death  Father    • Anxiety disorder Sister    • Rashes / Skin problems Sister    • Migraines Sister    • ADD / ADHD Brother    • Anxiety disorder Brother    • Melanoma Brother    • Diabetes Maternal Grandmother    • Arthritis Maternal Grandmother    • Heart disease Maternal Grandmother    • Breast cancer Maternal Grandmother    • No Known Problems Maternal Grandfather    • Breast cancer Paternal Grandmother         age unknown.   • Anuerysm Paternal Grandfather    • Multiple sclerosis Paternal Aunt    • Colon cancer Paternal Uncle    • Colon cancer Paternal Uncle      Social History     Socioeconomic History   • Marital status: /Civil Union     Spouse name: Not on file   • Number of children: Not on file   • Years of education: Not on file   • Highest education level: Not on file   Occupational History   • Not on file   Tobacco Use   • Smoking status: Every Day     Current packs/day: 0.50     Average packs/day: 0.5 packs/day for 22.5 years (11.3 ttl pk-yrs)     Types: Cigarettes     Start date: 4/11/2002   • Smokeless tobacco: Never   Vaping Use   • Vaping status: Never Used   Substance and Sexual Activity   • Alcohol use: Not Currently   • Drug use: No   • Sexual activity: Yes     Partners: Male     Birth control/protection: None     Comment: Hysterectomy on July 1st 2020   Other Topics Concern   • Not on file   Social History Narrative   • Not on file     Social Determinants of Health     Financial Resource Strain: Not on file   Food Insecurity: Not on file   Transportation Needs: Not on file   Physical Activity: Not on file   Stress: Not on file   Social Connections: Unknown (6/18/2024)    Received from [employer] [facility]    Social Connections    • How often do you feel lonely or isolated from those around you? (Adult - for ages 18 years and over): Not on file   Intimate Partner Violence: Not on file   Housing Stability: Not on file       Current Outpatient Medications:   •  acyclovir (ZOVIRAX) 5 % ointment, APPLY  TOPICALLY EVERY 4 (FOUR) HOURS, Disp: 15 g, Rfl: 0  •  busPIRone (BUSPAR) 5 mg tablet, Take 1 tablet (5 mg total) by mouth 2 (two) times a day, Disp: 60 tablet, Rfl: 5  •  Calcium Carbonate Antacid (TUMS PO), Take by mouth if needed, Disp: , Rfl:   •  clonazePAM (KlonoPIN) 1 mg tablet, Take 1 tablet (1 mg total) by mouth 2 (two) times a day, Disp: 60 tablet, Rfl: 0  •  cyclobenzaprine (FLEXERIL) 10 mg tablet, Take 1 tablet (10 mg total) by mouth 3 (three) times a day as needed for muscle spasms May make you drowsy or dizzy., Disp: 30 tablet, Rfl: 0  •  Diclofenac Sodium (VOLTAREN) 1 %, Apply 2 g topically 4 (four) times a day, Disp: 100 g, Rfl: 3  •  dicyclomine (BENTYL) 20 mg tablet, Take 1 tablet (20 mg total) by mouth 3 (three) times a day as needed (for abdominal cramping), Disp: 30 tablet, Rfl: 0  •  DULoxetine (CYMBALTA) 30 mg delayed release capsule, Take 1 capsule (30 mg total) by mouth daily Along with 60 mg capsule, Disp: 90 capsule, Rfl: 0  •  DULoxetine (CYMBALTA) 60 mg delayed release capsule, Take 1 capsule (60 mg total) by mouth daily, Disp: 90 capsule, Rfl: 0  •  gabapentin (Neurontin) 600 MG tablet, Take 1 tablet (600 mg total) by mouth 3 (three) times a day, Disp: 90 tablet, Rfl: 2  •  ibuprofen (MOTRIN) 600 mg tablet, Take 1 tablet (600 mg total) by mouth every 6 (six) hours as needed for mild pain, Disp: 120 tablet, Rfl: 1  •  lidocaine-prilocaine (EMLA) cream, Apply topically as needed for mild pain, Disp: 30 g, Rfl: 2  •  loratadine (CLARITIN) 10 mg tablet, Take 1 tablet (10 mg total) by mouth daily (Patient taking differently: Take 10 mg by mouth daily as needed), Disp: 30 tablet, Rfl: 2  •  Multiple Vitamins-Minerals (MULTIVITAMIN ADULT PO), Take 1 tablet by mouth daily, Disp: , Rfl:   •  ondansetron (ZOFRAN) 4 mg tablet, Take 1 tablet (4 mg total) by mouth every 8 (eight) hours as needed for nausea or vomiting, Disp: 20 tablet, Rfl: 0  •  traZODone (DESYREL) 50 mg tablet, Take 1 tablet (50  mg total) by mouth daily at bedtime as needed for sleep, Disp: 90 tablet, Rfl: 1  •  acetaminophen (TYLENOL) 650 mg CR tablet, Take 1 tablet (650 mg total) by mouth every 8 (eight) hours as needed for mild pain, Disp: 30 tablet, Rfl: 0  •  gabapentin (NEURONTIN) 100 mg capsule, Take 100 mg by mouth (Patient not taking: Reported on 9/20/2024), Disp: , Rfl:   •  meloxicam (MOBIC) 15 mg tablet, Take 1 tablet (15 mg total) by mouth daily (Patient not taking: Reported on 10/17/2024), Disp: 30 tablet, Rfl: 0  •  nabumetone (RELAFEN) 500 mg tablet, Take 1 tablet (500 mg total) by mouth 2 (two) times a day (Patient not taking: Reported on 10/17/2024), Disp: 60 tablet, Rfl: 2  •  naloxone (NARCAN) 4 mg/0.1 mL nasal spray, Administer 1 spray into a nostril. If no response after 2-3 minutes, give another dose in the other nostril using a new spray. (Patient not taking: Reported on 4/18/2024), Disp: 1 each, Rfl: 1  •  oxyCODONE (ROXICODONE) 5 immediate release tablet, Take 1 tablet (5 mg total) by mouth every 6 (six) hours as needed for severe pain Max Daily Amount: 20 mg, Disp: 30 tablet, Rfl: 0  Allergies   Allergen Reactions   • Bactrim [Sulfamethoxazole-Trimethoprim] Hives   • Latex Other (See Comments)     Latex allergy-rashes   • Levofloxacin Hives   • Chlorhexidine Rash   • Quinolones Rash     Vitals:    10/25/24 1303   BP: 130/76   Pulse: 86   Temp: (!) 97.1 °F (36.2 °C)   SpO2: 99%       Physical Exam  Vitals reviewed.   Constitutional:       General: She is not in acute distress.     Appearance: Normal appearance. She is normal weight. She is not ill-appearing or toxic-appearing.   HENT:      Head: Normocephalic and atraumatic.      Nose: Nose normal.      Mouth/Throat:      Mouth: Mucous membranes are moist.   Eyes:      General: No scleral icterus.  Cardiovascular:      Rate and Rhythm: Normal rate.   Pulmonary:      Effort: Pulmonary effort is normal.   Chest:   Breasts:     Right: Normal.      Left: Mass present.           Comments: Breasts are generally symmetric with frequent foci of dense fibroglandular tissue.  There is a small firm, fixed nodule in the 7:00 position of the left breast.  I do not appreciate any other masses, skin changes or adenopathy.  Musculoskeletal:         General: Normal range of motion.      Cervical back: Normal range of motion and neck supple.   Lymphadenopathy:      Cervical: No cervical adenopathy.      Upper Body:      Right upper body: No supraclavicular, axillary or pectoral adenopathy.      Left upper body: No supraclavicular, axillary or pectoral adenopathy.   Skin:     General: Skin is warm and dry.      Coloration: Skin is not jaundiced.      Findings: No erythema.   Neurological:      General: No focal deficit present.      Mental Status: She is alert and oriented to person, place, and time.   Psychiatric:         Mood and Affect: Mood normal.         Behavior: Behavior normal.         Thought Content: Thought content normal.         Judgment: Judgment normal.            Imaging  Mammo diagnostic bilateral w 3d & cad  US breast left limited (diagnostic)  09/24/2024  Narrative & Impression   DIAGNOSIS: Abnormal mammogram, follow-up left breast calcifications, left nipple discharge during the mammogram     TECHNIQUE:   Digital diagnostic mammography was performed. Computer Aided Detection (CAD) analyzed all applicable images.  Left breast ultrasound was performed.      COMPARISONS: Prior breast imaging dated: 11/22/2023, 11/22/2023, 09/19/2023, and 01/06/2017     RELEVANT HISTORY:   Family Breast Cancer History: History of breast cancer in Maternal Grandmother, Paternal Grandmother.  Family Medical History: Family medical history includes breast cancer in 2 relatives (maternal grandmother, paternal grandmother).   Personal History: Hormone history includes birth control. Surgical history includes hysterectomy. No known relevant medical history.     RISK ASSESSMENT:   3 Year Jamal:  1.97%  10 Year Tyrer-Cuzick: 4.83%  Lifetime Tyrer-Cuzick: 31.62%     TISSUE DENSITY:   The breasts are extremely dense, which lowers the sensitivity of mammography.     INDICATION: Katina Mendez is a 39 y.o. female presenting for left breast f/u.     FINDINGS:   LEFT  1) CALCIFICATIONS [A]  Mammo diagnostic bilateral w 3d & cad: There are round calcifications in a grouped distribution seen in the inner central region of the left breast at 9 o'clock in the middle depth.   2) CALCIFICATIONS [B]  Mammo diagnostic bilateral w 3d & cad: There are round calcifications in a grouped distribution seen in the outer central region of the left breast at 3 o'clock in the middle depth.      Right  Mammo diagnostic bilateral w 3d & cad  There are no suspicious masses, grouped microcalcifications or areas of unexplained architectural distortion. The skin and nipple areolar complex are unremarkable.      Sonographic exam of the retroareolar region of the left breast showed very dense fibroglandular tissue with no focal abnormality.     IMPRESSION:  Stable left breast calcifications compared to September 2023.  Follow-up exam is recommended with diagnostic mammogram in 1 year.  Clinical management is recommended if nipple discharge develops.  This patient has been identified as being at elevated risk for development of breast cancer based on the Tyrer-Cuzick model. She may benefit from supplemental screening.     ASSESSMENT/BI-RADS CATEGORY:  Left: 3 - Probably Benign  Right: 2 - Benign  Overall: 3 - Probably Benign     RECOMMENDATION:       - Clinical management for the left breast.       - Diagnostic mammogram in 1 year for both breasts.          I personally reviewed and interpreted the above imaging data.

## 2024-10-25 NOTE — TELEPHONE ENCOUNTER
While at check an urgent breast MRI was put in order for the pt. Pt stated she would only be able to go to [facility]. They are scheduled out until 1/26 @ 6 pm. Spoke w/ Elizabeth. Provider said she will reach out to the radiology dept. Informed pt that appt will be moved up and should expect a call in the next week or two.

## 2024-10-25 NOTE — PROGRESS NOTES
Surgical Oncology Consult       [facility]GICAL ONCOLOGY [place]  [address]  [place] [address]    Katina Mendez  1985  [id number]  [facility] SURGICAL ONCOLOGY [place]  [address]  [facility] [address]    1. Mass of lower inner quadrant of left breast  Assessment & Plan:  Palpable nodule on exam is concerning.  There was no correlate on recent diagnostic mammogram.  Given her breast density, high risk status, and presence of daily left nipple discharge, I think it would be prudent to obtain MRI now.  I will call her with results once available.  Orders:  -     MRI breast bilateral w and wo contrast w cad; Future; Expected date: 10/25/2024  2. Breast pain, left  -     Ambulatory Referral to Surgical Oncology  -     MRI breast bilateral w and wo contrast w cad; Future; Expected date: 10/25/2024  3. Extremely dense tissue of both breasts on mammography  -     MRI breast bilateral w and wo contrast w cad; Future; Expected date: 10/25/2024  4. At high risk for breast cancer  Assessment & Plan:  Given her extremely dense breast tissue, she would benefit from supplemental imaging.  Annual MRI would be ideal. However, if patient is unable to tolerate this, we can consider ABUS instead. [place] genetic testing is pending.  If this results negative, I will refer her to Genetics to discuss additional testing.   Orders:  -     MRI breast bilateral w and wo contrast w cad; Future; Expected date: 10/25/2024  5. Discharge from left nipple  -     MRI breast bilateral w and wo contrast w cad; Future; Expected date: 10/25/2024      Chief Complaint   Patient presents with    New Patient Visit       Return for Imaging - See orders.      History of Present Illness: This is a 38 y/o female who presents to the office today complaining of left breast pain.  She states it has been present for several months, does not go away,  and is mostly in the lateral left and inner left breast.  She also endorses clear or yellow nipple discharge only in the left breast, which occurs almost daily.  She underwent diagnostic breast imaging most recently on September 24, which showed round calcifications in the left breast. Her breasts were noted to be extremely dense.  The patient denies any breast swelling or erythema.  She reports that both of her grandmothers had breast cancer, although she does not know when they were diagnosed.  She also reports that 2 paternal uncles had colon cancer.  The patient reports menarche at age 8 and has never been pregnant.  Although she has undergone subtotal hysterectomy, she still has both ovaries and experiences monthly hormonal symptoms.  Her lifetime risk for breast cancer is in the 25-35% range.  Genetic testing is currently pending.      Review of Systems   Constitutional: Negative.  Negative for chills and fever.   HENT: Negative.     Respiratory: Negative.  Negative for shortness of breath.    Cardiovascular: Negative.    Gastrointestinal: Negative.    Musculoskeletal:  Positive for back pain, myalgias and neck pain.   Skin: Negative.  Negative for color change and wound.   Neurological:  Positive for dizziness and weakness.   Hematological: Negative.  Negative for adenopathy.   Psychiatric/Behavioral:  Positive for agitation and sleep disturbance. The patient is nervous/anxious.                Patient Active Problem List   Diagnosis    Generalized anxiety disorder    Lower back pain    Insomnia    GERD without esophagitis    Left breast lump    Benign paroxysmal positional vertigo due to bilateral vestibular disorder    Herpes simplex infection    Abnormal finding on thyroid function test    Atypical nevus    Cervical radiculopathy    Anxiety    Tobacco abuse    Migraines    Depression    Mild protein-calorie malnutrition (HCC)    Neck pain    S/P cervical discectomy    Cervical spinal cord compression (HCC)     Extremely dense tissue of both breasts on mammography    At high risk for breast cancer     Past Medical History:   Diagnosis Date    Allergic     Anxiety     Bunion     left foot   OR   correction today 4/14/2023    Cervical radiculopathy     COVID     x 2-- Jan 2022 and Jan 2023    Depression     GERD (gastroesophageal reflux disease)     Headache(784.0)     Migraines     Seasonal allergies     Tobacco abuse     Wears contact lenses      Past Surgical History:   Procedure Laterality Date    BUNIONECTOMY Right     DISCECTOMY SPINE CERVICAL ANTERIOR W/ ARTHROPLASTY Bilateral 2/29/2024    Procedure: Anterior Cervical discectomy and disc replacement/arthroplasty C5-6;  Surgeon: Edu Del Rio MD;  Location: [facility];  Service: Orthopedics    HYSTERECTOMY      2020    LAPAROSCOPIC TOTAL HYSTERECTOMY      NY HALLUX RIGIDUS W/CHEILECTOMY 1ST MP JT W/IMPLT Left 04/14/2023    Procedure: BUNIONECTOMY with implant;  Surgeon: Will Cuevas DPM;  Location: [facility];  Service: Podiatry    TONSILLECTOMY       Family History   Problem Relation Age of Onset    Mental illness Mother     Depression Mother     Early death Mother     Lung cancer Mother 58        with brain mets.    Cancer Mother     Anxiety disorder Mother     OCD Mother     Psychiatric Illness Mother     Diabetes Father     Hypertension Father     Early death Father     Anxiety disorder Sister     Rashes / Skin problems Sister     Migraines Sister     ADD / ADHD Brother     Anxiety disorder Brother     Melanoma Brother     Diabetes Maternal Grandmother     Arthritis Maternal Grandmother     Heart disease Maternal Grandmother     Breast cancer Maternal Grandmother     No Known Problems Maternal Grandfather     Breast cancer Paternal Grandmother         age unknown.    Anuerysm Paternal Grandfather     Multiple sclerosis Paternal Aunt     Colon cancer Paternal Uncle     Colon cancer Paternal Uncle      Social History     Socioeconomic History    Marital  status: /Civil Union     Spouse name: Not on file    Number of children: Not on file    Years of education: Not on file    Highest education level: Not on file   Occupational History    Not on file   Tobacco Use    Smoking status: Every Day     Current packs/day: 0.50     Average packs/day: 0.5 packs/day for 22.5 years (11.3 ttl pk-yrs)     Types: Cigarettes     Start date: 4/11/2002    Smokeless tobacco: Never   Vaping Use    Vaping status: Never Used   Substance and Sexual Activity    Alcohol use: Not Currently    Drug use: No    Sexual activity: Yes     Partners: Male     Birth control/protection: None     Comment: Hysterectomy on July 1st 2020   Other Topics Concern    Not on file   Social History Narrative    Not on file     Social Determinants of Health     Financial Resource Strain: Not on file   Food Insecurity: Not on file   Transportation Needs: Not on file   Physical Activity: Not on file   Stress: Not on file   Social Connections: Unknown (6/18/2024)    Received from [employer]     How often do you feel lonely or isolated from those around you? (Adult - for ages 18 years and over): Not on file   Intimate Partner Violence: Not on file   Housing Stability: Not on file       Current Outpatient Medications:     acyclovir (ZOVIRAX) 5 % ointment, APPLY TOPICALLY EVERY 4 (FOUR) HOURS, Disp: 15 g, Rfl: 0    busPIRone (BUSPAR) 5 mg tablet, Take 1 tablet (5 mg total) by mouth 2 (two) times a day, Disp: 60 tablet, Rfl: 5    Calcium Carbonate Antacid (TUMS PO), Take by mouth if needed, Disp: , Rfl:     clonazePAM (KlonoPIN) 1 mg tablet, Take 1 tablet (1 mg total) by mouth 2 (two) times a day, Disp: 60 tablet, Rfl: 0    cyclobenzaprine (FLEXERIL) 10 mg tablet, Take 1 tablet (10 mg total) by mouth 3 (three) times a day as needed for muscle spasms May make you drowsy or dizzy., Disp: 30 tablet, Rfl: 0    Diclofenac Sodium (VOLTAREN) 1 %, Apply 2 g topically 4 (four) times a day, Disp: 100 g,  Rfl: 3    dicyclomine (BENTYL) 20 mg tablet, Take 1 tablet (20 mg total) by mouth 3 (three) times a day as needed (for abdominal cramping), Disp: 30 tablet, Rfl: 0    DULoxetine (CYMBALTA) 30 mg delayed release capsule, Take 1 capsule (30 mg total) by mouth daily Along with 60 mg capsule, Disp: 90 capsule, Rfl: 0    DULoxetine (CYMBALTA) 60 mg delayed release capsule, Take 1 capsule (60 mg total) by mouth daily, Disp: 90 capsule, Rfl: 0    gabapentin (Neurontin) 600 MG tablet, Take 1 tablet (600 mg total) by mouth 3 (three) times a day, Disp: 90 tablet, Rfl: 2    ibuprofen (MOTRIN) 600 mg tablet, Take 1 tablet (600 mg total) by mouth every 6 (six) hours as needed for mild pain, Disp: 120 tablet, Rfl: 1    lidocaine-prilocaine (EMLA) cream, Apply topically as needed for mild pain, Disp: 30 g, Rfl: 2    loratadine (CLARITIN) 10 mg tablet, Take 1 tablet (10 mg total) by mouth daily (Patient taking differently: Take 10 mg by mouth daily as needed), Disp: 30 tablet, Rfl: 2    Multiple Vitamins-Minerals (MULTIVITAMIN ADULT PO), Take 1 tablet by mouth daily, Disp: , Rfl:     ondansetron (ZOFRAN) 4 mg tablet, Take 1 tablet (4 mg total) by mouth every 8 (eight) hours as needed for nausea or vomiting, Disp: 20 tablet, Rfl: 0    traZODone (DESYREL) 50 mg tablet, Take 1 tablet (50 mg total) by mouth daily at bedtime as needed for sleep, Disp: 90 tablet, Rfl: 1    acetaminophen (TYLENOL) 650 mg CR tablet, Take 1 tablet (650 mg total) by mouth every 8 (eight) hours as needed for mild pain, Disp: 30 tablet, Rfl: 0    gabapentin (NEURONTIN) 100 mg capsule, Take 100 mg by mouth (Patient not taking: Reported on 9/20/2024), Disp: , Rfl:     meloxicam (MOBIC) 15 mg tablet, Take 1 tablet (15 mg total) by mouth daily (Patient not taking: Reported on 10/17/2024), Disp: 30 tablet, Rfl: 0    nabumetone (RELAFEN) 500 mg tablet, Take 1 tablet (500 mg total) by mouth 2 (two) times a day (Patient not taking: Reported on 10/17/2024), Disp: 60  tablet, Rfl: 2    naloxone (NARCAN) 4 mg/0.1 mL nasal spray, Administer 1 spray into a nostril. If no response after 2-3 minutes, give another dose in the other nostril using a new spray. (Patient not taking: Reported on 4/18/2024), Disp: 1 each, Rfl: 1    oxyCODONE (ROXICODONE) 5 immediate release tablet, Take 1 tablet (5 mg total) by mouth every 6 (six) hours as needed for severe pain Max Daily Amount: 20 mg, Disp: 30 tablet, Rfl: 0  Allergies   Allergen Reactions    Bactrim [Sulfamethoxazole-Trimethoprim] Hives    Latex Other (See Comments)     Latex allergy-rashes    Levofloxacin Hives    Chlorhexidine Rash    Quinolones Rash     Vitals:    10/25/24 1303   BP: 130/76   Pulse: 86   Temp: (!) 97.1 °F (36.2 °C)   SpO2: 99%       Physical Exam  Vitals reviewed.   Constitutional:       General: She is not in acute distress.     Appearance: Normal appearance. She is normal weight. She is not ill-appearing or toxic-appearing.   HENT:      Head: Normocephalic and atraumatic.      Nose: Nose normal.      Mouth/Throat:      Mouth: Mucous membranes are moist.   Eyes:      General: No scleral icterus.  Cardiovascular:      Rate and Rhythm: Normal rate.   Pulmonary:      Effort: Pulmonary effort is normal.   Chest:   Breasts:     Right: Normal.      Left: Mass present.          Comments: Breasts are generally symmetric with frequent foci of dense fibroglandular tissue.  There is a small firm, fixed nodule in the 7:00 position of the left breast.  I do not appreciate any other masses, skin changes or adenopathy.  Musculoskeletal:         General: Normal range of motion.      Cervical back: Normal range of motion and neck supple.   Lymphadenopathy:      Cervical: No cervical adenopathy.      Upper Body:      Right upper body: No supraclavicular, axillary or pectoral adenopathy.      Left upper body: No supraclavicular, axillary or pectoral adenopathy.   Skin:     General: Skin is warm and dry.      Coloration: Skin is not  jaundiced.      Findings: No erythema.   Neurological:      General: No focal deficit present.      Mental Status: She is alert and oriented to person, place, and time.   Psychiatric:         Mood and Affect: Mood normal.         Behavior: Behavior normal.         Thought Content: Thought content normal.         Judgment: Judgment normal.            Imaging  Mammo diagnostic bilateral w 3d & cad  US breast left limited (diagnostic)  09/24/2024  Narrative & Impression   DIAGNOSIS: Abnormal mammogram, follow-up left breast calcifications, left nipple discharge during the mammogram     TECHNIQUE:   Digital diagnostic mammography was performed. Computer Aided Detection (CAD) analyzed all applicable images.  Left breast ultrasound was performed.      COMPARISONS: Prior breast imaging dated: 11/22/2023, 11/22/2023, 09/19/2023, and 01/06/2017     RELEVANT HISTORY:   Family Breast Cancer History: History of breast cancer in Maternal Grandmother, Paternal Grandmother.  Family Medical History: Family medical history includes breast cancer in 2 relatives (maternal grandmother, paternal grandmother).   Personal History: Hormone history includes birth control. Surgical history includes hysterectomy. No known relevant medical history.     RISK ASSESSMENT:   5 Year Tyrer-Cuzick: 1.97%  10 Year Tyrer-Cuzick: 4.83%  Lifetime Tyrer-Cuzick: 31.62%     TISSUE DENSITY:   The breasts are extremely dense, which lowers the sensitivity of mammography.     INDICATION: Katina Mendez is a 39 y.o. female presenting for left breast f/u.     FINDINGS:   LEFT  1) CALCIFICATIONS [A]  Mammo diagnostic bilateral w 3d & cad: There are round calcifications in a grouped distribution seen in the inner central region of the left breast at 9 o'clock in the middle depth.   2) CALCIFICATIONS [B]  Mammo diagnostic bilateral w 3d & cad: There are round calcifications in a grouped distribution seen in the outer central region of the left breast at 3 o'clock in  the middle depth.      Right  Mammo diagnostic bilateral w 3d & cad  There are no suspicious masses, grouped microcalcifications or areas of unexplained architectural distortion. The skin and nipple areolar complex are unremarkable.      Sonographic exam of the retroareolar region of the left breast showed very dense fibroglandular tissue with no focal abnormality.     IMPRESSION:  Stable left breast calcifications compared to September 2023.  Follow-up exam is recommended with diagnostic mammogram in 1 year.  Clinical management is recommended if nipple discharge develops.  This patient has been identified as being at elevated risk for development of breast cancer based on the Tyrer-Cuzick model. She may benefit from supplemental screening.     ASSESSMENT/BI-RADS CATEGORY:  Left: 3 - Probably Benign  Right: 2 - Benign  Overall: 3 - Probably Benign     RECOMMENDATION:       - Clinical management for the left breast.       - Diagnostic mammogram in 1 year for both breasts.          I personally reviewed and interpreted the above imaging data.

## 2024-10-25 NOTE — ASSESSMENT & PLAN NOTE
Palpable nodule on exam is concerning.  There was no correlate on recent diagnostic mammogram.  Given her breast density, high risk status, and presence of daily left nipple discharge, I think it would be prudent to obtain MRI now.  I will call her with results once available.   Alert and oriented, no focal deficits, no motor or sensory deficits. walking without assistance

## 2024-10-25 NOTE — ASSESSMENT & PLAN NOTE
Given her extremely dense breast tissue, she would benefit from supplemental imaging.  Annual MRI would be ideal. However, if patient is unable to tolerate this, we can consider ABUS instead. [place] genetic testing is pending.  If this results negative, I will refer her to Genetics to discuss additional testing.

## 2024-10-26 DIAGNOSIS — F40.240 CLAUSTROPHOBIA: Primary | ICD-10-CM

## 2024-10-26 RX ORDER — ALPRAZOLAM 1 MG/1
1 TABLET, ORALLY DISINTEGRATING ORAL
Qty: 2 TABLET | Refills: 0 | Status: SHIPPED | OUTPATIENT
Start: 2024-10-26

## 2024-10-28 ENCOUNTER — TELEPHONE (OUTPATIENT)
Dept: FAMILY MEDICINE CLINIC | Facility: CLINIC | Age: 39
End: 2024-10-28

## 2024-10-28 NOTE — TELEPHONE ENCOUNTER
----- Message from Marilyn Pepe DO sent at 10/26/2024  7:47 PM EDT -----  Please call patient, please inform her that a prescription for alprazolam was given for her to take this medication 30 minutes to 1 hour prior to her breast MRI.  Thank you  ----- Message -----  From: BORIS Alberto  Sent: 10/25/2024   1:32 PM EDT  To: Marilyn Pepe, DO    I would like this pt to have a breast MRI.  I know she already takes several medications for anxiety.  Do you feel it would be safe for her to have something extra prior to the MRI?  She has to lie face-down for about 40 minutes and I'm afraid she will have trouble getting through it.  Normally I would offered 1 mg Xanax or 0.5mg Ativan.  Thank you

## 2024-10-29 ENCOUNTER — HOSPITAL ENCOUNTER (OUTPATIENT)
Dept: RADIOLOGY | Facility: HOSPITAL | Age: 39
Discharge: HOME | End: 2024-10-29
Payer: COMMERCIAL

## 2024-10-29 DIAGNOSIS — R92.343 EXTREMELY DENSE TISSUE OF BOTH BREASTS ON MAMMOGRAPHY: ICD-10-CM

## 2024-10-29 DIAGNOSIS — N63.24 MASS OF LOWER INNER QUADRANT OF LEFT BREAST: ICD-10-CM

## 2024-10-29 DIAGNOSIS — N64.4 BREAST PAIN, LEFT: ICD-10-CM

## 2024-10-29 DIAGNOSIS — N64.52 DISCHARGE FROM LEFT NIPPLE: ICD-10-CM

## 2024-10-29 DIAGNOSIS — Z91.89 AT HIGH RISK FOR BREAST CANCER: ICD-10-CM

## 2024-10-29 PROCEDURE — C8937 CAD BREAST MRI: HCPCS

## 2024-10-29 PROCEDURE — A9585 GADOBUTROL INJECTION: HCPCS | Performed by: FAMILY MEDICINE

## 2024-10-29 PROCEDURE — C8908 MRI W/O FOL W/CONT, BREAST,: HCPCS

## 2024-10-29 RX ORDER — GADOBUTROL 604.72 MG/ML
5 INJECTION INTRAVENOUS
Status: COMPLETED | OUTPATIENT
Start: 2024-10-29 | End: 2024-10-29

## 2024-10-29 RX ADMIN — GADOBUTROL 5 ML: 604.72 INJECTION INTRAVENOUS at 17:32

## 2024-10-30 DIAGNOSIS — Z80.3 FAMILY HISTORY OF BREAST CANCER: Primary | ICD-10-CM

## 2024-10-30 DIAGNOSIS — Z80.0 FAMILY HISTORY OF COLON CANCER: ICD-10-CM

## 2024-10-31 ENCOUNTER — TELEPHONE (OUTPATIENT)
Dept: SURGICAL ONCOLOGY | Facility: CLINIC | Age: 39
End: 2024-10-31

## 2024-10-31 DIAGNOSIS — R92.8 ABNORMAL MAGNETIC RESONANCE IMAGING OF RIGHT BREAST: Primary | ICD-10-CM

## 2024-10-31 DIAGNOSIS — F41.1 GENERALIZED ANXIETY DISORDER: ICD-10-CM

## 2024-10-31 DIAGNOSIS — G47.00 INSOMNIA, UNSPECIFIED TYPE: ICD-10-CM

## 2024-10-31 NOTE — TELEPHONE ENCOUNTER
Returned patient's message to let her know she should be hearing from [facility] to scheduled MRI biopsy, before he surgery 12/9/24. Patient was requesting it to be scheduled stat, she was appreciative of the call.

## 2024-11-01 RX ORDER — DULOXETIN HYDROCHLORIDE 60 MG/1
60 CAPSULE, DELAYED RELEASE ORAL DAILY
Qty: 90 CAPSULE | Refills: 0 | Status: SHIPPED | OUTPATIENT
Start: 2024-11-01

## 2024-11-01 RX ORDER — DULOXETIN HYDROCHLORIDE 30 MG/1
30 CAPSULE, DELAYED RELEASE ORAL DAILY
Qty: 90 CAPSULE | Refills: 0 | Status: SHIPPED | OUTPATIENT
Start: 2024-11-01

## 2024-11-01 RX ORDER — TRAZODONE HYDROCHLORIDE 50 MG/1
50 TABLET, FILM COATED ORAL
Qty: 90 TABLET | Refills: 0 | Status: SHIPPED | OUTPATIENT
Start: 2024-11-01

## 2024-11-01 NOTE — PROGRESS NOTES
Call placed to patient regarding recommendation for possible pending results of second look US    __X___ RIGHT ______LEFT      __X___Ultrasound guided  ______Stereotactic breast biopsy.    Pt states that procedure was explained to her, additional questions answered at this time    __X___Verbalized understanding.    Reviewed clip placement with patient, pt states understanding: Yes: _X___ No: ____  Comments:    Blood thinners: No: __X___ Yes: _____ What:     Biopsy teaching sheet given:  _____yes __X__no (All teaching points discussed during call, pt with no questions at this time, pt adv to arrive at 0745 for 0800 US followed by possible biopsy)    Pt given name/# for any further questions/needs    Pt agreeable to a post procedure call and states we can give her biopsy results to her over the phone

## 2024-11-02 RX ORDER — CLONAZEPAM 1 MG/1
1 TABLET ORAL 2 TIMES DAILY
Qty: 60 TABLET | Refills: 0 | Status: SHIPPED | OUTPATIENT
Start: 2024-11-02

## 2024-11-05 ENCOUNTER — TELEPHONE (OUTPATIENT)
Dept: GENETICS | Facility: CLINIC | Age: 39
End: 2024-11-05

## 2024-11-05 ENCOUNTER — DOCUMENTATION (OUTPATIENT)
Dept: GENETICS | Facility: CLINIC | Age: 39
End: 2024-11-05

## 2024-11-06 ENCOUNTER — OFFICE VISIT (OUTPATIENT)
Dept: GENETICS | Facility: CLINIC | Age: 39
End: 2024-11-06
Payer: COMMERCIAL

## 2024-11-06 ENCOUNTER — DOCUMENTATION (OUTPATIENT)
Dept: GENETICS | Facility: CLINIC | Age: 39
End: 2024-11-06

## 2024-11-06 DIAGNOSIS — Z80.0 FAMILY HISTORY OF COLON CANCER: ICD-10-CM

## 2024-11-06 DIAGNOSIS — Z80.3 FAMILY HISTORY OF BREAST CANCER: ICD-10-CM

## 2024-11-06 PROCEDURE — 96040 PR MEDICAL GENETICS COUNSELING EACH 30 MINUTES: CPT

## 2024-11-06 NOTE — PROGRESS NOTES
Pre-Test Genetic Counseling Consult Note    Patient Name: Katina Mendez   /Age: 1985/39 y.o.  Referring Provider: NANCY Stevens    Date of Service: 2024  Genetic Counselor: Jaimee Alcaraz MS, [facility] [place]  Interpretation Services: None  Location: Telephone consult   Length of Visit:  40 Minutes    Katina was referred to the [facility] Cancer Risk and Genetic Assessment Program due to her family history of cancer . she presents today to discuss the possibility of a hereditary cancer syndrome, options for genetic testing, and implications for her and her family.     Cancer History and Treatment:   Personal History:   Oncology History    No history exists.     Screening Hx:   Breast:  Breast Imaging:     MRI breast bilateral w and wo contrast w cad 10/29/24    IMPRESSION:   1.  Right breast 9:00 oval mass is suboptimally evaluated should be evaluated with Second Look ultrasound.  This may represent an intramammary lymph node.  If it cannot be characterized as an intramammary lymph node, then biopsy would be indicated.  2.  No MR findings to explain left breast pain nor left breast discharge.  Recommend clinical management.  3.  Patient was previously recommended to have follow-up bilateral diagnostic mammogram after 2025 for left breast calcifications.    Mammo diagnostic bilateral w 3d & cad US breast left limited (diagnostic) 24    IMPRESSION:  Stable left breast calcifications compared to 2023.  Follow-up exam is recommended with diagnostic mammogram in 1 year.  Clinical management is recommended if nipple discharge develops.  This patient has been identified as being at elevated risk for development of breast cancer based on the Tyrer-Cuzick model. She may benefit from supplemental screening.    Breast Biopsy:    Ultrasound of right breast and possible biopsy scheduled for 24.    Breast Density: Extremely dense    Colon:  Colonoscopy: None    Gynecologic:  WILBERTO     BSO scheduled  "for 12/9/24    Skin:  No current screening    Other screening: None    Reproductive History  Age at menarche: 9  Age at first live birth: Nulliparous  Menopause: Perimenopausal  Hormone replacement: None    Medical and Surgical History  Pertinent surgical history:   Past Surgical History:   Procedure Laterality Date    BUNIONECTOMY Right     DISCECTOMY SPINE CERVICAL ANTERIOR W/ ARTHROPLASTY Bilateral 2/29/2024    Procedure: Anterior Cervical discectomy and disc replacement/arthroplasty C5-6;  Surgeon: Edu Del Rio MD;  Location:  MAIN OR;  Service: Orthopedics    HYSTERECTOMY      2020    LAPAROSCOPIC TOTAL HYSTERECTOMY      RI HALLUX RIGIDUS W/CHEILECTOMY 1ST MP JT W/IMPLT Left 04/14/2023    Procedure: BUNIONECTOMY with implant;  Surgeon: Will Cuevas DPM;  Location: [facility];  Service: Podiatry    TONSILLECTOMY        Pertinent medical history:  Past Medical History:   Diagnosis Date    Allergic     Anxiety     Bunion     left foot   OR   correction today 4/14/2023    Cervical radiculopathy     COVID     x 2-- Jan 2022 and Jan 2023    Depression     GERD (gastroesophageal reflux disease)     Headache(784.0)     Migraines     Seasonal allergies     Tobacco abuse     Wears contact lenses        Other History:  Height:   Ht Readings from Last 1 Encounters:   10/25/24 5' 4\" (1.626 m)     Weight:   Wt Readings from Last 1 Encounters:   10/25/24 54.7 kg (120 lb 8 oz)     Relevant Family History   Patient reports no Ashkenazi Islam ancestry.           Please refer to the scanned pedigree in the Media Tab for a complete family history     *All history is reported as provided by the patient; records are not available for review, except where indicated.     Assessment:  We discussed sporadic, familial and hereditary cancer. We reviewed that only 5-10% of cancers are considered hereditary. Cancers such as lung cancer and liver cancer rarely have a hereditary etiology, and we cannot test for a genetic " predisposition for these cancers at this time. We also discussed the many factors that influence our risk for cancer such as age, environmental exposures, lifestyle choices and family history.     We reviewed the indications suggestive of a hereditary predisposition to cancer.    Katina participated in the [employer] program.  We reviewed that [employer] is a community health research program that analyzes 11 genes known to cause the CDC Tier I conditions  HBOC syndrome, Capone syndrome, and Familial Hypercholesterolemia.  The following genes are included on this test: BRCA1, BRCA2, MLH1, MSH2, MSH6, PMS2, EPCAM, APOB, LDLR, LDLRAP1, and PCSK9.  Katina's result was negative for these 11 genes meaning that no clinically actionable variants were identified.  Of note, [facility] does not report on variants of uncertain significance.      Despite this negative result, comprehensive genetic testing is appropriate to rule out moderately penetrant genes which have clear management recommendations.    Of note, while testing an affected family member is most informative, it is also appropriate to test unaffected family members who meet testing criteria (NCCN Guidelines for Genetic/Familial High-Risk Assessment: Breast, Ovarian, and Pancreatic).      Genetic testing is indicated for Katina based on the following criteria: Meets NCCN V1.2025 Testing Criteria for High-Penetrance Breast Cancer Susceptibility Genes:  Patient has a second-degree blood relative (maternal grandmother) with breast cancer under age 50    The risks, benefits, and limitations of genetic testing were reviewed with the patient, as well as genetic discrimination laws, and possible test results (positive, negative, variants of uncertain significance) and their clinical implications. If positive for a mutation, options for managing cancer risk including increased surveillance, chemoprevention, and in some cases prophylactic surgery were discussed. Katina  was informed that if a hereditary cancer syndrome was identified in her, first degree relatives (parents, siblings, and children) have a chance of also inheriting the condition. Genetic testing would allow for predictive genetic testing in other relatives, who may also be at risk depending on their degree of relation.     Residual Risk for Breast Cancer:  Katina and I reviewed that even if her genetic testing were to return negative, she would still be at an increased risk for breast cancer given her family history. Based on her reported family history, Katina's estimated residual lifetime risk for breast cancer is 20% or higher, which puts her in a high-risk category. The National Comprehensive Cancer Network (NCCN) recommends that women at high risk for breast cancer follow the below screening interval. Katina already follows with Surgical Oncology to meet with a high risk provider to discuss increased breast cancer screening.    Nathan Curran Risk Model Score:  21.3%    Breast cancer screening per the NCCN Breast Cancer Risk Reduction as of 11/06/24  Screening for women who have a lifetime risk of >20% as defined by models that are largely dependent on family history:  Breast awareness  Clinical encounter every 6-12 months   Consider referral to a breast specialist   Annual screening mammogram.  Tomosynthesis is recommended if available beginning 10 years prior to the youngest family member but not less than age 30 years  Annual breast MRI to begin 10 years prior to the youngest family member but not less than age 25 years  Consider whole breast ultrasound or contrast-enhanced mammography for those who qualify for but cannot undergo MRI    Billing:  Most individuals pay <$100 for hereditary cancer genetic testing. If insurance covers the cost of the testing, individuals may still pay out of pocket secondary to co-pays, co-insurance, or deductibles. If the cost of the testing exceeds $100, the lab will reach out to  the patient via phone or e-mail. The patient will then have the option to proceed with the testing, cancel the testing, or elect the self-pay option of $250. Katina verbalized understanding.     Plan: Patient decided to proceed with testing and provided consent.    Summary:     Sample Collection:  A blood kit was mailed home to the patient    Genetic Testing Preformed: [employer]: Cancer® +RNAinsight® (59 genes): APC, TYREE, AXIN2, BAP1, BARD1, BMPR1A, BRCA1, BRCA2, BRIP1, CDH1, CDK4, CDKN1B, CDKN2A, CHEK2, CTNNA1, DICER1, EGLN1, EPCAM, FH, FLCN, GREM1, HOXB13, KIF1B, KIT, MAX, MEN1, MET, MITF, MLH1, MSH2, MSH3, MSH6, MUTYH, NF1, NTHL1, PALB2, PDGFRA PMS2, POLD1, POLE, POT1, PTEN, RAD51C, RAD51D, RB1, RET, SDHA, SDHAF2, SDHB, SDHC, SDHD, SMAD4, SMARCA4, STK11, JQOM809, TP53, TSC1, TSC2, VHL      Result Call Information:  In the event that we need to reach Katina via telephone:  I confirmed the patient's mobile number on file as the best number to call with results  I confirmed with the patient that we can leave a voicemail on the provided numbers    Results take approximately 2-3 weeks to complete once test is started.    Katina will be notified via [employer] once results are available.      Additional recommendations for surveillance/medical management will be made pending genetic test results.

## 2024-11-10 DIAGNOSIS — M54.12 CERVICAL RADICULOPATHY: ICD-10-CM

## 2024-11-11 ENCOUNTER — APPOINTMENT (OUTPATIENT)
Dept: LAB | Facility: CLINIC | Age: 39
End: 2024-11-11
Payer: COMMERCIAL

## 2024-11-11 DIAGNOSIS — Z80.0 FAMILY HISTORY OF MALIGNANT NEOPLASM OF GASTROINTESTINAL TRACT: ICD-10-CM

## 2024-11-11 DIAGNOSIS — Z80.3 FAMILY HISTORY OF MALIGNANT NEOPLASM OF BREAST: ICD-10-CM

## 2024-11-11 DIAGNOSIS — Z80.8 FAMILY HISTORY OF MALIGNANT NEOPLASM OF THYROID: ICD-10-CM

## 2024-11-11 DIAGNOSIS — Z01.818 PRE-OP TESTING: ICD-10-CM

## 2024-11-11 DIAGNOSIS — Z01.818 PRE-OP EXAM: ICD-10-CM

## 2024-11-11 DIAGNOSIS — Z13.1 SCREENING FOR DIABETES MELLITUS: ICD-10-CM

## 2024-11-11 LAB
ABO GROUP BLD: NORMAL
ALBUMIN SERPL BCG-MCNC: 4 G/DL (ref 3.5–5)
ALP SERPL-CCNC: 50 U/L (ref 34–104)
ALT SERPL W P-5'-P-CCNC: 10 U/L (ref 7–52)
ANION GAP SERPL CALCULATED.3IONS-SCNC: 8 MMOL/L (ref 4–13)
AST SERPL W P-5'-P-CCNC: 13 U/L (ref 13–39)
BILIRUB SERPL-MCNC: 0.58 MG/DL (ref 0.2–1)
BLD GP AB SCN SERPL QL: NEGATIVE
BUN SERPL-MCNC: 6 MG/DL (ref 5–25)
CALCIUM SERPL-MCNC: 8.9 MG/DL (ref 8.4–10.2)
CHLORIDE SERPL-SCNC: 104 MMOL/L (ref 96–108)
CHOLEST SERPL-MCNC: 199 MG/DL
CO2 SERPL-SCNC: 28 MMOL/L (ref 21–32)
CREAT SERPL-MCNC: 0.62 MG/DL (ref 0.6–1.3)
EST. AVERAGE GLUCOSE BLD GHB EST-MCNC: 111 MG/DL
GFR SERPL CREATININE-BSD FRML MDRD: 112 ML/MIN/1.73SQ M
GLUCOSE P FAST SERPL-MCNC: 105 MG/DL (ref 65–99)
HBA1C MFR BLD: 5.5 %
HDLC SERPL-MCNC: 56 MG/DL
LDLC SERPL CALC-MCNC: 110 MG/DL (ref 0–100)
POTASSIUM SERPL-SCNC: 4.1 MMOL/L (ref 3.5–5.3)
PROT SERPL-MCNC: 6.5 G/DL (ref 6.4–8.4)
RH BLD: POSITIVE
SODIUM SERPL-SCNC: 140 MMOL/L (ref 135–147)
SPECIMEN EXPIRATION DATE: NORMAL
T4 SERPL-MCNC: 6.41 UG/DL (ref 6.09–12.23)
TRIGL SERPL-MCNC: 163 MG/DL
TSH SERPL DL<=0.05 MIU/L-ACNC: 3.02 UIU/ML (ref 0.45–4.5)

## 2024-11-11 PROCEDURE — 36415 COLL VENOUS BLD VENIPUNCTURE: CPT

## 2024-11-11 PROCEDURE — 86901 BLOOD TYPING SEROLOGIC RH(D): CPT

## 2024-11-11 PROCEDURE — 84436 ASSAY OF TOTAL THYROXINE: CPT

## 2024-11-11 PROCEDURE — 80061 LIPID PANEL: CPT

## 2024-11-11 PROCEDURE — 84443 ASSAY THYROID STIM HORMONE: CPT

## 2024-11-11 PROCEDURE — 86900 BLOOD TYPING SEROLOGIC ABO: CPT

## 2024-11-11 PROCEDURE — 86850 RBC ANTIBODY SCREEN: CPT

## 2024-11-11 PROCEDURE — 83036 HEMOGLOBIN GLYCOSYLATED A1C: CPT

## 2024-11-11 PROCEDURE — 80053 COMPREHEN METABOLIC PANEL: CPT

## 2024-11-11 RX ORDER — GABAPENTIN 600 MG/1
600 TABLET ORAL 3 TIMES DAILY
Qty: 90 TABLET | Refills: 2 | Status: SHIPPED | OUTPATIENT
Start: 2024-11-11

## 2024-11-11 RX ORDER — GABAPENTIN 600 MG/1
600 TABLET ORAL 3 TIMES DAILY
Qty: 90 TABLET | Refills: 0 | OUTPATIENT
Start: 2024-11-11

## 2024-11-11 NOTE — TELEPHONE ENCOUNTER
If pain started when gabapentin was increased then we can try weaning back to her original dose of gabapentin. Let me know if she would like to try this and I can send new script to her pharmacy.    Agree with recs to follow up with PCP as well to rule out other potential causes as her most recent MRI lumbar spine was normal.

## 2024-11-11 NOTE — TELEPHONE ENCOUNTER
"S/w pt regarding refill request for Gabapentin 600 mg tid. Med was last ordered in 8/2024 with 2 refills. Per pt the med is helping and pt states she is having \"Severe leg cramps in my calf and upper leg. It's primarily on my RT side but have it on LT side as well. We discussed this at my last office visit and I was told to call if it got worse.\" Pt denies swelling and or discoloration in BLE. Per pt \" The muscular cramping is so bad I can barely walk at times. This pain started as my Gabapentin was increased.\"     Please advise regarding refill request for Gabapentin. Pt advised she has not tried Lyrica in the past. CG- Is Lyrica an option? Pt inquired if potential side effects are the same she will just stay on Gabapentin. Would you like pt to f/u with PCP for to r/o other causes and possible imaging?   "

## 2024-11-12 ENCOUNTER — HOSPITAL ENCOUNTER (OUTPATIENT)
Dept: MAMMOGRAPHY | Facility: CLINIC | Age: 39
Discharge: HOME | End: 2024-11-12
Payer: COMMERCIAL

## 2024-11-12 ENCOUNTER — HOSPITAL ENCOUNTER (OUTPATIENT)
Dept: ULTRASOUND IMAGING | Facility: CLINIC | Age: 39
Discharge: HOME | End: 2024-11-12
Payer: COMMERCIAL

## 2024-11-12 ENCOUNTER — TELEPHONE (OUTPATIENT)
Dept: RADIOLOGY | Facility: HOSPITAL | Age: 39
End: 2024-11-12

## 2024-11-12 VITALS — DIASTOLIC BLOOD PRESSURE: 72 MMHG | HEART RATE: 73 BPM | SYSTOLIC BLOOD PRESSURE: 113 MMHG

## 2024-11-12 VITALS — HEIGHT: 64 IN | WEIGHT: 120 LBS | BODY MASS INDEX: 20.49 KG/M2

## 2024-11-12 DIAGNOSIS — R92.8 ABNORMAL MAGNETIC RESONANCE IMAGING OF RIGHT BREAST: ICD-10-CM

## 2024-11-12 DIAGNOSIS — R92.8 ABNORMAL MRI, BREAST: ICD-10-CM

## 2024-11-12 PROCEDURE — A4648 IMPLANTABLE TISSUE MARKER: HCPCS

## 2024-11-12 PROCEDURE — 88341 IMHCHEM/IMCYTCHM EA ADD ANTB: CPT | Performed by: STUDENT IN AN ORGANIZED HEALTH CARE EDUCATION/TRAINING PROGRAM

## 2024-11-12 PROCEDURE — 19083 BX BREAST 1ST LESION US IMAG: CPT

## 2024-11-12 PROCEDURE — 88305 TISSUE EXAM BY PATHOLOGIST: CPT | Performed by: STUDENT IN AN ORGANIZED HEALTH CARE EDUCATION/TRAINING PROGRAM

## 2024-11-12 PROCEDURE — 76642 ULTRASOUND BREAST LIMITED: CPT

## 2024-11-12 PROCEDURE — 88342 IMHCHEM/IMCYTCHM 1ST ANTB: CPT | Performed by: STUDENT IN AN ORGANIZED HEALTH CARE EDUCATION/TRAINING PROGRAM

## 2024-11-12 RX ORDER — LIDOCAINE HYDROCHLORIDE 10 MG/ML
5 INJECTION, SOLUTION EPIDURAL; INFILTRATION; INTRACAUDAL; PERINEURAL ONCE
Status: COMPLETED | OUTPATIENT
Start: 2024-11-12 | End: 2024-11-12

## 2024-11-12 RX ADMIN — LIDOCAINE HYDROCHLORIDE 5 ML: 10 INJECTION, SOLUTION EPIDURAL; INFILTRATION; INTRACAUDAL; PERINEURAL at 08:33

## 2024-11-12 NOTE — NURSING NOTE
Call placed to pt to discuss upcoming appointment at [facility] Radiology Department and consultation completed.  Pt is having a R Shoulder Arthrogram completed on 11/13/2024.  Allergies reviewed and verified pt does not currently take any anticoagulant medications.  Pre procedure instructions including diet and taking own medications discussed with pt.  Pt instructed that she may eat normally and take medications as usual before the procedure.  Pt verbalized understanding of instructions given.  Reminded pt of the location, date and time of procedure.  My number was given to call if any questions or concerns arise pre or post procedure.

## 2024-11-12 NOTE — PROGRESS NOTES
Procedure type:    ___x__ultrasound guided _____stereotactic    Breast:    _____Left __x___Right    Location: 10 o'clock 7cmfn    Needle: 14G    # of passes: 4    Clip: Butterfly    Performed by: Dr. Farley    Pressure held for 5 minutes by: Rohini Lr Strips:    ___X__yes _____no    Band aid:    ____yes__x___no per patient (used 2x2  guaze and paper tape)    Tolerated procedure:    __X___yes _____no

## 2024-11-13 ENCOUNTER — TELEPHONE (OUTPATIENT)
Dept: FAMILY MEDICINE CLINIC | Facility: CLINIC | Age: 39
End: 2024-11-13

## 2024-11-13 ENCOUNTER — HOSPITAL ENCOUNTER (OUTPATIENT)
Dept: RADIOLOGY | Facility: HOSPITAL | Age: 39
Discharge: HOME | End: 2024-11-13
Attending: ORTHOPAEDIC SURGERY
Payer: COMMERCIAL

## 2024-11-13 ENCOUNTER — RESULTS FOLLOW-UP (OUTPATIENT)
Dept: FAMILY MEDICINE CLINIC | Facility: CLINIC | Age: 39
End: 2024-11-13

## 2024-11-13 ENCOUNTER — HOSPITAL ENCOUNTER (OUTPATIENT)
Dept: MRI IMAGING | Facility: HOSPITAL | Age: 39
Discharge: HOME | End: 2024-11-13
Attending: ORTHOPAEDIC SURGERY
Payer: COMMERCIAL

## 2024-11-13 DIAGNOSIS — G89.29 CHRONIC RIGHT SHOULDER PAIN: ICD-10-CM

## 2024-11-13 DIAGNOSIS — M25.511 CHRONIC RIGHT SHOULDER PAIN: ICD-10-CM

## 2024-11-13 PROCEDURE — 77002 NEEDLE LOCALIZATION BY XRAY: CPT

## 2024-11-13 PROCEDURE — 23350 INJECTION FOR SHOULDER X-RAY: CPT

## 2024-11-13 PROCEDURE — A9585 GADOBUTROL INJECTION: HCPCS | Performed by: ORTHOPAEDIC SURGERY

## 2024-11-13 PROCEDURE — 73222 MRI JOINT UPR EXTREM W/DYE: CPT

## 2024-11-13 RX ORDER — GADOBUTROL 604.72 MG/ML
0.2 INJECTION INTRAVENOUS
Status: COMPLETED | OUTPATIENT
Start: 2024-11-13 | End: 2024-11-13

## 2024-11-13 RX ORDER — ROPIVACAINE HYDROCHLORIDE 2 MG/ML
2 INJECTION, SOLUTION EPIDURAL; INFILTRATION; PERINEURAL
Status: DISCONTINUED | OUTPATIENT
Start: 2024-11-13 | End: 2024-11-14 | Stop reason: HOSPADM

## 2024-11-13 RX ORDER — SODIUM CHLORIDE 9 MG/ML
15 INJECTION INTRAVENOUS
Status: COMPLETED | OUTPATIENT
Start: 2024-11-13 | End: 2024-11-13

## 2024-11-13 RX ADMIN — ROPIVACAINE HYDROCHLORIDE 2 ML/HR: 2 INJECTION EPIDURAL; INFILTRATION; PERINEURAL at 12:55

## 2024-11-13 RX ADMIN — GADOBUTROL 0.2 ML: 604.72 INJECTION INTRAVENOUS at 12:55

## 2024-11-13 RX ADMIN — SODIUM CHLORIDE 2 ML: 9 INJECTION, SOLUTION INTRAMUSCULAR; INTRAVENOUS; SUBCUTANEOUS at 12:55

## 2024-11-13 RX ADMIN — IOHEXOL 2 ML: 300 INJECTION, SOLUTION INTRAVENOUS at 12:54

## 2024-11-13 NOTE — PROGRESS NOTES
Post procedure call completed on 11/13/24 @ 3199    Bleeding: _____yes ___x__no    Pain: _____yes ___x___no    Redness/Swelling: ______yes ___x___no    Band aid removed: _____yes ___x__no    Steri-Strips intact: ___x___yes _____no    Pt states she removed the band aid over biopsy site after her shower this morning because it had a little blood on it. She states she plans to remove the band aid over the biopsy site later today. She states her right breast is a little tender.

## 2024-11-13 NOTE — TELEPHONE ENCOUNTER
Spoke to patient. Let her know that her Health and Wellness Program paperwork was faxed and scanned into her chart. Patient did not want original.

## 2024-11-14 ENCOUNTER — TELEPHONE (OUTPATIENT)
Dept: SURGICAL ONCOLOGY | Facility: CLINIC | Age: 39
End: 2024-11-14

## 2024-11-14 PROCEDURE — 88342 IMHCHEM/IMCYTCHM 1ST ANTB: CPT | Performed by: STUDENT IN AN ORGANIZED HEALTH CARE EDUCATION/TRAINING PROGRAM

## 2024-11-14 PROCEDURE — 88305 TISSUE EXAM BY PATHOLOGIST: CPT | Performed by: STUDENT IN AN ORGANIZED HEALTH CARE EDUCATION/TRAINING PROGRAM

## 2024-11-14 PROCEDURE — 88341 IMHCHEM/IMCYTCHM EA ADD ANTB: CPT | Performed by: STUDENT IN AN ORGANIZED HEALTH CARE EDUCATION/TRAINING PROGRAM

## 2024-11-14 NOTE — TELEPHONE ENCOUNTER
Called patient and left voicemail there her breast biopsy pathology did not show any cancer.  The radiologist did recommend a surgical consultation, but I have reviewed her case with Dr Cueva, who does NOT recommend surgical excision.  I would like to see her back after her next mammogram.  Provided office callback number.

## 2024-11-15 NOTE — TELEPHONE ENCOUNTER
"FYI:  Pt calling back after message left by Elizabeth NP yesterday about her breats bx results. Informed pt of the following per Elizabeth NP \"breast biopsy pathology did not show any cancer. The radiologist did recommend a surgical consultation, but I have reviewed her case with Dr Cueva, who does NOT recommend surgical excision. I would like to see her back after her next mammogram.\"    Pt would like to know WHY Dr Cueva feels surgical excision is not needed when radiologist reccommended??    Pls advise Per pt may leave a message in her Mychart to the answer.  "

## 2024-11-19 ENCOUNTER — PATIENT MESSAGE (OUTPATIENT)
Dept: OBGYN CLINIC | Facility: HOSPITAL | Age: 39
End: 2024-11-19

## 2024-11-21 ENCOUNTER — TELEPHONE (OUTPATIENT)
Dept: SURGICAL ONCOLOGY | Facility: CLINIC | Age: 39
End: 2024-11-21

## 2024-11-21 NOTE — TELEPHONE ENCOUNTER
Called and spoke directly to pt at this time to make aware that Dr Cueva had an opening today at 1 pm. She states that she has an appt for 12/4 and is good with keeping that date.

## 2024-11-22 DIAGNOSIS — F41.1 GENERALIZED ANXIETY DISORDER: ICD-10-CM

## 2024-11-22 DIAGNOSIS — F40.240 CLAUSTROPHOBIA: ICD-10-CM

## 2024-11-22 DIAGNOSIS — Z98.890 S/P CERVICAL DISC REPLACEMENT: Primary | ICD-10-CM

## 2024-11-22 LAB
GENE DIS ANL INTERP-IMP: NORMAL
INTERPRETATION: NORMAL

## 2024-11-22 RX ORDER — ALPRAZOLAM 1 MG/1
1 TABLET, ORALLY DISINTEGRATING ORAL
Qty: 6 TABLET | Refills: 0 | Status: SHIPPED | OUTPATIENT
Start: 2024-11-22

## 2024-11-22 NOTE — PATIENT COMMUNICATION
Called and advised pt we ordered an MRI and ct of her cervical spine. Provided central scheduling number.

## 2024-11-27 ENCOUNTER — APPOINTMENT (OUTPATIENT)
Dept: LAB | Facility: CLINIC | Age: 39
End: 2024-11-27
Payer: COMMERCIAL

## 2024-11-27 DIAGNOSIS — Z01.818 PRE-OP TESTING: ICD-10-CM

## 2024-11-27 LAB
ANION GAP SERPL CALCULATED.3IONS-SCNC: 9 MMOL/L (ref 4–13)
BASOPHILS # BLD AUTO: 0.08 THOUSANDS/ÂΜL (ref 0–0.1)
BASOPHILS NFR BLD AUTO: 1 % (ref 0–1)
BUN SERPL-MCNC: 6 MG/DL (ref 5–25)
CALCIUM SERPL-MCNC: 9 MG/DL (ref 8.4–10.2)
CHLORIDE SERPL-SCNC: 105 MMOL/L (ref 96–108)
CO2 SERPL-SCNC: 26 MMOL/L (ref 21–32)
CREAT SERPL-MCNC: 0.64 MG/DL (ref 0.6–1.3)
EOSINOPHIL # BLD AUTO: 0.14 THOUSAND/ÂΜL (ref 0–0.61)
EOSINOPHIL NFR BLD AUTO: 2 % (ref 0–6)
ERYTHROCYTE [DISTWIDTH] IN BLOOD BY AUTOMATED COUNT: 12.3 % (ref 11.6–15.1)
GFR SERPL CREATININE-BSD FRML MDRD: 111 ML/MIN/1.73SQ M
GLUCOSE P FAST SERPL-MCNC: 108 MG/DL (ref 65–99)
HCT VFR BLD AUTO: 39.2 % (ref 34.8–46.1)
HGB BLD-MCNC: 12.7 G/DL (ref 11.5–15.4)
IMM GRANULOCYTES # BLD AUTO: 0.03 THOUSAND/UL (ref 0–0.2)
IMM GRANULOCYTES NFR BLD AUTO: 0 % (ref 0–2)
LYMPHOCYTES # BLD AUTO: 1.85 THOUSANDS/ÂΜL (ref 0.6–4.47)
LYMPHOCYTES NFR BLD AUTO: 27 % (ref 14–44)
MCH RBC QN AUTO: 30 PG (ref 26.8–34.3)
MCHC RBC AUTO-ENTMCNC: 32.4 G/DL (ref 31.4–37.4)
MCV RBC AUTO: 93 FL (ref 82–98)
MONOCYTES # BLD AUTO: 0.61 THOUSAND/ÂΜL (ref 0.17–1.22)
MONOCYTES NFR BLD AUTO: 9 % (ref 4–12)
NEUTROPHILS # BLD AUTO: 4.21 THOUSANDS/ÂΜL (ref 1.85–7.62)
NEUTS SEG NFR BLD AUTO: 61 % (ref 43–75)
NRBC BLD AUTO-RTO: 0 /100 WBCS
PLATELET # BLD AUTO: 250 THOUSANDS/UL (ref 149–390)
PMV BLD AUTO: 9.8 FL (ref 8.9–12.7)
POTASSIUM SERPL-SCNC: 4.1 MMOL/L (ref 3.5–5.3)
RBC # BLD AUTO: 4.24 MILLION/UL (ref 3.81–5.12)
SODIUM SERPL-SCNC: 140 MMOL/L (ref 135–147)
WBC # BLD AUTO: 6.92 THOUSAND/UL (ref 4.31–10.16)

## 2024-11-27 PROCEDURE — 85025 COMPLETE CBC W/AUTO DIFF WBC: CPT

## 2024-11-27 PROCEDURE — 36415 COLL VENOUS BLD VENIPUNCTURE: CPT

## 2024-11-27 PROCEDURE — 80048 BASIC METABOLIC PNL TOTAL CA: CPT

## 2024-11-27 NOTE — PRE-PROCEDURE INSTRUCTIONS
Pre-Surgery Instructions:   Medication Instructions    busPIRone (BUSPAR) 5 mg tablet Take day of surgery.    clonazePAM (KlonoPIN) 1 mg tablet Take day of surgery.    cyclobenzaprine (FLEXERIL) 10 mg tablet Uses PRN- OK to take day of surgery    DULoxetine (CYMBALTA) 30 mg delayed release capsule Take day of surgery.    DULoxetine (CYMBALTA) 60 mg delayed release capsule Take day of surgery.    gabapentin (Neurontin) 600 MG tablet Take day of surgery.    Multiple Vitamins-Minerals (MULTIVITAMIN ADULT PO) Stop taking 7 days prior to surgery.    ondansetron (ZOFRAN) 4 mg tablet Uses PRN- OK to take day of surgery    traZODone (DESYREL) 50 mg tablet Take night before surgery   Medication instructions for day surgery reviewed. Please use only a sip of water to take your instructed medications. Avoid all over the counter vitamins, supplements and NSAIDS for one week prior to surgery per anesthesia guidelines. Tylenol is ok to take as needed.     You will receive a call one business day prior to surgery with an arrival time and hospital directions. If your surgery is scheduled on a Monday, the hospital will be calling you on the Friday prior to your surgery. If you have not heard from anyone by 8pm, please call the hospital supervisor through the hospital  at [phone]. ([place] [phone] or [place] [phone]).    Do not eat or drink anything after midnight the night before your surgery, including candy, mints, lifesavers, or chewing gum. Do not drink alcohol 24hrs before your surgery. Try not to smoke at least 24hrs before your surgery.       Follow the pre surgery showering instructions as listed in the “My Surgical Experience Booklet” or otherwise provided by your surgeon's office. Do not use a blade to shave the surgical area 1 week before surgery. It is okay to use a clean electric clippers up to 24 hours before surgery. Do not apply any lotions, creams, including makeup, cologne, deodorant, or  perfumes after showering on the day of your surgery. Do not use dry shampoo, hair spray, hair gel, or any type of hair products.     No contact lenses, eye make-up, or artificial eyelashes. Remove nail polish, including gel polish, and any artificial, gel, or acrylic nails if possible. Remove all jewelry including rings and body piercing jewelry.     Wear causal clothing that is easy to take on and off. Consider your type of surgery.    Keep any valuables, jewelry, piercings at home. Please bring any specially ordered equipment (sling, braces) if indicated.    Arrange for a responsible person to drive you to and from the hospital on the day of your surgery. Please confirm the visitor policy for the day of your procedure when you receive your phone call with an arrival time.     Call the surgeon's office with any new illnesses, exposures, or additional questions prior to surgery.    Please reference your “My Surgical Experience Booklet” for additional information to prepare for your upcoming surgery.

## 2024-11-29 ENCOUNTER — HOSPITAL ENCOUNTER (OUTPATIENT)
Dept: CT IMAGING | Facility: HOSPITAL | Age: 39
Discharge: HOME | End: 2024-11-29
Attending: ORTHOPAEDIC SURGERY
Payer: COMMERCIAL

## 2024-11-29 DIAGNOSIS — Z98.890 S/P CERVICAL DISC REPLACEMENT: ICD-10-CM

## 2024-11-29 PROCEDURE — 72125 CT NECK SPINE W/O DYE: CPT

## 2024-12-01 DIAGNOSIS — F41.1 GENERALIZED ANXIETY DISORDER: ICD-10-CM

## 2024-12-01 DIAGNOSIS — H92.09 EARACHE: ICD-10-CM

## 2024-12-02 RX ORDER — IBUPROFEN 600 MG/1
600 TABLET, FILM COATED ORAL EVERY 6 HOURS PRN
Qty: 120 TABLET | Refills: 0 | Status: SHIPPED | OUTPATIENT
Start: 2024-12-02 | End: 2025-01-31

## 2024-12-03 RX ORDER — CLONAZEPAM 1 MG/1
1 TABLET ORAL 2 TIMES DAILY
Qty: 60 TABLET | Refills: 0 | Status: SHIPPED | OUTPATIENT
Start: 2024-12-03

## 2024-12-04 ENCOUNTER — OFFICE VISIT (OUTPATIENT)
Dept: SURGICAL ONCOLOGY | Facility: CLINIC | Age: 39
End: 2024-12-04
Payer: COMMERCIAL

## 2024-12-04 VITALS
HEART RATE: 80 BPM | HEIGHT: 64 IN | TEMPERATURE: 98.4 F | SYSTOLIC BLOOD PRESSURE: 108 MMHG | RESPIRATION RATE: 16 BRPM | OXYGEN SATURATION: 97 % | WEIGHT: 119.4 LBS | DIASTOLIC BLOOD PRESSURE: 70 MMHG | BODY MASS INDEX: 20.38 KG/M2

## 2024-12-04 DIAGNOSIS — D24.1 BENIGN TUMOR OF BREAST, RIGHT: Primary | ICD-10-CM

## 2024-12-04 PROCEDURE — 99215 OFFICE O/P EST HI 40 MIN: CPT | Performed by: SURGERY

## 2024-12-04 RX ORDER — CEFAZOLIN SODIUM 1 G/50ML
1000 SOLUTION INTRAVENOUS ONCE
OUTPATIENT
Start: 2024-12-04 | End: 2024-12-04

## 2024-12-04 NOTE — PROGRESS NOTES
Surgical Oncology Follow Up       [address]  [facility]  [address] [address]    Katina Mendez  1985  [id number]  [address]  [facility] SURGICAL ONCOLOGY [place]  [address] [place] [address]    Chief Complaint   Patient presents with    Pre-op Exam       Assessment & Plan   Diagnoses and all orders for this visit:    Benign tumor of breast, right  -     Case request operating room: right kary  localized lumpectomy; Standing  -     UA w Reflex to Microscopic w Reflex to Culture; Future  -     US breast clip right; Future  -     Case request operating room: right kary  localized lumpectomy    Other orders  -     Incentive spirometry; Standing  -     Insert and maintain IV line; Standing  -     Void On-Call to O.R.; Standing  -     Place sequential compression device; Standing  -     ceFAZolin (ANCEF) IVPB (premix in dextrose) 1,000 mg 50 mL  -     Ambulate patient; Standing        Advance Care Planning/Advance Directives:  Did not discuss  with the patient.     Oncology History:    Oncology History    No history exists.       History of Present Illness: Patient is here today to discuss and set up surgery, states that she occasionally has nipple discharge, is having issues with neck and shoulder pain on the right side  -Interval History: Recent MRI and biopsy    Review of Systems:  Review of Systems   Constitutional: Negative.  Negative for appetite change, fever and unexpected weight change.   HENT: Negative.  Negative for trouble swallowing.    Eyes: Negative.    Respiratory: Negative.  Negative for cough and shortness of breath.    Cardiovascular: Negative.  Negative for chest pain.   Gastrointestinal: Negative.  Negative for abdominal pain, nausea and vomiting.   Endocrine: Negative.    Genitourinary: Negative.  Negative for dysuria.   Musculoskeletal:  Positive for neck pain (and shoulder right  side). Negative for arthralgias and myalgias.   Skin: Negative.    Allergic/Immunologic: Negative.    Neurological: Negative.  Negative for headaches.   Hematological: Negative.  Negative for adenopathy. Does not bruise/bleed easily.   Psychiatric/Behavioral: Negative.         Patient Active Problem List   Diagnosis    Generalized anxiety disorder    Lower back pain    Insomnia    GERD without esophagitis    Left breast lump    Benign paroxysmal positional vertigo due to bilateral vestibular disorder    Herpes simplex infection    Abnormal finding on thyroid function test    Atypical nevus    Cervical radiculopathy    Anxiety    Tobacco abuse    Migraines    Depression    Mild protein-calorie malnutrition (HCC)    Neck pain    S/P cervical discectomy    Cervical spinal cord compression (HCC)    Extremely dense tissue of both breasts on mammography    At high risk for breast cancer    Benign tumor of breast, right     Past Medical History:   Diagnosis Date    Allergic     Anxiety     Bunion     left foot   OR   correction today 4/14/2023    Cervical radiculopathy     COVID     x 2-- Jan 2022 and Jan 2023    Depression     GERD (gastroesophageal reflux disease)     Headache(784.0)     Migraines     Seasonal allergies     Tobacco abuse     Wears contact lenses      Past Surgical History:   Procedure Laterality Date    BUNIONECTOMY Right     DISCECTOMY SPINE CERVICAL ANTERIOR W/ ARTHROPLASTY Bilateral 02/29/2024    Procedure: Anterior Cervical discectomy and disc replacement/arthroplasty C5-6;  Surgeon: Edu Del Rio MD;  Location: [facility];  Service: Orthopedics    FL INJECTION RIGHT SHOULDER (ARTHROGRAM)  11/13/2024    LAPAROSCOPIC TOTAL HYSTERECTOMY  2020    LA HALLUX RIGIDUS W/CHEILECTOMY 1ST MP JT W/IMPLT Left 04/14/2023    Procedure: BUNIONECTOMY with implant;  Surgeon: Will Cuevas DPM;  Location: [facility];  Service: Podiatry    TONSILLECTOMY      US GUIDED BREAST BIOPSY RIGHT COMPLETE Right 11/12/2024      Family History   Problem Relation Age of Onset    Mental illness Mother     Depression Mother     Early death Mother     Lung cancer Mother 58        with brain mets.    Cancer Mother     Anxiety disorder Mother     OCD Mother     Psychiatric Illness Mother     Diabetes Father     Hypertension Father     Early death Father     Anxiety disorder Sister     Rashes / Skin problems Sister     Migraines Sister     ADD / ADHD Brother     Anxiety disorder Brother     Melanoma Brother     Diabetes Maternal Grandmother     Arthritis Maternal Grandmother     Heart disease Maternal Grandmother     Breast cancer Maternal Grandmother     No Known Problems Maternal Grandfather     Breast cancer Paternal Grandmother         age unknown.    Anuerysm Paternal Grandfather     Multiple sclerosis Paternal Aunt     Colon cancer Paternal Uncle     Colon cancer Paternal Uncle      Social History     Socioeconomic History    Marital status: /Civil Union     Spouse name: Not on file    Number of children: Not on file    Years of education: Not on file    Highest education level: Not on file   Occupational History    Not on file   Tobacco Use    Smoking status: Every Day     Current packs/day: 0.50     Average packs/day: 0.5 packs/day for 22.6 years (11.3 ttl pk-yrs)     Types: Cigarettes     Start date: 4/11/2002    Smokeless tobacco: Never   Vaping Use    Vaping status: Never Used   Substance and Sexual Activity    Alcohol use: Not Currently    Drug use: No    Sexual activity: Yes     Partners: Male     Birth control/protection: None     Comment: Hysterectomy on July 1st 2020   Other Topics Concern    Not on file   Social History Narrative    Not on file     Social Drivers of Health     Financial Resource Strain: Not on file   Food Insecurity: Not on file   Transportation Needs: Not on file   Physical Activity: Not on file   Stress: Not on file   Social Connections: Unknown (6/18/2024)    Received from [facility]     How often do you feel lonely or isolated from those around you? (Adult - for ages 18 years and over): Not on file   Intimate Partner Violence: Not on file   Housing Stability: Not on file       Current Outpatient Medications:     acyclovir (ZOVIRAX) 5 % ointment, APPLY TOPICALLY EVERY 4 (FOUR) HOURS, Disp: 15 g, Rfl: 0    ALPRAZolam (NIRAVAM) 1 MG dissolvable tablet, Take 1 tablet (1 mg total) by mouth 30 min pre-procedure, Disp: 6 tablet, Rfl: 0    busPIRone (BUSPAR) 5 mg tablet, Take 1 tablet (5 mg total) by mouth 2 (two) times a day, Disp: 60 tablet, Rfl: 5    Calcium Carbonate Antacid (TUMS PO), Take by mouth if needed, Disp: , Rfl:     clonazePAM (KlonoPIN) 1 mg tablet, Take 1 tablet (1 mg total) by mouth 2 (two) times a day, Disp: 60 tablet, Rfl: 0    cyclobenzaprine (FLEXERIL) 10 mg tablet, Take 1 tablet (10 mg total) by mouth 3 (three) times a day as needed for muscle spasms May make you drowsy or dizzy., Disp: 30 tablet, Rfl: 0    Diclofenac Sodium (VOLTAREN) 1 %, Apply 2 g topically 4 (four) times a day, Disp: 100 g, Rfl: 3    dicyclomine (BENTYL) 20 mg tablet, Take 1 tablet (20 mg total) by mouth 3 (three) times a day as needed (for abdominal cramping), Disp: 30 tablet, Rfl: 0    DULoxetine (CYMBALTA) 30 mg delayed release capsule, Take 1 capsule (30 mg total) by mouth daily Along with 60 mg capsule, Disp: 90 capsule, Rfl: 0    DULoxetine (CYMBALTA) 60 mg delayed release capsule, Take 1 capsule (60 mg total) by mouth daily, Disp: 90 capsule, Rfl: 0    gabapentin (Neurontin) 600 MG tablet, Take 1 tablet (600 mg total) by mouth 3 (three) times a day, Disp: 90 tablet, Rfl: 2    ibuprofen (MOTRIN) 600 mg tablet, Take 1 tablet (600 mg total) by mouth every 6 (six) hours as needed for mild pain, Disp: 120 tablet, Rfl: 0    lidocaine-prilocaine (EMLA) cream, Apply topically as needed for mild pain, Disp: 30 g, Rfl: 2    loratadine (CLARITIN) 10 mg tablet, Take 1 tablet (10 mg total) by mouth  daily (Patient taking differently: Take 10 mg by mouth daily as needed), Disp: 30 tablet, Rfl: 2    Multiple Vitamins-Minerals (MULTIVITAMIN ADULT PO), Take 1 tablet by mouth daily, Disp: , Rfl:     ondansetron (ZOFRAN) 4 mg tablet, Take 1 tablet (4 mg total) by mouth every 8 (eight) hours as needed for nausea or vomiting, Disp: 20 tablet, Rfl: 0    traZODone (DESYREL) 50 mg tablet, Take 1 tablet (50 mg total) by mouth daily at bedtime as needed for sleep, Disp: 90 tablet, Rfl: 0    acetaminophen (TYLENOL) 650 mg CR tablet, Take 1 tablet (650 mg total) by mouth every 8 (eight) hours as needed for mild pain, Disp: 30 tablet, Rfl: 0    meloxicam (MOBIC) 15 mg tablet, Take 1 tablet (15 mg total) by mouth daily, Disp: 30 tablet, Rfl: 0    nabumetone (RELAFEN) 500 mg tablet, Take 1 tablet (500 mg total) by mouth 2 (two) times a day, Disp: 60 tablet, Rfl: 2    naloxone (NARCAN) 4 mg/0.1 mL nasal spray, Administer 1 spray into a nostril. If no response after 2-3 minutes, give another dose in the other nostril using a new spray. (Patient not taking: Reported on 4/18/2024), Disp: 1 each, Rfl: 1    oxyCODONE (ROXICODONE) 5 immediate release tablet, Take 1 tablet (5 mg total) by mouth every 6 (six) hours as needed for severe pain Max Daily Amount: 20 mg, Disp: 30 tablet, Rfl: 0  Allergies   Allergen Reactions    Bactrim [Sulfamethoxazole-Trimethoprim] Hives    Latex Other (See Comments)     Latex allergy-rashes    Levofloxacin Hives    Chlorhexidine Rash    Quinolones Rash       The following portions of the patient's history were reviewed and updated as appropriate: allergies, current medications, past family history, past medical history, past social history, past surgical history, and problem list.        Vitals:    12/04/24 1416   BP: 108/70   Pulse: 80   Resp: 16   Temp: 98.4 °F (36.9 °C)   SpO2: 97%       Physical Exam  Constitutional:       General: She is not in acute distress.     Appearance: Normal appearance. She is  well-developed.   HENT:      Head: Normocephalic and atraumatic.   Cardiovascular:      Heart sounds: Normal heart sounds.   Pulmonary:      Breath sounds: Normal breath sounds.   Chest:   Breasts:     Right: No swelling, bleeding, inverted nipple, mass, nipple discharge, skin change or tenderness.      Left: No swelling, bleeding, inverted nipple, mass, nipple discharge, skin change or tenderness.   Abdominal:      Palpations: Abdomen is soft.   Musculoskeletal:      Right lower leg: No edema.      Left lower leg: No edema.   Lymphadenopathy:      Upper Body:      Right upper body: No supraclavicular, axillary or pectoral adenopathy.      Left upper body: No supraclavicular, axillary or pectoral adenopathy.   Neurological:      Mental Status: She is alert and oriented to person, place, and time.   Psychiatric:         Mood and Affect: Mood normal.           Results:  Labs:  11/12/2024 core biopsy right breast 10:00 7 cm from the nipple reveals benign breast parenchyma with nodular sclerosing adenosis i.e. adenosis tumor, no evidence of malignancy    Imaging  9/24/2024 bilateral 3D diagnostic mammogram and left breast ultrasound likely benign calcifications in the left breast, right side was benign    10/29/2024 bilateral breast MRI left side was benign, oval mass at the 9:00 axis of the right breast for which second look ultrasound was recommended    11/12/24 right breast ultrasound shows an 8 mm mass corresponding to the recent MRI finding for which biopsy was recommended as noted above    11/12/24 postbiopsy mammogram is concordant, standard clip in place    I reviewed the above laboratory and imaging data.    Discussion/Summary: 39-year-old female who had an abnormal MRI showing a lesion in the right breast.  Recent biopsy was benign revealing sclerosing adenosis.  She was referred to discuss surgical excision.  I offered her short-term follow-up.  She also is having issues with her neck and shoulder with  decreased range of motion on the right side.  She states that she is concerned about this given her breast cancer risk and would like to have this excised.  I therefore counseled her on a JOSELUIS localized lumpectomy of the right breast.  All of her questions were answered.  Consent was signed today in the office.  Surgery will be scheduled for the near term.

## 2024-12-04 NOTE — H&P (VIEW-ONLY)
Surgical Oncology Follow Up       [address]  [facility]  [address] [id number]    Katina Mendez  1985  [id number]  [address]  [facility] SURGICAL ONCOLOGY [place]  [address] [place] [address]    Chief Complaint   Patient presents with    Pre-op Exam       Assessment & Plan   Diagnoses and all orders for this visit:    Benign tumor of breast, right  -     Case request operating room: right kary  localized lumpectomy; Standing  -     UA w Reflex to Microscopic w Reflex to Culture; Future  -     US breast clip right; Future  -     Case request operating room: right kary  localized lumpectomy    Other orders  -     Incentive spirometry; Standing  -     Insert and maintain IV line; Standing  -     Void On-Call to O.R.; Standing  -     Place sequential compression device; Standing  -     ceFAZolin (ANCEF) IVPB (premix in dextrose) 1,000 mg 50 mL  -     Ambulate patient; Standing        Advance Care Planning/Advance Directives:  Did not discuss  with the patient.     Oncology History:    Oncology History    No history exists.       History of Present Illness: Patient is here today to discuss and set up surgery, states that she occasionally has nipple discharge, is having issues with neck and shoulder pain on the right side  -Interval History: Recent MRI and biopsy    Review of Systems:  Review of Systems   Constitutional: Negative.  Negative for appetite change, fever and unexpected weight change.   HENT: Negative.  Negative for trouble swallowing.    Eyes: Negative.    Respiratory: Negative.  Negative for cough and shortness of breath.    Cardiovascular: Negative.  Negative for chest pain.   Gastrointestinal: Negative.  Negative for abdominal pain, nausea and vomiting.   Endocrine: Negative.    Genitourinary: Negative.  Negative for dysuria.   Musculoskeletal:  Positive for neck pain (and shoulder right  side). Negative for arthralgias and myalgias.   Skin: Negative.    Allergic/Immunologic: Negative.    Neurological: Negative.  Negative for headaches.   Hematological: Negative.  Negative for adenopathy. Does not bruise/bleed easily.   Psychiatric/Behavioral: Negative.         Patient Active Problem List   Diagnosis    Generalized anxiety disorder    Lower back pain    Insomnia    GERD without esophagitis    Left breast lump    Benign paroxysmal positional vertigo due to bilateral vestibular disorder    Herpes simplex infection    Abnormal finding on thyroid function test    Atypical nevus    Cervical radiculopathy    Anxiety    Tobacco abuse    Migraines    Depression    Mild protein-calorie malnutrition (HCC)    Neck pain    S/P cervical discectomy    Cervical spinal cord compression (HCC)    Extremely dense tissue of both breasts on mammography    At high risk for breast cancer    Benign tumor of breast, right     Past Medical History:   Diagnosis Date    Allergic     Anxiety     Bunion     left foot   OR   correction today 4/14/2023    Cervical radiculopathy     COVID     x 2-- Jan 2022 and Jan 2023    Depression     GERD (gastroesophageal reflux disease)     Headache(784.0)     Migraines     Seasonal allergies     Tobacco abuse     Wears contact lenses      Past Surgical History:   Procedure Laterality Date    BUNIONECTOMY Right     DISCECTOMY SPINE CERVICAL ANTERIOR W/ ARTHROPLASTY Bilateral 02/29/2024    Procedure: Anterior Cervical discectomy and disc replacement/arthroplasty C5-6;  Surgeon: Edu Del Rio MD;  Location: [facility];  Service: Orthopedics    FL INJECTION RIGHT SHOULDER (ARTHROGRAM)  11/13/2024    LAPAROSCOPIC TOTAL HYSTERECTOMY  2020    OH HALLUX RIGIDUS W/CHEILECTOMY 1ST MP JT W/IMPLT Left 04/14/2023    Procedure: BUNIONECTOMY with implant;  Surgeon: Will Cuevas DPM;  Location: [facility];  Service: Podiatry    TONSILLECTOMY      US GUIDED BREAST BIOPSY RIGHT COMPLETE Right 11/12/2024      Family History   Problem Relation Age of Onset    Mental illness Mother     Depression Mother     Early death Mother     Lung cancer Mother 58        with brain mets.    Cancer Mother     Anxiety disorder Mother     OCD Mother     Psychiatric Illness Mother     Diabetes Father     Hypertension Father     Early death Father     Anxiety disorder Sister     Rashes / Skin problems Sister     Migraines Sister     ADD / ADHD Brother     Anxiety disorder Brother     Melanoma Brother     Diabetes Maternal Grandmother     Arthritis Maternal Grandmother     Heart disease Maternal Grandmother     Breast cancer Maternal Grandmother     No Known Problems Maternal Grandfather     Breast cancer Paternal Grandmother         age unknown.    Anuerysm Paternal Grandfather     Multiple sclerosis Paternal Aunt     Colon cancer Paternal Uncle     Colon cancer Paternal Uncle      Social History     Socioeconomic History    Marital status: /Civil Union     Spouse name: Not on file    Number of children: Not on file    Years of education: Not on file    Highest education level: Not on file   Occupational History    Not on file   Tobacco Use    Smoking status: Every Day     Current packs/day: 0.50     Average packs/day: 0.5 packs/day for 22.6 years (11.3 ttl pk-yrs)     Types: Cigarettes     Start date: 4/11/2002    Smokeless tobacco: Never   Vaping Use    Vaping status: Never Used   Substance and Sexual Activity    Alcohol use: Not Currently    Drug use: No    Sexual activity: Yes     Partners: Male     Birth control/protection: None     Comment: Hysterectomy on July 1st 2020   Other Topics Concern    Not on file   Social History Narrative    Not on file     Social Drivers of Health     Financial Resource Strain: Not on file   Food Insecurity: Not on file   Transportation Needs: Not on file   Physical Activity: Not on file   Stress: Not on file   Social Connections: Unknown (6/18/2024)    Received from [employer]     How often do you feel lonely or isolated from those around you? (Adult - for ages 18 years and over): Not on file   Intimate Partner Violence: Not on file   Housing Stability: Not on file       Current Outpatient Medications:     acyclovir (ZOVIRAX) 5 % ointment, APPLY TOPICALLY EVERY 4 (FOUR) HOURS, Disp: 15 g, Rfl: 0    ALPRAZolam (NIRAVAM) 1 MG dissolvable tablet, Take 1 tablet (1 mg total) by mouth 30 min pre-procedure, Disp: 6 tablet, Rfl: 0    busPIRone (BUSPAR) 5 mg tablet, Take 1 tablet (5 mg total) by mouth 2 (two) times a day, Disp: 60 tablet, Rfl: 5    Calcium Carbonate Antacid (TUMS PO), Take by mouth if needed, Disp: , Rfl:     clonazePAM (KlonoPIN) 1 mg tablet, Take 1 tablet (1 mg total) by mouth 2 (two) times a day, Disp: 60 tablet, Rfl: 0    cyclobenzaprine (FLEXERIL) 10 mg tablet, Take 1 tablet (10 mg total) by mouth 3 (three) times a day as needed for muscle spasms May make you drowsy or dizzy., Disp: 30 tablet, Rfl: 0    Diclofenac Sodium (VOLTAREN) 1 %, Apply 2 g topically 4 (four) times a day, Disp: 100 g, Rfl: 3    dicyclomine (BENTYL) 20 mg tablet, Take 1 tablet (20 mg total) by mouth 3 (three) times a day as needed (for abdominal cramping), Disp: 30 tablet, Rfl: 0    DULoxetine (CYMBALTA) 30 mg delayed release capsule, Take 1 capsule (30 mg total) by mouth daily Along with 60 mg capsule, Disp: 90 capsule, Rfl: 0    DULoxetine (CYMBALTA) 60 mg delayed release capsule, Take 1 capsule (60 mg total) by mouth daily, Disp: 90 capsule, Rfl: 0    gabapentin (Neurontin) 600 MG tablet, Take 1 tablet (600 mg total) by mouth 3 (three) times a day, Disp: 90 tablet, Rfl: 2    ibuprofen (MOTRIN) 600 mg tablet, Take 1 tablet (600 mg total) by mouth every 6 (six) hours as needed for mild pain, Disp: 120 tablet, Rfl: 0    lidocaine-prilocaine (EMLA) cream, Apply topically as needed for mild pain, Disp: 30 g, Rfl: 2    loratadine (CLARITIN) 10 mg tablet, Take 1 tablet (10 mg total) by mouth  daily (Patient taking differently: Take 10 mg by mouth daily as needed), Disp: 30 tablet, Rfl: 2    Multiple Vitamins-Minerals (MULTIVITAMIN ADULT PO), Take 1 tablet by mouth daily, Disp: , Rfl:     ondansetron (ZOFRAN) 4 mg tablet, Take 1 tablet (4 mg total) by mouth every 8 (eight) hours as needed for nausea or vomiting, Disp: 20 tablet, Rfl: 0    traZODone (DESYREL) 50 mg tablet, Take 1 tablet (50 mg total) by mouth daily at bedtime as needed for sleep, Disp: 90 tablet, Rfl: 0    acetaminophen (TYLENOL) 650 mg CR tablet, Take 1 tablet (650 mg total) by mouth every 8 (eight) hours as needed for mild pain, Disp: 30 tablet, Rfl: 0    meloxicam (MOBIC) 15 mg tablet, Take 1 tablet (15 mg total) by mouth daily, Disp: 30 tablet, Rfl: 0    nabumetone (RELAFEN) 500 mg tablet, Take 1 tablet (500 mg total) by mouth 2 (two) times a day, Disp: 60 tablet, Rfl: 2    naloxone (NARCAN) 4 mg/0.1 mL nasal spray, Administer 1 spray into a nostril. If no response after 2-3 minutes, give another dose in the other nostril using a new spray. (Patient not taking: Reported on 4/18/2024), Disp: 1 each, Rfl: 1    oxyCODONE (ROXICODONE) 5 immediate release tablet, Take 1 tablet (5 mg total) by mouth every 6 (six) hours as needed for severe pain Max Daily Amount: 20 mg, Disp: 30 tablet, Rfl: 0  Allergies   Allergen Reactions    Bactrim [Sulfamethoxazole-Trimethoprim] Hives    Latex Other (See Comments)     Latex allergy-rashes    Levofloxacin Hives    Chlorhexidine Rash    Quinolones Rash       The following portions of the patient's history were reviewed and updated as appropriate: allergies, current medications, past family history, past medical history, past social history, past surgical history, and problem list.        Vitals:    12/04/24 1416   BP: 108/70   Pulse: 80   Resp: 16   Temp: 98.4 °F (36.9 °C)   SpO2: 97%       Physical Exam  Constitutional:       General: She is not in acute distress.     Appearance: Normal appearance. She is  well-developed.   HENT:      Head: Normocephalic and atraumatic.   Cardiovascular:      Heart sounds: Normal heart sounds.   Pulmonary:      Breath sounds: Normal breath sounds.   Chest:   Breasts:     Right: No swelling, bleeding, inverted nipple, mass, nipple discharge, skin change or tenderness.      Left: No swelling, bleeding, inverted nipple, mass, nipple discharge, skin change or tenderness.   Abdominal:      Palpations: Abdomen is soft.   Musculoskeletal:      Right lower leg: No edema.      Left lower leg: No edema.   Lymphadenopathy:      Upper Body:      Right upper body: No supraclavicular, axillary or pectoral adenopathy.      Left upper body: No supraclavicular, axillary or pectoral adenopathy.   Neurological:      Mental Status: She is alert and oriented to person, place, and time.   Psychiatric:         Mood and Affect: Mood normal.           Results:  Labs:  11/12/2024 core biopsy right breast 10:00 7 cm from the nipple reveals benign breast parenchyma with nodular sclerosing adenosis i.e. adenosis tumor, no evidence of malignancy    Imaging  9/24/2024 bilateral 3D diagnostic mammogram and left breast ultrasound likely benign calcifications in the left breast, right side was benign    10/29/2024 bilateral breast MRI left side was benign, oval mass at the 9:00 axis of the right breast for which second look ultrasound was recommended    11/12/24 right breast ultrasound shows an 8 mm mass corresponding to the recent MRI finding for which biopsy was recommended as noted above    11/12/24 postbiopsy mammogram is concordant, standard clip in place    I reviewed the above laboratory and imaging data.    Discussion/Summary: 39-year-old female who had an abnormal MRI showing a lesion in the right breast.  Recent biopsy was benign revealing sclerosing adenosis.  She was referred to discuss surgical excision.  I offered her short-term follow-up.  She also is having issues with her neck and shoulder with  decreased range of motion on the right side.  She states that she is concerned about this given her breast cancer risk and would like to have this excised.  I therefore counseled her on a JOSELUIS localized lumpectomy of the right breast.  All of her questions were answered.  Consent was signed today in the office.  Surgery will be scheduled for the near term.

## 2024-12-06 ENCOUNTER — ANESTHESIA EVENT (OUTPATIENT)
Dept: PERIOP | Facility: HOSPITAL | Age: 39
End: 2024-12-06
Payer: COMMERCIAL

## 2024-12-07 ENCOUNTER — HOSPITAL ENCOUNTER (OUTPATIENT)
Dept: MRI IMAGING | Facility: HOSPITAL | Age: 39
Discharge: HOME | End: 2024-12-07
Attending: ORTHOPAEDIC SURGERY
Payer: COMMERCIAL

## 2024-12-07 DIAGNOSIS — Z98.890 S/P CERVICAL DISC REPLACEMENT: ICD-10-CM

## 2024-12-07 PROCEDURE — 72141 MRI NECK SPINE W/O DYE: CPT

## 2024-12-09 ENCOUNTER — HOSPITAL ENCOUNTER (OUTPATIENT)
Facility: HOSPITAL | Age: 39
Setting detail: OUTPATIENT SURGERY
Discharge: HOME | End: 2024-12-09
Attending: OBSTETRICS & GYNECOLOGY | Admitting: OBSTETRICS & GYNECOLOGY
Payer: COMMERCIAL

## 2024-12-09 ENCOUNTER — ANESTHESIA (OUTPATIENT)
Dept: PERIOP | Facility: HOSPITAL | Age: 39
End: 2024-12-09
Payer: COMMERCIAL

## 2024-12-09 VITALS
WEIGHT: 120.37 LBS | BODY MASS INDEX: 20.55 KG/M2 | HEART RATE: 75 BPM | HEIGHT: 64 IN | DIASTOLIC BLOOD PRESSURE: 63 MMHG | SYSTOLIC BLOOD PRESSURE: 103 MMHG | RESPIRATION RATE: 16 BRPM | TEMPERATURE: 98.1 F | OXYGEN SATURATION: 95 %

## 2024-12-09 DIAGNOSIS — R10.2 PELVIC PAIN: ICD-10-CM

## 2024-12-09 DIAGNOSIS — Z90.722 S/P BILATERAL OOPHORECTOMY: Primary | ICD-10-CM

## 2024-12-09 DIAGNOSIS — N89.8 VAGINAL CYST: ICD-10-CM

## 2024-12-09 LAB
EXT PREGNANCY TEST URINE: NEGATIVE
EXT. CONTROL: NORMAL

## 2024-12-09 PROCEDURE — 81025 URINE PREGNANCY TEST: CPT | Performed by: OBSTETRICS & GYNECOLOGY

## 2024-12-09 PROCEDURE — NC001 PR NO CHARGE: Performed by: OBSTETRICS & GYNECOLOGY

## 2024-12-09 PROCEDURE — 88305 TISSUE EXAM BY PATHOLOGIST: CPT | Performed by: STUDENT IN AN ORGANIZED HEALTH CARE EDUCATION/TRAINING PROGRAM

## 2024-12-09 PROCEDURE — 58661 LAPAROSCOPY REMOVE ADNEXA: CPT | Performed by: OBSTETRICS & GYNECOLOGY

## 2024-12-09 RX ORDER — FENTANYL CITRATE 50 UG/ML
INJECTION, SOLUTION INTRAMUSCULAR; INTRAVENOUS AS NEEDED
Status: DISCONTINUED | OUTPATIENT
Start: 2024-12-09 | End: 2024-12-09

## 2024-12-09 RX ORDER — IBUPROFEN 600 MG/1
600 TABLET, FILM COATED ORAL EVERY 6 HOURS PRN
Status: DISCONTINUED | OUTPATIENT
Start: 2024-12-09 | End: 2024-12-09 | Stop reason: HOSPADM

## 2024-12-09 RX ORDER — ONDANSETRON 2 MG/ML
4 INJECTION INTRAMUSCULAR; INTRAVENOUS EVERY 6 HOURS PRN
Status: DISCONTINUED | OUTPATIENT
Start: 2024-12-09 | End: 2024-12-09 | Stop reason: HOSPADM

## 2024-12-09 RX ORDER — ACETAMINOPHEN 325 MG/1
975 TABLET ORAL EVERY 6 HOURS PRN
Status: DISCONTINUED | OUTPATIENT
Start: 2024-12-09 | End: 2024-12-09 | Stop reason: HOSPADM

## 2024-12-09 RX ORDER — EPHEDRINE SULFATE 50 MG/ML
INJECTION INTRAVENOUS AS NEEDED
Status: DISCONTINUED | OUTPATIENT
Start: 2024-12-09 | End: 2024-12-09

## 2024-12-09 RX ORDER — LIDOCAINE HYDROCHLORIDE 20 MG/ML
INJECTION, SOLUTION EPIDURAL; INFILTRATION; INTRACAUDAL; PERINEURAL AS NEEDED
Status: DISCONTINUED | OUTPATIENT
Start: 2024-12-09 | End: 2024-12-09

## 2024-12-09 RX ORDER — HYDROMORPHONE HCL/PF 1 MG/ML
0.5 SYRINGE (ML) INJECTION
Status: DISCONTINUED | OUTPATIENT
Start: 2024-12-09 | End: 2024-12-09 | Stop reason: HOSPADM

## 2024-12-09 RX ORDER — KETOROLAC TROMETHAMINE 30 MG/ML
INJECTION, SOLUTION INTRAMUSCULAR; INTRAVENOUS AS NEEDED
Status: DISCONTINUED | OUTPATIENT
Start: 2024-12-09 | End: 2024-12-09

## 2024-12-09 RX ORDER — ROCURONIUM BROMIDE 10 MG/ML
INJECTION, SOLUTION INTRAVENOUS AS NEEDED
Status: DISCONTINUED | OUTPATIENT
Start: 2024-12-09 | End: 2024-12-09

## 2024-12-09 RX ORDER — SODIUM CHLORIDE 9 MG/ML
125 INJECTION, SOLUTION INTRAVENOUS CONTINUOUS
Status: DISCONTINUED | OUTPATIENT
Start: 2024-12-09 | End: 2024-12-09 | Stop reason: HOSPADM

## 2024-12-09 RX ORDER — OXYCODONE HYDROCHLORIDE 5 MG/1
5 TABLET ORAL EVERY 4 HOURS PRN
Qty: 10 TABLET | Refills: 0 | Status: SHIPPED | OUTPATIENT
Start: 2024-12-09 | End: 2024-12-19

## 2024-12-09 RX ORDER — BUPIVACAINE HYDROCHLORIDE 2.5 MG/ML
INJECTION, SOLUTION EPIDURAL; INFILTRATION; INTRACAUDAL AS NEEDED
Status: DISCONTINUED | OUTPATIENT
Start: 2024-12-09 | End: 2024-12-09 | Stop reason: HOSPADM

## 2024-12-09 RX ORDER — ONDANSETRON 2 MG/ML
INJECTION INTRAMUSCULAR; INTRAVENOUS AS NEEDED
Status: DISCONTINUED | OUTPATIENT
Start: 2024-12-09 | End: 2024-12-09

## 2024-12-09 RX ORDER — SODIUM CHLORIDE, SODIUM LACTATE, POTASSIUM CHLORIDE, CALCIUM CHLORIDE 600; 310; 30; 20 MG/100ML; MG/100ML; MG/100ML; MG/100ML
INJECTION, SOLUTION INTRAVENOUS CONTINUOUS PRN
Status: DISCONTINUED | OUTPATIENT
Start: 2024-12-09 | End: 2024-12-09

## 2024-12-09 RX ORDER — OXYCODONE HYDROCHLORIDE 5 MG/1
5 TABLET ORAL EVERY 4 HOURS PRN
Refills: 0 | Status: DISCONTINUED | OUTPATIENT
Start: 2024-12-09 | End: 2024-12-09 | Stop reason: HOSPADM

## 2024-12-09 RX ORDER — DEXAMETHASONE SODIUM PHOSPHATE 10 MG/ML
INJECTION, SOLUTION INTRAMUSCULAR; INTRAVENOUS AS NEEDED
Status: DISCONTINUED | OUTPATIENT
Start: 2024-12-09 | End: 2024-12-09

## 2024-12-09 RX ORDER — MIDAZOLAM HYDROCHLORIDE 2 MG/2ML
INJECTION, SOLUTION INTRAMUSCULAR; INTRAVENOUS AS NEEDED
Status: DISCONTINUED | OUTPATIENT
Start: 2024-12-09 | End: 2024-12-09

## 2024-12-09 RX ORDER — ESTRADIOL 0.1 MG/D
1 PATCH TRANSDERMAL WEEKLY
Qty: 12 PATCH | Refills: 2 | Status: SHIPPED | OUTPATIENT
Start: 2024-12-09

## 2024-12-09 RX ORDER — FENTANYL CITRATE 50 UG/ML
50 INJECTION, SOLUTION INTRAMUSCULAR; INTRAVENOUS
Status: DISCONTINUED | OUTPATIENT
Start: 2024-12-09 | End: 2024-12-09 | Stop reason: HOSPADM

## 2024-12-09 RX ORDER — PROPOFOL 10 MG/ML
INJECTION, EMULSION INTRAVENOUS AS NEEDED
Status: DISCONTINUED | OUTPATIENT
Start: 2024-12-09 | End: 2024-12-09

## 2024-12-09 RX ADMIN — OXYCODONE 5 MG: 5 TABLET ORAL at 14:12

## 2024-12-09 RX ADMIN — SUGAMMADEX 100 MG: 100 INJECTION, SOLUTION INTRAVENOUS at 12:44

## 2024-12-09 RX ADMIN — EPHEDRINE SULFATE 10 MG: 50 INJECTION INTRAVENOUS at 12:20

## 2024-12-09 RX ADMIN — FENTANYL CITRATE 50 MCG: 50 INJECTION INTRAMUSCULAR; INTRAVENOUS at 12:55

## 2024-12-09 RX ADMIN — FENTANYL CITRATE 50 MCG: 50 INJECTION, SOLUTION INTRAMUSCULAR; INTRAVENOUS at 13:31

## 2024-12-09 RX ADMIN — EPHEDRINE SULFATE 10 MG: 50 INJECTION INTRAVENOUS at 12:11

## 2024-12-09 RX ADMIN — FENTANYL CITRATE 50 MCG: 50 INJECTION, SOLUTION INTRAMUSCULAR; INTRAVENOUS at 13:21

## 2024-12-09 RX ADMIN — SODIUM CHLORIDE, SODIUM LACTATE, POTASSIUM CHLORIDE, AND CALCIUM CHLORIDE: .6; .31; .03; .02 INJECTION, SOLUTION INTRAVENOUS at 12:21

## 2024-12-09 RX ADMIN — ROCURONIUM 40 MG: 50 INJECTION, SOLUTION INTRAVENOUS at 11:55

## 2024-12-09 RX ADMIN — PROPOFOL 200 MG: 10 INJECTION, EMULSION INTRAVENOUS at 11:53

## 2024-12-09 RX ADMIN — DEXAMETHASONE SODIUM PHOSPHATE 10 MG: 10 INJECTION INTRAMUSCULAR; INTRAVENOUS at 11:55

## 2024-12-09 RX ADMIN — ONDANSETRON 4 MG: 2 INJECTION INTRAMUSCULAR; INTRAVENOUS at 11:55

## 2024-12-09 RX ADMIN — KETOROLAC TROMETHAMINE 15 MG: 30 INJECTION, SOLUTION INTRAMUSCULAR; INTRAVENOUS at 12:37

## 2024-12-09 RX ADMIN — FENTANYL CITRATE 50 MCG: 50 INJECTION INTRAMUSCULAR; INTRAVENOUS at 11:53

## 2024-12-09 RX ADMIN — SODIUM CHLORIDE 125 ML/HR: 0.9 INJECTION, SOLUTION INTRAVENOUS at 08:52

## 2024-12-09 RX ADMIN — LIDOCAINE HYDROCHLORIDE 80 MG: 20 INJECTION, SOLUTION EPIDURAL; INFILTRATION; INTRACAUDAL at 11:53

## 2024-12-09 RX ADMIN — MIDAZOLAM 2 MG: 1 INJECTION INTRAMUSCULAR; INTRAVENOUS at 11:43

## 2024-12-09 NOTE — ANESTHESIA POSTPROCEDURE EVALUATION
Post-Op Assessment Note    CV Status:  Stable    Pain management: adequate       Mental Status:  Alert and awake   Hydration Status:  Euvolemic   PONV Controlled:  Controlled   Airway Patency:  Patent     Post Op Vitals Reviewed: Yes    No anethesia notable event occurred.    Staff: CRNA           Last Filed PACU Vitals:  Vitals Value Taken Time   Temp 97.2 °F (36.2 °C) 12/09/24 1256   Pulse 95 12/09/24 1256   /74 12/09/24 1256   Resp 12 12/09/24 1256   SpO2 100 % 12/09/24 1256       Modified Muna:  No data recorded

## 2024-12-09 NOTE — H&P
H&P - OB/GYN   Name: Katina Mendez 39 y.o. female I MRN: [id number]  Unit/Bed#: OR POOL I Date of Admission: 12/9/2024   Date of Service: 12/9/2024 I Hospital Day: 0     Assessment & Plan    Katina Mendez is a 39 y.o. female who presents with for exam under anesthesia, diagnostic laparoscopy, bilateral salpingoophorectomy, vaginal cyst removal  History of Present Illness   Katnia Mendez is a 39 y.o. female who presents for surgery.    Review of Systems   Constitutional:  Negative for chills and fever.   Respiratory:  Negative for cough and shortness of breath.    Cardiovascular:  Negative for chest pain and leg swelling.   Gastrointestinal:  Negative for abdominal pain, nausea and vomiting.   Genitourinary:  Negative for dysuria, pelvic pain, urgency, vaginal bleeding and vaginal discharge.   Neurological:  Negative for dizziness, light-headedness and headaches.   All other systems reviewed and are negative.        Objective :  Temp:  [97.7 °F (36.5 °C)] 97.7 °F (36.5 °C)  HR:  [85] 85  BP: (110)/(72) 110/72  Resp:  [16] 16  SpO2:  [96 %] 96 %  O2 Device: None (Room air)    Physical Exam  Vitals and nursing note reviewed.   Constitutional:       General: She is not in acute distress.     Appearance: She is well-developed.   HENT:      Head: Normocephalic and atraumatic.   Eyes:      Conjunctiva/sclera: Conjunctivae normal.   Cardiovascular:      Rate and Rhythm: Normal rate and regular rhythm.      Heart sounds: No murmur heard.  Pulmonary:      Effort: Pulmonary effort is normal. No respiratory distress.      Breath sounds: Normal breath sounds.   Abdominal:      Palpations: Abdomen is soft.      Tenderness: There is no abdominal tenderness.   Musculoskeletal:         General: No swelling.      Cervical back: Neck supple.   Skin:     General: Skin is warm and dry.      Capillary Refill: Capillary refill takes less than 2 seconds.   Neurological:      Mental Status: She is alert.   Psychiatric:         Mood and  Affect: Mood normal.       Betsy ROJAS PGY3

## 2024-12-09 NOTE — OP NOTE
OPERATIVE REPORT  PATIENT NAME: Katina Mendez    :  1985  MRN: [id number]  Pt Location: [facility] OR ROOM 02    SURGERY DATE: 2024    Surgeons and Role:     * Елена Silva MD - Primary     * Betsy Parnell MD - Assisting    Preop Diagnosis:  Vaginal cyst [N89.8]  Pelvic pain [R10.2]    Post-Op Diagnosis Codes:     * Vaginal cyst [N89.8]     * Pelvic pain [R10.2]    Procedure(s) (LRB):  DIAGNIOSTIC LAPAROSCOPY, BILATERAL OOPHERECTOMY, EUA (N/A)    Specimen(s):  ID Type Source Tests Collected by Time Destination   1 : bilateral ovaries Tissue Ovary, Right TISSUE EXAM Елена Silva MD 2024 1231        Surgical QBL:  5cc    Drains:  NG/OG/Enteral Tube Orogastric Center mouth (Active)   Number of days: 0       Anesthesia Type:   General    Operative Indications:  Pelvic pain [R10.2]    Operative Findings:  Normal external female genitalia   No vaginal cyst noted    On laparoscopy:   No evidence of injury to the bowel directly beneath first trocar placement.   Upper abdomen inspected, normal appearing liver and gallbladder, bowel and vasculature.   Inspection of the pelvis- grossly normal appearing ovaries.   Bilateral ureteral vermiculation noted to be outside of the planned surgical field prior to surgical manipulation  Hemostatic surgical sites under decreased intra-abdominal pressure at the end of the case     Complications:   None    Operative Technique    Patient was taken to the operating room. General LMA anesthesia (LMA) was administered and the patient was positioned on the OR table in the dorsal lithotomy position. All pressure points were padded and a moni hugger was placed to maintain control of core body temperature. The patient was prepped and draped in the usual sterile fashion with betadine on the abdomen and betadine prep on the vagina and perineum. The vagina was examined and there was no cyst found.    Operative Technique    A time out was performed to confirm correct patient and correct  procedure. A straight catheter was introduced into the bladder, which was drained of 400cc of clear yellow urine.     A veress needle was inserted at Phelna's point. Appropriate placement was confirmed with low flow insufflation and pneumoperitoneum was created to maximal pressure of 15 mmHg. A 5 mm skin incision was made in the umbilicus with a scalpel for introduction of a 5 mm trochar under direct visualization. Good intraperitoneal location was confirmed   The patient was placed in Trendelenburg and the above-mentioned findings were noted. Two additional  trocars were placed in a similar fashion in the right and left lower quadrants, approximately 2cm superior and medial to the anterior superior iliac spine.     The ureters were clearly identified transperitoneally on both sides.  Attention was first turned to the left adnexa. The left infundibulopelvic ligament was identified and divided with the Enseal devic for resection of the left ovary. The pedicle was hemostatic.  Attention was then turned to the contralateral adnexal structures, which were amputated in similar fashion. Hemostasis was noted to be adequate. The bilateral adnexal structures were removed intact using the Endocatch bag.    Surgical sites were again noted to be hemostatic. Pneumoperitoneum was allowed to escape. Trocars were removed from the abdomen.    The incisions were closed using 4-0 Monocryl and bandaids.    At the conclusion of the procedure, all needle, sponge, and instrument counts were noted to be correct x2. Patient tolerated the procedure well and was transferred to PACU in stable condition with plan for same day discharge. She was instructed to follow up with Dr Silva as scheduled.    Dr. Silva was present and participated in all key portions of the case.      Patient Disposition:  PACU     SIGNATURE: Betsy Parnell MD  DATE: December 9, 2024  TIME: 1:10 PM

## 2024-12-09 NOTE — ANESTHESIA POSTPROCEDURE EVALUATION
Post-Op Assessment Note    CV Status:  Stable    Pain management: adequate       Mental Status:  Alert and awake   Hydration Status:  Euvolemic   PONV Controlled:  Controlled   Airway Patency:  Patent     Post Op Vitals Reviewed: Yes    No anethesia notable event occurred.    Staff: Anesthesiologist           Last Filed PACU Vitals:  Vitals Value Taken Time   Temp 97.2 °F (36.2 °C) 12/09/24 1256   Pulse 72 12/09/24 1317   /71 12/09/24 1310   Resp 11 12/09/24 1317   SpO2 95 % 12/09/24 1317   Vitals shown include unfiled device data.    Modified Muna:  Activity: 2 (12/9/2024  1:10 PM)  Respiration: 2 (12/9/2024  1:10 PM)  Circulation: 2 (12/9/2024  1:10 PM)  Consciousness: 1 (12/9/2024  1:10 PM)  Oxygen Saturation: 2 (12/9/2024  1:10 PM)  Modified Muna Score: 9 (12/9/2024  1:10 PM)

## 2024-12-09 NOTE — ANESTHESIA PREPROCEDURE EVALUATION
Procedure:  DIAGNIOSTIC LAPAROSCOPY, BILATERAL OOPHERECTOMY, EUA (Uterus)  EXCISION OF VAGINAL CYST (Vagina )    Relevant Problems   CARDIO   (+) Migraines      GI/HEPATIC   (+) GERD without esophagitis      MUSCULOSKELETAL   (+) Lower back pain      NEURO/PSYCH   (+) Anxiety   (+) Cervical spinal cord compression (HCC)   (+) Depression   (+) Generalized anxiety disorder   (+) Migraines        Physical Exam    Airway    Mallampati score: II         Dental       Cardiovascular  Rhythm: regular, Rate: normal    Pulmonary   Breath sounds clear to auscultation    Other Findings  post-pubertal.      Anesthesia Plan  ASA Score- 2     Anesthesia Type- general with ASA Monitors.         Additional Monitors:     Airway Plan:            Plan Factors-Exercise tolerance (METS): >4 METS.    Chart reviewed.   Existing labs reviewed. Patient summary reviewed.    Patient is not a current smoker.      There is medical exclusion for perioperative obstructive sleep apnea risk education.        Induction-     Postoperative Plan-     Perioperative Resuscitation Plan - Level 1 - Full Code.       Informed Consent- Anesthetic plan and risks discussed with patient.

## 2024-12-10 ENCOUNTER — APPOINTMENT (OUTPATIENT)
Dept: LAB | Facility: CLINIC | Age: 39
End: 2024-12-10
Payer: COMMERCIAL

## 2024-12-10 DIAGNOSIS — D24.1 BENIGN TUMOR OF BREAST, RIGHT: ICD-10-CM

## 2024-12-10 LAB
BACTERIA UR QL AUTO: ABNORMAL /HPF
BILIRUB UR QL STRIP: NEGATIVE
CLARITY UR: CLEAR
COLOR UR: YELLOW
GLUCOSE UR STRIP-MCNC: NEGATIVE MG/DL
HGB UR QL STRIP.AUTO: NEGATIVE
KETONES UR STRIP-MCNC: ABNORMAL MG/DL
LEUKOCYTE ESTERASE UR QL STRIP: ABNORMAL
MUCOUS THREADS UR QL AUTO: ABNORMAL
NITRITE UR QL STRIP: NEGATIVE
NON-SQ EPI CELLS URNS QL MICRO: ABNORMAL /HPF
PH UR STRIP.AUTO: 6.5 [PH]
PROT UR STRIP-MCNC: ABNORMAL MG/DL
RBC #/AREA URNS AUTO: ABNORMAL /HPF
SP GR UR STRIP.AUTO: 1.03 (ref 1–1.03)
UROBILINOGEN UR STRIP-ACNC: 4 MG/DL
WBC #/AREA URNS AUTO: ABNORMAL /HPF

## 2024-12-10 PROCEDURE — 81001 URINALYSIS AUTO W/SCOPE: CPT

## 2024-12-10 NOTE — PROGRESS NOTES
Call placed to patient regarding recommendation for;    __X___ RIGHT ______LEFT      __X___SAVI  placement.    Procedure explained to patient, additional questions answered at this time    __X___Verbalized understanding.      Blood thinners:  _____yes __X___no    Date stopped: ___N/A____    All teaching points discussed during call, pt with no questions at this time, pt adv to arrive at 1200 for 1230 insertion    Pt given name/# for any further questions/needs

## 2024-12-11 PROCEDURE — 88305 TISSUE EXAM BY PATHOLOGIST: CPT | Performed by: STUDENT IN AN ORGANIZED HEALTH CARE EDUCATION/TRAINING PROGRAM

## 2024-12-12 ENCOUNTER — ANESTHESIA EVENT (OUTPATIENT)
Dept: PERIOP | Facility: HOSPITAL | Age: 39
End: 2024-12-12
Payer: COMMERCIAL

## 2024-12-12 NOTE — PRE-PROCEDURE INSTRUCTIONS
Pre-Surgery Instructions:   Medication Instructions    busPIRone (BUSPAR) 5 mg tablet Take day of surgery.    clonazePAM (KlonoPIN) 1 mg tablet Uses PRN- OK to take day of surgery    cyclobenzaprine (FLEXERIL) 10 mg tablet Uses PRN- OK to take day of surgery    dicyclomine (BENTYL) 20 mg tablet Uses PRN- OK to take day of surgery    DULoxetine (CYMBALTA) 30 mg delayed release capsule Take day of surgery.    DULoxetine (CYMBALTA) 60 mg delayed release capsule Take day of surgery.    gabapentin (Neurontin) 600 MG tablet Take day of surgery.    lidocaine-prilocaine (EMLA) cream Stop taking 1 day prior to surgery.    loratadine (CLARITIN) 10 mg tablet Hold day of surgery.    Multiple Vitamins-Minerals (MULTIVITAMIN ADULT PO) Already holding    ondansetron (ZOFRAN) 4 mg tablet Uses PRN- OK to take day of surgery    oxyCODONE (Roxicodone) 5 immediate release tablet Uses PRN- OK to take day of surgery    traZODone (DESYREL) 50 mg tablet Take night before surgery   Medication instructions for day surgery reviewed. Please use only a sip of water to take your instructed medications. Avoid all over the counter vitamins, supplements and NSAIDS for one week prior to surgery per anesthesia guidelines. Tylenol is ok to take as needed.     You will receive a call one business day prior to surgery with an arrival time and hospital directions. If your surgery is scheduled on a Monday, the hospital will be calling you on the Friday prior to your surgery. If you have not heard from anyone by 8pm, please call the hospital supervisor through the hospital  at [phone]. ([place] [phone] or [place] [phone]).    Do not eat or drink anything after midnight the night before your surgery, including candy, mints, lifesavers, or chewing gum. Do not drink alcohol 24hrs before your surgery. Try not to smoke at least 24hrs before your surgery.       Follow the pre surgery showering instructions as listed in the “My Surgical  Experience Booklet” or otherwise provided by your surgeon's office. Do not use a blade to shave the surgical area 1 week before surgery. It is okay to use a clean electric clippers up to 24 hours before surgery. Do not apply any lotions, creams, including makeup, cologne, deodorant, or perfumes after showering on the day of your surgery. Do not use dry shampoo, hair spray, hair gel, or any type of hair products.     No contact lenses, eye make-up, or artificial eyelashes. Remove nail polish, including gel polish, and any artificial, gel, or acrylic nails if possible. Remove all jewelry including rings and body piercing jewelry.     Wear causal clothing that is easy to take on and off. Consider your type of surgery.    Keep any valuables, jewelry, piercings at home. Please bring any specially ordered equipment (sling, braces) if indicated.    Arrange for a responsible person to drive you to and from the hospital on the day of your surgery. Please confirm the visitor policy for the day of your procedure when you receive your phone call with an arrival time.     Call the surgeon's office with any new illnesses, exposures, or additional questions prior to surgery.    Please reference your “My Surgical Experience Booklet” for additional information to prepare for your upcoming surgery.

## 2024-12-13 ENCOUNTER — HOSPITAL ENCOUNTER (OUTPATIENT)
Dept: MAMMOGRAPHY | Facility: CLINIC | Age: 39
Discharge: HOME | End: 2024-12-13
Payer: COMMERCIAL

## 2024-12-13 ENCOUNTER — HOSPITAL ENCOUNTER (OUTPATIENT)
Dept: ULTRASOUND IMAGING | Facility: CLINIC | Age: 39
Discharge: HOME | End: 2024-12-13
Payer: COMMERCIAL

## 2024-12-13 VITALS — SYSTOLIC BLOOD PRESSURE: 125 MMHG | HEART RATE: 68 BPM | DIASTOLIC BLOOD PRESSURE: 83 MMHG

## 2024-12-13 DIAGNOSIS — D24.1 BENIGN TUMOR OF BREAST, RIGHT: ICD-10-CM

## 2024-12-13 DIAGNOSIS — R92.8 ABNORMAL MAMMOGRAM: ICD-10-CM

## 2024-12-13 PROCEDURE — 19285 PERQ DEV BREAST 1ST US IMAG: CPT

## 2024-12-13 RX ORDER — LIDOCAINE HYDROCHLORIDE 10 MG/ML
5 INJECTION, SOLUTION EPIDURAL; INFILTRATION; INTRACAUDAL; PERINEURAL ONCE
Status: COMPLETED | OUTPATIENT
Start: 2024-12-13 | End: 2024-12-13

## 2024-12-13 RX ADMIN — LIDOCAINE HYDROCHLORIDE 5 ML: 10 INJECTION, SOLUTION EPIDURAL; INFILTRATION; INTRACAUDAL; PERINEURAL at 12:48

## 2024-12-13 NOTE — PROGRESS NOTES
Procedure type:    ___x__ultrasound guided _____stereotactic    Breast:    _____Left ___x__Right    Location: 10 o'clock 7 cmfn     Needle: 16g    # of passes: 1    Clip: Manda    Performed by: Dr. Serrano     Pressure held for 5 minutes by: Pratik Lr Strips:    ___X__yes _____no    Band aid:    __X___yes_____no    Tolerated procedure:    __X___yes _____no

## 2024-12-16 NOTE — PROGRESS NOTES
Post procedure call completed    Bleeding: _____yes __X___no (Pt denies)    Pain: _____yes ___X___no (Pt reports some soreness at the site, is taking Tylenol prn)    Redness/Swelling: ______yes ___X___no (Pt denies)    Band aid removed: __X___yes _____no     Steri-Strips intact: ___X___yes _____no (discussed with patient to remove steri strips on Wednesday if they have not come off on their own)    Pt with no questions at this time, adv to call with any questions or concerns, has name/# for contact

## 2024-12-17 ENCOUNTER — HOSPITAL ENCOUNTER (OUTPATIENT)
Facility: HOSPITAL | Age: 39
Setting detail: OUTPATIENT SURGERY
Discharge: HOME | End: 2024-12-17
Attending: SURGERY | Admitting: SURGERY
Payer: COMMERCIAL

## 2024-12-17 ENCOUNTER — APPOINTMENT (OUTPATIENT)
Dept: MAMMOGRAPHY | Facility: HOSPITAL | Age: 39
End: 2024-12-17
Payer: COMMERCIAL

## 2024-12-17 ENCOUNTER — ANESTHESIA (OUTPATIENT)
Dept: PERIOP | Facility: HOSPITAL | Age: 39
End: 2024-12-17
Payer: COMMERCIAL

## 2024-12-17 VITALS
WEIGHT: 119.93 LBS | TEMPERATURE: 97.4 F | HEART RATE: 84 BPM | DIASTOLIC BLOOD PRESSURE: 69 MMHG | SYSTOLIC BLOOD PRESSURE: 109 MMHG | BODY MASS INDEX: 20.47 KG/M2 | OXYGEN SATURATION: 94 % | HEIGHT: 64 IN | RESPIRATION RATE: 16 BRPM

## 2024-12-17 DIAGNOSIS — D24.1 BENIGN TUMOR OF BREAST, RIGHT: Primary | ICD-10-CM

## 2024-12-17 PROCEDURE — 19301 PARTIAL MASTECTOMY: CPT

## 2024-12-17 PROCEDURE — 19301 PARTIAL MASTECTOMY: CPT | Performed by: SURGERY

## 2024-12-17 PROCEDURE — 88307 TISSUE EXAM BY PATHOLOGIST: CPT | Performed by: PATHOLOGY

## 2024-12-17 RX ORDER — FENTANYL CITRATE 50 UG/ML
INJECTION, SOLUTION INTRAMUSCULAR; INTRAVENOUS AS NEEDED
Status: DISCONTINUED | OUTPATIENT
Start: 2024-12-17 | End: 2024-12-17

## 2024-12-17 RX ORDER — BUPIVACAINE HYDROCHLORIDE 5 MG/ML
INJECTION, SOLUTION EPIDURAL; INTRACAUDAL AS NEEDED
Status: DISCONTINUED | OUTPATIENT
Start: 2024-12-17 | End: 2024-12-17 | Stop reason: HOSPADM

## 2024-12-17 RX ORDER — KETOROLAC TROMETHAMINE 30 MG/ML
INJECTION, SOLUTION INTRAMUSCULAR; INTRAVENOUS AS NEEDED
Status: DISCONTINUED | OUTPATIENT
Start: 2024-12-17 | End: 2024-12-17

## 2024-12-17 RX ORDER — ACETAMINOPHEN 10 MG/ML
1000 INJECTION, SOLUTION INTRAVENOUS ONCE
Status: COMPLETED | OUTPATIENT
Start: 2024-12-17 | End: 2024-12-17

## 2024-12-17 RX ORDER — IBUPROFEN 600 MG/1
600 TABLET, FILM COATED ORAL ONCE
Status: DISCONTINUED | OUTPATIENT
Start: 2024-12-17 | End: 2024-12-17 | Stop reason: HOSPADM

## 2024-12-17 RX ORDER — ONDANSETRON 2 MG/ML
4 INJECTION INTRAMUSCULAR; INTRAVENOUS ONCE AS NEEDED
Status: DISCONTINUED | OUTPATIENT
Start: 2024-12-17 | End: 2024-12-17 | Stop reason: HOSPADM

## 2024-12-17 RX ORDER — MIDAZOLAM HYDROCHLORIDE 2 MG/2ML
INJECTION, SOLUTION INTRAMUSCULAR; INTRAVENOUS AS NEEDED
Status: DISCONTINUED | OUTPATIENT
Start: 2024-12-17 | End: 2024-12-17

## 2024-12-17 RX ORDER — PROPOFOL 10 MG/ML
INJECTION, EMULSION INTRAVENOUS AS NEEDED
Status: DISCONTINUED | OUTPATIENT
Start: 2024-12-17 | End: 2024-12-17

## 2024-12-17 RX ORDER — EPHEDRINE SULFATE 50 MG/ML
INJECTION INTRAVENOUS AS NEEDED
Status: DISCONTINUED | OUTPATIENT
Start: 2024-12-17 | End: 2024-12-17

## 2024-12-17 RX ORDER — SODIUM CHLORIDE, SODIUM LACTATE, POTASSIUM CHLORIDE, CALCIUM CHLORIDE 600; 310; 30; 20 MG/100ML; MG/100ML; MG/100ML; MG/100ML
125 INJECTION, SOLUTION INTRAVENOUS CONTINUOUS
Status: DISCONTINUED | OUTPATIENT
Start: 2024-12-17 | End: 2024-12-17 | Stop reason: HOSPADM

## 2024-12-17 RX ORDER — HYDROMORPHONE HCL/PF 1 MG/ML
0.5 SYRINGE (ML) INJECTION
Status: DISCONTINUED | OUTPATIENT
Start: 2024-12-17 | End: 2024-12-17 | Stop reason: HOSPADM

## 2024-12-17 RX ORDER — MAGNESIUM HYDROXIDE 1200 MG/15ML
LIQUID ORAL AS NEEDED
Status: DISCONTINUED | OUTPATIENT
Start: 2024-12-17 | End: 2024-12-17 | Stop reason: HOSPADM

## 2024-12-17 RX ORDER — FENTANYL CITRATE/PF 50 MCG/ML
25 SYRINGE (ML) INJECTION
Status: DISCONTINUED | OUTPATIENT
Start: 2024-12-17 | End: 2024-12-17 | Stop reason: HOSPADM

## 2024-12-17 RX ORDER — MEPERIDINE HYDROCHLORIDE 25 MG/ML
12.5 INJECTION INTRAMUSCULAR; INTRAVENOUS; SUBCUTANEOUS
Status: DISCONTINUED | OUTPATIENT
Start: 2024-12-17 | End: 2024-12-17 | Stop reason: HOSPADM

## 2024-12-17 RX ORDER — CEFAZOLIN SODIUM 1 G/50ML
1000 SOLUTION INTRAVENOUS ONCE
Status: COMPLETED | OUTPATIENT
Start: 2024-12-17 | End: 2024-12-17

## 2024-12-17 RX ORDER — ACETAMINOPHEN 500 MG
500 TABLET ORAL EVERY 6 HOURS PRN
COMMUNITY

## 2024-12-17 RX ORDER — DEXAMETHASONE SODIUM PHOSPHATE 10 MG/ML
INJECTION, SOLUTION INTRAMUSCULAR; INTRAVENOUS AS NEEDED
Status: DISCONTINUED | OUTPATIENT
Start: 2024-12-17 | End: 2024-12-17

## 2024-12-17 RX ORDER — PROPOFOL 10 MG/ML
INJECTION, EMULSION INTRAVENOUS CONTINUOUS PRN
Status: DISCONTINUED | OUTPATIENT
Start: 2024-12-17 | End: 2024-12-17

## 2024-12-17 RX ORDER — IBUPROFEN 600 MG/1
600 TABLET, FILM COATED ORAL EVERY 6 HOURS PRN
Status: DISCONTINUED | OUTPATIENT
Start: 2024-12-17 | End: 2024-12-17

## 2024-12-17 RX ADMIN — KETOROLAC TROMETHAMINE 30 MG: 30 INJECTION, SOLUTION INTRAMUSCULAR; INTRAVENOUS at 08:54

## 2024-12-17 RX ADMIN — PROPOFOL 200 MG: 10 INJECTION, EMULSION INTRAVENOUS at 07:43

## 2024-12-17 RX ADMIN — IBUPROFEN 600 MG: 600 TABLET, FILM COATED ORAL at 10:41

## 2024-12-17 RX ADMIN — FENTANYL CITRATE 25 MCG: 50 INJECTION INTRAMUSCULAR; INTRAVENOUS at 09:40

## 2024-12-17 RX ADMIN — FENTANYL CITRATE 25 MCG: 50 INJECTION INTRAMUSCULAR; INTRAVENOUS at 09:51

## 2024-12-17 RX ADMIN — MIDAZOLAM 2 MG: 1 INJECTION INTRAMUSCULAR; INTRAVENOUS at 07:32

## 2024-12-17 RX ADMIN — FENTANYL CITRATE 25 MCG: 50 INJECTION INTRAMUSCULAR; INTRAVENOUS at 08:59

## 2024-12-17 RX ADMIN — SODIUM CHLORIDE, SODIUM LACTATE, POTASSIUM CHLORIDE, AND CALCIUM CHLORIDE: .6; .31; .03; .02 INJECTION, SOLUTION INTRAVENOUS at 08:50

## 2024-12-17 RX ADMIN — PROPOFOL 30 MCG/KG/MIN: 10 INJECTION, EMULSION INTRAVENOUS at 07:46

## 2024-12-17 RX ADMIN — DEXAMETHASONE SODIUM PHOSPHATE 10 MG: 10 INJECTION INTRAMUSCULAR; INTRAVENOUS at 07:43

## 2024-12-17 RX ADMIN — FENTANYL CITRATE 25 MCG: 50 INJECTION INTRAMUSCULAR; INTRAVENOUS at 08:02

## 2024-12-17 RX ADMIN — ACETAMINOPHEN 1000 MG: 10 INJECTION INTRAVENOUS at 08:53

## 2024-12-17 RX ADMIN — PHENYLEPHRINE HYDROCHLORIDE 20 MCG/MIN: 50 INJECTION INTRAVENOUS at 07:54

## 2024-12-17 RX ADMIN — SODIUM CHLORIDE, SODIUM LACTATE, POTASSIUM CHLORIDE, AND CALCIUM CHLORIDE 125 ML/HR: .6; .31; .03; .02 INJECTION, SOLUTION INTRAVENOUS at 05:42

## 2024-12-17 RX ADMIN — CEFAZOLIN SODIUM 1000 MG: 1 SOLUTION INTRAVENOUS at 07:46

## 2024-12-17 RX ADMIN — EPHEDRINE SULFATE 5 MG: 50 INJECTION INTRAVENOUS at 07:56

## 2024-12-17 RX ADMIN — FENTANYL CITRATE 25 MCG: 50 INJECTION INTRAMUSCULAR; INTRAVENOUS at 08:37

## 2024-12-17 RX ADMIN — FENTANYL CITRATE 25 MCG: 50 INJECTION INTRAMUSCULAR; INTRAVENOUS at 07:51

## 2024-12-17 NOTE — OP NOTE
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]    Procedure(s):  Right - Right kary  localized lumpectomy  Use of kary   Specimen radiograph    Specimen(s):  ID Type Source Tests Collected by Time Destination   1 : RIGHT breast lumpectomy- short stitch superior, long stitch lateral Tissue Breast, Right TISSUE EXAM Stacey Cueva MD 2024 0830        Estimated Blood Loss:   Minimal    Drains:  * No LDAs found *    Anesthesia Type:   General    Operative Indications:  Benign tumor of breast, 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 She was counseled on short term follow up vs excision; she preferred that latter given her family history and overall breast cancer risk. She presented the day of surgery to the OR. She had preoperative antibiotics. She was prepped and draped in the usual standard fashion. Timeout was performed. Attention was turned to the outer right breast. Half percent marcaine plain was injected for local anesthesia. A circumareolar incision was created in the outer right breast. Electrocautery was used to dissect further lateral to the area of concern which was elevated into the wound using an allis clamp. This was excised with a small rim of surrounding tissue. The specimen was marked with a short stitch superior and long stitch lateral. This was imaged in the OR revealing the standard clip and kary reflector. The specimen was submitted to pathology in formalin. Her wound was irrigated  and hemostasis was achieved. The wound was then closed in a layered fashion using multiple interrupted 3-0 Monocryl suture and a running 4-0 Monocryl subcuticular stitch.  All counts were correct.  The skin was cleaned and dried.  Surgical mold, fluffs and a bra were applied.  The patient was then extubated and taken to recovery in stable condition.  A physician assistant was required during the procedure for retraction, tissue handling, dissection and suturing; no residents were available.    Patient Disposition:  extubated and stable    This procedure was not performed to treat breast cancer through sentinel node biopsy           SIGNATURE: Stacey Cueva MD  DATE: December 17, 2024  TIME: 8:52 AM

## 2024-12-17 NOTE — INTERVAL H&P NOTE
H&P reviewed. After examining the patient I find no changes in the patients condition since the H&P had been written.    Vitals:    12/17/24 0522   BP: 101/66   Pulse: 79   Resp: 20   Temp: 97.8 °F (36.6 °C)   SpO2: 96%

## 2024-12-17 NOTE — DISCHARGE INSTR - AVS FIRST PAGE
POST-OPERATIVE CARE INSTRUCTIONS       Care after your procedure:   General  Rest and relax for 24 hours, then gradually return to normal activities.  Do not perform any heavy lifting or strenuous physical activities for 14 days.  Your activity restrictions will be re-evaluated at your post op visit.  Drink clear liquids until you are certain there is no nausea, then resume a normal diet.  Do not drink alcohol, drive any vehicle, operate mechanical equipment or make critical decisions for at least 24 hours and until you are off any narcotic pain medications.  The Incision  Your incision is closed with:   dissolvable stiches just underneath the skin.                The incision is also covered with:                          clear waterproof glue  A gauze-pad is covering the wound.  Wound care  Remove your gauze-pad after 24 hours.   You may then shower using soap and water to clean your incision. Gently dry the wound.  You may redress your wound with additional gauze and tape if you choose.  A little bruising at the wound site is normal.    Medication  Resume all previous medications  Take either Naproxen (Aleve) one tablet every 8 hours or Ibuprofen(Advil/Motrin) one(1) to two(2) tablets every 6 hours around the clock for the first 2-3 days.       Take this even if you don't think you need it for at least the first 24 hours.  Pain Medication Instructions: may also use over the counter tylenol          Other (If applicable)  Wear a post-surgical bra around the clock.  May use ice to the incision site(s) for the next 24-48 hours, twice daily.   Call your  doctor if you have any of the following:  Redness, swelling, heat, drainage, and/or bleeding from your wound  Chills or fever ( above 101' F )  Pain, not relieved with the above medications  If you have any questions or problems call our office [phone]    Follow-up appointment:  As scheduled

## 2024-12-17 NOTE — ANESTHESIA POSTPROCEDURE EVALUATION
Post-Op Assessment Note    CV Status:  Stable  Pain Score: 4    Pain management: adequate       Mental Status:  Alert and awake   Hydration Status:  Euvolemic   PONV Controlled:  Controlled   Airway Patency:  Patent  Two or more mitigation strategies used for obstructive sleep apnea   Post Op Vitals Reviewed: Yes    No anethesia notable event occurred.    Staff: Anesthesiologist           Last Filed PACU Vitals:  Vitals Value Taken Time   Temp 97.4 °F (36.3 °C) 12/17/24 1016   Pulse 80 12/17/24 1016   /69 12/17/24 1016   Resp 16 12/17/24 1016   SpO2 95 % 12/17/24 1016       Modified Muna:  Activity: 2 (12/17/2024  9:47 AM)  Respiration: 2 (12/17/2024  9:47 AM)  Circulation: 2 (12/17/2024  9:47 AM)  Consciousness: 2 (12/17/2024  9:47 AM)  Oxygen Saturation: 2 (12/17/2024  9:47 AM)  Modified Muna Score: 10 (12/17/2024  9:47 AM)

## 2024-12-17 NOTE — ANESTHESIA PREPROCEDURE EVALUATION
Procedure:  Right kary  localized lumpectomy (Right: Breast)    Relevant Problems   CARDIO   (+) Migraines      GI/HEPATIC   (+) GERD without esophagitis      MUSCULOSKELETAL   (+) Lower back pain      NEURO/PSYCH   (+) Anxiety   (+) Cervical spinal cord compression (HCC)   (+) Depression   (+) Generalized anxiety disorder   (+) Migraines      Behavioral Health   (+) Tobacco abuse      Ear/Nose/Throat   (+) Benign paroxysmal positional vertigo due to bilateral vestibular disorder        Physical Exam    Airway    Mallampati score: II  TM Distance: >3 FB  Neck ROM: limited     Dental   No notable dental hx     Cardiovascular  Cardiovascular exam normal    Pulmonary  Pulmonary exam normal     Other Findings  post-pubertal.      Anesthesia Plan  ASA Score- 2     Anesthesia Type- general with ASA Monitors.         Additional Monitors:     Airway Plan: LMA.           Plan Factors-Exercise tolerance (METS): >4 METS.    Chart reviewed.        Patient is a current smoker.  Patient instructed to abstain from smoking on day of procedure. Patient smoked on day of surgery.    Obstructive sleep apnea risk education given perioperatively.        Induction- intravenous.    Postoperative Plan- Plan for postoperative opioid use. Planned trial extubation        Informed Consent- Anesthetic plan and risks discussed with patient.

## 2024-12-17 NOTE — ANESTHESIA POSTPROCEDURE EVALUATION
Post-Op Assessment Note    CV Status:  Stable  Pain Score: 0    Pain management: adequate       Mental Status:  Sleepy and awake   Hydration Status:  Stable   PONV Controlled:  None   Airway Patency:  Patent     Post Op Vitals Reviewed: Yes    No anethesia notable event occurred.    Staff: CRNA           Last Filed PACU Vitals:  Vitals Value Taken Time   Temp     Pulse 80 12/17/24 0919   /79    Resp     SpO2 97 % 12/17/24 0919   Vitals shown include unfiled device data.    Modified Muna:  No data recorded

## 2024-12-20 ENCOUNTER — TELEPHONE (OUTPATIENT)
Age: 39
End: 2024-12-20

## 2024-12-20 NOTE — TELEPHONE ENCOUNTER
Attempted to call patient regarding post op call.  Left voicemail message with number to call back.

## 2024-12-23 NOTE — TELEPHONE ENCOUNTER
How does the incision look? Yellowish/orange clear fluid per pt Changing dressing daily Wearing support bra all times except to shower.  Pt stated R breast is swollen and sore,using ice packs several times/day.    Do you have fever or chills? No    Are you having any pain? Yes     Is it controlled with your pain medication? Yes    What medications are you currently taking for pain control? Alt ES Tylenol and Motrin 600mg    Do you have a drain(s)? No    Verify post-op appointment date and time  [x]12/30 11AM Abhis[facility]k [facility]    Do you have any other questions or concerns? No other concerns at this time    **NOTE TO TRIAGER: If patient requires further triage, based upon the answers above, move to appropriate triage protocol.

## 2024-12-24 DIAGNOSIS — F43.12 CHRONIC POST-TRAUMATIC STRESS DISORDER (PTSD): ICD-10-CM

## 2024-12-24 RX ORDER — BUSPIRONE HYDROCHLORIDE 7.5 MG/1
7.5 TABLET ORAL 2 TIMES DAILY
Qty: 60 TABLET | Refills: 0 | Status: SHIPPED | OUTPATIENT
Start: 2024-12-24

## 2024-12-26 PROCEDURE — 88307 TISSUE EXAM BY PATHOLOGIST: CPT | Performed by: PATHOLOGY

## 2024-12-29 ENCOUNTER — TELEPHONE (OUTPATIENT)
Dept: OTHER | Facility: OTHER | Age: 39
End: 2024-12-29

## 2024-12-29 NOTE — TELEPHONE ENCOUNTER
Patient is calling regarding cancelling an appointment.    Date/Time: 12/30/2024 11:00    Patient was rescheduled: YES [] NO [x]    Patient requesting call back to reschedule: YES [x] NO []      Pt is sick and declined to speak with provider.

## 2024-12-30 DIAGNOSIS — Z98.890 S/P CERVICAL DISC REPLACEMENT: ICD-10-CM

## 2024-12-30 DIAGNOSIS — F41.1 GENERALIZED ANXIETY DISORDER: ICD-10-CM

## 2024-12-30 DIAGNOSIS — B00.9 HERPES SIMPLEX INFECTION: ICD-10-CM

## 2024-12-30 RX ORDER — CLONAZEPAM 1 MG/1
1 TABLET ORAL 2 TIMES DAILY
Qty: 60 TABLET | Refills: 0 | Status: SHIPPED | OUTPATIENT
Start: 2024-12-30

## 2024-12-30 RX ORDER — ACYCLOVIR 50 MG/G
OINTMENT TOPICAL EVERY 4 HOURS
Qty: 15 G | Refills: 0 | Status: SHIPPED | OUTPATIENT
Start: 2024-12-30

## 2024-12-31 ENCOUNTER — TELEPHONE (OUTPATIENT)
Dept: FAMILY MEDICINE CLINIC | Facility: CLINIC | Age: 39
End: 2024-12-31

## 2024-12-31 NOTE — TELEPHONE ENCOUNTER
PA for ACYCLOVIR 5% OINTMENT  SUBMITTED to rx benefits    via    []CMM-KEY:    []Surescripts-Case ID #    []Availity-Auth ID #  NDC #    []Faxed to plan   [x]Other website rxb- [id number]  []Phone call Case ID #      []PA sent as URGENT    All office notes, labs and other pertaining documents and studies sent. Clinical questions answered. Awaiting determination from insurance company.     Turnaround time for your insurance to make a decision on your Prior Authorization can take 7-21 business days.

## 2025-01-01 RX ORDER — CYCLOBENZAPRINE HCL 10 MG
10 TABLET ORAL 3 TIMES DAILY PRN
Qty: 30 TABLET | Refills: 0 | Status: SHIPPED | OUTPATIENT
Start: 2025-01-01

## 2025-01-02 NOTE — TELEPHONE ENCOUNTER
PA for ACYCLOVIR 5% OINTMENT DENIED    Reason:    Message sent to office clinical pool Yes    Denial letter scanned into Media Yes    Appeal started No (Provider will need to decide if appeal is warranted and send clinical documentation to Prior Authorization Team for initiation.)    **Please follow up with your patient regarding denial and next steps**

## 2025-01-07 DIAGNOSIS — M25.50 POLYARTHRALGIA: Primary | ICD-10-CM

## 2025-01-08 ENCOUNTER — OFFICE VISIT (OUTPATIENT)
Dept: SURGICAL ONCOLOGY | Facility: CLINIC | Age: 40
End: 2025-01-08

## 2025-01-08 VITALS
HEART RATE: 72 BPM | BODY MASS INDEX: 19.63 KG/M2 | SYSTOLIC BLOOD PRESSURE: 110 MMHG | DIASTOLIC BLOOD PRESSURE: 78 MMHG | TEMPERATURE: 98 F | OXYGEN SATURATION: 98 % | WEIGHT: 115 LBS | HEIGHT: 64 IN | RESPIRATION RATE: 16 BRPM

## 2025-01-08 DIAGNOSIS — D24.1 BENIGN TUMOR OF BREAST, RIGHT: Primary | ICD-10-CM

## 2025-01-08 PROCEDURE — 99024 POSTOP FOLLOW-UP VISIT: CPT | Performed by: SURGERY

## 2025-01-08 NOTE — PROGRESS NOTES
39 y.o. female is here today s/p right lumpectomy. She reports having a reaction to the surgical glue.  She apparently had a prior reaction when she had her hysterectomy.  This was not listed as an allergy.      Physical Exam  Constitutional:       General: She is not in acute distress.     Appearance: Normal appearance.   Chest:   Breasts:     Right: Swelling and skin change (Resolving rash lateral to her incision, incision is healing well with no signs of infection) present.   Neurological:      Mental Status: She is alert and oriented to person, place, and time.   Psychiatric:         Mood and Affect: Mood normal.           Diagnoses and all orders for this visit:    Benign tumor of breast, right        Assessment/Plan: 39-year-old female status post right lumpectomy for an adenosis tumor.  Final pathology reveals the same along with fibrocystic changes.  She developed a rash secondary to surgical glue.  I gave her wound care recommendations for home.  She states that she is seeing her gynecologist tomorrow.  She can continue routine care with GYN.  I will see her on a as needed basis.

## 2025-01-09 ENCOUNTER — TELEMEDICINE (OUTPATIENT)
Dept: OBGYN CLINIC | Facility: MEDICAL CENTER | Age: 40
End: 2025-01-09
Payer: COMMERCIAL

## 2025-01-09 ENCOUNTER — PATIENT MESSAGE (OUTPATIENT)
Dept: OBGYN CLINIC | Facility: CLINIC | Age: 40
End: 2025-01-09

## 2025-01-09 ENCOUNTER — TELEPHONE (OUTPATIENT)
Age: 40
End: 2025-01-09

## 2025-01-09 DIAGNOSIS — E89.40 SURGICAL MENOPAUSE: ICD-10-CM

## 2025-01-09 DIAGNOSIS — Z91.89 AT INCREASED RISK OF BREAST CANCER: Primary | ICD-10-CM

## 2025-01-09 DIAGNOSIS — Z09 POSTOPERATIVE EXAMINATION: ICD-10-CM

## 2025-01-09 PROCEDURE — 99214 OFFICE O/P EST MOD 30 MIN: CPT | Performed by: OBSTETRICS & GYNECOLOGY

## 2025-01-09 NOTE — PROGRESS NOTES
Virtual Regular Visit  Name: Katina Mendez      : 1985      MRN: [id number]  Encounter Provider: Елена Silva MD  Encounter Date: 2025   Encounter department: [facility]      Verification of patient location:  Patient is located at Home in the following state in which I hold an active license [place] :  Assessment & Plan  At increased risk of breast cancer         Postoperative examination         Surgical menopause               Encounter provider Елена Silva MD    The patient was identified by name and date of birth. Katina Mendez was informed that this is a telemedicine visit and that the visit is being conducted through the Epic Embedded platform. She agrees to proceed..  My office door was closed. No one else was in the room.  She acknowledged consent and understanding of privacy and security of the video platform. The patient has agreed to participate and understands they can discontinue the visit at any time.    Patient is aware this is a billable service.     History of Present Illness     HPI  Patient presents for postop, however, has a URI so requested a virtual visit. States her recovery has been uneventful. Doing well. No complaints. She was started on Climara but was not started due to cost. Will check with insurance to see if there are other options for coverage. Discussed importance of HRT with bone and cardiac protection.   We also discussed her recent breast imaging and breast lumpectomy. She is doing well and healing well. Discussed surveillance recommendation of mammo and MRI alternating every 6 months with clinical exams between. MRI ordered for , recommend she follow up for mammo in March. Patient will call to change her appointment.     Review of Systems   Constitutional: Negative.    HENT: Negative.     Eyes: Negative.    Respiratory: Negative.     Cardiovascular: Negative.    Gastrointestinal: Negative.    Genitourinary: Negative.     Musculoskeletal: Negative.    All other systems reviewed and are negative.      Objective   LMP 01/29/2020     Physical Exam  General: Patient appears well-developed. Patient is adequately nourished. Patient is not diaphoretic. Patient is not in distress.  Neck: Visualization of the neck demonstrates no grossly visible masses. Neck mobility is not compromised, neck appears supple.   HEENT: Oral mucosa appears moist. Patient does not identify palpable neck masses. Patient reports no oral tenderness or readily identifiable masses.  Eyes: Conjunctivae appear normal bilaterally. Right eye with no discharge. Left eye with no discharge. No evidence of scleral icterus. No evidence of strabismus.  Respiratory: Respiratory effort appears normal. There is no respiratory distress. Patient able to speak in full sentences. There was no audible stridor or cough.  Abdomen: Patient states her abdomen is soft. States abdomen is non-tender. States abdomen is non-distended. Patient denies visible or palpable bulges to suggest hernias.  Musculoskeletal: Patient reports and I can confirm no visible deformities in 4 extremities. Patient reports and I can confirm full mobility in 4 extremities. There is no grossly visible limb edema. There is no evidence of clubbing or peripheral cyanosis.  Neurologic: Patient is fully alert and responsive. Patient is oriented to time, place and person. Gross evaluation of CNs III-IV-VI-VII-VIII and XI demonstrates no deficits. Patient reports normal gait and balance.  Skin: My evaluation of exposed skin areas reveals no evidence of pallor. My evaluation of exposed skin areas reveals no obvious rashes. My evaluation of exposed skin areas reveals no grossly visible lesions. My evaluation of exposed skin areas reveals no evidence of erythema.  Psychiatric / Behavioral: Patient's mood and affect appears normal. Patient's judgement is preserved. Patient is coherent and thought content appears directionally  and contextually appropriate for age and health status.    Visit Time  Total Visit Duration: 20

## 2025-01-10 DIAGNOSIS — M25.50 POLYARTHRALGIA: Primary | ICD-10-CM

## 2025-01-15 LAB — CRP SERPL-MCNC: <1 MG/L

## 2025-01-16 LAB
ANA SER QL IF: NORMAL
DSDNA IGG SERPL IA-ACNC: NEGATIVE TITER
ENA SCL70 AB SER-ACNC: NEGATIVE
ENA SM+RNP AB SER-ACNC: NEGATIVE
ENA SS-A AB SER-ACNC: <20 ELISA UNIT
ENA SS-B AB SER-ACNC: <20 ELISA UNIT
SL AMB SMITH ANTIBODIES: NEGATIVE

## 2025-01-17 ENCOUNTER — RESULTS FOLLOW-UP (OUTPATIENT)
Dept: FAMILY MEDICINE CLINIC | Facility: CLINIC | Age: 40
End: 2025-01-17

## 2025-01-24 DIAGNOSIS — F41.1 GENERALIZED ANXIETY DISORDER: ICD-10-CM

## 2025-01-24 RX ORDER — DULOXETIN HYDROCHLORIDE 30 MG/1
30 CAPSULE, DELAYED RELEASE ORAL DAILY
Qty: 90 CAPSULE | Refills: 0 | Status: SHIPPED | OUTPATIENT
Start: 2025-01-24

## 2025-01-24 RX ORDER — DULOXETIN HYDROCHLORIDE 60 MG/1
60 CAPSULE, DELAYED RELEASE ORAL DAILY
Qty: 90 CAPSULE | Refills: 0 | Status: SHIPPED | OUTPATIENT
Start: 2025-01-24

## 2025-01-25 ENCOUNTER — OFFICE VISIT (OUTPATIENT)
Dept: URGENT CARE | Facility: CLINIC | Age: 40
End: 2025-01-25
Payer: COMMERCIAL

## 2025-01-25 VITALS
HEART RATE: 103 BPM | TEMPERATURE: 97.7 F | OXYGEN SATURATION: 97 % | SYSTOLIC BLOOD PRESSURE: 110 MMHG | RESPIRATION RATE: 16 BRPM | DIASTOLIC BLOOD PRESSURE: 72 MMHG

## 2025-01-25 DIAGNOSIS — Z20.828 EXPOSURE TO INFLUENZA: ICD-10-CM

## 2025-01-25 DIAGNOSIS — H60.501 ACUTE OTITIS EXTERNA OF RIGHT EAR, UNSPECIFIED TYPE: Primary | ICD-10-CM

## 2025-01-25 PROCEDURE — 99213 OFFICE O/P EST LOW 20 MIN: CPT

## 2025-01-25 PROCEDURE — 87636 SARSCOV2 & INF A&B AMP PRB: CPT

## 2025-01-25 RX ORDER — NEOMYCIN SULFATE, POLYMYXIN B SULFATE, HYDROCORTISONE 3.5; 10000; 1 MG/ML; [USP'U]/ML; MG/ML
4 SOLUTION/ DROPS AURICULAR (OTIC) EVERY 6 HOURS SCHEDULED
Qty: 10 ML | Refills: 0 | Status: SHIPPED | OUTPATIENT
Start: 2025-01-25

## 2025-01-25 RX ORDER — CEPHALEXIN 500 MG/1
500 CAPSULE ORAL EVERY 6 HOURS SCHEDULED
Qty: 28 CAPSULE | Refills: 0 | Status: SHIPPED | OUTPATIENT
Start: 2025-01-25 | End: 2025-02-01

## 2025-01-25 NOTE — PATIENT INSTRUCTIONS
Use antibiotic as prescribed  Start antibiotic ear drops as prescribed for 7-10 days.  Avoid Q-Tip use  Tylenol/Ibuprofen for discomfort  Fluids  Change pillowcase daily  Follow up with PCP in 3-5 days.  Proceed to ER if symptoms worsen.     Eat yogurt with live and active cultures and/or take a probiotic at least 3 hours before or after antibiotic dose. Monitor stool for diarrhea and/or blood. If this occurs, contact primary care doctor ASAP.

## 2025-01-25 NOTE — PROGRESS NOTES
[facility]        NAME: Katina Mendez is a 39 y.o. female  : 1985    MRN: [id number]  DATE: 2025  TIME: 2:26 PM    Assessment and Plan   Acute otitis externa of right ear, unspecified type [H60.501]  1. Acute otitis externa of right ear, unspecified type  cephalexin (KEFLEX) 500 mg capsule    neomycin-polymyxin-hydrocortisone (CORTISPORIN) otic solution      2. Exposure to influenza  Covid/Flu- Office Collect Normal            Patient Instructions     Use antibiotic as prescribed  Start antibiotic ear drops as prescribed for 7-10 days.  Avoid Q-Tip use  Tylenol/Ibuprofen for discomfort  Fluids  Change pillowcase daily  Follow up with PCP in 3-5 days.  Proceed to ER if symptoms worsen.     Eat yogurt with live and active cultures and/or take a probiotic at least 3 hours before or after antibiotic dose. Monitor stool for diarrhea and/or blood. If this occurs, contact primary care doctor ASAP.    If tests are performed, our office will contact you with results only if changes need to made to the care plan discussed with you at the visit. You can review your full results on [facility].    Chief Complaint     Chief Complaint   Patient presents with    Ear Problem     Patient with right ear pain, swelling and dryness for 2-3 days.  Also reports drainage         History of Present Illness       Patient is a 40 yo female with no significant PMH presenting in the clinic today for cold sx x 2-3 days. Admits chills, body aches, fatigue, right ear pain, right ear discharge, cough, congestion, rhinorrhea, headache, and intermittent dizziness with positional changes. Denies fever, tinnitus, decreased hearing, chest pain, SOB, abdominal pain, n/v/d. Admits the use of tylenol, anti-bacterial soap, and Aleve for sx management. Positive recent sick contacts as patient's son is experiencing similar sx and recently tested positive for flu. Patient notes h/o allergies to Bactrim and  fluoroquinolones.        Review of Systems   Review of Systems   Constitutional:  Positive for chills and fatigue. Negative for fever.   HENT:  Positive for congestion, ear discharge, ear pain and rhinorrhea. Negative for hearing loss, postnasal drip, sinus pressure, sinus pain, sore throat and tinnitus.    Respiratory:  Positive for cough. Negative for shortness of breath.    Cardiovascular:  Negative for chest pain.   Gastrointestinal:  Negative for abdominal pain, diarrhea, nausea and vomiting.   Musculoskeletal:  Positive for myalgias.   Skin:  Negative for rash.   Neurological:  Positive for dizziness and headaches.         Current Medications       Current Outpatient Medications:     cephalexin (KEFLEX) 500 mg capsule, Take 1 capsule (500 mg total) by mouth every 6 (six) hours for 7 days, Disp: 28 capsule, Rfl: 0    neomycin-polymyxin-hydrocortisone (CORTISPORIN) otic solution, Administer 4 drops to the right ear every 6 (six) hours, Disp: 10 mL, Rfl: 0    acetaminophen (TYLENOL) 500 mg tablet, Take 500 mg by mouth every 6 (six) hours as needed for mild pain, Disp: , Rfl:     acyclovir (ZOVIRAX) 5 % ointment, Apply topically every 4 (four) hours, Disp: 15 g, Rfl: 0    ALPRAZolam (NIRAVAM) 1 MG dissolvable tablet, Take 1 tablet (1 mg total) by mouth 30 min pre-procedure, Disp: 6 tablet, Rfl: 0    busPIRone (BUSPAR) 7.5 mg tablet, Take 1 tablet (7.5 mg total) by mouth 2 (two) times a day, Disp: 60 tablet, Rfl: 0    clonazePAM (KlonoPIN) 1 mg tablet, Take 1 tablet (1 mg total) by mouth 2 (two) times a day, Disp: 60 tablet, Rfl: 0    cyclobenzaprine (FLEXERIL) 10 mg tablet, Take 1 tablet (10 mg total) by mouth 3 (three) times a day as needed for muscle spasms May make you drowsy or dizzy., Disp: 30 tablet, Rfl: 0    dicyclomine (BENTYL) 20 mg tablet, Take 1 tablet (20 mg total) by mouth 3 (three) times a day as needed (for abdominal cramping), Disp: 30 tablet, Rfl: 0    DULoxetine (CYMBALTA) 30 mg delayed release  capsule, TAKE ONE CAPSULE BY MOUTH EVERY DAY, Disp: 90 capsule, Rfl: 0    DULoxetine (CYMBALTA) 60 mg delayed release capsule, Take 1 capsule (60 mg total) by mouth daily, Disp: 90 capsule, Rfl: 0    gabapentin (Neurontin) 600 MG tablet, Take 1 tablet (600 mg total) by mouth 3 (three) times a day, Disp: 90 tablet, Rfl: 2    lidocaine-prilocaine (EMLA) cream, Apply topically as needed for mild pain, Disp: 30 g, Rfl: 2    loratadine (CLARITIN) 10 mg tablet, Take 1 tablet (10 mg total) by mouth daily, Disp: 30 tablet, Rfl: 2    Multiple Vitamins-Minerals (MULTIVITAMIN ADULT PO), Take 1 tablet by mouth daily, Disp: , Rfl:     ondansetron (ZOFRAN) 4 mg tablet, Take 1 tablet (4 mg total) by mouth every 8 (eight) hours as needed for nausea or vomiting, Disp: 20 tablet, Rfl: 0    traZODone (DESYREL) 50 mg tablet, Take 1 tablet (50 mg total) by mouth daily at bedtime as needed for sleep, Disp: 90 tablet, Rfl: 0    Current Allergies     Allergies as of 01/25/2025 - Reviewed 01/25/2025   Allergen Reaction Noted    Bactrim [sulfamethoxazole-trimethoprim] Hives 10/06/2020    Latex Other (See Comments) 05/30/2016    Levofloxacin Hives 11/17/2016    2-octylcyanoacrylate [cyanoacrylate] Rash 01/03/2025    Chlorhexidine Rash 02/29/2024    Other Rash 01/08/2025    Quinolones Rash 10/25/2016            The following portions of the patient's history were reviewed and updated as appropriate: allergies, current medications, past family history, past medical history, past social history, past surgical history and problem list.     Past Medical History:   Diagnosis Date    Allergic     Anxiety     Bunion     left foot   OR   correction today 4/14/2023    Cervical radiculopathy     COVID     x 2-- Jan 2022 and Jan 2023    Depression     GERD (gastroesophageal reflux disease)     Headache(784.0)     Migraines     Seasonal allergies     Tobacco abuse     Wears contact lenses        Past Surgical History:   Procedure Laterality Date     BUNIONECTOMY Right     DISCECTOMY SPINE CERVICAL ANTERIOR W/ ARTHROPLASTY Bilateral 02/29/2024    Procedure: Anterior Cervical discectomy and disc replacement/arthroplasty C5-6;  Surgeon: Edu Del Rio MD;  Location: [facility];  Service: Orthopedics    FL INJECTION RIGHT SHOULDER (ARTHROGRAM)  11/13/2024    LAPAROSCOPIC TOTAL HYSTERECTOMY  2020    ND EXC BREAST LES PREOP PLMT RAD MARKER OPEN 1 LES Right 12/17/2024    Procedure: Right kary  localized lumpectomy;  Surgeon: Stacey Cueva MD;  Location: [facility];  Service: Surgical Oncology    ND HALLUX RIGIDUS W/CHEILECTOMY 1ST MP JT W/IMPLT Left 04/14/2023    Procedure: BUNIONECTOMY with implant;  Surgeon: Will Cuevas DPM;  Location: [facility];  Service: Podiatry    ND LAPAROSCOPY W/RMVL ADNEXAL STRUCTURES N/A 12/9/2024    Procedure: DIAGNIOSTIC LAPAROSCOPY, BILATERAL OOPHERECTOMY, EUA;  Surgeon: Елена Silva MD;  Location: [facility];  Service: Gynecology    ND MASTECTOMY PARTIAL Right 12/17/2024    Procedure: Right kary  localized lumpectomy;  Surgeon: Stacey Cueva MD;  Location: [facility];  Service: Surgical Oncology    TONSILLECTOMY      US BREAST CLIP NEEDLE LOC RIGHT Right 12/13/2024    US GUIDED BREAST BIOPSY RIGHT COMPLETE Right 11/12/2024       Family History   Problem Relation Age of Onset    Mental illness Mother     Depression Mother     Early death Mother     Lung cancer Mother 58        with brain mets.    Cancer Mother     Anxiety disorder Mother     OCD Mother     Psychiatric Illness Mother     Diabetes Father     Hypertension Father     Early death Father     Anxiety disorder Sister     Rashes / Skin problems Sister     Migraines Sister     ADD / ADHD Brother     Anxiety disorder Brother     Melanoma Brother     Diabetes Maternal Grandmother     Arthritis Maternal Grandmother     Heart disease Maternal Grandmother     Breast cancer Maternal Grandmother     No Known Problems Maternal Grandfather     Breast cancer Paternal  Grandmother         age unknown.    Anuerysm Paternal Grandfather     Multiple sclerosis Paternal Aunt     Colon cancer Paternal Uncle     Colon cancer Paternal Uncle          Medications have been verified.        Objective   /72   Pulse 103   Temp 97.7 °F (36.5 °C) (Tympanic)   Resp 16   LMP 01/29/2020   SpO2 97%        Physical Exam     Physical Exam  Vitals reviewed.   Constitutional:       General: She is not in acute distress.     Appearance: Normal appearance. She is normal weight. She is not ill-appearing.   HENT:      Head: Normocephalic.      Right Ear: Hearing, tympanic membrane, ear canal and external ear normal. Drainage (dried drainage noted along external ear canal), swelling and tenderness (TTP to tragus) present. No middle ear effusion. There is no impacted cerumen. Tympanic membrane is not erythematous or bulging.      Left Ear: Hearing, tympanic membrane, ear canal and external ear normal. No drainage, swelling or tenderness.  No middle ear effusion. There is no impacted cerumen. Tympanic membrane is not erythematous or bulging.      Nose: Nose normal. No congestion or rhinorrhea.      Right Sinus: No maxillary sinus tenderness or frontal sinus tenderness.      Left Sinus: No maxillary sinus tenderness or frontal sinus tenderness.      Mouth/Throat:      Lips: Pink.      Mouth: Mucous membranes are moist.      Pharynx: Oropharynx is clear. Uvula midline. No pharyngeal swelling, oropharyngeal exudate, posterior oropharyngeal erythema or uvula swelling.      Tonsils: No tonsillar exudate or tonsillar abscesses. 1+ on the right. 1+ on the left.   Eyes:      General:         Right eye: No discharge.         Left eye: No discharge.      Conjunctiva/sclera: Conjunctivae normal.   Cardiovascular:      Rate and Rhythm: Normal rate and regular rhythm.      Pulses: Normal pulses.      Heart sounds: Normal heart sounds. No murmur heard.     No friction rub. No gallop.   Pulmonary:      Effort:  Pulmonary effort is normal.      Breath sounds: Normal breath sounds. No wheezing, rhonchi or rales.   Musculoskeletal:      Cervical back: Normal range of motion and neck supple. No tenderness.   Lymphadenopathy:      Cervical: No cervical adenopathy.   Skin:     General: Skin is warm.      Findings: No rash.   Neurological:      Mental Status: She is alert.   Psychiatric:         Mood and Affect: Mood normal.         Behavior: Behavior normal.

## 2025-01-27 ENCOUNTER — HOSPITAL ENCOUNTER (OUTPATIENT)
Dept: RADIOLOGY | Facility: HOSPITAL | Age: 40
Discharge: HOME | End: 2025-01-27
Attending: ORTHOPAEDIC SURGERY
Payer: COMMERCIAL

## 2025-01-27 ENCOUNTER — TELEPHONE (OUTPATIENT)
Dept: OBGYN CLINIC | Facility: HOSPITAL | Age: 40
End: 2025-01-27

## 2025-01-27 ENCOUNTER — OFFICE VISIT (OUTPATIENT)
Dept: OBGYN CLINIC | Facility: HOSPITAL | Age: 40
End: 2025-01-27
Payer: COMMERCIAL

## 2025-01-27 VITALS — BODY MASS INDEX: 19.65 KG/M2 | HEIGHT: 64 IN | WEIGHT: 115.08 LBS

## 2025-01-27 DIAGNOSIS — Z01.818 PRE-OP EVALUATION: Primary | ICD-10-CM

## 2025-01-27 DIAGNOSIS — M54.12 RADICULOPATHY, CERVICAL REGION: ICD-10-CM

## 2025-01-27 DIAGNOSIS — F43.12 CHRONIC POST-TRAUMATIC STRESS DISORDER (PTSD): ICD-10-CM

## 2025-01-27 DIAGNOSIS — Z01.818 PRE-OP EVALUATION: ICD-10-CM

## 2025-01-27 PROCEDURE — 71046 X-RAY EXAM CHEST 2 VIEWS: CPT

## 2025-01-27 PROCEDURE — 99215 OFFICE O/P EST HI 40 MIN: CPT | Performed by: ORTHOPAEDIC SURGERY

## 2025-01-27 RX ORDER — BUSPIRONE HYDROCHLORIDE 10 MG/1
10 TABLET ORAL 2 TIMES DAILY
Qty: 60 TABLET | Refills: 0 | Status: SHIPPED | OUTPATIENT
Start: 2025-01-27

## 2025-01-27 RX ORDER — CHLORHEXIDINE GLUCONATE ORAL RINSE 1.2 MG/ML
15 SOLUTION DENTAL ONCE
OUTPATIENT
Start: 2025-01-27 | End: 2025-01-27

## 2025-01-27 RX ORDER — CEFAZOLIN SODIUM 2 G/50ML
2000 SOLUTION INTRAVENOUS ONCE
OUTPATIENT
Start: 2025-01-27 | End: 2025-01-27

## 2025-01-27 NOTE — TELEPHONE ENCOUNTER
Caller: Katina     Doctor: Dr. Del Rio    Reason for call: Patient is waiting for her COVID/FLU test to come back and she is wondering if you would do a virtual with her today for her appointment instead?    I did read her our prevention protocol as follows:              Please call back.    Call back#: [phone]

## 2025-01-27 NOTE — PROGRESS NOTES
Assessment & Plan/Medical Decision Makin y.o. female with Neck Pain and Right Shoulder Pain, history of C5-6 disc arthroplasty on 2024        The clinical, physical and imaging findings were reviewed with the patient.  Katina has a constellation of findings consistent with Cervical Radiculopathy in the setting of cervical degenerative disease with prior C5-6 CDA.  We reviewed her updated cervical MRI and CT scan in detail with implant subsidence, foraminal stenosis with return of symptoms after initial surgery success.     We discussed the differential diagnosis including alternative CNS/neurologic pathology, extremity/musculoskeltal pathology, peripheral nerve compression, etc. Katina's symptoms, exam findings and imaging are most consistent with cervical radiculopathy.  In my opinion, no further work-up is warranted at this time.   Discussed treatment options.  Reviewed the role of further non operative treatment such as physical therapy, activity modification, medication mgmt, interventional spine procedures/injections. Given her symptoms have persisted despite these conservative treatments, recommend consideration of surgical intervention.  Katina would like to proceed with ADRIANA, cervical decompression and stabilization surgery.      We discussed surgical options.  In my opinion, would be C5-6 CDA Removal of Hardware and of C5-6 anterior cervical discectomy fusion to address cervical degenerative disc disease with CDA subsidence and foraminal stenosis.  We reviewed the options - such as ACDF versus repeat disc arthroplasty versus posterior surgery for decompression and ICBG versus cage versus allograft +/- biologic/graft extenders supplementation for obtaining solid arthrodesis as well as the associated risk/benefit profiles and fusion efficacy as well as the FDA status of the instrumentation and products. After discussion with the patient we will plan anterior cervical discectomy and fusion  surgery.  Explained at length the rationale for surgical invention and postoperative expectations.  We discussed and Katina expressed her understanding, that in general, spine surgery is more predictive in improving extremity/radicular discomfort rather than axial spine pain, and arresting the progression of spinal cord/nerve dysfunction rather than improving.      I reviewed with the patient possible risks of surgery which included the risk of nerve injury including C5 nerve palsy, autonomic nervous system dysfunction, persistent and/or worsening pain, chronic pain, need for further surgery, risk of nonunion and instrumentation failure, instability, adjacent segment disease/degeneration, bone graft complications, vocal cord dysfunction including voice changes and hoarseness, swallowing difficulty with possible need for tube feeding, spinal fluid leak and meningitis, hematoma, blood loss, infection, DVT, pulmonary embolism, blindness, paralysis and even death, as well as other risks on consent form.  Patient agrees to proceed with surgery and understands all risks discussed.  Discussed the use of neurophysiology monitoring during the procedure.  We also reviewed patient specific risks and mitigation including previous anterior cervical neck surgery and the associated increased risks.   We discussed that Dr. Laureano of ENT will be assisting in the surgery to help mitigate anterior approach related risks.       We reviewed infection prevention.  Reviewed medications; instructed patient medications to stop before surgery (i.e. blood thinners, NSAIDs, DMARDs and vitamins).       Also reviewed with patient our narcotic policy.  We will provide medications up to 60 days postop.  In the event patient requires more medications, will need to consult their PCP and/or see a pain management physician.     I have queried the [place] database for Katina to better assist in clinical decision making  regarding prescriptions for controlled medications.  After querying the database and considering the clinical picture I have determined that she is a candidate for a prescription for control medication in the initial postoperative period.     All questions were answered. Katina verbalized understanding and desire to proceed with surgical scheduling.  Informed consent was obtained. Consent form was signed at today's visit.       She received a pre-operative chest Xray at today's visit. Orders for preoperative labs (CBC, CMP, PTT, INR, type & screen, and ECG) were also placed at today's visit. Discussed pre-operative clearances, medical optimization and risk stratification.  Katina needs pre-operative clearance from PCP and obtain PATs.    Patient instructed to return to office/ER sooner if symptoms are not improving, getting worse, or new worrisome/neurologic symptoms arise.      Subjective:      Chief Complaint: Neck Pain    HPI:  Katina Mendez is a 39 y.o. female presenting for initial visit with chief complaint of neck pain and right shoulder pain.  Patient is status post C5-6 disc arthroplasty on 2/29/2024.  Preoperatively her symptoms were consistent with both cervical radiculopathy and right shoulder pathology.  Her postoperative course was relatively uneventful.  Patient initially reported improvement in right arm range of motion and function postoperatively.  She continue with neck stiffness postoperatively.  History of preoperative and postoperative tobacco use.  Today she notes residual neck pain and right shoulder pain that radiates into her right anterior arm.  She holds her arm at her side and abducted position and guarded.  Recently evaluated by Dr. Alaniz and provided CSI of right subacromial bursa.  She notes she has difficulty with daily activities such as washing her hair and stopping her bra due to right shoulder dysfunction and pain.    In addition she was reevaluated by pain management and  underwent right biceps tendon injection as well as epidural injection with no significant pain relief.  She does continue with medications including Flexeril and gabapentin.    Interval history 1/27/2025:  The patient presents for follow up of cervical spine.  She has had recent lumpectomy, 12/17/2024.   Her cervical symptoms remain the same.  Today she complains of cervical pain with right shoulder pain and right arm pain and numbness.   Her pain effects her daily activity and sleep.  We did review her postoperative course.  She will notes that she did have improvement in symptoms initially after surgery however she has had some return of symptoms of the last several months.  She has had an extensive postoperative treatment course with extensive physical therapy, multiple injections including right shoulder and cervical.  She has done home exercises and evaluated by orthopedic sports medicine.  She is taken oral medications.  At this time she would like to proceed with further surgical intervention.  Katina did obtain updated advanced imaging including CT scan and cervical MRI in the interim.    Objective:     Family History   Problem Relation Age of Onset    Mental illness Mother     Depression Mother     Early death Mother     Lung cancer Mother 58        with brain mets.    Cancer Mother     Anxiety disorder Mother     OCD Mother     Psychiatric Illness Mother     Diabetes Father     Hypertension Father     Early death Father     Anxiety disorder Sister     Rashes / Skin problems Sister     Migraines Sister     ADD / ADHD Brother     Anxiety disorder Brother     Melanoma Brother     Diabetes Maternal Grandmother     Arthritis Maternal Grandmother     Heart disease Maternal Grandmother     Breast cancer Maternal Grandmother     No Known Problems Maternal Grandfather     Breast cancer Paternal Grandmother         age unknown.    Anuerysm Paternal Grandfather     Multiple sclerosis Paternal Aunt     Colon cancer  Paternal Uncle     Colon cancer Paternal Uncle        Past Medical History:   Diagnosis Date    Allergic     Anxiety     Bunion     left foot   OR   correction today 4/14/2023    Cervical radiculopathy     COVID     x 2-- Jan 2022 and Jan 2023    Depression     GERD (gastroesophageal reflux disease)     Headache(784.0)     Migraines     Seasonal allergies     Tobacco abuse     Wears contact lenses        Current Outpatient Medications   Medication Sig Dispense Refill    acetaminophen (TYLENOL) 500 mg tablet Take 500 mg by mouth every 6 (six) hours as needed for mild pain      acyclovir (ZOVIRAX) 5 % ointment Apply topically every 4 (four) hours 15 g 0    ALPRAZolam (NIRAVAM) 1 MG dissolvable tablet Take 1 tablet (1 mg total) by mouth 30 min pre-procedure 6 tablet 0    busPIRone (BUSPAR) 7.5 mg tablet Take 1 tablet (7.5 mg total) by mouth 2 (two) times a day 60 tablet 0    cephalexin (KEFLEX) 500 mg capsule Take 1 capsule (500 mg total) by mouth every 6 (six) hours for 7 days 28 capsule 0    clonazePAM (KlonoPIN) 1 mg tablet Take 1 tablet (1 mg total) by mouth 2 (two) times a day 60 tablet 0    cyclobenzaprine (FLEXERIL) 10 mg tablet Take 1 tablet (10 mg total) by mouth 3 (three) times a day as needed for muscle spasms May make you drowsy or dizzy. 30 tablet 0    dicyclomine (BENTYL) 20 mg tablet Take 1 tablet (20 mg total) by mouth 3 (three) times a day as needed (for abdominal cramping) 30 tablet 0    DULoxetine (CYMBALTA) 30 mg delayed release capsule TAKE ONE CAPSULE BY MOUTH EVERY DAY 90 capsule 0    DULoxetine (CYMBALTA) 60 mg delayed release capsule Take 1 capsule (60 mg total) by mouth daily 90 capsule 0    gabapentin (Neurontin) 600 MG tablet Take 1 tablet (600 mg total) by mouth 3 (three) times a day 90 tablet 2    lidocaine-prilocaine (EMLA) cream Apply topically as needed for mild pain 30 g 2    loratadine (CLARITIN) 10 mg tablet Take 1 tablet (10 mg total) by mouth daily 30 tablet 2    Multiple  Vitamins-Minerals (MULTIVITAMIN ADULT PO) Take 1 tablet by mouth daily      neomycin-polymyxin-hydrocortisone (CORTISPORIN) otic solution Administer 4 drops to the right ear every 6 (six) hours 10 mL 0    ondansetron (ZOFRAN) 4 mg tablet Take 1 tablet (4 mg total) by mouth every 8 (eight) hours as needed for nausea or vomiting 20 tablet 0    traZODone (DESYREL) 50 mg tablet Take 1 tablet (50 mg total) by mouth daily at bedtime as needed for sleep 90 tablet 0     No current facility-administered medications for this visit.       Past Surgical History:   Procedure Laterality Date    BUNIONECTOMY Right     DISCECTOMY SPINE CERVICAL ANTERIOR W/ ARTHROPLASTY Bilateral 02/29/2024    Procedure: Anterior Cervical discectomy and disc replacement/arthroplasty C5-6;  Surgeon: Edu Del Rio MD;  Location: [facility];  Service: Orthopedics    FL INJECTION RIGHT SHOULDER (ARTHROGRAM)  11/13/2024    LAPAROSCOPIC TOTAL HYSTERECTOMY  2020    NE EXC BREAST LES PREOP PLMT RAD MARKER OPEN 1 LES Right 12/17/2024    Procedure: Right kary  localized lumpectomy;  Surgeon: Stacey Cueva MD;  Location: [facility];  Service: Surgical Oncology    NE HALLUX RIGIDUS W/CHEILECTOMY 1ST MP JT W/IMPLT Left 04/14/2023    Procedure: BUNIONECTOMY with implant;  Surgeon: Will Cuevas DPM;  Location: [facility];  Service: Podiatry    NE LAPAROSCOPY W/RMVL ADNEXAL STRUCTURES N/A 12/9/2024    Procedure: DIAGNIOSTIC LAPAROSCOPY, BILATERAL OOPHERECTOMY, EUA;  Surgeon: Елена Silva MD;  Location[facility];  Service: Gynecology    NE MASTECTOMY PARTIAL Right 12/17/2024    Procedure: Right kary  localized lumpectomy;  Surgeon: Stacey Cueva MD;  Location[facility];  Service: Surgical Oncology    TONSILLECTOMY      US BREAST CLIP NEEDLE LOC RIGHT Right 12/13/2024    US GUIDED BREAST BIOPSY RIGHT COMPLETE Right 11/12/2024       Social History     Socioeconomic History    Marital status: /Civil Union     Spouse name: Not on file     Number of children: Not on file    Years of education: Not on file    Highest education level: Not on file   Occupational History    Not on file   Tobacco Use    Smoking status: Every Day     Current packs/day: 0.50     Average packs/day: 0.5 packs/day for 22.8 years (11.4 ttl pk-yrs)     Types: Cigarettes     Start date: 4/11/2002    Smokeless tobacco: Never    Tobacco comments:     Smoking 1 cig today 1217   Vaping Use    Vaping status: Never Used   Substance and Sexual Activity    Alcohol use: Not Currently    Drug use: No    Sexual activity: Yes     Partners: Male     Birth control/protection: None     Comment: Hysterectomy on July 1st 2020   Other Topics Concern    Not on file   Social History Narrative    Not on file     Social Drivers of Health     Financial Resource Strain: Not on file   Food Insecurity: Not on file   Transportation Needs: Not on file   Physical Activity: Not on file   Stress: Not on file   Social Connections: Unknown (6/18/2024)    Received from [employer]     How often do you feel lonely or isolated from those around you? (Adult - for ages 18 years and over): Not on file   Intimate Partner Violence: Not on file   Housing Stability: Not on file       Allergies   Allergen Reactions    Bactrim [Sulfamethoxazole-Trimethoprim] Hives    Latex Other (See Comments)     Latex allergy-rashes    Levofloxacin Hives    2-Octylcyanoacrylate [Cyanoacrylate] Rash     Surgical glue    Chlorhexidine Rash    Other Rash     Surgical Glue; resulted in rash and blistering of the skin    Quinolones Rash       Review of Systems  General- denies fever/chills  HEENT- denies hearing loss or sore throat  Eyes- denies eye pain or visual disturbances, denies red eyes  Respiratory- denies cough or SOB  Cardio- denies chest pain or palpitations  GI- denies abdominal pain  Endocrine- denies urinary frequency  Urinary- denies pain with urination  Musculoskeletal- Negative except noted above  Skin-  "denies rashes or wounds  Neurological- denies dizziness or headache  Psychiatric- denies anxiety or difficulty concentrating    Physical Exam  Ht 5' 4\" (1.626 m)   Wt 52.2 kg (115 lb 1.3 oz)   LMP 01/29/2020   BMI 19.75 kg/m²     General/Constitutional: No apparent distress: well-nourished and well developed.  Lymphatic: No appreciable lymphadenopathy  Respiratory: Non-labored breathing  Vascular: No edema, swelling or tenderness, except as noted in detailed exam.  Integumentary: No impressive skin lesions present, except as noted in detailed exam.  Psych: Normal mood and affect, oriented to person, place and time.  MSK: normal other than stated in HPI and exam  Gait & balance: no evidence of myelopathic gait, ambulates Independently     Cervical  spine range of motion:  -Forward flexion chin to chest  -Extension to 60  -Lateral bend 20 right, 20 left  -Rotation 15 right, 15 left.    There is no point tenderness with palpation along the posterior cervical, thoracic, lumbar spine.   Well-healed anterior surgical site  Decreased internal rotation on the right compared to left with pain  Continued pain with palpation of the proximal biceps tendon sheath on the right    Neurologic:  Upper Extremity Motor Function    Right  Left    Deltoid  5/5  5/5    Bicep  5/5  5/5    Wrist extension  5/5  5/5    Tricep  5/5  5/5    Finger flexion/  4+/5  5/5    Hand intrinsic  5/5  5/5        Sensory: light touch is intact to bilateral upper and lower extremities     Diagnostic Tests   IMAGING: I have personally reviewed the images and these are my findings:  Cervical Spine X-rays from 10/8/2024: C5-6 disc arthroplasty intact, with mild motion at the disc on flexion-extension radiographs, stable appearing mild adjacent segment spondylosis    Cervical spine CT scan from 11/29/2024: Multilevel cervical spondylosis with loss of disc height at C3-5, C6-7, C5-6 disc arthroplasty appears intact with no evidence of hardware loosening " "however does appear to be subsidence within the superior endplate of C6 with residual uncovertebral narrowing and bony foraminal stenosis on the right    Cervical spine MRI from 12/7/2024: Multilevel cervical disc degeneration, extensive metal artifact at C5-6, there is mild foraminal stenosis at both C4-5 and C6-7 on the right    Electronic Medical Records were reviewed including pain management notes, Dr. Alaniz notes    Procedures, if performed today     None performed       Portions of the record may have been created with voice recognition software.  Occasional wrong word or \"sound a like\" substitutions may have occurred due to the inherent limitations of voice recognition software.  Read the chart carefully and recognize, using context, where substitutions have occurred.     "

## 2025-01-30 DIAGNOSIS — F41.1 GENERALIZED ANXIETY DISORDER: ICD-10-CM

## 2025-01-31 RX ORDER — CLONAZEPAM 1 MG/1
1 TABLET ORAL 2 TIMES DAILY
Qty: 60 TABLET | Refills: 0 | Status: SHIPPED | OUTPATIENT
Start: 2025-01-31

## 2025-02-04 DIAGNOSIS — M54.12 CERVICAL RADICULOPATHY: ICD-10-CM

## 2025-02-14 ENCOUNTER — TELEPHONE (OUTPATIENT)
Dept: OBGYN CLINIC | Facility: MEDICAL CENTER | Age: 40
End: 2025-02-14

## 2025-02-14 DIAGNOSIS — G47.00 INSOMNIA, UNSPECIFIED TYPE: ICD-10-CM

## 2025-02-14 RX ORDER — TRAZODONE HYDROCHLORIDE 50 MG/1
50 TABLET, FILM COATED ORAL
Qty: 90 TABLET | Refills: 1 | Status: SHIPPED | OUTPATIENT
Start: 2025-02-14

## 2025-02-14 NOTE — TELEPHONE ENCOUNTER
Per [facility] Formulary list for 2025, the following Tier 1 listed medications are covered. Please see below, thank you!

## 2025-02-18 DIAGNOSIS — E89.40 SURGICAL MENOPAUSE: Primary | ICD-10-CM

## 2025-02-18 RX ORDER — ESTRADIOL 2 MG/1
2 TABLET ORAL DAILY
Qty: 90 TABLET | Refills: 3 | Status: SHIPPED | OUTPATIENT
Start: 2025-02-18

## 2025-02-18 NOTE — TELEPHONE ENCOUNTER
Patient returned call. Reviewed with patient prescription sent. Reviewed some common side effects with patient and advised she call with any concerns. Patient verbalized understanding.

## 2025-02-21 DIAGNOSIS — F43.12 CHRONIC POST-TRAUMATIC STRESS DISORDER (PTSD): ICD-10-CM

## 2025-02-21 RX ORDER — BUSPIRONE HYDROCHLORIDE 10 MG/1
10 TABLET ORAL 2 TIMES DAILY
Qty: 60 TABLET | Refills: 5 | Status: SHIPPED | OUTPATIENT
Start: 2025-02-21

## 2025-02-26 RX ORDER — GABAPENTIN 600 MG/1
600 TABLET ORAL 3 TIMES DAILY
Qty: 90 TABLET | Refills: 0 | Status: SHIPPED | OUTPATIENT
Start: 2025-02-26

## 2025-02-28 DIAGNOSIS — F41.1 GENERALIZED ANXIETY DISORDER: ICD-10-CM

## 2025-02-28 RX ORDER — CLONAZEPAM 1 MG/1
1 TABLET ORAL 2 TIMES DAILY
Qty: 60 TABLET | Refills: 0 | Status: SHIPPED | OUTPATIENT
Start: 2025-02-28

## 2025-02-28 NOTE — PLAN OF CARE
Problem: Nutrition/Hydration-ADULT  Goal: Nutrient/Hydration intake appropriate for improving, restoring or maintaining nutritional needs  Description: Monitor and assess patient's nutrition/hydration status for malnutrition. Collaborate with interdisciplinary team and initiate plan and interventions as ordered.  Monitor patient's weight and dietary intake as ordered or per policy. Utilize nutrition screening tool and intervene as necessary. Determine patient's food preferences and provide high-protein, high-caloric foods as appropriate.     INTERVENTIONS:  - Monitor oral intake, urinary output, labs, and treatment plans  - Assess nutrition and hydration status and recommend course of action  - Evaluate amount of meals eaten  - Assist patient with eating if necessary   - Allow adequate time for meals  - Recommend/ encourage appropriate diets, oral nutritional supplements, and vitamin/mineral supplements  - Order, calculate, and assess calorie counts as needed  - Recommend, monitor, and adjust tube feedings and TPN/PPN based on assessed needs  - Assess need for intravenous fluids  - Provide specific nutrition/hydration education as appropriate  - Include patient/family/caregiver in decisions related to nutrition  Outcome: Progressing     Problem: PAIN - ADULT  Goal: Verbalizes/displays adequate comfort level or baseline comfort level  Description: Interventions:  - Encourage patient to monitor pain and request assistance  - Assess pain using appropriate pain scale  - Administer analgesics based on type and severity of pain and evaluate response  - Implement non-pharmacological measures as appropriate and evaluate response  - Consider cultural and social influences on pain and pain management  - Notify physician/advanced practitioner if interventions unsuccessful or patient reports new pain  Outcome: Progressing     Problem: INFECTION - ADULT  Goal: Absence or prevention of progression during  Mr. Wound call me back.  He states the place is somewhat smaller but still there.  I went and changed him over to Keflex.  Told to follow-up with his primary was not better.  Voiced understanding.   hospitalization  Description: INTERVENTIONS:  - Assess and monitor for signs and symptoms of infection  - Monitor lab/diagnostic results  - Monitor all insertion sites, i.e. indwelling lines, tubes, and drains  - Monitor endotracheal if appropriate and nasal secretions for changes in amount and color  - [place] appropriate cooling/warming therapies per order  - Administer medications as ordered  - Instruct and encourage patient and family to use good hand hygiene technique  - Identify and instruct in appropriate isolation precautions for identified infection/condition  Outcome: Progressing     Problem: SAFETY ADULT  Goal: Patient will remain free of falls  Description: INTERVENTIONS:  - Educate patient/family on patient safety including physical limitations  - Instruct patient to call for assistance with activity   - Consult OT/PT to assist with strengthening/mobility   - Keep Call bell within reach  - Keep bed low and locked with side rails adjusted as appropriate  - Keep care items and personal belongings within reach  - Initiate and maintain comfort rounds  - Make Fall Risk Sign visible to staff  - Offer Toileting every 2 Hours, in advance of need  - Initiate/Maintain 24/ 7 alarm  - Obtain necessary fall risk management equipment: RW  - Apply yellow socks and bracelet for high fall risk patients  - Consider moving patient to room near nurses station  Outcome: Progressing

## 2025-03-24 ENCOUNTER — HOSPITAL ENCOUNTER (OUTPATIENT)
Dept: MAMMOGRAPHY | Facility: CLINIC | Age: 40
Discharge: HOME | End: 2025-03-24
Payer: COMMERCIAL

## 2025-03-24 VITALS — HEIGHT: 64 IN | WEIGHT: 115 LBS | BODY MASS INDEX: 19.63 KG/M2

## 2025-03-24 DIAGNOSIS — R92.8 FOLLOW-UP EXAMINATION OF ABNORMAL MAMMOGRAM: ICD-10-CM

## 2025-03-24 PROCEDURE — G0279 TOMOSYNTHESIS, MAMMO: HCPCS

## 2025-03-24 PROCEDURE — 77066 DX MAMMO INCL CAD BI: CPT

## 2025-03-28 ENCOUNTER — LAB REQUISITION (OUTPATIENT)
Dept: LAB | Facility: HOSPITAL | Age: 40
End: 2025-03-28
Payer: COMMERCIAL

## 2025-03-28 ENCOUNTER — APPOINTMENT (OUTPATIENT)
Dept: LAB | Facility: CLINIC | Age: 40
End: 2025-03-28
Payer: COMMERCIAL

## 2025-03-28 DIAGNOSIS — Z01.818 ENCOUNTER FOR OTHER PREPROCEDURAL EXAMINATION: ICD-10-CM

## 2025-03-28 DIAGNOSIS — Z01.818 PRE-OP EVALUATION: ICD-10-CM

## 2025-03-28 DIAGNOSIS — M54.12 RADICULOPATHY, CERVICAL REGION: ICD-10-CM

## 2025-03-28 LAB
ABO GROUP BLD: NORMAL
ALBUMIN SERPL BCG-MCNC: 4.2 G/DL (ref 3.5–5)
ALP SERPL-CCNC: 64 U/L (ref 34–104)
ALT SERPL W P-5'-P-CCNC: 14 U/L (ref 7–52)
ANION GAP SERPL CALCULATED.3IONS-SCNC: 9 MMOL/L (ref 4–13)
APTT PPP: 28 SECONDS (ref 23–34)
AST SERPL W P-5'-P-CCNC: 19 U/L (ref 13–39)
BASOPHILS # BLD AUTO: 0.05 THOUSANDS/ÂΜL (ref 0–0.1)
BASOPHILS NFR BLD AUTO: 1 % (ref 0–1)
BILIRUB SERPL-MCNC: 0.47 MG/DL (ref 0.2–1)
BLD GP AB SCN SERPL QL: NEGATIVE
BUN SERPL-MCNC: 7 MG/DL (ref 5–25)
CALCIUM SERPL-MCNC: 9.1 MG/DL (ref 8.4–10.2)
CHLORIDE SERPL-SCNC: 105 MMOL/L (ref 96–108)
CO2 SERPL-SCNC: 27 MMOL/L (ref 21–32)
CREAT SERPL-MCNC: 0.78 MG/DL (ref 0.6–1.3)
EOSINOPHIL # BLD AUTO: 0.27 THOUSAND/ÂΜL (ref 0–0.61)
EOSINOPHIL NFR BLD AUTO: 5 % (ref 0–6)
ERYTHROCYTE [DISTWIDTH] IN BLOOD BY AUTOMATED COUNT: 12.8 % (ref 11.6–15.1)
GFR SERPL CREATININE-BSD FRML MDRD: 94 ML/MIN/1.73SQ M
GLUCOSE P FAST SERPL-MCNC: 118 MG/DL (ref 65–99)
HCT VFR BLD AUTO: 38.1 % (ref 34.8–46.1)
HGB BLD-MCNC: 12.5 G/DL (ref 11.5–15.4)
IMM GRANULOCYTES # BLD AUTO: 0.02 THOUSAND/UL (ref 0–0.2)
IMM GRANULOCYTES NFR BLD AUTO: 0 % (ref 0–2)
INR PPP: 0.95 (ref 0.85–1.19)
LYMPHOCYTES # BLD AUTO: 1.74 THOUSANDS/ÂΜL (ref 0.6–4.47)
LYMPHOCYTES NFR BLD AUTO: 35 % (ref 14–44)
MCH RBC QN AUTO: 30.1 PG (ref 26.8–34.3)
MCHC RBC AUTO-ENTMCNC: 32.8 G/DL (ref 31.4–37.4)
MCV RBC AUTO: 92 FL (ref 82–98)
MONOCYTES # BLD AUTO: 0.46 THOUSAND/ÂΜL (ref 0.17–1.22)
MONOCYTES NFR BLD AUTO: 9 % (ref 4–12)
NEUTROPHILS # BLD AUTO: 2.42 THOUSANDS/ÂΜL (ref 1.85–7.62)
NEUTS SEG NFR BLD AUTO: 50 % (ref 43–75)
NRBC BLD AUTO-RTO: 0 /100 WBCS
PLATELET # BLD AUTO: 285 THOUSANDS/UL (ref 149–390)
PMV BLD AUTO: 9.4 FL (ref 8.9–12.7)
POTASSIUM SERPL-SCNC: 3.9 MMOL/L (ref 3.5–5.3)
PROT SERPL-MCNC: 7.1 G/DL (ref 6.4–8.4)
PROTHROMBIN TIME: 13 SECONDS (ref 12.3–15)
RBC # BLD AUTO: 4.15 MILLION/UL (ref 3.81–5.12)
RH BLD: POSITIVE
SODIUM SERPL-SCNC: 141 MMOL/L (ref 135–147)
SPECIMEN EXPIRATION DATE: NORMAL
WBC # BLD AUTO: 4.96 THOUSAND/UL (ref 4.31–10.16)

## 2025-03-28 PROCEDURE — 36415 COLL VENOUS BLD VENIPUNCTURE: CPT

## 2025-03-28 PROCEDURE — 85610 PROTHROMBIN TIME: CPT

## 2025-03-28 PROCEDURE — 80053 COMPREHEN METABOLIC PANEL: CPT

## 2025-03-28 PROCEDURE — 86901 BLOOD TYPING SEROLOGIC RH(D): CPT

## 2025-03-28 PROCEDURE — 86850 RBC ANTIBODY SCREEN: CPT

## 2025-03-28 PROCEDURE — 86900 BLOOD TYPING SEROLOGIC ABO: CPT

## 2025-03-28 PROCEDURE — 85025 COMPLETE CBC W/AUTO DIFF WBC: CPT

## 2025-03-28 PROCEDURE — 85730 THROMBOPLASTIN TIME PARTIAL: CPT

## 2025-03-29 ENCOUNTER — HOSPITAL ENCOUNTER (OUTPATIENT)
Dept: RADIOLOGY | Facility: HOSPITAL | Age: 40
Discharge: HOME | End: 2025-03-29
Attending: OBSTETRICS & GYNECOLOGY
Payer: COMMERCIAL

## 2025-03-29 DIAGNOSIS — Z91.89 AT INCREASED RISK OF BREAST CANCER: ICD-10-CM

## 2025-03-29 PROCEDURE — C8937 CAD BREAST MRI: HCPCS

## 2025-03-29 PROCEDURE — A9585 GADOBUTROL INJECTION: HCPCS | Performed by: OBSTETRICS & GYNECOLOGY

## 2025-03-29 PROCEDURE — C8908 MRI W/O FOL W/CONT, BREAST,: HCPCS

## 2025-03-29 RX ORDER — GADOBUTROL 604.72 MG/ML
5 INJECTION INTRAVENOUS
Status: COMPLETED | OUTPATIENT
Start: 2025-03-29 | End: 2025-03-29

## 2025-03-29 RX ADMIN — GADOBUTROL 5 ML: 604.72 INJECTION INTRAVENOUS at 13:59

## 2025-03-30 ENCOUNTER — RESULTS FOLLOW-UP (OUTPATIENT)
Dept: LABOR AND DELIVERY | Facility: HOSPITAL | Age: 40
End: 2025-03-30

## 2025-03-30 DIAGNOSIS — F41.1 GENERALIZED ANXIETY DISORDER: ICD-10-CM

## 2025-03-30 DIAGNOSIS — N63.20 MASS OF LEFT BREAST, UNSPECIFIED QUADRANT: Primary | ICD-10-CM

## 2025-03-31 RX ORDER — CLONAZEPAM 1 MG/1
1 TABLET ORAL 2 TIMES DAILY
Qty: 60 TABLET | Refills: 0 | Status: SHIPPED | OUTPATIENT
Start: 2025-03-31

## 2025-04-01 ENCOUNTER — RESULTS FOLLOW-UP (OUTPATIENT)
Dept: OBGYN CLINIC | Facility: MEDICAL CENTER | Age: 40
End: 2025-04-01

## 2025-04-01 DIAGNOSIS — R92.8 ABNORMAL MRI, BREAST: Primary | ICD-10-CM

## 2025-04-01 NOTE — RESULT ENCOUNTER NOTE
Please inform patient MRI has lesion on  right breast  for which  biopsy  recommended    Order has been  placed    Left breast no lesions seen

## 2025-04-07 ENCOUNTER — CONSULT (OUTPATIENT)
Dept: FAMILY MEDICINE CLINIC | Facility: CLINIC | Age: 40
End: 2025-04-07
Payer: COMMERCIAL

## 2025-04-07 VITALS
HEIGHT: 65 IN | OXYGEN SATURATION: 99 % | WEIGHT: 123 LBS | BODY MASS INDEX: 20.49 KG/M2 | HEART RATE: 69 BPM | TEMPERATURE: 98.5 F | DIASTOLIC BLOOD PRESSURE: 70 MMHG | SYSTOLIC BLOOD PRESSURE: 100 MMHG

## 2025-04-07 DIAGNOSIS — M54.12 CERVICAL RADICULOPATHY: ICD-10-CM

## 2025-04-07 DIAGNOSIS — Z01.818 PRE-OP EXAM: Primary | ICD-10-CM

## 2025-04-07 DIAGNOSIS — F32.2 MODERATELY SEVERE MAJOR DEPRESSION (HCC): ICD-10-CM

## 2025-04-07 DIAGNOSIS — F43.12 CHRONIC POST-TRAUMATIC STRESS DISORDER (PTSD): ICD-10-CM

## 2025-04-07 DIAGNOSIS — G95.20 CERVICAL SPINAL CORD COMPRESSION (HCC): ICD-10-CM

## 2025-04-07 PROCEDURE — 93000 ELECTROCARDIOGRAM COMPLETE: CPT | Performed by: FAMILY MEDICINE

## 2025-04-07 PROCEDURE — 99214 OFFICE O/P EST MOD 30 MIN: CPT | Performed by: FAMILY MEDICINE

## 2025-04-07 RX ORDER — BUSPIRONE HYDROCHLORIDE 15 MG/1
15 TABLET ORAL 2 TIMES DAILY
Qty: 60 TABLET | Refills: 5 | Status: SHIPPED | OUTPATIENT
Start: 2025-04-07

## 2025-04-07 NOTE — PROGRESS NOTES
Pre-operative Clearance  Name: Katina Mendez      : 1985      MRN: [id number]  Encounter Provider: Marilyn Pepe DO  Encounter Date: 2025   Encounter department: [facility]  :  Assessment & Plan  Pre-op exam  Patient appears well today.  She has a good functional capacity and no active cardiac problems.  EKG appeared to show normal sinus rhythm, no ST or T wave changes.  Preop blood work all appeared clinically stable.  This type of issue is considered intermediate risk.  She is cleared with intermediate perioperative cardiovascular risk.  No further testing is needed today.  Orders:    POCT ECG    Cervical radiculopathy         Chronic post-traumatic stress disorder (PTSD)    Orders:    busPIRone (BUSPAR) 15 mg tablet; Take 1 tablet (15 mg total) by mouth 2 (two) times a day    Moderately severe major depression (HCC)           Cervical spinal cord compression (HCC)         Pre-operative Clearance:     Revised Cardiac Risk Index:  RCI RISK CLASS I (0 risk factors, risk of major cardiac complications approximately 0.5%)    Medication Instructions:   - 5HT3 Antagonist (ie, zofran):  Continue to take this medication on your normal schedule.   - Alpha Adrenergic Antagonist (ie, trazodone, viibryd, trintellix): Continue to take this medication on your normal schedule.  - Antidepressants: Continue to take this medication on your normal schedule.  - Antiepileptic meds: Continue to take this medication on your normal schedule.  - Benzodiazepines (ie, alprazolam, lorazepam, diazepam):  If the medication is needed, continue to take it on your normal schedule.  - Buspirone: Continue to take this medication on your normal schedule.  - Hormone replacement therapy: Continue to take this medication on your normal schedule. This medication may need to be discontinued for at least 4 weeks prior to surgery if the surgical procedure is associated with high risk of blood clots. Consult with your  surgeon.       History of Present Illness     Pre-op Exam  Surgery: Removal of C5-6 disc arthroplasty device, C5-6 ACDF (Bilateral: Spine Cervical)  Anticipated Date of Surgery: 4/24  Surgeon: Dr. Del Rio    Previous history of bleeding disorders or clots?: No  Previous Anesthesia reaction?: No  Prolonged steroid use in the last 6 months?: No    Assessment of Cardiac Risk:   - Unstable or severe angina or MI in the last 6 weeks or history of stent placement in the last year?: No   - Decompensated heart failure (e.g. New onset heart failure, NYHA  Class IV heart failure, or worsening existing heart failure)?: No  - Significant arrhythmias such as high grade AV block, symptomatic ventricular arrhythmia, newly recognized ventricular tachycardia, supraventricular tachycardia with resting heart rate >100, or symptomatic bradycardia?: No  - Severe heart valve disease including aortic stenosis or symptomatic mitral stenosis?: No      Pre-operative Risk Factors:  Elevated-risk surgery: No    History of cerebrovascular disease: No    History of ischemic heart disease: No  Pre-operative treatment with insulin: No  Pre-operative creatinine >2 mg/dL: No    History of congestive heart failure: No    Duke Activity Status Index (DASI):   DASI Total Score: 23.45  METs: 5.6    Review of Systems   Constitutional:  Negative for activity change, chills, fatigue and fever.   HENT:  Negative for congestion, ear pain, sinus pressure and sore throat.    Eyes:  Negative for redness, itching and visual disturbance.   Respiratory:  Negative for cough and shortness of breath.    Cardiovascular:  Negative for chest pain and palpitations.   Gastrointestinal:  Negative for abdominal pain, diarrhea and nausea.   Endocrine: Negative for cold intolerance and heat intolerance.   Genitourinary:  Negative for dysuria, flank pain and frequency.   Musculoskeletal:  Negative for arthralgias, back pain, gait problem and myalgias.   Skin:  Negative for  color change.   Allergic/Immunologic: Negative for environmental allergies.   Neurological:  Negative for dizziness, numbness and headaches.   Psychiatric/Behavioral:  Negative for behavioral problems and sleep disturbance.      Past Medical History   Past Medical History:   Diagnosis Date    Allergic     Anxiety     Bunion     left foot   OR   correction today 4/14/2023    Cervical disc disorder 06/2023    Had MRI done d/t pain    Cervical radiculopathy     COVID     x 2-- Jan 2022 and Jan 2023    Depression     Dizziness     Female infertility     GERD (gastroesophageal reflux disease)     Headache(784.0)     Ingrown toenail     Low back pain     Migraines     Nasal congestion     Seasonal allergies     Sinusitis     Constant sinusitis    Sleep difficulties     Tobacco abuse     Wears contact lenses      Past Surgical History:   Procedure Laterality Date    BREAST BIOPSY Right 11/2024    BREAST CYST EXCISION Right 12/2024    BUNIONECTOMY Right     DISCECTOMY SPINE CERVICAL ANTERIOR W/ ARTHROPLASTY Bilateral 02/29/2024    Procedure: Anterior Cervical discectomy and disc replacement/arthroplasty C5-6;  Surgeon: Edu Del Rio MD;  Location: [facility];  Service: Orthopedics    FL INJECTION RIGHT SHOULDER (ARTHROGRAM)  11/13/2024    HYSTERECTOMY      LAPAROSCOPIC TOTAL HYSTERECTOMY  2020    WY EXC BREAST LES PREOP PLMT RAD MARKER OPEN 1 LES Right 12/17/2024    Procedure: Right kary  localized lumpectomy;  Surgeon: Stacey Cueva MD;  Location: [facility];  Service: Surgical Oncology    WY HALLUX RIGIDUS W/CHEILECTOMY 1ST MP JT W/IMPLT Left 04/14/2023    Procedure: BUNIONECTOMY with implant;  Surgeon: Will Cuevas DPM;  Location: [facility];  Service: Podiatry    WY LAPAROSCOPY W/RMVL ADNEXAL STRUCTURES N/A 12/09/2024    Procedure: DIAGNIOSTIC LAPAROSCOPY, BILATERAL OOPHERECTOMY, EUA;  Surgeon: Елена Silva MD;  Location: [facility];  Service: Gynecology    WY MASTECTOMY PARTIAL Right 12/17/2024     Procedure: Right kary  localized lumpectomy;  Surgeon: Stacey Cueva MD;  Location: [facility];  Service: Surgical Oncology    TONSILLECTOMY      US BREAST CLIP NEEDLE LOC RIGHT Right 12/13/2024    US GUIDED BREAST BIOPSY RIGHT COMPLETE Right 11/12/2024     Family History   Problem Relation Age of Onset    Mental illness Mother     Depression Mother     Early death Mother     Lung cancer Mother 58        with brain mets.    Cancer Mother     Anxiety disorder Mother     OCD Mother     Psychiatric Illness Mother     Diabetes Father     Hypertension Father     Early death Father     Anxiety disorder Sister     Rashes / Skin problems Sister     Migraines Sister     Arthritis Sister     ADD / ADHD Brother     Anxiety disorder Brother     Melanoma Brother     Diabetes Maternal Grandmother     Arthritis Maternal Grandmother     Heart disease Maternal Grandmother     Breast cancer Maternal Grandmother     Colon cancer Maternal Grandmother     Cancer Maternal Grandmother     Osteoporosis Maternal Grandmother     Dementia Maternal Grandmother     Aneurysm Maternal Grandfather     Breast cancer Paternal Grandmother         age unknown.    Anuerysm Paternal Grandfather     Multiple sclerosis Paternal Aunt     Colon cancer Paternal Uncle     Colon cancer Paternal Uncle     ADD / ADHD Brother     Anxiety disorder Brother     Cancer Brother         Melanoma    Cancer Paternal Uncle         Liver / colon cancer    Multiple sclerosis Maternal Aunt      Social History     Tobacco Use    Smoking status: Every Day     Current packs/day: 0.50     Average packs/day: 0.5 packs/day for 23.0 years (11.5 ttl pk-yrs)     Types: Cigarettes     Start date: 4/11/2002    Smokeless tobacco: Never    Tobacco comments:     Smoking 1 cig today 1217   Vaping Use    Vaping status: Never Used   Substance and Sexual Activity    Alcohol use: Not Currently    Drug use: No    Sexual activity: Yes     Partners: Male     Birth control/protection: None      Comment: Hysterectomy on July 1st 2020     Current Outpatient Medications on File Prior to Visit   Medication Sig    acetaminophen (TYLENOL) 500 mg tablet Take 500 mg by mouth every 6 (six) hours as needed for mild pain    acyclovir (ZOVIRAX) 5 % ointment Apply topically every 4 (four) hours    ALPRAZolam (NIRAVAM) 1 MG dissolvable tablet Take 1 tablet (1 mg total) by mouth 30 min pre-procedure    clonazePAM (KlonoPIN) 1 mg tablet Take 1 tablet (1 mg total) by mouth 2 (two) times a day    cyclobenzaprine (FLEXERIL) 10 mg tablet Take 1 tablet (10 mg total) by mouth 3 (three) times a day as needed for muscle spasms May make you drowsy or dizzy.    dicyclomine (BENTYL) 20 mg tablet Take 1 tablet (20 mg total) by mouth 3 (three) times a day as needed (for abdominal cramping)    DULoxetine (CYMBALTA) 30 mg delayed release capsule TAKE ONE CAPSULE BY MOUTH EVERY DAY    DULoxetine (CYMBALTA) 60 mg delayed release capsule Take 1 capsule (60 mg total) by mouth daily    estradiol (Estrace) 2 MG tablet Take 1 tablet (2 mg total) by mouth daily    gabapentin (NEURONTIN) 600 MG tablet Take 1 tablet (600 mg total) by mouth 3 (three) times a day    lidocaine-prilocaine (EMLA) cream Apply topically as needed for mild pain    loratadine (CLARITIN) 10 mg tablet Take 1 tablet (10 mg total) by mouth daily    Multiple Vitamins-Minerals (MULTIVITAMIN ADULT PO) Take 1 tablet by mouth daily    neomycin-polymyxin-hydrocortisone (CORTISPORIN) otic solution Administer 4 drops to the right ear every 6 (six) hours    ondansetron (ZOFRAN) 4 mg tablet Take 1 tablet (4 mg total) by mouth every 8 (eight) hours as needed for nausea or vomiting    traZODone (DESYREL) 50 mg tablet Take 1 tablet (50 mg total) by mouth daily at bedtime as needed for sleep    [DISCONTINUED] busPIRone (BUSPAR) 10 mg tablet Take 1 tablet (10 mg total) by mouth 2 (two) times a day     Allergies   Allergen Reactions    Bactrim [Sulfamethoxazole-Trimethoprim] Hives    Latex  "Other (See Comments)     Latex allergy-rashes    Levofloxacin Hives    2-Octylcyanoacrylate [Cyanoacrylate] Rash     Surgical glue    Chlorhexidine Rash    Other Rash     Surgical Glue; resulted in rash and blistering of the skin    Quinolones Rash     Objective   /70 (BP Location: Left arm, Patient Position: Sitting, Cuff Size: Adult)   Pulse 69   Temp 98.5 °F (36.9 °C) (Temporal)   Ht 5' 4.5\" (1.638 m)   Wt 55.8 kg (123 lb)   LMP 01/29/2020   SpO2 99%   BMI 20.79 kg/m²     Physical Exam  Vitals reviewed.   Constitutional:       General: She is not in acute distress.     Appearance: Normal appearance. She is well-developed.   HENT:      Head: Normocephalic and atraumatic.      Right Ear: Tympanic membrane, ear canal and external ear normal. There is no impacted cerumen.      Left Ear: Tympanic membrane, ear canal and external ear normal. There is no impacted cerumen.      Nose: Nose normal. No congestion or rhinorrhea.      Mouth/Throat:      Mouth: Mucous membranes are moist.      Pharynx: No oropharyngeal exudate or posterior oropharyngeal erythema.   Eyes:      General: No scleral icterus.        Right eye: No discharge.         Left eye: No discharge.      Extraocular Movements: Extraocular movements intact.      Conjunctiva/sclera: Conjunctivae normal.      Pupils: Pupils are equal, round, and reactive to light.   Neck:      Trachea: No tracheal deviation.   Cardiovascular:      Rate and Rhythm: Normal rate and regular rhythm.      Pulses: Normal pulses.           Dorsalis pedis pulses are 2+ on the right side and 2+ on the left side.        Posterior tibial pulses are 2+ on the right side and 2+ on the left side.      Heart sounds: Normal heart sounds. No murmur heard.     No friction rub. No gallop.   Pulmonary:      Effort: Pulmonary effort is normal. No respiratory distress.      Breath sounds: Normal breath sounds. No wheezing, rhonchi or rales.   Abdominal:      General: Bowel sounds are " normal. There is no distension.      Palpations: Abdomen is soft.      Tenderness: There is no abdominal tenderness. There is no guarding or rebound.   Musculoskeletal:         General: Normal range of motion.      Cervical back: Normal range of motion and neck supple.      Right lower leg: No edema.      Left lower leg: No edema.   Lymphadenopathy:      Head:      Right side of head: No submental or submandibular adenopathy.      Left side of head: No submental or submandibular adenopathy.      Cervical: No cervical adenopathy.      Right cervical: No superficial, deep or posterior cervical adenopathy.     Left cervical: No superficial, deep or posterior cervical adenopathy.   Skin:     General: Skin is warm and dry.      Findings: Rash present. No erythema. Rash is macular.      Comments: scalp   Neurological:      General: No focal deficit present.      Mental Status: She is alert and oriented to person, place, and time.      Cranial Nerves: No cranial nerve deficit.      Sensory: Sensation is intact. No sensory deficit.      Motor: Weakness and abnormal muscle tone present.   Psychiatric:         Attention and Perception: Attention and perception normal.         Mood and Affect: Mood is anxious and depressed.         Speech: Speech normal.         Behavior: Behavior normal.         Thought Content: Thought content normal.         Judgment: Judgment normal.           Marilyn Pepe, DO

## 2025-04-07 NOTE — ASSESSMENT & PLAN NOTE
Orders:    busPIRone (BUSPAR) 15 mg tablet; Take 1 tablet (15 mg total) by mouth 2 (two) times a day

## 2025-04-08 NOTE — PRE-PROCEDURE INSTRUCTIONS
Pre-Surgery Instructions:   Medication Instructions    acetaminophen (TYLENOL) 500 mg tablet Uses PRN- OK to take day of surgery    acyclovir (ZOVIRAX) 5 % ointment Stop taking 1 day prior to surgery.    busPIRone (BUSPAR) 15 mg tablet Take day of surgery.    clonazePAM (KlonoPIN) 1 mg tablet Take day of surgery.    cyclobenzaprine (FLEXERIL) 10 mg tablet Uses PRN- OK to take day of surgery    dicyclomine (BENTYL) 20 mg tablet Uses PRN- DO NOT take day of surgery    DULoxetine (CYMBALTA) 30 mg delayed release capsule Take day of surgery.    DULoxetine (CYMBALTA) 60 mg delayed release capsule Take day of surgery.    estradiol (Estrace) 2 MG tablet Hold day of surgery.    gabapentin (NEURONTIN) 600 MG tablet Take day of surgery.    loratadine (CLARITIN) 10 mg tablet Hold day of surgery.    Multiple Vitamins-Minerals (MULTIVITAMIN ADULT PO) Stop taking 7 days prior to surgery.    ondansetron (ZOFRAN) 4 mg tablet Uses PRN- OK to take day of surgery    traZODone (DESYREL) 50 mg tablet Uses PRN- DO NOT take day of surgery      Medication instructions for day of surgery reviewed. Please take all instructed medications with only a sip of water.       You will receive a call one business day prior to surgery with an arrival time and hospital directions. If your surgery is scheduled on a Monday, the hospital will be calling you on the Friday prior to your surgery. If you have not heard from anyone by 8pm, please call the hospital supervisor through the hospital  at [phone]. ([place] [phone] or [place] [phone]).    Do not eat or drink anything after midnight the night before your surgery, including candy, mints, lifesavers, or chewing gum. Do not drink alcohol 24hrs before your surgery. Try not to smoke at least 24hrs before your surgery.       Follow the pre surgery showering instructions as listed in the “My Surgical Experience Booklet” or otherwise provided by your surgeon's office. Do not use a  blade to shave the surgical area 1 week before surgery. It is okay to use a clean electric clippers up to 24 hours before surgery. Do not apply any lotions, creams, including makeup, cologne, deodorant, or perfumes after showering on the day of your surgery. Do not use dry shampoo, hair spray, hair gel, or any type of hair products.     No contact lenses, eye make-up, or artificial eyelashes. Remove nail polish, including gel polish, and any artificial, gel, or acrylic nails if possible. Remove all jewelry including rings and body piercing jewelry.     Wear causal clothing that is easy to take on and off. Consider your type of surgery.    Keep any valuables, jewelry, piercings at home. Please bring any specially ordered equipment (sling, braces) if indicated.    Arrange for a responsible person to drive you to and from the hospital on the day of your surgery. Please confirm the visitor policy for the day of your procedure when you receive your phone call with an arrival time.     Call the surgeon's office with any new illnesses, exposures, or additional questions prior to surgery.    Please reference your “My Surgical Experience Booklet” for additional information to prepare for your upcoming surgery.

## 2025-04-17 ENCOUNTER — HOSPITAL ENCOUNTER (OUTPATIENT)
Dept: ULTRASOUND IMAGING | Facility: CLINIC | Age: 40
Discharge: HOME | End: 2025-04-17
Payer: COMMERCIAL

## 2025-04-17 DIAGNOSIS — R92.8 ABNORMAL MRI, BREAST: ICD-10-CM

## 2025-04-17 PROCEDURE — 76642 ULTRASOUND BREAST LIMITED: CPT

## 2025-04-17 NOTE — PROGRESS NOTES
Met with patient and Dr. Gasca regarding recommendation for;    __X___ RIGHT ______LEFT      __X___Ultrasound guided  ______Stereotactic breast biopsy.      __X___Verbalized understanding.    Reviewed clip placement with patient, pt states understanding: Yes: __X__ No: ____  Comments:    Blood thinners:  No: __X___ Yes: ______ What:          Biopsy teaching sheet given:  Yes: ___X___ No: ________    Pt given contact information and adv to call with any questions/needs    Patient advised to arrive at 1030 for a 1100 appointment

## 2025-04-19 DIAGNOSIS — F41.1 GENERALIZED ANXIETY DISORDER: ICD-10-CM

## 2025-04-20 RX ORDER — DULOXETIN HYDROCHLORIDE 60 MG/1
60 CAPSULE, DELAYED RELEASE ORAL DAILY
Qty: 90 CAPSULE | Refills: 1 | Status: SHIPPED | OUTPATIENT
Start: 2025-04-20

## 2025-04-20 RX ORDER — DULOXETIN HYDROCHLORIDE 30 MG/1
30 CAPSULE, DELAYED RELEASE ORAL DAILY
Qty: 90 CAPSULE | Refills: 1 | Status: SHIPPED | OUTPATIENT
Start: 2025-04-20

## 2025-04-21 DIAGNOSIS — R92.8 ABNORMAL MRI, BREAST: Primary | ICD-10-CM

## 2025-04-22 RX ORDER — METHOCARBAMOL 750 MG/1
750 TABLET, FILM COATED ORAL EVERY 6 HOURS SCHEDULED
Status: CANCELLED | OUTPATIENT
Start: 2025-04-22

## 2025-04-23 ENCOUNTER — RESULTS FOLLOW-UP (OUTPATIENT)
Dept: LABOR AND DELIVERY | Facility: HOSPITAL | Age: 40
End: 2025-04-23

## 2025-04-23 ENCOUNTER — ANESTHESIA EVENT (OUTPATIENT)
Dept: PERIOP | Facility: HOSPITAL | Age: 40
End: 2025-04-23
Payer: COMMERCIAL

## 2025-04-23 NOTE — RESULT ENCOUNTER NOTE
Please inform patient  I reviewed her  US of right breast  was  reviewed and MRI guided biopsy  was requested   / I see she has this scheduled  please confirm with patient she  knows  date and time

## 2025-04-24 ENCOUNTER — APPOINTMENT (INPATIENT)
Dept: RADIOLOGY | Facility: HOSPITAL | Age: 40
End: 2025-04-24
Payer: COMMERCIAL

## 2025-04-24 ENCOUNTER — HOSPITAL ENCOUNTER (INPATIENT)
Facility: HOSPITAL | Age: 40
LOS: 1 days | Discharge: HOME | End: 2025-04-25
Attending: ORTHOPAEDIC SURGERY | Admitting: ORTHOPAEDIC SURGERY
Payer: COMMERCIAL

## 2025-04-24 ENCOUNTER — ANESTHESIA (OUTPATIENT)
Dept: PERIOP | Facility: HOSPITAL | Age: 40
End: 2025-04-24
Payer: COMMERCIAL

## 2025-04-24 ENCOUNTER — APPOINTMENT (OUTPATIENT)
Dept: RADIOLOGY | Facility: HOSPITAL | Age: 40
End: 2025-04-24
Payer: COMMERCIAL

## 2025-04-24 ENCOUNTER — TELEPHONE (OUTPATIENT)
Dept: MAMMOGRAPHY | Facility: CLINIC | Age: 40
End: 2025-04-24

## 2025-04-24 DIAGNOSIS — K21.9 GERD WITHOUT ESOPHAGITIS: ICD-10-CM

## 2025-04-24 DIAGNOSIS — M54.12 RADICULOPATHY, CERVICAL REGION: ICD-10-CM

## 2025-04-24 DIAGNOSIS — Z98.890 S/P CERVICAL DISCECTOMY: ICD-10-CM

## 2025-04-24 DIAGNOSIS — F41.1 GENERALIZED ANXIETY DISORDER: Primary | ICD-10-CM

## 2025-04-24 DIAGNOSIS — F41.9 ANXIETY: ICD-10-CM

## 2025-04-24 DIAGNOSIS — Z98.890 S/P CERVICAL DISC REPLACEMENT: ICD-10-CM

## 2025-04-24 PROCEDURE — 87116 MYCOBACTERIA CULTURE: CPT | Performed by: ORTHOPAEDIC SURGERY

## 2025-04-24 PROCEDURE — NC001 PR NO CHARGE: Performed by: ORTHOPAEDIC SURGERY

## 2025-04-24 PROCEDURE — 20930 SP BONE ALGRFT MORSEL ADD-ON: CPT | Performed by: ORTHOPAEDIC SURGERY

## 2025-04-24 PROCEDURE — 87206 SMEAR FLUORESCENT/ACID STAI: CPT | Performed by: ORTHOPAEDIC SURGERY

## 2025-04-24 PROCEDURE — C1713 ANCHOR/SCREW BN/BN,TIS/BN: HCPCS | Performed by: ORTHOPAEDIC SURGERY

## 2025-04-24 PROCEDURE — 22864 RMVL TOT ARTHRP 1NTRSPC CRV: CPT | Performed by: ORTHOPAEDIC SURGERY

## 2025-04-24 PROCEDURE — C1781 MESH (IMPLANTABLE): HCPCS | Performed by: ORTHOPAEDIC SURGERY

## 2025-04-24 PROCEDURE — 0RP104Z REMOVAL OF INTERNAL FIXATION DEVICE FROM CERVICAL VERTEBRAL JOINT, OPEN APPROACH: ICD-10-PCS | Performed by: ORTHOPAEDIC SURGERY

## 2025-04-24 PROCEDURE — 20936 SP BONE AGRFT LOCAL ADD-ON: CPT | Performed by: ORTHOPAEDIC SURGERY

## 2025-04-24 PROCEDURE — 87070 CULTURE OTHR SPECIMN AEROBIC: CPT | Performed by: ORTHOPAEDIC SURGERY

## 2025-04-24 PROCEDURE — 0RG10A0 FUSION OF CERVICAL VERTEBRAL JOINT WITH INTERBODY FUSION DEVICE, ANTERIOR APPROACH, ANTERIOR COLUMN, OPEN APPROACH: ICD-10-PCS | Performed by: ORTHOPAEDIC SURGERY

## 2025-04-24 PROCEDURE — 0PB30ZZ EXCISION OF CERVICAL VERTEBRA, OPEN APPROACH: ICD-10-PCS | Performed by: ORTHOPAEDIC SURGERY

## 2025-04-24 PROCEDURE — 72040 X-RAY EXAM NECK SPINE 2-3 VW: CPT

## 2025-04-24 PROCEDURE — 99024 POSTOP FOLLOW-UP VISIT: CPT | Performed by: ORTHOPAEDIC SURGERY

## 2025-04-24 PROCEDURE — 0RG10K1 FUSION OF CERVICAL VERTEBRAL JOINT WITH NONAUTOLOGOUS TISSUE SUBSTITUTE, POSTERIOR APPROACH, POSTERIOR COLUMN, OPEN APPROACH: ICD-10-PCS | Performed by: ORTHOPAEDIC SURGERY

## 2025-04-24 PROCEDURE — 22853 INSJ BIOMECHANICAL DEVICE: CPT | Performed by: ORTHOPAEDIC SURGERY

## 2025-04-24 PROCEDURE — 87205 SMEAR GRAM STAIN: CPT | Performed by: ORTHOPAEDIC SURGERY

## 2025-04-24 PROCEDURE — 87102 FUNGUS ISOLATION CULTURE: CPT | Performed by: ORTHOPAEDIC SURGERY

## 2025-04-24 PROCEDURE — 22845 INSERT SPINE FIXATION DEVICE: CPT | Performed by: ORTHOPAEDIC SURGERY

## 2025-04-24 PROCEDURE — 87075 CULTR BACTERIA EXCEPT BLOOD: CPT | Performed by: ORTHOPAEDIC SURGERY

## 2025-04-24 PROCEDURE — 99222 1ST HOSP IP/OBS MODERATE 55: CPT | Performed by: INTERNAL MEDICINE

## 2025-04-24 PROCEDURE — 22554 ARTHRD ANT NTRBD MIN DSC CRV: CPT | Performed by: ORTHOPAEDIC SURGERY

## 2025-04-24 DEVICE — SCREW 3120416 4.0 X 16 SELF DRILL FIX
Type: IMPLANTABLE DEVICE | Site: SPINE CERVICAL | Status: FUNCTIONAL
Brand: ATLANTIS® ANTERIOR CERVICAL PLATE SYSTEM

## 2025-04-24 DEVICE — I-FACTOR PUTTY, 1.0CC SYRINGE
Type: IMPLANTABLE DEVICE | Site: SPINE CERVICAL | Status: FUNCTIONAL
Brand: I-FACTOR PEPTIDE ENHANCED BONE GRAFT

## 2025-04-24 DEVICE — GRAFTON DBM DBF 1ML: Type: IMPLANTABLE DEVICE | Site: SPINE CERVICAL | Status: FUNCTIONAL

## 2025-04-24 DEVICE — IMPLANT 6240864 ANATOMIC 16X14X8MM
Type: IMPLANTABLE DEVICE | Site: SPINE CERVICAL | Status: FUNCTIONAL
Brand: VERTE-STACK® SPINAL SYSTEM

## 2025-04-24 RX ORDER — GABAPENTIN 300 MG/1
600 CAPSULE ORAL ONCE
Status: COMPLETED | OUTPATIENT
Start: 2025-04-24 | End: 2025-04-24

## 2025-04-24 RX ORDER — HYDROMORPHONE HYDROCHLORIDE 2 MG/ML
INJECTION, SOLUTION INTRAMUSCULAR; INTRAVENOUS; SUBCUTANEOUS AS NEEDED
Status: DISCONTINUED | OUTPATIENT
Start: 2025-04-24 | End: 2025-04-24

## 2025-04-24 RX ORDER — ONDANSETRON 2 MG/ML
4 INJECTION INTRAMUSCULAR; INTRAVENOUS ONCE AS NEEDED
Status: DISCONTINUED | OUTPATIENT
Start: 2025-04-24 | End: 2025-04-24 | Stop reason: HOSPADM

## 2025-04-24 RX ORDER — MAGNESIUM HYDROXIDE 1200 MG/15ML
LIQUID ORAL AS NEEDED
Status: DISCONTINUED | OUTPATIENT
Start: 2025-04-24 | End: 2025-04-24 | Stop reason: HOSPADM

## 2025-04-24 RX ORDER — CYCLOBENZAPRINE HCL 10 MG
10 TABLET ORAL EVERY 8 HOURS SCHEDULED
Status: DISCONTINUED | OUTPATIENT
Start: 2025-04-24 | End: 2025-04-25 | Stop reason: HOSPADM

## 2025-04-24 RX ORDER — GABAPENTIN 300 MG/1
CAPSULE ORAL
Status: COMPLETED
Start: 2025-04-24 | End: 2025-04-24

## 2025-04-24 RX ORDER — PROPOFOL 10 MG/ML
INJECTION, EMULSION INTRAVENOUS AS NEEDED
Status: DISCONTINUED | OUTPATIENT
Start: 2025-04-24 | End: 2025-04-24

## 2025-04-24 RX ORDER — MAGNESIUM HYDROXIDE/ALUMINUM HYDROXICE/SIMETHICONE 120; 1200; 1200 MG/30ML; MG/30ML; MG/30ML
30 SUSPENSION ORAL EVERY 6 HOURS PRN
Status: DISCONTINUED | OUTPATIENT
Start: 2025-04-24 | End: 2025-04-25 | Stop reason: HOSPADM

## 2025-04-24 RX ORDER — SUCCINYLCHOLINE/SOD CL,ISO/PF 100 MG/5ML
SYRINGE (ML) INTRAVENOUS AS NEEDED
Status: DISCONTINUED | OUTPATIENT
Start: 2025-04-24 | End: 2025-04-24

## 2025-04-24 RX ORDER — DICYCLOMINE HCL 20 MG
20 TABLET ORAL 3 TIMES DAILY PRN
Status: DISCONTINUED | OUTPATIENT
Start: 2025-04-24 | End: 2025-04-25 | Stop reason: HOSPADM

## 2025-04-24 RX ORDER — GLYCOPYRROLATE 0.2 MG/ML
INJECTION INTRAMUSCULAR; INTRAVENOUS AS NEEDED
Status: DISCONTINUED | OUTPATIENT
Start: 2025-04-24 | End: 2025-04-24

## 2025-04-24 RX ORDER — DULOXETIN HYDROCHLORIDE 60 MG/1
60 CAPSULE, DELAYED RELEASE ORAL DAILY
Status: DISCONTINUED | OUTPATIENT
Start: 2025-04-25 | End: 2025-04-25 | Stop reason: HOSPADM

## 2025-04-24 RX ORDER — ACETAMINOPHEN 325 MG/1
650 TABLET ORAL EVERY 6 HOURS SCHEDULED
Status: DISCONTINUED | OUTPATIENT
Start: 2025-04-24 | End: 2025-04-25 | Stop reason: HOSPADM

## 2025-04-24 RX ORDER — FENTANYL CITRATE 50 UG/ML
INJECTION, SOLUTION INTRAMUSCULAR; INTRAVENOUS AS NEEDED
Status: DISCONTINUED | OUTPATIENT
Start: 2025-04-24 | End: 2025-04-24

## 2025-04-24 RX ORDER — MIDAZOLAM HYDROCHLORIDE 2 MG/2ML
INJECTION, SOLUTION INTRAMUSCULAR; INTRAVENOUS AS NEEDED
Status: DISCONTINUED | OUTPATIENT
Start: 2025-04-24 | End: 2025-04-24

## 2025-04-24 RX ORDER — OXYCODONE HYDROCHLORIDE 5 MG/1
5 TABLET ORAL EVERY 4 HOURS PRN
Status: DISCONTINUED | OUTPATIENT
Start: 2025-04-24 | End: 2025-04-25 | Stop reason: HOSPADM

## 2025-04-24 RX ORDER — HYDROMORPHONE HCL/PF 1 MG/ML
0.5 SYRINGE (ML) INJECTION
Status: COMPLETED | OUTPATIENT
Start: 2025-04-24 | End: 2025-04-24

## 2025-04-24 RX ORDER — CEFAZOLIN SODIUM 2 G/50ML
2000 SOLUTION INTRAVENOUS EVERY 8 HOURS
Status: COMPLETED | OUTPATIENT
Start: 2025-04-24 | End: 2025-04-25

## 2025-04-24 RX ORDER — ALBUTEROL SULFATE 0.83 MG/ML
2.5 SOLUTION RESPIRATORY (INHALATION) ONCE AS NEEDED
Status: DISCONTINUED | OUTPATIENT
Start: 2025-04-24 | End: 2025-04-24 | Stop reason: HOSPADM

## 2025-04-24 RX ORDER — SODIUM CHLORIDE, SODIUM LACTATE, POTASSIUM CHLORIDE, CALCIUM CHLORIDE 600; 310; 30; 20 MG/100ML; MG/100ML; MG/100ML; MG/100ML
INJECTION, SOLUTION INTRAVENOUS CONTINUOUS PRN
Status: DISCONTINUED | OUTPATIENT
Start: 2025-04-24 | End: 2025-04-24

## 2025-04-24 RX ORDER — ONDANSETRON 2 MG/ML
4 INJECTION INTRAMUSCULAR; INTRAVENOUS EVERY 6 HOURS PRN
Status: DISCONTINUED | OUTPATIENT
Start: 2025-04-24 | End: 2025-04-25 | Stop reason: HOSPADM

## 2025-04-24 RX ORDER — ONDANSETRON 2 MG/ML
INJECTION INTRAMUSCULAR; INTRAVENOUS AS NEEDED
Status: DISCONTINUED | OUTPATIENT
Start: 2025-04-24 | End: 2025-04-24

## 2025-04-24 RX ORDER — CALCIUM CARBONATE 500 MG/1
1000 TABLET, CHEWABLE ORAL DAILY PRN
Status: DISCONTINUED | OUTPATIENT
Start: 2025-04-24 | End: 2025-04-25 | Stop reason: HOSPADM

## 2025-04-24 RX ORDER — CEFAZOLIN SODIUM 2 G/50ML
2000 SOLUTION INTRAVENOUS ONCE
Status: COMPLETED | OUTPATIENT
Start: 2025-04-24 | End: 2025-04-24

## 2025-04-24 RX ORDER — SENNOSIDES 8.6 MG
1 TABLET ORAL DAILY
Status: DISCONTINUED | OUTPATIENT
Start: 2025-04-24 | End: 2025-04-25 | Stop reason: HOSPADM

## 2025-04-24 RX ORDER — SODIUM CHLORIDE, SODIUM LACTATE, POTASSIUM CHLORIDE, CALCIUM CHLORIDE 600; 310; 30; 20 MG/100ML; MG/100ML; MG/100ML; MG/100ML
125 INJECTION, SOLUTION INTRAVENOUS CONTINUOUS
Status: DISCONTINUED | OUTPATIENT
Start: 2025-04-24 | End: 2025-04-25 | Stop reason: HOSPADM

## 2025-04-24 RX ORDER — CHLORHEXIDINE GLUCONATE ORAL RINSE 1.2 MG/ML
15 SOLUTION DENTAL ONCE
Status: DISCONTINUED | OUTPATIENT
Start: 2025-04-24 | End: 2025-04-24 | Stop reason: HOSPADM

## 2025-04-24 RX ORDER — LORATADINE 10 MG/1
10 TABLET ORAL DAILY
Status: DISCONTINUED | OUTPATIENT
Start: 2025-04-24 | End: 2025-04-25 | Stop reason: HOSPADM

## 2025-04-24 RX ORDER — KETAMINE HCL IN NACL, ISO-OSM 100MG/10ML
SYRINGE (ML) INJECTION AS NEEDED
Status: DISCONTINUED | OUTPATIENT
Start: 2025-04-24 | End: 2025-04-24

## 2025-04-24 RX ORDER — PROPOFOL 10 MG/ML
INJECTION, EMULSION INTRAVENOUS CONTINUOUS PRN
Status: DISCONTINUED | OUTPATIENT
Start: 2025-04-24 | End: 2025-04-24

## 2025-04-24 RX ORDER — ESTRADIOL 1 MG/1
2 TABLET ORAL DAILY
Status: DISCONTINUED | OUTPATIENT
Start: 2025-04-24 | End: 2025-04-25 | Stop reason: HOSPADM

## 2025-04-24 RX ORDER — DOCUSATE SODIUM 100 MG/1
100 CAPSULE, LIQUID FILLED ORAL 2 TIMES DAILY
Status: DISCONTINUED | OUTPATIENT
Start: 2025-04-24 | End: 2025-04-25 | Stop reason: HOSPADM

## 2025-04-24 RX ORDER — METHOCARBAMOL 500 MG/1
500 TABLET, FILM COATED ORAL ONCE
Status: COMPLETED | OUTPATIENT
Start: 2025-04-24 | End: 2025-04-24

## 2025-04-24 RX ORDER — LIDOCAINE HYDROCHLORIDE 10 MG/ML
INJECTION, SOLUTION EPIDURAL; INFILTRATION; INTRACAUDAL; PERINEURAL AS NEEDED
Status: DISCONTINUED | OUTPATIENT
Start: 2025-04-24 | End: 2025-04-24

## 2025-04-24 RX ORDER — GABAPENTIN 300 MG/1
600 CAPSULE ORAL 3 TIMES DAILY
Status: DISCONTINUED | OUTPATIENT
Start: 2025-04-24 | End: 2025-04-25 | Stop reason: HOSPADM

## 2025-04-24 RX ORDER — SODIUM CHLORIDE 9 MG/ML
INJECTION, SOLUTION INTRAVENOUS CONTINUOUS PRN
Status: DISCONTINUED | OUTPATIENT
Start: 2025-04-24 | End: 2025-04-24

## 2025-04-24 RX ORDER — CLONAZEPAM 1 MG/1
1 TABLET ORAL 2 TIMES DAILY
Status: DISCONTINUED | OUTPATIENT
Start: 2025-04-24 | End: 2025-04-25 | Stop reason: HOSPADM

## 2025-04-24 RX ORDER — FENTANYL CITRATE/PF 50 MCG/ML
25 SYRINGE (ML) INJECTION
Status: DISCONTINUED | OUTPATIENT
Start: 2025-04-24 | End: 2025-04-24 | Stop reason: HOSPADM

## 2025-04-24 RX ORDER — SODIUM CHLORIDE, SODIUM LACTATE, POTASSIUM CHLORIDE, CALCIUM CHLORIDE 600; 310; 30; 20 MG/100ML; MG/100ML; MG/100ML; MG/100ML
125 INJECTION, SOLUTION INTRAVENOUS CONTINUOUS
Status: DISCONTINUED | OUTPATIENT
Start: 2025-04-24 | End: 2025-04-24

## 2025-04-24 RX ORDER — DULOXETIN HYDROCHLORIDE 30 MG/1
30 CAPSULE, DELAYED RELEASE ORAL DAILY
Status: DISCONTINUED | OUTPATIENT
Start: 2025-04-25 | End: 2025-04-25 | Stop reason: HOSPADM

## 2025-04-24 RX ORDER — OXYCODONE HYDROCHLORIDE 10 MG/1
10 TABLET ORAL EVERY 4 HOURS PRN
Status: DISCONTINUED | OUTPATIENT
Start: 2025-04-24 | End: 2025-04-25 | Stop reason: HOSPADM

## 2025-04-24 RX ORDER — DEXAMETHASONE SODIUM PHOSPHATE 10 MG/ML
INJECTION, SOLUTION INTRAMUSCULAR; INTRAVENOUS AS NEEDED
Status: DISCONTINUED | OUTPATIENT
Start: 2025-04-24 | End: 2025-04-24

## 2025-04-24 RX ADMIN — LORATADINE 10 MG: 10 TABLET ORAL at 13:49

## 2025-04-24 RX ADMIN — SODIUM CHLORIDE, SODIUM LACTATE, POTASSIUM CHLORIDE, AND CALCIUM CHLORIDE: .6; .31; .03; .02 INJECTION, SOLUTION INTRAVENOUS at 07:26

## 2025-04-24 RX ADMIN — SODIUM CHLORIDE: 0.9 INJECTION, SOLUTION INTRAVENOUS at 07:35

## 2025-04-24 RX ADMIN — B-COMPLEX W/ C & FOLIC ACID TAB 1 TABLET: TAB at 13:49

## 2025-04-24 RX ADMIN — ACETAMINOPHEN 650 MG: 325 TABLET, FILM COATED ORAL at 17:39

## 2025-04-24 RX ADMIN — ONDANSETRON 4 MG: 2 INJECTION INTRAMUSCULAR; INTRAVENOUS at 10:04

## 2025-04-24 RX ADMIN — DEXAMETHASONE SODIUM PHOSPHATE 10 MG: 10 INJECTION, SOLUTION INTRAMUSCULAR; INTRAVENOUS at 08:00

## 2025-04-24 RX ADMIN — CEFAZOLIN SODIUM 2000 MG: 2 SOLUTION INTRAVENOUS at 23:10

## 2025-04-24 RX ADMIN — BUSPIRONE HYDROCHLORIDE 15 MG: 5 TABLET ORAL at 17:39

## 2025-04-24 RX ADMIN — Medication 20 MG: at 08:39

## 2025-04-24 RX ADMIN — MIDAZOLAM 2 MG: 1 INJECTION INTRAMUSCULAR; INTRAVENOUS at 07:26

## 2025-04-24 RX ADMIN — Medication 100 MG: at 07:32

## 2025-04-24 RX ADMIN — OXYCODONE HYDROCHLORIDE 10 MG: 10 TABLET ORAL at 11:53

## 2025-04-24 RX ADMIN — CYCLOBENZAPRINE HYDROCHLORIDE 10 MG: 10 TABLET, FILM COATED ORAL at 13:49

## 2025-04-24 RX ADMIN — PROPOFOL 150 MCG/KG/MIN: 10 INJECTION, EMULSION INTRAVENOUS at 07:34

## 2025-04-24 RX ADMIN — GABAPENTIN 600 MG: 300 CAPSULE ORAL at 12:43

## 2025-04-24 RX ADMIN — Medication 20 MG: at 09:46

## 2025-04-24 RX ADMIN — FENTANYL CITRATE 25 MCG: 50 INJECTION INTRAMUSCULAR; INTRAVENOUS at 10:40

## 2025-04-24 RX ADMIN — CEFAZOLIN SODIUM 2000 MG: 2 SOLUTION INTRAVENOUS at 07:35

## 2025-04-24 RX ADMIN — GABAPENTIN 600 MG: 300 CAPSULE ORAL at 21:54

## 2025-04-24 RX ADMIN — FENTANYL CITRATE 25 MCG: 50 INJECTION INTRAMUSCULAR; INTRAVENOUS at 10:35

## 2025-04-24 RX ADMIN — FENTANYL CITRATE 50 MCG: 50 INJECTION INTRAMUSCULAR; INTRAVENOUS at 07:32

## 2025-04-24 RX ADMIN — SODIUM CHLORIDE, SODIUM LACTATE, POTASSIUM CHLORIDE, AND CALCIUM CHLORIDE 125 ML/HR: .6; .31; .03; .02 INJECTION, SOLUTION INTRAVENOUS at 13:48

## 2025-04-24 RX ADMIN — CLONAZEPAM 1 MG: 1 TABLET ORAL at 17:38

## 2025-04-24 RX ADMIN — SODIUM CHLORIDE, SODIUM LACTATE, POTASSIUM CHLORIDE, AND CALCIUM CHLORIDE 125 ML/HR: .6; .31; .03; .02 INJECTION, SOLUTION INTRAVENOUS at 21:57

## 2025-04-24 RX ADMIN — PROPOFOL 200 MG: 10 INJECTION, EMULSION INTRAVENOUS at 07:32

## 2025-04-24 RX ADMIN — HYDROMORPHONE HYDROCHLORIDE 0.5 MG: 1 INJECTION, SOLUTION INTRAMUSCULAR; INTRAVENOUS; SUBCUTANEOUS at 10:58

## 2025-04-24 RX ADMIN — ACETAMINOPHEN 650 MG: 325 TABLET, FILM COATED ORAL at 23:10

## 2025-04-24 RX ADMIN — ACETAMINOPHEN 650 MG: 325 TABLET, FILM COATED ORAL at 11:53

## 2025-04-24 RX ADMIN — FENTANYL CITRATE 50 MCG: 50 INJECTION INTRAMUSCULAR; INTRAVENOUS at 07:26

## 2025-04-24 RX ADMIN — CEFAZOLIN SODIUM 2000 MG: 2 SOLUTION INTRAVENOUS at 15:26

## 2025-04-24 RX ADMIN — HYDROMORPHONE HYDROCHLORIDE 0.5 MG: 1 INJECTION, SOLUTION INTRAMUSCULAR; INTRAVENOUS; SUBCUTANEOUS at 12:12

## 2025-04-24 RX ADMIN — HYDROMORPHONE HYDROCHLORIDE 0.5 MG: 1 INJECTION, SOLUTION INTRAMUSCULAR; INTRAVENOUS; SUBCUTANEOUS at 11:25

## 2025-04-24 RX ADMIN — OXYCODONE HYDROCHLORIDE 10 MG: 10 TABLET ORAL at 17:39

## 2025-04-24 RX ADMIN — HYDROMORPHONE HYDROCHLORIDE 0.5 MG: 2 INJECTION, SOLUTION INTRAMUSCULAR; INTRAVENOUS; SUBCUTANEOUS at 10:02

## 2025-04-24 RX ADMIN — Medication 10 MG: at 10:13

## 2025-04-24 RX ADMIN — HYDROMORPHONE HYDROCHLORIDE 0.5 MG: 1 INJECTION, SOLUTION INTRAMUSCULAR; INTRAVENOUS; SUBCUTANEOUS at 12:23

## 2025-04-24 RX ADMIN — REMIFENTANIL HYDROCHLORIDE 0.25 MCG/KG/MIN: 1 INJECTION, POWDER, LYOPHILIZED, FOR SOLUTION INTRAVENOUS at 07:34

## 2025-04-24 RX ADMIN — GLYCOPYRROLATE 0.4 MG: 0.2 INJECTION, SOLUTION INTRAMUSCULAR; INTRAVENOUS at 08:11

## 2025-04-24 RX ADMIN — OXYCODONE HYDROCHLORIDE 10 MG: 10 TABLET ORAL at 21:54

## 2025-04-24 RX ADMIN — HYDROMORPHONE HYDROCHLORIDE 0.5 MG: 1 INJECTION, SOLUTION INTRAMUSCULAR; INTRAVENOUS; SUBCUTANEOUS at 10:45

## 2025-04-24 RX ADMIN — CYCLOBENZAPRINE HYDROCHLORIDE 10 MG: 10 TABLET, FILM COATED ORAL at 21:55

## 2025-04-24 RX ADMIN — METHOCARBAMOL 500 MG: 500 TABLET ORAL at 13:49

## 2025-04-24 RX ADMIN — LIDOCAINE HYDROCHLORIDE 50 MG: 10 INJECTION, SOLUTION EPIDURAL; INFILTRATION; INTRACAUDAL; PERINEURAL at 07:32

## 2025-04-24 RX ADMIN — FENTANYL CITRATE 100 MCG: 50 INJECTION INTRAMUSCULAR; INTRAVENOUS at 07:48

## 2025-04-24 RX ADMIN — HYDROMORPHONE HYDROCHLORIDE 0.5 MG: 1 INJECTION, SOLUTION INTRAMUSCULAR; INTRAVENOUS; SUBCUTANEOUS at 11:11

## 2025-04-24 RX ADMIN — ESTRADIOL 2 MG: 1 TABLET ORAL at 15:26

## 2025-04-24 NOTE — ANESTHESIA POSTPROCEDURE EVALUATION
Post-Op Assessment Note    CV Status:  Stable    Pain management: satisfactory to patient       Mental Status:  Awake   Hydration Status:  Stable   PONV Controlled:  None   Airway Patency:  Patent     Post Op Vitals Reviewed: Yes    No anethesia notable event occurred.    Staff: Anesthesiologist           Last Filed PACU Vitals:  Vitals Value Taken Time   Temp 98.2 °F (36.8 °C) 04/24/25 1249   Pulse 87 04/24/25 1253   /69 04/24/25 1245   Resp 14 04/24/25 1252   SpO2 97 % 04/24/25 1253   Vitals shown include unfiled device data.    Modified Muna:     Vitals Value Taken Time   Activity 2 04/24/25 1249   Respiration 2 04/24/25 1249   Circulation 2 04/24/25 1249   Consciousness 2 04/24/25 1249   Oxygen Saturation 1 04/24/25 1249     Modified Muna Score: 9

## 2025-04-24 NOTE — ANESTHESIA POSTPROCEDURE EVALUATION
Post-Op Assessment Note    CV Status:  Stable  Pain Score: 0    Pain management: adequate       Mental Status:  Alert and awake   Hydration Status:  Euvolemic   PONV Controlled:  Controlled   Airway Patency:  Patent     Post Op Vitals Reviewed: Yes      Staff: Anesthesiologist, CRNA           Last Filed PACU Vitals:  Vitals Value Taken Time   Temp 98.2 °F (36.8 °C) 04/24/25 1033   Pulse 89 04/24/25 1033   /86 04/24/25 1033   Resp 21 04/24/25 1033   SpO2 99 % 04/24/25 1033   Vitals shown include unfiled device data.

## 2025-04-24 NOTE — H&P
25 H&P Update  See detailed H&P office note from 25.    H&P reviewed. After examining the patient I find no significant changes in the patients condition since the H&P had been written - continues with neck, right arm pain, weakness with right hand weakness, right shoulder weakness 4/5.  Imaging, clinical findings and procedure reviewed with patient, and sister and significant other at bedside .  Plan to proceed with ADRIANA, ACDF surgery.  Counseled patient on deleterious effects of smoking/nicotine on healing/post operative recovery and complications.      General: appears well, no acute distress  Respiratory: No SOB, no abnormal effort   Abdomen: Soft. No tenderness.    Cardiac: RRR, warm & well perfused      Assessment & Plan/Medical Decision Makin y.o. female with Neck Pain and Right Shoulder Pain, history of C5-6 disc arthroplasty on 2024        The clinical, physical and imaging findings were reviewed with the patient.  Katina has a constellation of findings consistent with Cervical Radiculopathy in the setting of cervical degenerative disease with prior C5-6 CDA.  We reviewed her updated cervical MRI and CT scan in detail with implant subsidence, foraminal stenosis with return of symptoms after initial surgery success.     We discussed the differential diagnosis including alternative CNS/neurologic pathology, extremity/musculoskeltal pathology, peripheral nerve compression, etc. Katina's symptoms, exam findings and imaging are most consistent with cervical radiculopathy.  In my opinion, no further work-up is warranted at this time.   Discussed treatment options.  Reviewed the role of further non operative treatment such as physical therapy, activity modification, medication mgmt, interventional spine procedures/injections. Given her symptoms have persisted despite these conservative treatments, recommend consideration of surgical intervention.  Katina would like to proceed with [facility],  cervical decompression and stabilization surgery.      We discussed surgical options.  In my opinion, would be C5-6 CDA Removal of Hardware and of C5-6 anterior cervical discectomy fusion to address cervical degenerative disc disease with CDA subsidence and foraminal stenosis.  We reviewed the options - such as ACDF versus repeat disc arthroplasty versus posterior surgery for decompression and ICBG versus cage versus allograft +/- biologic/graft extenders supplementation for obtaining solid arthrodesis as well as the associated risk/benefit profiles and fusion efficacy as well as the FDA status of the instrumentation and products. After discussion with the patient we will plan anterior cervical discectomy and fusion surgery.  Explained at length the rationale for surgical invention and postoperative expectations.  We discussed and Katina expressed her understanding, that in general, spine surgery is more predictive in improving extremity/radicular discomfort rather than axial spine pain, and arresting the progression of spinal cord/nerve dysfunction rather than improving.      I reviewed with the patient possible risks of surgery which included the risk of nerve injury including C5 nerve palsy, autonomic nervous system dysfunction, persistent and/or worsening pain, chronic pain, need for further surgery, risk of nonunion and instrumentation failure, instability, adjacent segment disease/degeneration, bone graft complications, vocal cord dysfunction including voice changes and hoarseness, swallowing difficulty with possible need for tube feeding, spinal fluid leak and meningitis, hematoma, blood loss, infection, DVT, pulmonary embolism, blindness, paralysis and even death, as well as other risks on consent form.  Patient agrees to proceed with surgery and understands all risks discussed.  Discussed the use of neurophysiology monitoring during the procedure.  We also reviewed patient specific risks and mitigation  including previous anterior cervical neck surgery and the associated increased risks.   We discussed that Dr. Laureano of ENT will be assisting in the surgery to help mitigate anterior approach related risks.       We reviewed infection prevention.  Reviewed medications; instructed patient medications to stop before surgery (i.e. blood thinners, NSAIDs, DMARDs and vitamins).       Also reviewed with patient our narcotic policy.  We will provide medications up to 60 days postop.  In the event patient requires more medications, will need to consult their PCP and/or see a pain management physician.     I have queried the [place] drug monitoring database for Katina to better assist in clinical decision making regarding prescriptions for controlled medications.  After querying the database and considering the clinical picture I have determined that she is a candidate for a prescription for control medication in the initial postoperative period.     All questions were answered. Katina verbalized understanding and desire to proceed with surgical scheduling.  Informed consent was obtained. Consent form was signed at today's visit.       She received a pre-operative chest Xray at today's visit. Orders for preoperative labs (CBC, CMP, PTT, INR, type & screen, and ECG) were also placed at today's visit. Discussed pre-operative clearances, medical optimization and risk stratification.  Katina needs pre-operative clearance from PCP and obtain PATs.    Patient instructed to return to office/ER sooner if symptoms are not improving, getting worse, or new worrisome/neurologic symptoms arise.      Subjective:      Chief Complaint: Neck Pain    HPI:  Katina Mendez is a 40 y.o. female presenting for initial visit with chief complaint of neck pain and right shoulder pain.  Patient is status post C5-6 disc arthroplasty on 2/29/2024.  Preoperatively her symptoms were consistent with both cervical radiculopathy and right shoulder  pathology.  Her postoperative course was relatively uneventful.  Patient initially reported improvement in right arm range of motion and function postoperatively.  She continue with neck stiffness postoperatively.  History of preoperative and postoperative tobacco use.  Today she notes residual neck pain and right shoulder pain that radiates into her right anterior arm.  She holds her arm at her side and abducted position and guarded.  Recently evaluated by Dr. Alaniz and provided CSI of right subacromial bursa.  She notes she has difficulty with daily activities such as washing her hair and stopping her bra due to right shoulder dysfunction and pain.    In addition she was reevaluated by pain management and underwent right biceps tendon injection as well as epidural injection with no significant pain relief.  She does continue with medications including Flexeril and gabapentin.    Interval history 1/27/2025:  The patient presents for follow up of cervical spine.  She has had recent lumpectomy, 12/17/2024.   Her cervical symptoms remain the same.  Today she complains of cervical pain with right shoulder pain and right arm pain and numbness.   Her pain effects her daily activity and sleep.  We did review her postoperative course.  She will notes that she did have improvement in symptoms initially after surgery however she has had some return of symptoms of the last several months.  She has had an extensive postoperative treatment course with extensive physical therapy, multiple injections including right shoulder and cervical.  She has done home exercises and evaluated by orthopedic sports medicine.  She is taken oral medications.  At this time she would like to proceed with further surgical intervention.  Katina did obtain updated advanced imaging including CT scan and cervical MRI in the interim.    Objective:     Family History   Problem Relation Age of Onset    Mental illness Mother     Depression Mother      Early death Mother     Lung cancer Mother 58        with brain mets.    Cancer Mother     Anxiety disorder Mother     OCD Mother     Psychiatric Illness Mother     Diabetes Father     Hypertension Father     Early death Father     Anxiety disorder Sister     Rashes / Skin problems Sister     Migraines Sister     Arthritis Sister     ADD / ADHD Brother     Anxiety disorder Brother     Melanoma Brother     Diabetes Maternal Grandmother     Arthritis Maternal Grandmother     Heart disease Maternal Grandmother     Breast cancer Maternal Grandmother     Colon cancer Maternal Grandmother     Cancer Maternal Grandmother     Osteoporosis Maternal Grandmother     Dementia Maternal Grandmother     Aneurysm Maternal Grandfather     Breast cancer Paternal Grandmother         age unknown.    Anuerysm Paternal Grandfather     Multiple sclerosis Paternal Aunt     Colon cancer Paternal Uncle     Colon cancer Paternal Uncle     ADD / ADHD Brother     Anxiety disorder Brother     Cancer Brother         Melanoma    Cancer Paternal Uncle         Liver / colon cancer    Multiple sclerosis Maternal Aunt        Past Medical History:   Diagnosis Date    Allergic     Anxiety     Bunion     left foot   OR   correction today 4/14/2023    Cervical disc disorder 06/2023    Had MRI done d/t pain    Cervical radiculopathy     COVID     x 2-- Jan 2022 and Jan 2023    Depression     Dizziness     Female infertility     GERD (gastroesophageal reflux disease)     Headache(784.0)     Ingrown toenail     Low back pain     Migraines     Nasal congestion     Seasonal allergies     Sinusitis     Constant sinusitis    Sleep difficulties     Tobacco abuse     Wears contact lenses        Current Facility-Administered Medications   Medication Dose Route Frequency Provider Last Rate Last Admin    ceFAZolin (ANCEF) IVPB (premix in dextrose) 2,000 mg 50 mL  2,000 mg Intravenous Once Diana Vicente PA-C        chlorhexidine (PERIDEX) 0.12 % oral rinse 15  mL  15 mL Swish & Spit Once Diana Vicente PA-C        lactated ringers infusion  125 mL/hr Intravenous Continuous Bev Rodriguez MD           Past Surgical History:   Procedure Laterality Date    BREAST BIOPSY Right 11/2024    BREAST CYST EXCISION Right 12/2024    BUNIONECTOMY Right     DISCECTOMY SPINE CERVICAL ANTERIOR W/ ARTHROPLASTY Bilateral 02/29/2024    Procedure: Anterior Cervical discectomy and disc replacement/arthroplasty C5-6;  Surgeon: Edu Del Rio MD;  [facility]: [facility];  Service: Orthopedics    FL INJECTION RIGHT SHOULDER (ARTHROGRAM)  11/13/2024    HYSTERECTOMY      HYSTERECTOMY W/ SALPINGO-OOPHERECTOMY      LAPAROSCOPIC TOTAL HYSTERECTOMY  2020    MN EXC BREAST LES PREOP PLMT RAD MARKER OPEN 1 LES Right 12/17/2024    Procedure: Right kary  localized lumpectomy;  Surgeon: Stacey Cueva MD;  Location: [facility];  Service: Surgical Oncology    MN HALLUX RIGIDUS W/CHEILECTOMY 1ST MP JT W/IMPLT Left 04/14/2023    Procedure: BUNIONECTOMY with implant;  Surgeon: Will Cuevas DPM;  Location[facility];  Service: Podiatry    MN LAPAROSCOPY W/RMVL ADNEXAL STRUCTURES N/A 12/09/2024    Procedure: DIAGNIOSTIC LAPAROSCOPY, BILATERAL OOPHERECTOMY, EUA;  Surgeon: Елена Silva MD;  [facility];  Service: Gynecology    MN MASTECTOMY PARTIAL Right 12/17/2024    Procedure: Right kary  localized lumpectomy;  Surgeon: Stacey Cueva MD;  [facility];  Service: Surgical Oncology    TONSILLECTOMY      US BREAST CLIP NEEDLE LOC RIGHT Right 12/13/2024    US GUIDED BREAST BIOPSY RIGHT COMPLETE Right 11/12/2024       Social History     Socioeconomic History    Marital status: /Civil Union     Spouse name: Not on file    Number of children: Not on file    Years of education: Not on file    Highest education level: Not on file   Occupational History    Not on file   Tobacco Use    Smoking status: Every Day     Current packs/day: 0.50     Average packs/day: 0.5  "packs/day for 23.0 years (11.5 ttl pk-yrs)     Types: Cigarettes     Start date: 4/11/2002    Smokeless tobacco: Never    Tobacco comments:     Smoking 1 cig today 1217   Vaping Use    Vaping status: Never Used   Substance and Sexual Activity    Alcohol use: Not Currently    Drug use: No    Sexual activity: Yes     Partners: Male     Birth control/protection: None     Comment: Hysterectomy on July 1st 2020   Other Topics Concern    Not on file   Social History Narrative    Not on file     Social Drivers of Health     Financial Resource Strain: Not on file   Food Insecurity: Not on file   Transportation Needs: Not on file   Physical Activity: Not on file   Stress: Not on file   Social Connections: Unknown (6/18/2024)    Received from [employer]     How often do you feel lonely or isolated from those around you? (Adult - for ages 18 years and over): Not on file   Intimate Partner Violence: Not on file   Housing Stability: Not on file       Allergies   Allergen Reactions    Bactrim [Sulfamethoxazole-Trimethoprim] Hives    Latex Other (See Comments)     Latex allergy-rashes    Levofloxacin Hives    2-Octylcyanoacrylate [Cyanoacrylate] Rash     Surgical glue    Chlorhexidine Rash    Other Rash     Surgical Glue; resulted in rash and blistering of the skin    Quinolones Rash       Review of Systems  General- denies fever/chills  HEENT- denies hearing loss or sore throat  Eyes- denies eye pain or visual disturbances, denies red eyes  Respiratory- denies cough or SOB  Cardio- denies chest pain or palpitations  GI- denies abdominal pain  Endocrine- denies urinary frequency  Urinary- denies pain with urination  Musculoskeletal- Negative except noted above  Skin- denies rashes or wounds  Neurological- denies dizziness or headache  Psychiatric- denies anxiety or difficulty concentrating    Physical Exam  /70   Pulse 64   Temp (!) 97.2 °F (36.2 °C) (Temporal)   Resp 20   Ht 5' 4.5\" (1.638 m)   Wt " 54 kg (119 lb)   LMP 01/29/2020   SpO2 99%   BMI 20.11 kg/m²     General/Constitutional: No apparent distress: well-nourished and well developed.  Lymphatic: No appreciable lymphadenopathy  Respiratory: Non-labored breathing  Vascular: No edema, swelling or tenderness, except as noted in detailed exam.  Integumentary: No impressive skin lesions present, except as noted in detailed exam.  Psych: Normal mood and affect, oriented to person, place and time.  MSK: normal other than stated in HPI and exam  Gait & balance: no evidence of myelopathic gait, ambulates Independently     Cervical  spine range of motion:  -Forward flexion chin to chest  -Extension to 60  -Lateral bend 20 right, 20 left  -Rotation 15 right, 15 left.    There is no point tenderness with palpation along the posterior cervical, thoracic, lumbar spine.   Well-healed anterior surgical site  Decreased internal rotation on the right compared to left with pain  Continued pain with palpation of the proximal biceps tendon sheath on the right    Neurologic:  Upper Extremity Motor Function    Right  Left    Deltoid  5/5  5/5    Bicep  5/5  5/5    Wrist extension  5/5  5/5    Tricep  5/5  5/5    Finger flexion/  4+/5  5/5    Hand intrinsic  5/5  5/5        Sensory: light touch is intact to bilateral upper and lower extremities     Diagnostic Tests   IMAGING: I have personally reviewed the images and these are my findings:  Cervical Spine X-rays from 10/8/2024: C5-6 disc arthroplasty intact, with mild motion at the disc on flexion-extension radiographs, stable appearing mild adjacent segment spondylosis    Cervical spine CT scan from 11/29/2024: Multilevel cervical spondylosis with loss of disc height at C3-5, C6-7, C5-6 disc arthroplasty appears intact with no evidence of hardware loosening however does appear to be subsidence within the superior endplate of C6 with residual uncovertebral narrowing and bony foraminal stenosis on the right    Cervical  "spine MRI from 12/7/2024: Multilevel cervical disc degeneration, extensive metal artifact at C5-6, there is mild foraminal stenosis at both C4-5 and C6-7 on the right    Electronic Medical Records were reviewed including pain management notes, Dr. Alaniz notes    Procedures, if performed today     None performed       Portions of the record may have been created with voice recognition software.  Occasional wrong word or \"sound a like\" substitutions may have occurred due to the inherent limitations of voice recognition software.  Read the chart carefully and recognize, using context, where substitutions have occurred.     "

## 2025-04-24 NOTE — PLAN OF CARE
Problem: PAIN - ADULT  Goal: Verbalizes/displays adequate comfort level or baseline comfort level  Description: Interventions:- Encourage patient to monitor pain and request assistance- Assess pain using appropriate pain scale- Administer analgesics based on type and severity of pain and evaluate response- Implement non-pharmacological measures as appropriate and evaluate response- Consider cultural and social influences on pain and pain management- Notify physician/advanced practitioner if interventions unsuccessful or patient reports new pain  Outcome: Progressing     Problem: INFECTION - ADULT  Goal: Absence or prevention of progression during hospitalization  Description: INTERVENTIONS:- Assess and monitor for signs and symptoms of infection- Monitor lab/diagnostic results- Monitor all insertion sites, i.e. indwelling lines, tubes, and drains- Monitor endotracheal if appropriate and nasal secretions for changes in amount and color- [place] appropriate cooling/warming therapies per order- Administer medications as ordered- Instruct and encourage patient and family to use good hand hygiene technique- Identify and instruct in appropriate isolation precautions for identified infection/condition  Outcome: Progressing     Problem: SAFETY ADULT  Goal: Patient will remain free of falls  Description: INTERVENTIONS:- Educate patient/family on patient safety including physical limitations- Instruct patient to call for assistance with activity - Consult OT/PT to assist with strengthening/mobility - Keep Call bell within reach- Keep bed low and locked with side rails adjusted as appropriate- Keep care items and personal belongings within reach- Initiate and maintain comfort rounds- Make Fall Risk Sign visible to staff- Offer Toileting every 2-4 Hours, in advance of need- Initiate/Maintain bed/chair alarm- Obtain necessary fall risk management equipment: nonskid footwear - Apply yellow socks and bracelet for high fall  risk patients- Consider moving patient to room near nurses station  Outcome: Progressing  Goal: Maintain or return to baseline ADL function  Description: INTERVENTIONS:-  Assess patient's ability to carry out ADLs; assess patient's baseline for ADL function and identify physical deficits which impact ability to perform ADLs (bathing, care of mouth/teeth, toileting, grooming, dressing, etc.)- Assess/evaluate cause of self-care deficits - Assess range of motion- Assess patient's mobility; develop plan if impaired- Assess patient's need for assistive devices and provide as appropriate- Encourage maximum independence but intervene and supervise when necessary- Involve family in performance of ADLs- Assess for home care needs following discharge - Consider OT consult to assist with ADL evaluation and planning for discharge- Provide patient education as appropriate  Outcome: Progressing  Goal: Maintains/Returns to pre admission functional level  Description: INTERVENTIONS:- Perform AM-PAC 6 Click Basic Mobility/ Daily Activity assessment daily.- Set and communicate daily mobility goal to care team and patient/family/caregiver. - Collaborate with rehabilitation services on mobility goals if consulted- Perform Range of Motion 3 times a day.- Reposition patient every 2 hours.- Dangle patient 3 times a day- Stand patient 3 times a day- Ambulate patient 3 times a day- Out of bed to chair 3 times a day - Out of bed for meals 3 times a day- Out of bed for toileting- Record patient progress and toleration of activity level   Outcome: Progressing     Problem: DISCHARGE PLANNING  Goal: Discharge to home or other facility with appropriate resources  Description: INTERVENTIONS:- Identify barriers to discharge w/patient and caregiver- Arrange for needed discharge resources and transportation as appropriate- Identify discharge learning needs (meds, wound care, etc.)- Arrange for interpretive services to assist at discharge as needed-  Refer to Case Management Department for coordinating discharge planning if the patient needs post-hospital services based on physician/advanced practitioner order or complex needs related to functional status, cognitive ability, or social support system  Outcome: Progressing     Problem: Knowledge Deficit  Goal: Patient/family/caregiver demonstrates understanding of disease process, treatment plan, medications, and discharge instructions  Description: Complete learning assessment and assess knowledge base.Interventions:- Provide teaching at level of understanding- Provide teaching via preferred learning methods  Outcome: Progressing

## 2025-04-24 NOTE — ASSESSMENT & PLAN NOTE
40 y.o.female with cervical radiculopathy in the setting of prior C5-6 anterior disc replacement, now status post hardware removal and revision to C5-6 ACDF. Doing well postoperatively overall.    WBAT B/L UE  5 lb weight lifting limit  Monitor BRIANA drain.  Soft collar on at al times.  PT/OT  Pain control  DVT ppx: SCD's/early ambulation  Will monitor for ABLA and administer IVF/prbc as indicated for Greater than 2 gram drop or Hgb < 7  Medical management per medicine team  Dispo planning

## 2025-04-24 NOTE — OP NOTE
OPERATIVE REPORT  PATIENT NAME: Katina Mendez    :  1985  MRN: [id number]  Pt Location: [facility] OR ROOM 18    SURGERY DATE: 2025    Surgeons and Role:     * Edu Del Rio MD - Primary     * Dominic Kimble MD - Assisting     * Jesusita Chen MD - Assisting     * Francisco Laureano MD    Preop Diagnosis:  Radiculopathy, cervical region M54.12    Post-Op Diagnosis Codes:     * Radiculopathy, cervical region [M54.12]    Procedure(s):  Removal of CDA, C5-6: 10840  Anterior Interbody Arthrodesis, C5-6: 61541  Interbody cage, C5-6: 13581  Anterior Instrumentation, C5-6: 42282    Specimen(s):  ID Type Source Tests Collected by Time Destination   A : C5-C6 disc space Tissue Intervertebral Disc ANAEROBIC CULTURE AND GRAM STAIN, FUNGAL CULTURE, CULTURE, TISSUE AND GRAM STAIN, AFB CULTURE WITH STAIN Edu Del Rio MD 2025  8:54 AM        Estimated Blood Loss:   Minimal    Drains:  Closed/Suction Drain Anterior;Right Neck Bulb 10 Fr. (Active)   Site Description Unable to view 25 1249   Dressing Status Clean;Dry;Intact 25 1249   Drainage Appearance Bloody 25 1249   Status To bulb suction 25 1249   Number of days: 0       Anesthesia Type:   General    Operative Indications:  Radiculopathy, cervical region M54.12    40 year old female who presented with history of C5-6 CDA with ongoing neck pain, right arm pain and weakness.  Patient failed extensive non operative treatment options.   The risks, benefits and alternatives to surgery were discussed and patient elected to proceed with surgical intervention.     Plan for ADRIANA, anterior cervical decompression and fusion.  Consent previously obtained in outpatient setting.  Prior to surgery I again explained in detail to the patient and the patient's sister and  the possible risks of surgery which include the risk of nerve injury, persistent, and/or worsening pain, spinal fluid leak, infection, and/or meningitis, excessive bleeding, paralysis,  death, blood vessel injury, need for further surgery, instability, autonomic nervous system dysfunction, as well as the potential for unforeseen medical and surgical complications.   I reiterated the an understanding, that in general, spine surgery is more predictive of improving extremity discomfort rather than axial spine pain and arresting the progression of spinal cord dysfunction rather than improving it. Katina Mendez had no further questions.     Operative Findings:  Intact disc arthroplasty with inferior base plate subsidence    Complications:   None    Procedure and Technique:  Patient was identified in the preoperative holding area.  The operative site was appropriately marked.      Patient was taken to the operating room.  Patient was transferred supine onto the Rolan table.  Sequential compression devices were applied to bilateral lower extremities.  The patient was placed under general endotracheal anesthesia.  Antibiotics were administered. Neurophysiology monitoring leads were placed and baselines obtained.  During this time and the entire operation, care was taken to maintain appropriate perfusion pressures.  All bony prominences were properly padded.  A bolster was placed under the shoulders and the shoulders were loosely secured at the sides to access to the anterior cervical spine.  There were no changes in neurophysiologic monitoring during positioning. Preprocedure fluoroscopic images were obtained in AP and lateral position.  The operative field was then prepped and draped in the normal sterile fashion.  A time-out was performed.  All parties were in agreement.  A headlamp and surgical loops were utilized during the approach.       Dr. Laureano from ENT performed the approach due to history of previous anterior neck surgery. A right sided approach was selected, through the previous surgical site.  Please refer to Dr. Laureano's operative note for details regarding the approach.       Once the  approach was completed, there was clear access to the retropharyngeal space where the anterior cervical spine was palpated.  The C5-6 cervical disc replacement device was identified.  An intraoperative x-ray was taken to confirm.  Soft tissues including longus coli muscles anterior to the spinal elements were elevated in a subperiosteal manner.  Retractors were placed and distracted.  The endotracheal cuff was then deflated and then reinflated to minimize injury to the recurrent laryngeal nerve.  The inferior base plate had subsided on the patient's right side with a small bony defect at the superior C6 endplate.  An osteotome was used at the superior baseplate and inferior baseplate for mobilization.  There was substantial bony ingrowth at the superior baseplate along the inferior C5 endplate.  At this point [place] our distraction pins were placed with fluoroscopy guidance.  Gentle traction was applied.  At this point the disc arthroplasty device was easily removed with a Kocher.  Cultures were taken of the disc space.  The inferior endplate of C5 was noted to be intact with healthy bleeding bone along the two rail grooves.  There was a bony defect within the C6 superior endplate, mostly on the right side.  There was a rim of endplate adjacent to bilateral uncovertebral joints.  Scar tissue and fibrinous tissue was removed from the disc base with pituitary rongeurs as well as curettes.  We performed additional right-sided foraminotomy with high-speed drill.  Traction was applied to the disc space to allow additional access to the posterior aspect of the interbody space.  There was no change in the neurophysiologic monitoring.   Curettes were used to gently decorticate the endplates of the vertebral body above and below the interspace.  After squaring off the endplates, a rasp was used to further refine and smooth the endplates and to expose bleeding bone across both surfaces.   The appropriate sized cage was  selected (Etaphasetronic Anatomic PEEK [id number]).   The cage was then filled with local autograft and allograft bone.  The cage was then inserted with a tamp and countersunk a few millimeters.  Intraoperative imaging was obtained to confirmed positioning of the interbody allograft. There was no change in the neurophysiologic monitoring.      After performing the fusion procedure, the anterior aspect of the cervical spine was meticulously sculpted to allow placement of a flush fitting anterior cervical plate.  Prior to application of the plate, the bony defect at the C6 superior endplate was filled with allograft bone. Then the proper plate length was selected to fit the spinal segment.  The plate was aligned with the upper and lower vertebral bodies and then attached to the C5 and C6 levels using unicortical screw fixation (4.0mm).  Variable angle screws were used at C5 while fixed angle screws were used at C6.  Adequate fixation strength was noted at each screw site.  The screws were confirmed to be locked to the plate.  The integrity of the plate construct was checked and noted to be excellent.  An intraoperative AP and lateral radiograph was obtained which showed satisfactory position of the internal fixation device and graft.  A Medtronic [place] Vision Elite Plate 25mm was used.     Neurophysiologic monitoring was noted to be unchanged.  The wound was again copiously irrigated and hemostasis was obtained.  After confirmation of hemostasis a 10 [place] drain was placed deep to the fascia and brought out through the skin. The deep fascia including the platysma and superficial fascia were closed with 2-0 Vicryl suture.  The dermis was closed with a running 3-0 Monocryl suture.  All counts were correct.  Steri strips and a sterile dressing was applied. The patient was transferred supine onto a stretcher, extubated by anesthesia, and transported to the PACU in stable condition.      I was present for the entire  procedure.    Patient Disposition:  PACU       SIGNATURE: Edu Del Rio MD  DATE: April 24, 2025  TIME: 4:33 PM

## 2025-04-24 NOTE — PROGRESS NOTES
Progress Note - Orthopedics   Name: Katina Mendez 40 y.o. female I MRN: [id number]  Unit/Bed#: -01 I Date of Admission: 4/24/2025   Date of Service: 4/24/2025 I Hospital Day: 0      Assessment & Plan  Cervical radiculopathy  40 y.o.female with cervical radiculopathy in the setting of prior C5-6 anterior disc replacement, now status post hardware removal and revision to C5-6 ACDF. Doing well postoperatively overall.    WBAT B/L UE  5 lb weight lifting limit  Monitor BRIANA drain.  Soft collar on at al times.  PT/OT  Pain control  DVT ppx: SCD's/early ambulation  Will monitor for ABLA and administer IVF/prbc as indicated for Greater than 2 gram drop or Hgb < 7  Medical management per medicine team  Dispo planning       Please contact the SecureChat role for the Orthopedics service with any questions/concerns.    History of Present Illness   40 y.o. female No acute events, no acute distress. Denies fever/chills, SOB. Pain is improving. Patient reports she's been able to tolerate PO intake without issue and she has gotten up and walked to the bathroom to urinate.    Objective      Temp:  [97.2 °F (36.2 °C)-98.2 °F (36.8 °C)] 98 °F (36.7 °C)  HR:  [64-92] 73  BP: (110-146)/(68-86) 113/68  Resp:  [12-28] 16  SpO2:  [89 %-99 %] 91 %  O2 Device: Nasal cannula  Nasal Cannula O2 Flow Rate (L/min):  [3 L/min] 3 L/min  O2 Device: Nasal cannula          I/O last 24 hours:  In: 2000 [P.O.:600; I.V.:1400]  Out: -   Lines/Drains/Airways       Active Status       Name Placement date Placement time Site Days    Closed/Suction Drain Anterior;Right Neck Bulb 10 Fr. 04/24/25  1022  Neck  less than 1                  Physical Exam   Musculoskeletal: Bilateral Upper Extremity  Dressing C/D/I   BRIANA drain in place with serosanguinous output.  TTP charles-incisionally  Sensation intact to light touch C5-T1  5/5 deltoid, 5/5 biceps, 5/5 triceps, 5/5 wrist flexion, 5/5 wrist extension, 5/5 finger abduction, 4+ finger flexion/.  2+ DP  "pulse  No calf swelling or ttp        Lab Results: I have reviewed the following results:  No results for input(s): \"WBC\", \"HGB\", \"HCT\", \"PLT\", \"BANDSPCT\", \"BUN\", \"CREATININE\", \"PTT\", \"INR\", \"ESR\", \"CRP\" in the last 72 hours.  Blood Culture:  No results found for: \"BLOODCX\"  Wound Culture: No results found for: \"WOUNDCULT\"      "

## 2025-04-24 NOTE — ANESTHESIA PREPROCEDURE EVALUATION
Procedure:  Removal of C5-6 disc arthroplasty device, C5-6 ACDF (Bilateral: Spine Cervical)    Relevant Problems   CARDIO   (+) Migraines      GI/HEPATIC   (+) GERD without esophagitis      MUSCULOSKELETAL   (+) Lower back pain      NEURO/PSYCH   (+) Anxiety   (+) Cervical spinal cord compression (HCC)   (+) Chronic post-traumatic stress disorder (PTSD)   (+) Depression   (+) Generalized anxiety disorder   (+) Migraines   (+) Moderately severe major depression (HCC)      EKG 4/7/2025: NSR    R>L arm weakness   Current smoker    Lab Results   Component Value Date    WBC 4.96 03/28/2025    HGB 12.5 03/28/2025    HCT 38.1 03/28/2025    MCV 92 03/28/2025     03/28/2025     Lab Results   Component Value Date    SODIUM 141 03/28/2025    K 3.9 03/28/2025     03/28/2025    CO2 27 03/28/2025    BUN 7 03/28/2025    CREATININE 0.78 03/28/2025    GLUC 98 03/01/2024    CALCIUM 9.1 03/28/2025     Lab Results   Component Value Date    INR 0.95 03/28/2025    INR 1.1 12/12/2023    PROTIME 13.0 03/28/2025    PROTIME 11.1 12/12/2023     Lab Results   Component Value Date    HGBA1C 5.5 11/11/2024            Physical Exam    Airway    Mallampati score: II  TM Distance: >3 FB  Neck ROM: full     Dental   No notable dental hx     Cardiovascular      Pulmonary      Other Findings  post-pubertal.      Anesthesia Plan  ASA Score- 2     Anesthesia Type- general with ASA Monitors.         Additional Monitors:     Airway Plan: ETT.           Plan Factors-Exercise tolerance (METS): >4 METS.    Chart reviewed. EKG reviewed.  Existing labs reviewed. Patient summary reviewed.    Patient is a current smoker.  Patient instructed to abstain from smoking on day of procedure. Patient did not smoke on day of surgery.    Obstructive sleep apnea risk education given perioperatively.        Induction- intravenous.    Postoperative Plan-     Perioperative Resuscitation Plan - Level 1 - Full Code.       Informed Consent- Anesthetic plan and risks  discussed with patient.  I personally reviewed this patient with the CRNA. Discussed and agreed on the Anesthesia Plan with the CRNA..      NPO Status:  Vitals Value Taken Time   Date of last liquid 04/24/25 04/24/25 0605   Time of last liquid 0100 04/24/25 0605   Date of last solid 04/23/25 04/24/25 0605   Time of last solid 1830 04/24/25 0605

## 2025-04-24 NOTE — OP NOTE
OPERATIVE REPORT  PATIENT NAME: Katina Mendez    :  1985  MRN: [id number]  Pt Location: [facility] ROOM 18    SURGERY DATE: 2025    Surgeons and Role:     * Edu Del Rio MD - Primary     * Dominic Kimble MD - Assisting     * Jesusita Chen MD - Assisting     * Francisco Laureano MD    Preop Diagnosis:  Radiculopathy, cervical region [M54.12]    Post-Op Diagnosis Codes:     * Radiculopathy, cervical region [M54.12]    Procedure(s):  Bilateral - Removal of C5-6 disc arthroplasty device. C5-6 ACDF    Specimen(s):  ID Type Source Tests Collected by Time Destination   A : C5-C6 disc space Tissue Intervertebral Disc ANAEROBIC CULTURE AND GRAM STAIN, FUNGAL CULTURE, CULTURE, TISSUE AND GRAM STAIN, AFB CULTURE WITH STAIN Edu Del Rio MD 2025  8:54 AM        Estimated Blood Loss:   Minimal    Drains:  Closed/Suction Drain Anterior;Right Neck Bulb 10 Fr. (Active)   Site Description Unable to view 25 1249   Dressing Status Clean;Dry;Intact 25 1249   Drainage Appearance Bloody 25 1249   Status To bulb suction 25 1249   Number of days: 0       Anesthesia Type:   General    Operative Indications:  Radiculopathy, cervical region [M54.12]      Operative Findings:  Scarring within the operative bed      Complications:   None    Procedure and Technique:  The patient was taken to the operating room.  General anesthesia was induced and he was intubated uneventfully.  Neuromonitoring via Nuvasive/Impulse was setup, calibrated, and found to be working appropriately.  The patient was then positioned by Dr. Del Rio.    The patient was then prepped and draped in the usual sterile fashion.  The C-arm was then brought into place and the levels confirmed.  A time out was then performed and the patient identity and procedure were confirmed.  Utilizing the prior cervical exposure incision, a 15 blade was used to make the incision.  The subcutaneous fat and platysma was then divided with bovie  electrocautery.  Subplatysmal flaps were then elevated and retracted using a Weitlaner retractor.  The Omohyoid muscle was then identified and divided to aid in exposure.  The common carotid was then palpated.  Dissection was then carried out along the length of the common carotid superiorly and inferiorly.  Care was taken to avoid the vagus nerve in the dissection.  Once the medial border of the carotid was delineated, the carotid was retracted laterally with the Cloward retractor.  The larynx and trachea were retracted medially taking care to avoid inadvertent pressure and stretch on the RLN.  There was significant scar within the neck due to prior surgery.  At this point, the alar and prevertebral layer of the deep cervical fascia was bluntly dissected using [place].  The appropriate disc space was then identified using a spinal needle and the C-arm.    At this point, Dr. Del Rio took over, his portion will be dictated separately.  The patient tolerated my portion of the procedure well without complications.       I was present for the entire procedure.    Patient Disposition:  PACU              SIGNATURE: Francisco Laureano MD  DATE: April 24, 2025  TIME: 4:48 PM

## 2025-04-25 VITALS
SYSTOLIC BLOOD PRESSURE: 108 MMHG | TEMPERATURE: 98.2 F | WEIGHT: 119 LBS | HEIGHT: 64 IN | OXYGEN SATURATION: 93 % | HEART RATE: 78 BPM | BODY MASS INDEX: 20.32 KG/M2 | RESPIRATION RATE: 16 BRPM | DIASTOLIC BLOOD PRESSURE: 68 MMHG

## 2025-04-25 PROBLEM — R13.12 OROPHARYNGEAL DYSPHAGIA: Status: ACTIVE | Noted: 2025-04-25

## 2025-04-25 PROBLEM — D64.9 ANEMIA: Status: ACTIVE | Noted: 2025-04-25

## 2025-04-25 PROBLEM — D72.829 LEUKOCYTOSIS: Status: ACTIVE | Noted: 2025-04-25

## 2025-04-25 LAB
ANION GAP SERPL CALCULATED.3IONS-SCNC: 8 MMOL/L (ref 4–13)
BUN SERPL-MCNC: 5 MG/DL (ref 5–25)
CALCIUM SERPL-MCNC: 8.7 MG/DL (ref 8.4–10.2)
CHLORIDE SERPL-SCNC: 107 MMOL/L (ref 96–108)
CO2 SERPL-SCNC: 26 MMOL/L (ref 21–32)
CREAT SERPL-MCNC: 0.58 MG/DL (ref 0.6–1.3)
ERYTHROCYTE [DISTWIDTH] IN BLOOD BY AUTOMATED COUNT: 12.5 % (ref 11.6–15.1)
GFR SERPL CREATININE-BSD FRML MDRD: 114 ML/MIN/1.73SQ M
GLUCOSE SERPL-MCNC: 149 MG/DL (ref 65–140)
HCT VFR BLD AUTO: 34.4 % (ref 34.8–46.1)
HGB BLD-MCNC: 10.8 G/DL (ref 11.5–15.4)
MCH RBC QN AUTO: 29.3 PG (ref 26.8–34.3)
MCHC RBC AUTO-ENTMCNC: 31.4 G/DL (ref 31.4–37.4)
MCV RBC AUTO: 94 FL (ref 82–98)
PLATELET # BLD AUTO: 235 THOUSANDS/UL (ref 149–390)
PMV BLD AUTO: 9.7 FL (ref 8.9–12.7)
POTASSIUM SERPL-SCNC: 3.9 MMOL/L (ref 3.5–5.3)
RBC # BLD AUTO: 3.68 MILLION/UL (ref 3.81–5.12)
RHODAMINE-AURAMINE STN SPEC: NORMAL
SODIUM SERPL-SCNC: 141 MMOL/L (ref 135–147)
WBC # BLD AUTO: 12.32 THOUSAND/UL (ref 4.31–10.16)

## 2025-04-25 PROCEDURE — 92610 EVALUATE SWALLOWING FUNCTION: CPT

## 2025-04-25 PROCEDURE — 97163 PT EVAL HIGH COMPLEX 45 MIN: CPT

## 2025-04-25 PROCEDURE — 85027 COMPLETE CBC AUTOMATED: CPT

## 2025-04-25 PROCEDURE — 99024 POSTOP FOLLOW-UP VISIT: CPT | Performed by: ORTHOPAEDIC SURGERY

## 2025-04-25 PROCEDURE — 99232 SBSQ HOSP IP/OBS MODERATE 35: CPT | Performed by: PHYSICIAN ASSISTANT

## 2025-04-25 PROCEDURE — 97166 OT EVAL MOD COMPLEX 45 MIN: CPT

## 2025-04-25 PROCEDURE — 80048 BASIC METABOLIC PNL TOTAL CA: CPT

## 2025-04-25 RX ORDER — CYCLOBENZAPRINE HCL 10 MG
10 TABLET ORAL 3 TIMES DAILY PRN
Qty: 30 TABLET | Refills: 0 | Status: SHIPPED | OUTPATIENT
Start: 2025-04-25

## 2025-04-25 RX ORDER — SENNOSIDES 8.6 MG
650 CAPSULE ORAL EVERY 8 HOURS PRN
Qty: 30 TABLET | Refills: 0 | Status: SHIPPED | OUTPATIENT
Start: 2025-04-25

## 2025-04-25 RX ORDER — OXYCODONE HYDROCHLORIDE 5 MG/1
5 TABLET ORAL EVERY 4 HOURS PRN
Qty: 30 TABLET | Refills: 0 | Status: SHIPPED | OUTPATIENT
Start: 2025-04-25 | End: 2025-05-05

## 2025-04-25 RX ADMIN — LORATADINE 10 MG: 10 TABLET ORAL at 08:26

## 2025-04-25 RX ADMIN — ACETAMINOPHEN 650 MG: 325 TABLET, FILM COATED ORAL at 05:48

## 2025-04-25 RX ADMIN — DULOXETINE 60 MG: 60 CAPSULE, DELAYED RELEASE ORAL at 08:25

## 2025-04-25 RX ADMIN — ACETAMINOPHEN 650 MG: 325 TABLET, FILM COATED ORAL at 11:04

## 2025-04-25 RX ADMIN — CYCLOBENZAPRINE HYDROCHLORIDE 10 MG: 10 TABLET, FILM COATED ORAL at 05:49

## 2025-04-25 RX ADMIN — CLONAZEPAM 1 MG: 1 TABLET ORAL at 08:26

## 2025-04-25 RX ADMIN — OXYCODONE HYDROCHLORIDE 10 MG: 10 TABLET ORAL at 08:26

## 2025-04-25 RX ADMIN — DULOXETINE 30 MG: 60 CAPSULE, DELAYED RELEASE ORAL at 08:25

## 2025-04-25 RX ADMIN — DOCUSATE SODIUM 100 MG: 100 CAPSULE, LIQUID FILLED ORAL at 08:26

## 2025-04-25 RX ADMIN — B-COMPLEX W/ C & FOLIC ACID TAB 1 TABLET: TAB at 08:25

## 2025-04-25 RX ADMIN — OXYCODONE HYDROCHLORIDE 10 MG: 10 TABLET ORAL at 16:15

## 2025-04-25 RX ADMIN — OXYCODONE HYDROCHLORIDE 10 MG: 10 TABLET ORAL at 04:00

## 2025-04-25 RX ADMIN — GABAPENTIN 600 MG: 300 CAPSULE ORAL at 16:15

## 2025-04-25 RX ADMIN — BUSPIRONE HYDROCHLORIDE 15 MG: 5 TABLET ORAL at 08:26

## 2025-04-25 RX ADMIN — SENNOSIDES 8.6 MG: 8.6 TABLET, FILM COATED ORAL at 08:26

## 2025-04-25 RX ADMIN — CYCLOBENZAPRINE HYDROCHLORIDE 10 MG: 10 TABLET, FILM COATED ORAL at 13:17

## 2025-04-25 RX ADMIN — GABAPENTIN 600 MG: 300 CAPSULE ORAL at 08:26

## 2025-04-25 RX ADMIN — ESTRADIOL 2 MG: 1 TABLET ORAL at 08:27

## 2025-04-25 NOTE — ASSESSMENT & PLAN NOTE
Recent Labs     04/25/25  0452   HGB 10.8*     Baseline Hgb mid 10's to mid 12's   Monitor post-op   No acute bleeding   Suspect dilutional component w/ IVF's

## 2025-04-25 NOTE — SPEECH THERAPY NOTE
Speech-Language Pathology Bedside Swallow Evaluation      Patient Name: Katina Mendez    Today's Date: 4/25/2025     Problem List  Principal Problem:    Cervical radiculopathy  Active Problems:    Generalized anxiety disorder    Depression    Oropharyngeal dysphagia      Past Medical History  Past Medical History:   Diagnosis Date    Allergic     Anxiety     Bunion     left foot   OR   correction today 4/14/2023    Cervical disc disorder 06/2023    Had MRI done d/t pain    Cervical radiculopathy     COVID     x 2-- Jan 2022 and Jan 2023    Depression     Dizziness     Female infertility     GERD (gastroesophageal reflux disease)     Headache(784.0)     Ingrown toenail     Low back pain     Migraines     Nasal congestion     Seasonal allergies     Sinusitis     Constant sinusitis    Sleep difficulties     Tobacco abuse     Wears contact lenses        Past Surgical History  Past Surgical History:   Procedure Laterality Date    BREAST BIOPSY Right 11/2024    BREAST CYST EXCISION Right 12/2024    BUNIONECTOMY Right     DISCECTOMY SPINE CERVICAL ANTERIOR W/ ARTHROPLASTY Bilateral 02/29/2024    Procedure: Anterior Cervical discectomy and disc replacement/arthroplasty C5-6;  Surgeon: Edu Del Rio MD;  Location: [facility];  Service: Orthopedics    FL INJECTION RIGHT SHOULDER (ARTHROGRAM)  11/13/2024    HYSTERECTOMY      HYSTERECTOMY W/ SALPINGO-OOPHERECTOMY      LAPAROSCOPIC TOTAL HYSTERECTOMY  2020    VT ARTHRD ANT INTERBODY DECOMPRESS CERVICAL BELW C2 Bilateral 4/24/2025    Procedure: Removal of C5-6 disc arthroplasty device, C5-6 ACDF;  Surgeon: Edu Del Rio MD;  [facility];  Service: Orthopedics    VT EXC BREAST LES PREOP PLMT RAD MARKER OPEN 1 LES Right 12/17/2024    Procedure: Right kary  localized lumpectomy;  Surgeon: Stacey Cueva MD;  [facility];  Service: Surgical Oncology    VT HALLUX RIGIDUS W/CHEILECTOMY 1ST MP JT W/IMPLT Left 04/14/2023    Procedure: BUNIONECTOMY with  implant;  Surgeon: Will Cuevas DPM;  [facility];  Service: Podiatry    ID LAPAROSCOPY W/RMVL ADNEXAL STRUCTURES N/A 12/09/2024    Procedure: DIAGNIOSTIC LAPAROSCOPY, BILATERAL OOPHERECTOMY, EUA;  Surgeon: Елена Silva MD;  [facility];  Service: Gynecology    ID MASTECTOMY PARTIAL Right 12/17/2024    Procedure: Right kary  localized lumpectomy;  Surgeon: Stacey Cueva MD;  [facility];  Service: Surgical Oncology    TONSILLECTOMY      US BREAST CLIP NEEDLE LOC RIGHT Right 12/13/2024    US GUIDED BREAST BIOPSY RIGHT COMPLETE Right 11/12/2024       Summary   Pt presented with functional appearing oral and pharyngeal stage swallowing skills with materials administered today. The patient is assessed with soft solids and thin liquids. She is s/p C5-C6 ACDF and has some post-op pain. This is her second surgery of this kind, and had some swelling and difficulty post op before. She did eat softer foods after last surgery. She tolerated items with no overt s/s aspiration.     Risk/s for Aspiration: low     Recommended Diet: regular diet and thin liquids (choose softer menu items)  Recommended Form of Meds: whole with liquid   Aspiration precautions and swallowing strategies: upright posture, small bites/sips, and alternating bites and sips  Other Recommendations: Continue frequent oral care    Current Medical Status  Chief Complaint:    Katina Mendez is a 40 y.o. female with a PMH of cervical radiculopathy, depression, anxiety, PTSD, surgical menopause on estrogen who underwent removal of C5-6 disc arthroplasty device and revision to C5-6 ACDF on 4/24/2025.   She complains of neck pain.  She has also noted coughing and choking with thin liquids.  No fever or chills.  No shortness of breath.    Allergies:  No known food allergies  Past medical history:  Please see H&P for details    Social/Education/Vocational Hx:  Pt lives with family    Swallow Information   Current Risks for  Dysphagia & Aspiration: recent surgery  Current Symptoms/Concerns:  none observed   Current Diet: mechanically altered/level 2 diet and nectar thick liquids   Baseline Diet: regular diet and thin liquids    Baseline Assessment   Behavior/Cognition: alert  Speech/Language Status: able to participate in conversation and able to follow commands  Patient Positioning: upright in recliner  Pain Status/Interventions/Response to Interventions: No report of or nonverbal indications of pain.     Swallow Mechanism Exam  Not formally assessed, but WFL for PO intake. No weakness observed or pain reported    Consistencies Assessed and Performance   Consistencies Administered: thin liquids and soft solids  Materials administered included akua cracker and thin liquids    Oral Stage: WFL  Mastication was adequate with the materials administered today.  Bolus formation and transfer were functional with no significant oral residue noted.  No overt s/s reduced oral control.    Pharyngeal Stage: WFL  Swallow Mechanics:  Swallowing initiation appeared prompt.  Laryngeal rise was observed and judged to be within functional limits.  No coughing, throat clearing, change in vocal quality or respiratory status noted today.     Esophageal Concerns: none reported    Summary and Recommendations (see above)    Results Reviewed with: patient and PA     Treatment Recommended: evaluation only. Please re-consult with concerns

## 2025-04-25 NOTE — PLAN OF CARE
Problem: PAIN - ADULT  Goal: Verbalizes/displays adequate comfort level or baseline comfort level  Description: Interventions:- Encourage patient to monitor pain and request assistance- Assess pain using appropriate pain scale- Administer analgesics based on type and severity of pain and evaluate response- Implement non-pharmacological measures as appropriate and evaluate response- Consider cultural and social influences on pain and pain management- Notify physician/advanced practitioner if interventions unsuccessful or patient reports new pain  Outcome: Progressing     Problem: INFECTION - ADULT  Goal: Absence or prevention of progression during hospitalization  Description: INTERVENTIONS:- Assess and monitor for signs and symptoms of infection- Monitor lab/diagnostic results- Monitor all insertion sites, i.e. indwelling lines, tubes, and drains- Monitor endotracheal if appropriate and nasal secretions for changes in amount and color- [place] appropriate cooling/warming therapies per order- Administer medications as ordered- Instruct and encourage patient and family to use good hand hygiene technique- Identify and instruct in appropriate isolation precautions for identified infection/condition  Outcome: Progressing     Problem: DISCHARGE PLANNING  Goal: Discharge to home or other facility with appropriate resources  Description: INTERVENTIONS:- Identify barriers to discharge w/patient and caregiver- Arrange for needed discharge resources and transportation as appropriate- Identify discharge learning needs (meds, wound care, etc.)- Arrange for interpretive services to assist at discharge as needed- Refer to Case Management Department for coordinating discharge planning if the patient needs post-hospital services based on physician/advanced practitioner order or complex needs related to functional status, cognitive ability, or social support system  Outcome: Progressing     Problem: Knowledge Deficit  Goal:  Patient/family/caregiver demonstrates understanding of disease process, treatment plan, medications, and discharge instructions  Description: Complete learning assessment and assess knowledge base.Interventions:- Provide teaching at level of understanding- Provide teaching via preferred learning methods  Outcome: Progressing

## 2025-04-25 NOTE — UTILIZATION REVIEW
Initial Clinical Review    [facility] [id number]     Elective Inpatient surgical procedure  Age/Sex: 40 y.o. female  Surgery Date: 4/24  Procedure: S/p Bilateral - Removal of C5-6 disc arthroplasty device. C5-6 ACDF   Anesthesia: General    POD#1 Progress Note:   4/25  Monitor BRIANA drain. BRIANA drain in place with 25 cc of serosang output and pulling suction appropriately.   5lb wt lifting limit. Soft collar on at all times.  Monitor for ABLA and administer IVF/prbc as indicated for Greater than 2 gram drop or Hgb < 7   Medical management. Pain control.     Admission Orders: Date/Time/Statement:   Admission Orders (From admission, onward)       Ordered        04/24/25 1047  Inpatient Admission  Once                          Orders Placed This Encounter   Procedures    Inpatient Admission     Standing Status:   Standing     Number of Occurrences:   1     Level of Care:   Med Surg [16]     Estimated length of stay:   Not Applicable     Diet: Surgical Soft/Lite meal; Nectar Thick  Mobility: OOB  DVT Prophylaxis: SCD    Medications/Pain Control:   Scheduled Medications:  acetaminophen, 650 mg, Oral, Q6H AVILA  busPIRone, 15 mg, Oral, BID  clonazePAM, 1 mg, Oral, BID  cyclobenzaprine, 10 mg, Oral, Q8H AVILA  docusate sodium, 100 mg, Oral, BID  DULoxetine, 30 mg, Oral, Daily  DULoxetine, 60 mg, Oral, Daily  estradiol, 2 mg, Oral, Daily  gabapentin, 600 mg, Oral, TID  loratadine, 10 mg, Oral, Daily  multivitamin stress formula, 1 tablet, Oral, Daily  senna, 1 tablet, Oral, Daily    ceFAZolin (ANCEF) IVPB (premix in dextrose) 2,000 mg 50 mL  Dose: 2,000 mg  Freq: Every 8 hours Route: IV  Last Dose: Stopped (04/25/25 0010)  Start: 04/24/25 1600 End: 04/25/25 0010    Continuous IV Infusions:  lactated ringers, 125 mL/hr, Intravenous, Continuous      PRN Meds:  aluminum-magnesium hydroxide-simethicone, 30 mL, Oral, Q6H PRN  calcium carbonate, 1,000 mg, Oral, Daily PRN  dicyclomine, 20 mg, Oral, TID PRN  ondansetron, 4 mg, Intravenous, Q6H  PRN  oxyCODONE, 10 mg, Oral, Q4H PRN 4/24 x3, 4/25 x2  oxyCODONE, 5 mg, Oral, Q4H PRN      Vital Signs (last 3 days)       Date/Time Temp Pulse Resp BP MAP (mmHg) SpO2 Calculated FIO2 (%) - Nasal Cannula Nasal Cannula O2 Flow Rate (L/min) O2 Device Cardiac (WDL) [place] Coma Scale Score Pain    04/25/25 09:44:45 97.8 °F (36.6 °C) 76 14 107/63 78 94 % -- -- -- -- -- --    04/25/25 0925 -- -- -- -- -- -- -- -- None (Room air) -- 15 --    04/25/25 0826 -- -- -- -- -- -- -- -- -- -- -- 8    04/25/25 0548 -- -- -- -- -- -- -- -- -- -- -- 5 04/25/25 0400 -- -- -- -- -- -- -- -- -- -- 15 7 04/25/25 02:59:35 97.6 °F (36.4 °C) 77 16 116/55 75 93 % -- -- -- -- -- --    04/25/25 0000 -- -- -- -- -- -- -- -- -- -- 15 --    04/24/25 2310 -- -- -- -- -- -- -- -- -- -- -- 5 04/24/25 22:48:34 97.6 °F (36.4 °C) 63 16 99/58 72 91 % -- -- -- -- -- --    04/24/25 2154 -- -- -- -- -- -- -- -- -- -- -- 7 04/24/25 2000 -- -- -- -- -- 92 % -- -- None (Room air) -- 15 --    04/24/25 1739 -- -- -- -- -- -- -- -- -- -- -- 7 04/24/25 15:15:57 98 °F (36.7 °C) 73 16 113/68 83 91 % -- -- -- -- -- --    04/24/25 1500 -- -- -- -- -- -- -- -- -- -- 15 --    04/24/25 14:07:40 97.5 °F (36.4 °C) 86 20 119/73 88 89 % -- -- -- -- -- --    04/24/25 13:04:02 -- 82 -- 119/74 89 90 % -- -- -- -- -- --    04/24/25 1249 98.2 °F (36.8 °C) 92 28 -- -- 98 % 32 3 L/min Nasal cannula WDL -- 8 04/24/25 1245 -- 90 23 123/69 92 97 % -- -- -- -- -- 8 04/24/25 1230 -- 89 16 125/75 95 98 % -- -- -- -- -- 8 04/24/25 1223 -- -- -- -- -- -- -- -- -- -- -- 8 04/24/25 1215 -- 90 16 125/80 99 97 % -- -- -- -- -- 8 04/24/25 1200 -- 82 15 132/86 104 98 % -- -- -- -- -- 8 04/24/25 1153 -- -- -- -- -- -- -- -- -- -- -- 8 04/24/25 1145 -- 79 12 130/79 99 98 % -- -- -- -- -- 8 04/24/25 1130 -- 79 21 136/79 102 99 % -- -- -- -- -- 7    04/24/25 1125 -- -- -- -- -- -- -- -- -- -- -- 8 04/24/25 1115 -- 78 13 129/73 96 99 % -- -- -- -- -- 8     04/24/25 1100 -- 79 12 141/80 105 99 % -- -- -- -- -- 8    04/24/25 1045 -- 80 14 133/81 103 99 % -- -- -- -- -- 8    04/24/25 1033 98.2 °F (36.8 °C) 89 18 146/86 111 99 % 32 3 L/min Nasal cannula WDL -- 7    04/24/25 0604 97.2 °F (36.2 °C) 64 20 110/70 -- 99 % -- -- None (Room air) -- -- 8          Weight (last 2 days)       Date/Time Weight    04/24/25 1311 54 (119)    04/24/25 0604 54 (119)            Pertinent Labs/Diagnostic Test Results:   Radiology:  XR spine cervical 2 or 3 vw injury   Final Interpretation by Elder Kauffman MD (04/24 1410)      Fluoroscopy provided for procedure guidance.      Please refer to the separate procedure note for additional details.                  Workstation performed: [id number]         XR spine cervical 2 or 3 vw injury    (Results Pending)     Cardiology:  No orders to display     GI:  No orders to display           Results from last 7 days   Lab Units 04/25/25  0452   WBC Thousand/uL 12.32*   HEMOGLOBIN g/dL 10.8*   HEMATOCRIT % 34.4*   PLATELETS Thousands/uL 235         Results from last 7 days   Lab Units 04/25/25  0452   SODIUM mmol/L 141   POTASSIUM mmol/L 3.9   CHLORIDE mmol/L 107   CO2 mmol/L 26   ANION GAP mmol/L 8   BUN mg/dL 5   CREATININE mg/dL 0.58*   EGFR ml/min/1.73sq m 114   CALCIUM mg/dL 8.7             Results from last 7 days   Lab Units 04/25/25  0452   GLUCOSE RANDOM mg/dL 149*           Results from last 7 days   Lab Units 04/24/25  0854   GRAM STAIN RESULT  1+ Polys  No organisms seen       Network Utilization Review Department  ATTENTION: Please call with any questions or concerns to [phone] and carefully listen to the prompts so that you are directed to the right person. All voicemails are confidential.   For Discharge needs, contact Care Management DC Support Team at [phone] opt. 2  Send all requests for admission clinical reviews, approved or denied determinations and any other requests to dedicated fax number below belonging to the  [place] where the patient is receiving treatment. List of dedicated fax numbers for the Facilities:  FACILITY NAME UR FAX NUMBER   ADMISSION DENIALS (Administrative/Medical Necessity) [phone]   DISCHARGE SUPPORT TEAM (NETWORK) [phone]   PARENT CHILD HEALTH (Maternity/NICU/Pediatrics) [phone]   [facility] [phone]   [facility] [phone]   [facility] [phone]   [facility] [phone]   [facility] [phone]   [facility] [phone]   [facility] [phone]   [facility] [phone]   [facility] [phone]   [facility] [phone]   [facility] [phone]   [facility] [phone]

## 2025-04-25 NOTE — PROGRESS NOTES
Progress Note - Orthopedics   Name: Katina Mendez 40 y.o. female I MRN: [id number]  Unit/Bed#: -01 I Date of Admission: 4/24/2025   Date of Service: 4/25/2025 I Hospital Day: 1      Assessment & Plan  Cervical radiculopathy  40 y.o.female with cervical radiculopathy in the setting of prior C5-6 anterior disc replacement, now status post hardware removal and revision to C5-6 ACDF on 4/24. Doing well postoperatively overall.    WBAT B/L UE  5 lb weight lifting limit  Monitor BRIANA drain.  Soft collar on at al times.  PT/OT  Pain control  DVT ppx: SCD's/early ambulation  Will monitor for ABLA and administer IVF/prbc as indicated for Greater than 2 gram drop or Hgb < 7  Medical management per medicine team  Dispo planning     Generalized anxiety disorder    Depression    Oropharyngeal dysphagia      Please contact the SecureChat role for the Orthopedics service with any questions/concerns.    History of Present Illness   40 y.o. female No acute events, no acute distress. Denies fever/chills, SOB. Pain well controlled. Patient reports she's been tolerating PO intake, but admits that she's only been eating ice cream so far.     Objective      Temp:  [97.2 °F (36.2 °C)-98.2 °F (36.8 °C)] 97.6 °F (36.4 °C)  HR:  [63-92] 77  BP: ()/(55-86) 116/55  Resp:  [12-28] 16  SpO2:  [89 %-99 %] 93 %  O2 Device: None (Room air)  Nasal Cannula O2 Flow Rate (L/min):  [3 L/min] 3 L/min  O2 Device: None (Room air)          I/O last 24 hours:  In: 3036.3 [P.O.:1080; I.V.:1906.3; IV Piggyback:50]  Out: 25 [Drains:25]  Lines/Drains/Airways       Active Status       Name Placement date Placement time Site Days    Closed/Suction Drain Anterior;Right Neck Bulb 10 Fr. 04/24/25  1022  Neck  less than 1                  Physical Exam   Musculoskeletal: Bilateral Upper Extremity  Dressing C/D/I   BRIANA drain in place with 25 cc of serosang output and pulling suction appropriately.  TTP charles-incisionally  Sensation intact to light touch  "C5-T1  Motor intact C5-T1  Digits warm well perfused with brisk cap refill        Lab Results: I have reviewed the following results:  No results for input(s): \"WBC\", \"HGB\", \"HCT\", \"PLT\", \"BANDSPCT\", \"BUN\", \"CREATININE\", \"PTT\", \"INR\", \"ESR\", \"CRP\" in the last 72 hours.  Blood Culture:  No results found for: \"BLOODCX\"  Wound Culture: No results found for: \"WOUNDCULT\"      "

## 2025-04-25 NOTE — UTILIZATION REVIEW
NOTIFICATION OF INPATIENT ADMISSION   AUTHORIZATION REQUEST   SERVICING FACILITY:   [facility]  Address: [address] [place] [address]  Tax ID: [id number]  NPI: [id number] ATTENDING PROVIDER:  Attending Name and NPI#: Edu Del Rio Md [[id number]]  Address: [address] [place] [address]  Phone: [phone]   ADMISSION INFORMATION:  Place of Service: Inpatient [facility]  Place of Service Code: 21  Inpatient Admission Date/Time: 4/24/25 10:47 AM  Discharge Date/Time: No discharge date for patient encounter.  Admitting Diagnosis Code/Description:  Radiculopathy, cervical region [M54.12]     UTILIZATION REVIEW CONTACT:  Nilson Lyles Utilization   Network Utilization Review Department  Phone: [phone]  Fax: [phone]  Email: [email or web address]  Contact for approvals/pending authorizations, clinical reviews, and discharge.     PHYSICIAN ADVISORY SERVICES:  Medical Necessity Denial & [employer] Review  Phone: [phone]  Fax: [phone]  Email: [email or web address]     DISCHARGE SUPPORT TEAM:  For Patients Discharge Needs & Updates  Phone: [phone] opt. 2 Fax: [phone]  Email: [email or web address]

## 2025-04-25 NOTE — PLAN OF CARE
Problem: PAIN - ADULT  Goal: Verbalizes/displays adequate comfort level or baseline comfort level  Description: Interventions:- Encourage patient to monitor pain and request assistance- Assess pain using appropriate pain scale- Administer analgesics based on type and severity of pain and evaluate response- Implement non-pharmacological measures as appropriate and evaluate response- Consider cultural and social influences on pain and pain management- Notify physician/advanced practitioner if interventions unsuccessful or patient reports new pain  Outcome: Progressing     Problem: INFECTION - ADULT  Goal: Absence or prevention of progression during hospitalization  Description: INTERVENTIONS:- Assess and monitor for signs and symptoms of infection- Monitor lab/diagnostic results- Monitor all insertion sites, i.e. indwelling lines, tubes, and drains- Monitor endotracheal if appropriate and nasal secretions for changes in amount and color- [place] appropriate cooling/warming therapies per order- Administer medications as ordered- Instruct and encourage patient and family to use good hand hygiene technique- Identify and instruct in appropriate isolation precautions for identified infection/condition  Outcome: Progressing     Problem: SAFETY ADULT  Goal: Patient will remain free of falls  Description: INTERVENTIONS:- Educate patient/family on patient safety including physical limitations- Instruct patient to call for assistance with activity - Consult OT/PT to assist with strengthening/mobility - Keep Call bell within reach- Keep bed low and locked with side rails adjusted as appropriate- Keep care items and personal belongings within reach- Initiate and maintain comfort rounds- Make Fall Risk Sign visible to staff- Offer Toileting every 2 Hours, in advance of need- Initiate/Maintain bed/ chair alarm- Obtain necessary fall risk management equipment: - Apply yellow socks and bracelet for high fall risk patients-  Consider moving patient to room near nurses station  Outcome: Progressing  Goal: Maintain or return to baseline ADL function  Description: INTERVENTIONS:-  Assess patient's ability to carry out ADLs; assess patient's baseline for ADL function and identify physical deficits which impact ability to perform ADLs (bathing, care of mouth/teeth, toileting, grooming, dressing, etc.)- Assess/evaluate cause of self-care deficits - Assess range of motion- Assess patient's mobility; develop plan if impaired- Assess patient's need for assistive devices and provide as appropriate- Encourage maximum independence but intervene and supervise when necessary- Involve family in performance of ADLs- Assess for home care needs following discharge - Consider OT consult to assist with ADL evaluation and planning for discharge- Provide patient education as appropriate  Outcome: Progressing  Goal: Maintains/Returns to pre admission functional level  Description: INTERVENTIONS:- Perform AM-PAC 6 Click Basic Mobility/ Daily Activity assessment daily.- Set and communicate daily mobility goal to care team and patient/family/caregiver. - Collaborate with rehabilitation services on mobility goals if consulted- Perform Range of Motion 3 times a day.- Reposition patient every 2 hours.- Dangle patient 3 times a day- Stand patient 3 times a day- Ambulate patient 3 times a day- Out of bed to chair 3 times a day - Out of bed for meals 3 times a day- Out of bed for toileting- Record patient progress and toleration of activity level   Outcome: Progressing     Problem: DISCHARGE PLANNING  Goal: Discharge to home or other facility with appropriate resources  Description: INTERVENTIONS:- Identify barriers to discharge w/patient and caregiver- Arrange for needed discharge resources and transportation as appropriate- Identify discharge learning needs (meds, wound care, etc.)- Arrange for interpretive services to assist at discharge as needed- Refer to Case  Management Department for coordinating discharge planning if the patient needs post-hospital services based on physician/advanced practitioner order or complex needs related to functional status, cognitive ability, or social support system  Outcome: Progressing     Problem: Knowledge Deficit  Goal: Patient/family/caregiver demonstrates understanding of disease process, treatment plan, medications, and discharge instructions  Description: Complete learning assessment and assess knowledge base.Interventions:- Provide teaching at level of understanding- Provide teaching via preferred learning methods  Outcome: Progressing

## 2025-04-25 NOTE — ASSESSMENT & PLAN NOTE
H/o cervical radiculopathy w/ prior C5-6 anterior disc replacement now s/p hardware removal and revision to C5-6 ACDF on 04/24   Pain control  PT/OT  Management per primary team

## 2025-04-25 NOTE — OCCUPATIONAL THERAPY NOTE
Occupational Therapy Evaluation     Patient Name: Katina Mendez  Today's Date: 4/25/2025  Problem List  Principal Problem:    Cervical radiculopathy  Active Problems:    Generalized anxiety disorder    Depression    Oropharyngeal dysphagia    Past Medical History  Past Medical History:   Diagnosis Date    Allergic     Anxiety     Bunion     left foot   OR   correction today 4/14/2023    Cervical disc disorder 06/2023    Had MRI done d/t pain    Cervical radiculopathy     COVID     x 2-- Jan 2022 and Jan 2023    Depression     Dizziness     Female infertility     GERD (gastroesophageal reflux disease)     Headache(784.0)     Ingrown toenail     Low back pain     Migraines     Nasal congestion     Seasonal allergies     Sinusitis     Constant sinusitis    Sleep difficulties     Tobacco abuse     Wears contact lenses      Past Surgical History  Past Surgical History:   Procedure Laterality Date    BREAST BIOPSY Right 11/2024    BREAST CYST EXCISION Right 12/2024    BUNIONECTOMY Right     DISCECTOMY SPINE CERVICAL ANTERIOR W/ ARTHROPLASTY Bilateral 02/29/2024    Procedure: Anterior Cervical discectomy and disc replacement/arthroplasty C5-6;  Surgeon: Edu Del Rio MD;  [facility]: [facility];  Service: Orthopedics    FL INJECTION RIGHT SHOULDER (ARTHROGRAM)  11/13/2024    HYSTERECTOMY      HYSTERECTOMY W/ SALPINGO-OOPHERECTOMY      LAPAROSCOPIC TOTAL HYSTERECTOMY  2020    WY EXC BREAST LES PREOP PLMT RAD MARKER OPEN 1 LES Right 12/17/2024    Procedure: Right kary  localized lumpectomy;  Surgeon: Stacey Cueva MD;  Location: [facility];  Service: Surgical Oncology    WY HALLUX RIGIDUS W/CHEILECTOMY 1ST MP JT W/IMPLT Left 04/14/2023    Procedure: BUNIONECTOMY with implant;  Surgeon: Will Cuevas DPM;  [facility];  Service: Podiatry    WY LAPAROSCOPY W/RMVL ADNEXAL STRUCTURES N/A 12/09/2024    Procedure: DIAGNIOSTIC LAPAROSCOPY, BILATERAL OOPHERECTOMY, EUA;  Surgeon: Елена Silva MD;  Location:  "[facility];  Service: Gynecology    MS MASTECTOMY PARTIAL Right 12/17/2024    Procedure: Right kary  localized lumpectomy;  Surgeon: Stacey Cueva MD;  [facility];  Service: Surgical Oncology    TONSILLECTOMY      US BREAST CLIP NEEDLE LOC RIGHT Right 12/13/2024    US GUIDED BREAST BIOPSY RIGHT COMPLETE Right 11/12/2024 04/25/25 0840   OT Last Visit   OT Visit Date 04/25/25   Note Type   Note type Evaluation   Pain Assessment   Pain Assessment Tool 0-10   Patient's Stated Pain Goal No pain   Hospital Pain Intervention(s) Repositioned;Ambulation/increased activity;Emotional support   Restrictions/Precautions   Weight Bearing Precautions Per Order No  (5 LB LIFTING RESTRICTION)   Braces or Orthoses (S)    (SOFT COLLAR AT ALL TIMES PER ORTHO)   Other Precautions Chair Alarm;Bed Alarm;Multiple lines;Fall Risk;Pain;Spinal precautions   Home Living   Type of Home House   Home Layout Two level;1/2 bath on main level;Stairs to enter with rails   Bathroom Shower/Tub Tub/shower unit   Bathroom Toilet Standard   Bathroom Equipment Shower chair   Home Equipment Walker;Cane;Wheelchair-manual   Additional Comments NO USE OF AD AT BASELINE. + USE OF SC PRN   Prior Function   Level of [place] Independent with ADLs;Independent with functional mobility;Independent with IADLS   Lives With Spouse;Family;Son   Receives Help From Family   IADLs Independent with driving;Family/Friend/Other provides meals   Falls in the last 6 months 0  (FEW \"NEAR FALLS\")   Vocational On disability   Lifestyle   [employer] PT REPORTS BEING I WITH ADLS/LT IADLS/DRIVING PTA. SPOUSE ASSISTS WITH WASHING HAIR AND BRA MANAGEMENT AS NEEDED   Reciprocal Relationships LIVES WITH SPOUSE, MIL AND 3 YO SON. PT REPORTS SPOUSE WAS RECENTLY DX WITH DEMENTIA. MIL IS VERY SUPPORTIVE. ADDIDTIONAL LOCAL SUPPORTIVE SISTER.   Service to Others ON DISABILITY; CNA   Intrinsic Gratification ENJOYS SPENDING TIME WITH FAMILY.   ADL   Eating Assistance 6  " Modified independent   Grooming Assistance 5  Supervision/Setup   UB Bathing Assistance 5  Supervision/Setup   LB Bathing Assistance 5  Supervision/Setup   UB Dressing Assistance 5  Supervision/Setup   LB Dressing Assistance 5  Supervision/Setup   Toileting Assistance  5  Supervision/Setup   Functional Assistance 5  Supervision/Setup   Bed Mobility   Supine to Sit 5  Supervision   Sit to Supine Unable to assess   Additional Comments PT LEFT OOB WITH ALL NEEDS IN REACH + CHAIR ALARM ACTIVATED.   Transfers   Sit to Stand 5  Supervision   Stand to Sit 5  Supervision   Functional Mobility   Functional Mobility 5  Supervision   Additional items Rolling walker   Balance   Static Sitting Fair +   Static Standing Fair   Ambulatory Fair -   Activity Tolerance   Activity Tolerance Patient tolerated treatment well   Medical Staff Made Aware PT SEEN FOR CO-EVAL WITH SKILLED PHYSICAL THERAPIST 2'  NEW PRECAUTIONS/LIMITATIONS, AND LIMITED ACTIVITY TOLERANCE WHICH IMPACT PERFORMANCE AND ARE A REGRESSION FROM PT'S BASELINE.   Nurse Made Aware APPROPRIATE TO SEE PER RN.   RUE Assessment   RUE Assessment WFL   LUE Assessment   LUE Assessment WFL   Hand Function   Gross Motor Coordination Functional   Fine Motor Coordination Functional   Sensation   Light Touch Partial deficits in the RUE  (PT REPORT IS BASELINE- NO ACUTE CHANGE FOLLOWING SX)   Psychosocial   Psychosocial (WDL) WDL   Cognition   Overall Cognitive Status WFL   Arousal/Participation Alert;Cooperative   Attention Within functional limits   Orientation Level Oriented X4   Memory Within functional limits   Following Commands Follows all commands and directions without difficulty   Comments PT IS PLEASANT AND COOPERATIVE. G RECALL/CARRYOVER OF LEARNED PRECAUTIONS   Assessment   Assessment 39 YO Female SEEN FOR INITIAL OCCUPATIONAL THERAPY EVALUATION S/P REMOVAL AND REVISION TO C5-6 ACDF ON 4/24. PT CURRENTLY HAS THE FOLLOWING RESTRICTIONS;SPINAL PRECAUTIONS AND SOFT COLLAR.  PROBLEMS LIST/PMH INCLUDES Allergic, Anxiety, Bunion, Cervical disc disorder, Cervical radiculopathy, COVID, Depression, Dizziness, Female infertility, GERD (gastroesophageal reflux disease), Headache(784.0), Ingrown toenail, Low back pain, Migraines, Nasal congestion, Seasonal allergies, Sinusitis, Sleep difficulties, Tobacco abuse, and Wears contact lenses. PT IS FROM HOME WITH FAMILY WHERE SHE REPORTS BEING INDEPENDENT WITH ADLS/IADLS/DRIVING PTA. PT CURRENTLY REQUIRES OVERALL S WITH ADLS, TRANSFERS AND FUNCTIONAL MOBILITY WITH USE OF RW. PT IS LIMITED 2' PAIN, FATIGUE, IMPAIRED BALANCE, SPINAL PRECAUTIONS, RUE SENSORY DEFICITS/WEAKNESS and OVERALL LIMITED ACTIVITY TOLERANCE. PT EDUCATED ON SPINAL PRECAUTIONS, DEEP BREATHING TECHNIQUES T/O ACTIVITY, SLOWING OF PACE, ENERGY CONSERVATION TECHNIQUES FOR CARRY OVER UPON D/C, INCREASED FAMILY SUPPORT, and CONTINUE PARTICIPATION IN SELF-CARE/MOBILITY WITH STAFF WHILE IN THE HOSPITAL . The patient's raw score on the AM-PAC Daily Activity Inpatient Short Form is 19. A raw score of greater than or equal to 19 suggests the patient may benefit from discharge to home. Please refer to the recommendation of the Occupational Therapist for safe discharge planning. FROM AN OCCUPATIONAL THERAPY PERSPECTIVE, PT CAN RETURN HOME WITH INCREASED FAMILY SUPPORT WHEN MEDICALLY CLEARED. ALL QUESTIONS/CONCERNS ADDRESSED. NO ADDITIONAL ACUTE CARE OT NEEDS. D/C OT.   Goals   Patient Goals TO RETURN HOME   Discharge Recommendation   Rehab Resource Intensity Level, OT No post-acute rehabilitation needs   Equipment Recommended   (USE OF SC- PT AGREEABLE)   AM-PAC Daily Activity Inpatient   Lower Body Dressing 3   Bathing 3   Toileting 3   Upper Body Dressing 3   Grooming 3   Eating 4   Daily Activity Raw Score 19   Daily Activity Standardized Score (Calc for Raw Score >=11) 40.22   AM-PAC Applied Cognition Inpatient   Following a Speech/Presentation 4   Understanding Ordinary Conversation 4    Taking Medications 4   Remembering Where Things Are Placed or Put Away 4   Remembering List of 4-5 Errands 4   Taking Care of Complicated Tasks 4   Applied Cognition Raw Score 24   Applied Cognition Standardized Score 62.21       Documentation completed by JUWAN Rivas, OTR/L  [facility] Certified ID# [id number]

## 2025-04-25 NOTE — ASSESSMENT & PLAN NOTE
H/o C5-6 anterior disc replacement  S/p hardware removal and revision to C5-6 ACDF  Pain control  PT/OT

## 2025-04-25 NOTE — ASSESSMENT & PLAN NOTE
Recent Labs     04/25/25  0452   WBC 12.32*      Likely reactive w/ recent surgery  No acute infectious symptoms

## 2025-04-25 NOTE — CONSULTS
Consultation - Hospitalist   Name: Katina Mendez 40 y.o. female I MRN: [id number]  Unit/Bed#: -01 I Date of Admission: 4/24/2025   Date of Service: 4/25/2025 I Hospital Day: 1   Inpatient consult to Internal Medicine  Consult performed by: Harriett Pinto MD  Consult ordered by: Diana Vicente PA-C        Physician Requesting Evaluation: Edu Del Rio MD   Reason for Evaluation / Principal Problem: Medical management    Assessment & Plan  Cervical radiculopathy  H/o C5-6 anterior disc replacement  S/p hardware removal and revision to C5-6 ACDF  Pain control  PT/OT  Generalized anxiety disorder  Ct home Clonazepam 1 mg BID  PDMP verified  Depression  Ct home meds Duloxetine 90 mg daily, Buspar 15 mg BID  Oropharyngeal dysphagia  Coughing and choking with thin liquids  Modify diet  Swallow eval        History of Present Illness   Chief Complaint:     Katina Mendez is a 40 y.o. female with a PMH of cervical radiculopathy, depression, anxiety, PTSD, surgical menopause on estrogen who underwent removal of C5-6 disc arthroplasty device and revision to C5-6 ACDF on 4/24/2025.   She complains of neck pain.  She has also noted coughing and choking with thin liquids.  No fever or chills.  No shortness of breath.      Review of Systems   Respiratory:  Positive for cough and choking.    Musculoskeletal:  Positive for neck pain.   All other systems reviewed and are negative.      Historical Information   Past Medical History:   Diagnosis Date    Allergic     Anxiety     Bunion     left foot   OR   correction today 4/14/2023    Cervical disc disorder 06/2023    Had MRI done d/t pain    Cervical radiculopathy     COVID     x 2-- Jan 2022 and Jan 2023    Depression     Dizziness     Female infertility     GERD (gastroesophageal reflux disease)     Headache(784.0)     Ingrown toenail     Low back pain     Migraines     Nasal congestion     Seasonal allergies     Sinusitis     Constant sinusitis    Sleep  difficulties     Tobacco abuse     Wears contact lenses      Past Surgical History:   Procedure Laterality Date    BREAST BIOPSY Right 11/2024    BREAST CYST EXCISION Right 12/2024    BUNIONECTOMY Right     DISCECTOMY SPINE CERVICAL ANTERIOR W/ ARTHROPLASTY Bilateral 02/29/2024    Procedure: Anterior Cervical discectomy and disc replacement/arthroplasty C5-6;  Surgeon: Edu Del Rio MD;  Location: [facility];  Service: Orthopedics    FL INJECTION RIGHT SHOULDER (ARTHROGRAM)  11/13/2024    HYSTERECTOMY      HYSTERECTOMY W/ SALPINGO-OOPHERECTOMY      LAPAROSCOPIC TOTAL HYSTERECTOMY  2020    WA EXC BREAST LES PREOP PLMT RAD MARKER OPEN 1 LES Right 12/17/2024    Procedure: Right kary  localized lumpectomy;  Surgeon: Stacey Cueva MD;  Location[facility];  Service: Surgical Oncology    WA HALLUX RIGIDUS W/CHEILECTOMY 1ST MP JT W/IMPLT Left 04/14/2023    Procedure: BUNIONECTOMY with implant;  Surgeon: Will Cuevas DPM;  Location[facility];  Service: Podiatry    WA LAPAROSCOPY W/RMVL ADNEXAL STRUCTURES N/A 12/09/2024    Procedure: DIAGNIOSTIC LAPAROSCOPY, BILATERAL OOPHERECTOMY, EUA;  Surgeon: Елена Silva MD;  [facility];  Service: Gynecology    WA MASTECTOMY PARTIAL Right 12/17/2024    Procedure: Right kary  localized lumpectomy;  Surgeon: Stacey Cueva MD;  Location[facility];  Service: Surgical Oncology    TONSILLECTOMY      US BREAST CLIP NEEDLE LOC RIGHT Right 12/13/2024    US GUIDED BREAST BIOPSY RIGHT COMPLETE Right 11/12/2024     Social History     Tobacco Use    Smoking status: Every Day     Current packs/day: 0.50     Average packs/day: 0.5 packs/day for 23.0 years (11.5 ttl pk-yrs)     Types: Cigarettes     Start date: 4/11/2002    Smokeless tobacco: Never    Tobacco comments:     Smoking 1 cig today 1217   Vaping Use    Vaping status: Never Used   Substance and Sexual Activity    Alcohol use: Not Currently    Drug use: No    Sexual activity: Yes     Partners: Male     Birth  control/protection: None     Comment: Hysterectomy on July 1st 2020     E-Cigarette/Vaping    E-Cigarette Use Never User      E-Cigarette/Vaping Substances    Nicotine No     THC No     CBD No     Flavoring No     Other No     Unknown No      Family History   Problem Relation Age of Onset    Mental illness Mother     Depression Mother     Early death Mother     Lung cancer Mother 58        with brain mets.    Cancer Mother     Anxiety disorder Mother     OCD Mother     Psychiatric Illness Mother     Diabetes Father     Hypertension Father     Early death Father     Anxiety disorder Sister     Rashes / Skin problems Sister     Migraines Sister     Arthritis Sister     ADD / ADHD Brother     Anxiety disorder Brother     Melanoma Brother     Diabetes Maternal Grandmother     Arthritis Maternal Grandmother     Heart disease Maternal Grandmother     Breast cancer Maternal Grandmother     Colon cancer Maternal Grandmother     Cancer Maternal Grandmother     Osteoporosis Maternal Grandmother     Dementia Maternal Grandmother     Aneurysm Maternal Grandfather     Breast cancer Paternal Grandmother         age unknown.    Anuerysm Paternal Grandfather     Multiple sclerosis Paternal Aunt     Colon cancer Paternal Uncle     Colon cancer Paternal Uncle     ADD / ADHD Brother     Anxiety disorder Brother     Cancer Brother         Melanoma    Cancer Paternal Uncle         Liver / colon cancer    Multiple sclerosis Maternal Aunt      Social History:  Marital Status: /Civil Union   Meds/Allergies   I have reviewed home medications with patient personally.  Prior to Admission medications    Medication Sig Start Date End Date Taking? Authorizing Provider   acetaminophen (TYLENOL) 500 mg tablet Take 500 mg by mouth every 6 (six) hours as needed for mild pain   Yes Historical Provider, MD   busPIRone (BUSPAR) 15 mg tablet Take 1 tablet (15 mg total) by mouth 2 (two) times a day 4/7/25  Yes Marilyn Pepe DO   clonazePAM  (KlonoPIN) 1 mg tablet Take 1 tablet (1 mg total) by mouth 2 (two) times a day 3/31/25  Yes Marilyn Pepe DO   DULoxetine (CYMBALTA) 30 mg delayed release capsule TAKE ONE CAPSULE BY MOUTH EVERY DAY 4/20/25  Yes Marilyn Pepe DO   DULoxetine (CYMBALTA) 60 mg delayed release capsule Take 1 capsule (60 mg total) by mouth daily 4/20/25  Yes Marilyn Pepe DO   estradiol (Estrace) 2 MG tablet Take 1 tablet (2 mg total) by mouth daily 2/18/25  Yes Елена Silva MD   gabapentin (NEURONTIN) 600 MG tablet Take 1 tablet (600 mg total) by mouth 3 (three) times a day 2/26/25  Yes Iam Samson DO   Multiple Vitamins-Minerals (MULTIVITAMIN ADULT PO) Take 1 tablet by mouth daily 12/29/17  Yes Collin Vegas MD   acyclovir (ZOVIRAX) 5 % ointment Apply topically every 4 (four) hours  Patient taking differently: Apply topically every 4 (four) hours As needed 12/30/24   Dmitry Barrera DO   ALPRAZolam (NIRAVAM) 1 MG dissolvable tablet Take 1 tablet (1 mg total) by mouth 30 min pre-procedure 11/22/24   Marilyn Pepe DO   cyclobenzaprine (FLEXERIL) 10 mg tablet Take 1 tablet (10 mg total) by mouth 3 (three) times a day as needed for muscle spasms May make you drowsy or dizzy. 1/1/25   Diana Vicente PA-C   dicyclomine (BENTYL) 20 mg tablet Take 1 tablet (20 mg total) by mouth 3 (three) times a day as needed (for abdominal cramping) 1/5/24   Marilyn Pepe DO   lidocaine-prilocaine (EMLA) cream Apply topically as needed for mild pain  Patient not taking: Reported on 4/8/2025 8/1/24   Елена Silva MD   loratadine (CLARITIN) 10 mg tablet Take 1 tablet (10 mg total) by mouth daily 2/24/21   BORIS Onofre   ondansetron (ZOFRAN) 4 mg tablet Take 1 tablet (4 mg total) by mouth every 8 (eight) hours as needed for nausea or vomiting 3/6/24   Marilyn Pepe DO   traZODone (DESYREL) 50 mg tablet Take 1 tablet (50 mg total) by mouth daily at bedtime as needed for sleep 2/14/25   Marilyn Pepe DO     Allergies    Allergen Reactions    Bactrim [Sulfamethoxazole-Trimethoprim] Hives    Latex Other (See Comments)     Latex allergy-rashes    Levofloxacin Hives    2-Octylcyanoacrylate [Cyanoacrylate] Rash     Surgical glue    Chlorhexidine Rash    Other Rash     Surgical Glue; resulted in rash and blistering of the skin    Quinolones Rash       Objective :  Temp:  [97.2 °F (36.2 °C)-98.2 °F (36.8 °C)] 97.6 °F (36.4 °C)  HR:  [63-92] 63  BP: ()/(58-86) 99/58  Resp:  [12-28] 16  SpO2:  [89 %-99 %] 91 %  O2 Device: None (Room air)  Nasal Cannula O2 Flow Rate (L/min):  [3 L/min] 3 L/min    Physical Exam  Vitals reviewed.   HENT:      Nose: Nose normal.      Mouth/Throat:      Mouth: Mucous membranes are moist.   Eyes:      Extraocular Movements: Extraocular movements intact.   Cardiovascular:      Rate and Rhythm: Normal rate and regular rhythm.   Pulmonary:      Effort: Pulmonary effort is normal. No respiratory distress.      Breath sounds: Normal breath sounds. No wheezing.   Abdominal:      General: Bowel sounds are normal. There is no distension.      Tenderness: There is no abdominal tenderness.   Musculoskeletal:         General: No swelling.      Cervical back: Neck supple.   Skin:     General: Skin is warm and dry.   Neurological:      Mental Status: She is alert and oriented to person, place, and time.   Psychiatric:         Mood and Affect: Mood normal.         Behavior: Behavior normal.          Lines/Drains:  Lines/Drains/Airways       Active Status       Name Placement date Placement time Site Days    Closed/Suction Drain Anterior;Right Neck Bulb 10 Fr. 04/24/25  1022  Neck  less than 1                          Lab Results: I have reviewed the following results:                  Lab Results   Component Value Date    HGBA1C 5.5 11/11/2024    HGBA1C 5.4 09/30/2023                 Administrative Statements       ** Please Note: This note has been constructed using a voice recognition system. **

## 2025-04-25 NOTE — ASSESSMENT & PLAN NOTE
40 y.o.female with cervical radiculopathy in the setting of prior C5-6 anterior disc replacement, now status post hardware removal and revision to C5-6 ACDF on 4/24. Doing well postoperatively overall.    WBAT B/L UE  5 lb weight lifting limit  Monitor BRIANA drain.  Soft collar on at al times.  PT/OT  Pain control  DVT ppx: SCD's/early ambulation  Will monitor for ABLA and administer IVF/prbc as indicated for Greater than 2 gram drop or Hgb < 7  Medical management per medicine team  Dispo planning

## 2025-04-25 NOTE — PROGRESS NOTES
Progress Note - Hospitalist   Name: Katina Mendez 40 y.o. female I MRN: [id number]  Unit/Bed#: -01 I Date of Admission: 4/24/2025   Date of Service: 4/25/2025 I Hospital Day: 1    Assessment & Plan  Cervical radiculopathy  H/o cervical radiculopathy w/ prior C5-6 anterior disc replacement now s/p hardware removal and revision to C5-6 ACDF on 04/24   Pain control  PT/OT  Management per primary team  Oropharyngeal dysphagia  Noting coughing and choking w/ thin liquids  ST consulted   C/w surgical soft and thin liquids  Anemia  Recent Labs     04/25/25  0452   HGB 10.8*     Baseline Hgb mid 10's to mid 12's   Monitor post-op   No acute bleeding   Suspect dilutional component w/ IVF's   Leukocytosis  Recent Labs     04/25/25  0452   WBC 12.32*      Likely reactive w/ recent surgery  No acute infectious symptoms   Generalized anxiety disorder  C/w PTA Clonazepam 1 mg BID  PDMP verified  Depression  C/w PTA Duloxetine 90 mg daily, Buspar 15 mg BID    VTE Pharmacologic Prophylaxis: VTE Score: 2 Low Risk (Score 0-2) - Encourage Ambulation.  VTE prophylaxis per primary team    Mobility:   Basic Mobility Inpatient Raw Score: 21  JH-HLM Goal: 6: Walk 10 steps or more  JH-HLM Achieved: 8: Walk 250 feet ot more  JH-HLM Goal NOT achieved. Continue with multidisciplinary rounding and encourage appropriate mobility to improve upon JH-HLM goals.    Patient Centered Rounds: I performed bedside rounds with nursing staff today.   Discussions with Specialists or Other Care Team Provider: Plan of care reviewed with case management, orthopedics, speech therapy, nursing    Education and Discussions with Family / Patient: Patient declined call to .     Current Length of Stay: 1 day(s)  Current Patient Status: Inpatient   Certification Statement: Per primary team  Discharge Plan: [facility] is following this patient on consult. They are medically stable for discharge when deemed appropriate by primary service.    Code Status:  No Order    Subjective Doing okay; pain is tolerable. Chronic paresthesias in RUE/RLE. Pt has a BM since surgery. Without dysuria.     Objective :  Temp:  [97.5 °F (36.4 °C)-98.2 °F (36.8 °C)] 97.8 °F (36.6 °C)  HR:  [63-92] 76  BP: ()/(55-86) 107/63  Resp:  [12-28] 14  SpO2:  [89 %-99 %] 94 %  O2 Device: None (Room air)  Nasal Cannula O2 Flow Rate (L/min):  [3 L/min] 3 L/min    Body mass index is 20.43 kg/m².     Input and Output Summary (last 24 hours):     Intake/Output Summary (Last 24 hours) at 4/25/2025 0958  Last data filed at 4/25/2025 0944  Gross per 24 hour   Intake 3522.08 ml   Output 25 ml   Net 3497.08 ml       Physical Exam  Constitutional:       Appearance: She is normal weight.   HENT:      Head: Normocephalic.      Comments: C-collar in place     Right Ear: External ear normal.      Left Ear: External ear normal.      Nose: Nose normal.      Mouth/Throat:      Mouth: Mucous membranes are moist.      Pharynx: Oropharynx is clear.   Eyes:      Extraocular Movements: Extraocular movements intact.      Conjunctiva/sclera: Conjunctivae normal.   Cardiovascular:      Rate and Rhythm: Normal rate and regular rhythm.      Pulses: Normal pulses.      Heart sounds: Normal heart sounds.   Pulmonary:      Effort: Pulmonary effort is normal.      Breath sounds: Normal breath sounds.   Abdominal:      General: Abdomen is flat. Bowel sounds are normal. There is no distension.      Palpations: Abdomen is soft.      Tenderness: There is no abdominal tenderness. There is no guarding or rebound.   Musculoskeletal:         General: Normal range of motion.      Cervical back: Normal range of motion.      Right lower leg: No edema.      Left lower leg: No edema.   Skin:     General: Skin is warm and dry.      Coloration: Skin is not jaundiced.      Findings: No bruising or lesion.   Neurological:      General: No focal deficit present.      Mental Status: She is alert and oriented to person, place, and time. Mental  status is at baseline.   Psychiatric:         Mood and Affect: Mood normal.         Behavior: Behavior normal.         Lab Results: I have reviewed the following results:   Results from last 7 days   Lab Units 04/25/25  0452   WBC Thousand/uL 12.32*   HEMOGLOBIN g/dL 10.8*   HEMATOCRIT % 34.4*   PLATELETS Thousands/uL 235     Results from last 7 days   Lab Units 04/25/25  0452   SODIUM mmol/L 141   POTASSIUM mmol/L 3.9   CHLORIDE mmol/L 107   CO2 mmol/L 26   BUN mg/dL 5   CREATININE mg/dL 0.58*   ANION GAP mmol/L 8   CALCIUM mg/dL 8.7   GLUCOSE RANDOM mg/dL 149*                       Recent Cultures (last 7 days):   Results from last 7 days   Lab Units 04/24/25  0854   GRAM STAIN RESULT  1+ Polys  No organisms seen       Imaging Results Review: No pertinent imaging studies reviewed.  Other Study Results Review: No additional pertinent studies reviewed.    Last 24 Hours Medication List:     Current Facility-Administered Medications:     acetaminophen (TYLENOL) tablet 650 mg, Q6H AVILA    aluminum-magnesium hydroxide-simethicone (MAALOX) oral suspension 30 mL, Q6H PRN    busPIRone (BUSPAR) tablet 15 mg, BID    calcium carbonate (TUMS) chewable tablet 1,000 mg, Daily PRN    clonazePAM (KlonoPIN) tablet 1 mg, BID    cyclobenzaprine (FLEXERIL) tablet 10 mg, Q8H AVILA    dicyclomine (BENTYL) tablet 20 mg, TID PRN    docusate sodium (COLACE) capsule 100 mg, BID    DULoxetine (CYMBALTA) delayed release capsule 30 mg, Daily    DULoxetine (CYMBALTA) delayed release capsule 60 mg, Daily    estradiol (ESTRACE) tablet 2 mg, Daily    gabapentin (NEURONTIN) capsule 600 mg, TID    lactated ringers infusion, Continuous, Last Rate: Stopped (04/25/25 0555)    loratadine (CLARITIN) tablet 10 mg, Daily    multivitamin stress formula tablet 1 tablet, Daily    ondansetron (ZOFRAN) injection 4 mg, Q6H PRN    oxyCODONE (ROXICODONE) immediate release tablet 10 mg, Q4H PRN    oxyCODONE (ROXICODONE) IR tablet 5 mg, Q4H PRN    senna (SENOKOT)  tablet 8.6 mg, Daily    Administrative Statements   Today, Patient Was Seen By: Jeanna Coy PA-C  I have spent a total time of 40 minutes in caring for this patient on the day of the visit/encounter including Diagnostic results, Prognosis, Risks and benefits of tx options, Instructions for management, Patient and family education, Impressions, Counseling / Coordination of care, Documenting in the medical record, Reviewing/placing orders in the medical record (including tests, medications, and/or procedures), Obtaining or reviewing history  , and Communicating with other healthcare professionals .    **Please Note: This note may have been constructed using a voice recognition system.**

## 2025-04-25 NOTE — CASE MANAGEMENT
Case Management Assessment & Discharge Planning Note    Patient name Katina Mendez  Location /-01 MRN [id number]  : 1985 Date 2025       Current Admission Date: 2025  Current Admission Diagnosis:Cervical radiculopathy   Patient Active Problem List    Diagnosis Date Noted Date Diagnosed    Oropharyngeal dysphagia 2025     Leukocytosis 2025     Anemia 2025     Moderately severe major depression (HCC) 2025     At increased risk of breast cancer 2025     Benign tumor of breast, right 2024     Extremely dense tissue of both breasts on mammography 10/25/2024     At high risk for breast cancer 10/25/2024     Cervical spinal cord compression (HCC) 2024     S/P cervical discectomy 2024     Neck pain 2023     Mild protein-calorie malnutrition (HCC) 2023     Anxiety      Tobacco abuse      Migraines      Depression      Cervical radiculopathy 2023     Abnormal finding on thyroid function test 2020     Atypical nevus 2020     Herpes simplex infection 2018     Benign paroxysmal positional vertigo due to bilateral vestibular disorder 2018     Insomnia 2017     Lower back pain 2017     Left breast lump 2016     GERD without esophagitis 2015     Generalized anxiety disorder 10/22/2015     Chronic post-traumatic stress disorder (PTSD) 2014       LOS (days): 1  Geometric Mean LOS (GMLOS) (days): 2.5  Days to GMLOS:1.3     OBJECTIVE:    Risk of Unplanned Readmission Score: 9.21         Current admission status: Inpatient       Preferred Pharmacy:   [id number] - [place] - [address] [address]  Phone: [phone] Fax: [phone]    Primary Care Provider: Marilyn Pepe DO    Primary Insurance: [employer]  Secondary Insurance:     ASSESSMENT:  Active Health Care Proxies    There are no active Health Care Proxies on  file.                 Readmission Root Cause  30 Day Readmission: No    Patient Information  Admitted from:: Home  Mental Status: Alert  During Assessment patient was accompanied by: Not accompanied during assessment  Assessment information provided by:: Patient  Primary Caregiver: Self       DISCHARGE DETAILS:    Discharge planning discussed with:: Patient  Freedom of Choice: Yes  Comments - Freedom of Choice: Discussed FOC  CM contacted family/caregiver?: No- see comments (Declined)  Were Treatment Team discharge recommendations reviewed with patient/caregiver?: Yes  Did patient/caregiver verbalize understanding of patient care needs?: Yes  Were patient/caregiver advised of the risks associated with not following Treatment Team discharge recommendations?: Yes       DME Referral Provided  Referral made for DME?: No (Pt declined - will obtain on her own.)       Per am rounds: BRIANA drain pulled by Dr. Del Rio. PT/OT recommend RW for pt. Cleared for home w/OP f/u for therapy when cleared by attending provider.      CM met w/pt at bedside to f/u on order for RW.  Confirmed demogrpahics & PCP. Pt declined need to order a RW for her - stated she does not need one but if she wants one, she will order from [employer].      Pt informed CM: she has not worked in 2 years d/t her medical condition. Her  who is the main source of income was recently diagnosed w/Alzheimer's dementia at 46 y.o and is currently out on STD through his employer.  Pt has a 3 yo son at home and is being tested for autism & ADHD. They are on his medical insurance which ends 5/23/2025. She has applied for disability for herself and was denied.  She has a  & court hearing scheduled for June 25, 2025 to appeal the denial.  I told her to get her  to apply for disability ASAP.  Pt is requesting assistance post discharge re: any OP resources she may be eligible and applying for Medical assistance for food, etc.  She has high anxiety also and  her meds were recently increased per pt.      CM offered to provide resources for Medicaid and to f/u w/her husbands HR dept re: when the insurance will officially be ending.  Pt verbalized understanding & will f/u w/spouse's employer upon DC.    CM offered to send referral to OP CM fof f/u upon dc for OP resources. Pt receptive & agreeable to referral.  Referral sent to OP POOL Care Management requesting referral upon dc - receipt of referral acknowledged & pt will be assigned to OP CM upon dc.

## 2025-04-25 NOTE — PHYSICAL THERAPY NOTE
PHYSICAL THERAPY EVALUATION          Patient Name: Katina Mendez  Today's Date: 4/25/2025 04/25/25 0908   Note Type   Note type Evaluation   Pain Assessment   Pain Assessment Tool 0-10   Pain Score 5   Pain Location/Orientation Location: Neck   Patient's Stated Pain Goal No pain   Hospital Pain Intervention(s) Repositioned;Ambulation/increased activity;Elevated;Emotional support   Restrictions/Precautions   Weight Bearing Precautions Per Order No   Braces or Orthoses (S)    (per ortho note: soft collar to be worn at all times)   Other Precautions Spinal precautions;Pain  (5lb weight lifting restriction)   Home Living   Type of Home House   Home Layout Two level;1/2 bath on main level;Stairs to enter with rails  (3 KYLIE; full flight upstairs)   Home Equipment Walker;Cane;Wheelchair-manual   Additional Comments Pt reports living with spouse and mother-in-law. Pt reports needing assistance PTA with ADLs however, no assistance needed with ambulation. Pt reports mother-in-law can continue assisting. Pt reports spouse recently received a medical diagnosis and may be limited in the future however, can physically assist at this current time.   Prior Function   Level of [place] Independent with functional mobility   Lives With Spouse;Family;Son  (Mother-in-law; 3 y.o. son)   Receives Help From Family   Falls in the last 6 months 0  (Pt reports near falling however, reports 0 ground level falls.)   Comments Pt denies assistive device usage PTA.   General   Family/Caregiver Present No   Cognition   Overall Cognitive Status WFL   Attention Within functional limits   Orientation Level Oriented X4   Memory Within functional limits   Following Commands Follows all commands and directions without difficulty   RLE Assessment   RLE Assessment WFL   Strength RLE   R Hip Flexion 3+/5   R Knee Flexion 3+/5   R Knee Extension 3+/5   R Ankle  "Dorsiflexion 4-/5   R Ankle Plantar Flexion 4-/5   LLE Assessment   LLE Assessment WFL   Strength LLE   L Hip Flexion 4/5   L Knee Flexion 4/5   L Knee Extension 4/5   L Ankle Dorsiflexion 4/5   L Ankle Plantar Flexion 4/5   Light Touch   RLE Light Touch Grossly intact   LLE Light Touch Grossly intact   Bed Mobility   Supine to Sit 5  Supervision   Sit to Supine Unable to assess   Additional Comments Pt left sitting OOB in bedside chair at the end of the session.   Transfers   Sit to Stand 5  Supervision   Stand to Sit 5  Supervision   Additional Comments RW   Ambulation/Elevation   Gait pattern Decreased foot clearance;Excessively slow   Gait Assistance 5  Supervision   Assistive Device Rolling walker   Distance 100'x2   Stair Management Assistance 5  Supervision   Additional items Verbal cues   Stair Management Technique One rail R;Sideways;Step to pattern  (Leading with L LE)   Number of Stairs 3   Balance   Static Sitting Fair +   Dynamic Sitting Fair   Static Standing Fair   Dynamic Standing Fair -   Ambulatory Fair -   Activity Tolerance   Activity Tolerance Patient tolerated treatment well   Medical Staff Made Aware Gabriel OT, and Andrey PT; were present for co-evaluation due to the pt's current medical presentation.   Nurse Made Aware Pt is appropriate to be seen and mobilize per nsg.   Assessment   Prognosis Good   Problem List Decreased strength;Decreased range of motion;Decreased endurance;Orthopedic restrictions;Pain   Assessment Pt is a 40 y.o. male seen for PT evaluation on 4/25/25 after being [facility] to [facility] on 4/24/25 for a planned \"removal of C5-6 disc arthroplasty device, C5-6 ACDF\" procedure performed on 4/24/25. Pt's current/active diagnoses include cervical radiculopathy, generalized anxiety disorder, depression, oropharyngeal dysphagia. Pt has a past medical history of Allergic, Anxiety, Bunion, Cervical disc disorder, Cervical radiculopathy, COVID, Depression, Dizziness, Female " infertility, GERD (gastroesophageal reflux disease), Headache(784.0), Ingrown toenail, Low back pain, Migraines, Nasal congestion, Seasonal allergies, Sinusitis, Sleep difficulties, Tobacco abuse, and Wears contact lenses. PT evaluation was ordered to assess the pt's functional mobility and discharge recommendations. Prior to admission, pt was independent with functional mobility without the use of an assistive device. Pt lives with spouse, mother in law, and son in a 2 story house with 3 KYLIE. Pt is of high complexity due to ongoing medical management, continuous pulse oximetry monitoring, pain, spinal precautions, soft c/s collar, increased reliance on caregivers for support, s/p surgical intervention. At evaluation, pt performed bed mobility with supervision. Pt performed sit to stand/stand to sit transfers with supervision. Pt ambulated with supervision and rolling walker for 100'x2.  Pt negotiated 3 steps with supervision using right handrail while negotiating sideways with step to pattern. Pt presents with the following PT impairments decreased LE strength, decreased mobility, decreased endurance, spinal precautions, pain. The pt was left sitting out of bed in bedside chair with all needs within reach including the call bell and the room phone. Based on the pt's functional performance during the PT evaluation, the discharge recommendation is level 3 resource intensity. Based on the pt's functional performance during the PT evaluation, the pt does not require further skilled IP PT needs, d/c IP PT.   Goals   Patient Goals To get better and go home.   Plan   PT Frequency   (d/c IP PT)   Discharge Recommendation   Rehab Resource Intensity Level, PT III (Minimum Resource Intensity)   Equipment Recommended Walker   Walker Package Recommended Wheeled walker   AM-PAC Basic Mobility Inpatient   Turning in Flat Bed Without Bedrails 3   Lying on Back to Sitting on Edge of Flat Bed Without Bedrails 3   Moving Bed to Chair  3   Standing Up From Chair Using Arms 3   Walk in Room 3   Climb 3-5 Stairs With Railing 3   Basic Mobility Inpatient Raw Score 18   Basic Mobility Standardized Score 41.05   [facility] Highest Level Of Mobility   -HLM Goal 6: Walk 10 steps or more   -HLM Achieved 7: Walk 25 feet or more   Modified [place] Scale   Modified [place] Scale 3   End of Consult   Patient Position at End of Consult Bedside chair;All needs within reach     Alix Neal, SPT

## 2025-04-27 LAB
BACTERIA SPEC ANAEROBE CULT: NO GROWTH
BACTERIA TISS AEROBE CULT: NO GROWTH
GRAM STN SPEC: NORMAL
GRAM STN SPEC: NORMAL

## 2025-04-28 ENCOUNTER — TRANSITIONAL CARE MANAGEMENT (OUTPATIENT)
Dept: FAMILY MEDICINE CLINIC | Facility: CLINIC | Age: 40
End: 2025-04-28

## 2025-04-28 ENCOUNTER — TELEPHONE (OUTPATIENT)
Age: 40
End: 2025-04-28

## 2025-04-28 ENCOUNTER — PATIENT OUTREACH (OUTPATIENT)
Dept: CASE MANAGEMENT | Facility: OTHER | Age: 40
End: 2025-04-28

## 2025-04-28 DIAGNOSIS — Z78.9 NEEDS ASSISTANCE WITH COMMUNITY RESOURCES: Primary | ICD-10-CM

## 2025-04-28 NOTE — TELEPHONE ENCOUNTER
Patient called stating she was returning a call from the office. Upon chart review I did see that a Transitional Care Management call had been placed. Unfortunately at this time I was unable to connect with office clinical at this time. Please return patient call to schedule a Transitional Care Management.

## 2025-04-28 NOTE — PROGRESS NOTES
"GAYLA CM received referral to contact patient to offer support regarding need for community resource assistance.  Chart reviewed and call placed to patient to discuss however message received \"call cannot be completed at this time, please try later.\"  Will continue attempts to contact patient to offer support.   "

## 2025-04-28 NOTE — UTILIZATION REVIEW
NOTIFICATION OF ADMISSION DISCHARGE   This is a Notification of Discharge from [facility]. Please be advised that this patient has been discharge from our facility. Below you will find the admission and discharge date and time including the patient’s disposition.   UTILIZATION REVIEW CONTACT:  Utilization Review Assistants  Network Utilization Review Department  Phone: [phone] x carefully listen to the prompts. All voicemails are confidential.  Email: [email or web address]     ADMISSION INFORMATION  PRESENTATION DATE: 4/24/2025  5:30 AM  OBERVATION ADMISSION DATE: N/A  INPATIENT ADMISSION DATE: 4/24/25 10:47 AM   DISCHARGE DATE: 4/25/2025  4:33 PM   DISPOSITION:Home/Self Care    Network Utilization Review Department  ATTENTION: Please call with any questions or concerns to [phone] and carefully listen to the prompts so that you are directed to the right person. All voicemails are confidential.   For Discharge needs, contact Care Management DC Support Team at [phone] opt. 2  Send all requests for admission clinical reviews, approved or denied determinations and any other requests to dedicated fax number below belonging to the campus where the patient is receiving treatment. List of dedicated fax numbers for the Facilities:  FACILITY NAME UR FAX NUMBER   ADMISSION DENIALS (Administrative/Medical Necessity) [phone]   DISCHARGE SUPPORT TEAM ([facility]) [phone]   PARENT CHILD HEALTH (Maternity/NICU/Pediatrics) [phone]   [facility] [phone]   General acute hospital [phone]   [facility] [phone]   [facility] [phone]   formerly [facility] [phone]   [facility] [phone]   [facility] [phone]   [facility] [phone]   [facility]  [facility] [phone]   [facility] [phone]   [facility] [phone]   [facility] [phone]

## 2025-04-29 ENCOUNTER — TELEPHONE (OUTPATIENT)
Facility: HOSPITAL | Age: 40
End: 2025-04-29

## 2025-04-30 ENCOUNTER — TELEPHONE (OUTPATIENT)
Dept: OBGYN CLINIC | Facility: HOSPITAL | Age: 40
End: 2025-04-30

## 2025-04-30 LAB
MYCOBACTERIUM SPEC CULT: NORMAL
RHODAMINE-AURAMINE STN SPEC: NORMAL

## 2025-04-30 NOTE — TELEPHONE ENCOUNTER
Called and spoke with pt. She said when she was in the hospital she asked Dr Del Rio when she could shower and she was told in a week however the AVS says 2 weeks and after her first appt. She is asking if she is able to shower and her surgical dressing, which is still intact, gets wet should she change it? Please advise, thanks!

## 2025-04-30 NOTE — TELEPHONE ENCOUNTER
Called and spoke with pt and relayed GELA Vicente's message, she stated understanding, no further questions.

## 2025-04-30 NOTE — TELEPHONE ENCOUNTER
Caller: Patient     Doctor: Dr. Del Rio    Reason for call: patient questioning instructions when she can shower and instructions her sx date was on 4/24    Please advise     Call back#: [phone]

## 2025-05-01 ENCOUNTER — PATIENT OUTREACH (OUTPATIENT)
Facility: HOSPITAL | Age: 40
End: 2025-05-01

## 2025-05-01 NOTE — PROGRESS NOTES
"Program details reviewed (Yes/No):Yes    Patient lives in [place]: (Yes/No):Yes    Uninsured/Underinsured(List):Underinsured    Patient Agreeable to Participation(Yes/No):Yes    Verbal Consent Given (Yes/No):Yes    Adagio Consent form signed \"verba consent\"(Yes/No):Yes    Patient Entered into Med-IT (Yes/No):Yes      "

## 2025-05-02 DIAGNOSIS — F43.12 CHRONIC POST-TRAUMATIC STRESS DISORDER (PTSD): ICD-10-CM

## 2025-05-02 DIAGNOSIS — F41.1 GENERALIZED ANXIETY DISORDER: ICD-10-CM

## 2025-05-05 ENCOUNTER — PATIENT OUTREACH (OUTPATIENT)
Dept: CASE MANAGEMENT | Facility: OTHER | Age: 40
End: 2025-05-05

## 2025-05-05 LAB — FUNGUS SPEC CULT: NORMAL

## 2025-05-05 RX ORDER — BUSPIRONE HYDROCHLORIDE 15 MG/1
15 TABLET ORAL 2 TIMES DAILY
Qty: 60 TABLET | Refills: 0 | Status: SHIPPED | OUTPATIENT
Start: 2025-05-05

## 2025-05-05 RX ORDER — CLONAZEPAM 1 MG/1
1 TABLET ORAL 2 TIMES DAILY
Qty: 60 TABLET | Refills: 0 | Status: SHIPPED | OUTPATIENT
Start: 2025-05-05

## 2025-05-05 NOTE — PROGRESS NOTES
GAYLA BRAND completed chart review and placed call to patient today to offer support regarding Psychosocial concerns and community resource connection, and message left.  Will await return call and remain available to provide support.

## 2025-05-06 LAB
MYCOBACTERIUM SPEC CULT: NORMAL
RHODAMINE-AURAMINE STN SPEC: NORMAL

## 2025-05-12 ENCOUNTER — PATIENT OUTREACH (OUTPATIENT)
Dept: CASE MANAGEMENT | Facility: OTHER | Age: 40
End: 2025-05-12

## 2025-05-12 ENCOUNTER — HOSPITAL ENCOUNTER (OUTPATIENT)
Dept: RADIOLOGY | Facility: HOSPITAL | Age: 40
Discharge: HOME | End: 2025-05-12
Attending: ORTHOPAEDIC SURGERY
Payer: COMMERCIAL

## 2025-05-12 ENCOUNTER — OFFICE VISIT (OUTPATIENT)
Dept: OBGYN CLINIC | Facility: HOSPITAL | Age: 40
End: 2025-05-12

## 2025-05-12 VITALS — HEIGHT: 64 IN | WEIGHT: 119.05 LBS | BODY MASS INDEX: 20.32 KG/M2

## 2025-05-12 DIAGNOSIS — Z98.1 S/P CERVICAL SPINAL FUSION: Primary | ICD-10-CM

## 2025-05-12 DIAGNOSIS — Z98.1 S/P CERVICAL SPINAL FUSION: ICD-10-CM

## 2025-05-12 DIAGNOSIS — M54.12 CERVICAL RADICULOPATHY: ICD-10-CM

## 2025-05-12 LAB — FUNGUS SPEC CULT: NORMAL

## 2025-05-12 PROCEDURE — 72040 X-RAY EXAM NECK SPINE 2-3 VW: CPT

## 2025-05-12 PROCEDURE — 99024 POSTOP FOLLOW-UP VISIT: CPT | Performed by: ORTHOPAEDIC SURGERY

## 2025-05-12 RX ORDER — GABAPENTIN 600 MG/1
600 TABLET ORAL 3 TIMES DAILY
Qty: 90 TABLET | Refills: 0 | Status: SHIPPED | OUTPATIENT
Start: 2025-05-12

## 2025-05-12 RX ORDER — OXYCODONE HYDROCHLORIDE 5 MG/1
5 TABLET ORAL EVERY 4 HOURS PRN
Qty: 30 TABLET | Refills: 0 | Status: SHIPPED | OUTPATIENT
Start: 2025-05-12 | End: 2025-05-22

## 2025-05-12 NOTE — ASSESSMENT & PLAN NOTE
Orders:    gabapentin (NEURONTIN) 600 MG tablet; Take 1 tablet (600 mg total) by mouth 3 (three) times a day    oxyCODONE (Roxicodone) 5 immediate release tablet; Take 1 tablet (5 mg total) by mouth every 4 (four) hours as needed for moderate pain for up to 10 days Max Daily Amount: 30 mg

## 2025-05-12 NOTE — PROGRESS NOTES
GAYLA BRAND has not received return call from patient to date.  Chart reviewed and call place to patient to offer Psychosocial support.     Patient reports that she is  and resides with her  and their 3 y.o. son.  Patient reports that she has applied for SSD due to her medical concerns and her application was denied.  Patient has appealed this decision and a hearing has been scheduled for next month.      Patient reports that her  has been diagnosed with Alzheimer's Dementia at the age of 46 y.o.  She reports that his salary supported their family and currently he has been placed on STD through his employer. Patient reports that he will not be able to return to his job as the dementia is progressing quickly.  She is planning to apply for SSD for him.      Patient reports that her 's STD and Healthcare benefits will terminate on 5/23/25.  She reports that they are planning to sell their home and move in with her sister but this process will take some time.  Reviewed Medical Assistance application as well as SNAP application and income criteria for both and patient reports that she will apply. Patient reports that she is currently utilizing local food [email or web address].       Patient reports that her son has been diagnosed with ADHD and may have additional concerns.  She reports that he has an appointment with Psychiatry 8/25 for him to be evaluated to determine if there are additional concerns/diagnosis.  Reviewed [place] programs for patient's son and contact information provided.  Patient reports she will call to inquire about eligibility.      Supportive counseling provided to patient who denies further needs at this time.  Will close referral and remain available to provide support.

## 2025-05-12 NOTE — PROGRESS NOTES
Name: Katina Mendez      : 1985       MRN: [id number]   Encounter Provider: Edu Del Rio MD   Encounter Date: 25  Encounter department: [facility] ORTHOPEDIC CARE SPECIALISTS [facility] ORTHOPEDIC SPINE SURGERY  POST OP NOTE     Katina Mendez  here today s/p removal C5-6 disc arthroplasty device, C5-6 ACDF         Surgery date: 2025    Assessment & Plan  S/P cervical spinal fusion  See plan below  Orders:    XR spine cervical 2 or 3 vw injury; Future    oxyCODONE (Roxicodone) 5 immediate release tablet; Take 1 tablet (5 mg total) by mouth every 4 (four) hours as needed for moderate pain for up to 10 days Max Daily Amount: 30 mg    Cervical radiculopathy    Orders:    gabapentin (NEURONTIN) 600 MG tablet; Take 1 tablet (600 mg total) by mouth 3 (three) times a day    oxyCODONE (Roxicodone) 5 immediate release tablet; Take 1 tablet (5 mg total) by mouth every 4 (four) hours as needed for moderate pain for up to 10 days Max Daily Amount: 30 mg       Plan:  Steri strips removed  Patient was instructed to do the following   -Able to walk as much as tolerated. Continue to limit excessive turning, twisting, bending. No overhead lifting. No lifting >5lbs.l  -May shower and allow water/soap to run over the incision. Do not scrub or submerge incision.  -Able to drive as long as no longer taking narcotics.   -Take pain medication as prescribed if needed.  Refill of oxycodone provided. [place] Prescription Drug Monitoring Program report was queried, reviewed and deemed appropriate.  -Refill of gabapentin provided.  -Follow up in 4 weeks for 6 week post-op visit. Will obtain X-rays  Katina Mendez was instructed to call the office with any concerns or questions.    History of Present Illness    Subjective: Preoperative symptoms were neck pain, right upper extremity pain, numbness and tingling. Today, she reports some ongoing neck discomfort and swallowing discomfort.  She reports that  "she has not had to modify any of her diet.  She notes she no longer has any constant right arm pain but does continues with some numbness and tingling in her right upper extremity. Difficulty raising her right arm above her head due to pain. Patient admits to compliance with activity restrictions.  Denies any fevers, chills, and night sweats.  No cough, no shortness of breath.  No chest pain, no palpitations. Some discomfort with swallowing.    Post-Op Medications:  5mg oxycodone - taking as needed  10mg flexeril - taking as needed  650mg tylenol - taking as needed      Physical Exam    Objective:  Ht 5' 4\" (1.626 m)   Wt 54 kg (119 lb 0.8 oz)   LMP 01/29/2020   BMI 20.43 kg/m²     Strength:  Upper Extremity Motor Function     Right  Left    Deltoid  5/5  5/5    Bicep  5/5  5/5    Wrist extension  5/5  5/5    Tricep  5/5  5/5    Finger flexion/  4+/5  5/5    Hand intrinsic  5/5  5/5      Sensation: intact  Gait: fluid, non antalgic    Anterior cervical incision healing extremely well. Sutures intact.  No signs of erythema, discharge, or purulence.  There is no appreciable effusion.    Calf: soft, non tender, no swelling or erythema     Imaging: I have reviewed and independently visualized the imaging studies (05/12/25)  myself and my interpretation is the following: Instrumentation in place and in good alignment.  "

## 2025-05-12 NOTE — PROGRESS NOTES
"Name: Katina Mendez      : 1985       MRN: [id number]   Encounter Provider: Edu Del Rio MD   Encounter Date: 25  Encounter department: [facility] ORTHOPEDIC CARE SPECIALISTS [facility] ORTHOPEDIC SPINE SURGERY  POST OP NOTE     Katina Mendez  here today s/p Removal C5-6 arthroplasty, C5-6 ACDF         Surgery date: 25    Assessment & Plan  S/P cervical spinal fusion    Staples were removed. Steri strips applied.  Patient was instructed to do the following   -Able to walk as much as tolerated.  -Able to drive as long as no longer taking narcotics.   -May shower and allow water/soap to run over the incision. Do not scrub or submerge incision  -Take pain medication as prescribed if needed.    -Follow up in ***  Katina Mendez was instructed to call the office with any concerns or questions.      Orders:    XR spine cervical 2 or 3 vw injury; Future           History of Present Illness    Subjective: Preoperative symptoms were ***. Today, she reports *** . Patient admits to compliance with activity restrictions.  Denies any fevers, chills, and night sweats.  No cough, no shortness of breath.  No chest pain, no palpitations.  No dysphagia.    Post-Op Medications:  ***      Physical Exam    Objective:  Ht 5' 4\" (1.626 m)   Wt 54 kg (119 lb 0.8 oz)   LMP 2020   BMI 20.43 kg/m²     Strength:  Sensation:  DTR:  Gait:     *** incision healing extremely well. Staples intact.  No signs of erythema, discharge, or purulence.  There is no appreciable effusion.    Calf: soft, non tender, no swelling or erythema ***    Imaging: *** I have reviewed and independently visualized the imaging studies (25)  myself and my interpretation is the following: Instrumentation in place and in good alignment.    Assessment: s/p ***      Plan:  Staples were removed.     Patient was instructed to do the following   -Able to walk as much as tolerated.  -Able to drive as long as no longer taking " narcotics.   -Take pain medication as prescribed if needed.    -Call at 6 week post-op christina for check-in and to schedule 3 month return visit.  Katina Mendez was instructed to call the office with any concerns or questions.

## 2025-05-13 LAB
MYCOBACTERIUM SPEC CULT: NORMAL
RHODAMINE-AURAMINE STN SPEC: NORMAL

## 2025-05-19 LAB — FUNGUS SPEC CULT: NORMAL

## 2025-05-20 LAB
MYCOBACTERIUM SPEC CULT: NORMAL
RHODAMINE-AURAMINE STN SPEC: NORMAL

## 2025-05-27 LAB — FUNGUS SPEC CULT: NORMAL

## 2025-05-29 DIAGNOSIS — F40.240 CLAUSTROPHOBIA: ICD-10-CM

## 2025-05-29 DIAGNOSIS — F41.1 GENERALIZED ANXIETY DISORDER: ICD-10-CM

## 2025-05-31 RX ORDER — ALPRAZOLAM 1 MG/1
1 TABLET, ORALLY DISINTEGRATING ORAL
Qty: 6 TABLET | Refills: 0 | Status: SHIPPED | OUTPATIENT
Start: 2025-05-31

## 2025-05-31 RX ORDER — CLONAZEPAM 1 MG/1
1 TABLET ORAL 2 TIMES DAILY
Qty: 60 TABLET | Refills: 0 | Status: SHIPPED | OUTPATIENT
Start: 2025-05-31

## 2025-06-03 ENCOUNTER — HOSPITAL ENCOUNTER (OUTPATIENT)
Dept: MAMMOGRAPHY | Facility: CLINIC | Age: 40
Discharge: HOME | End: 2025-06-03
Payer: COMMERCIAL

## 2025-06-03 ENCOUNTER — HOSPITAL ENCOUNTER (OUTPATIENT)
Dept: ULTRASOUND IMAGING | Facility: CLINIC | Age: 40
Discharge: HOME | End: 2025-06-03
Attending: OBSTETRICS & GYNECOLOGY
Payer: COMMERCIAL

## 2025-06-03 VITALS — DIASTOLIC BLOOD PRESSURE: 71 MMHG | HEART RATE: 74 BPM | SYSTOLIC BLOOD PRESSURE: 105 MMHG

## 2025-06-03 DIAGNOSIS — R92.8 ABNORMAL ULTRASOUND OF BREAST: ICD-10-CM

## 2025-06-03 DIAGNOSIS — R92.8 ABNORMAL MRI, BREAST: ICD-10-CM

## 2025-06-03 LAB
MYCOBACTERIUM SPEC CULT: NORMAL
RHODAMINE-AURAMINE STN SPEC: NORMAL

## 2025-06-03 PROCEDURE — 19083 BX BREAST 1ST LESION US IMAG: CPT

## 2025-06-03 PROCEDURE — 88312 SPECIAL STAINS GROUP 1: CPT | Performed by: PATHOLOGY

## 2025-06-03 PROCEDURE — 88342 IMHCHEM/IMCYTCHM 1ST ANTB: CPT | Performed by: PATHOLOGY

## 2025-06-03 PROCEDURE — 88305 TISSUE EXAM BY PATHOLOGIST: CPT | Performed by: PATHOLOGY

## 2025-06-03 PROCEDURE — 88341 IMHCHEM/IMCYTCHM EA ADD ANTB: CPT | Performed by: PATHOLOGY

## 2025-06-03 PROCEDURE — A4648 IMPLANTABLE TISSUE MARKER: HCPCS

## 2025-06-03 RX ORDER — LIDOCAINE HYDROCHLORIDE 10 MG/ML
5 INJECTION, SOLUTION EPIDURAL; INFILTRATION; INTRACAUDAL; PERINEURAL ONCE
Status: COMPLETED | OUTPATIENT
Start: 2025-06-03 | End: 2025-06-03

## 2025-06-03 RX ADMIN — LIDOCAINE HYDROCHLORIDE 5 ML: 10 INJECTION, SOLUTION EPIDURAL; INFILTRATION; INTRACAUDAL; PERINEURAL at 11:06

## 2025-06-03 NOTE — PROGRESS NOTES
Procedure type:    __x___ultrasound guided _____stereotactic    Breast:    _____Left ___x__Right    Location: 1 o'clock 5cmfn    Needle: 12G    # of passes: 3    Clip: Heart    Performed by: Dr. Kocher    Pressure held for 5 minutes by: Rohini Lr Strips:    ___X__yes _____no    Band aid:    __X___yes_____no    Tolerated procedure:    __X___yes _____no

## 2025-06-04 ENCOUNTER — TELEPHONE (OUTPATIENT)
Dept: OBGYN CLINIC | Facility: HOSPITAL | Age: 40
End: 2025-06-04

## 2025-06-04 NOTE — TELEPHONE ENCOUNTER
Called and lm for pt to call back to reschedule follow up on 6/9/25 due to the provider being OOO. Advised pt that appointment would be cancelled until they call back. Provider call back number.

## 2025-06-04 NOTE — TELEPHONE ENCOUNTER
"Ann-Mariehart message left for patient      \"Good morning,    I am reaching out to inform you that our office attempted to call you at the number listed in your chart about your upcoming appointment. We left a message on your voicemail to let you know that Dr. Del Rio is out of the office on 6/9/25 and we have cancelled your appointment for that day. Please call the office to reschedule your appointment.        Our office number is [phone].    Have a great day!        Jaimee Chong MA\"     When patient calls back, please schedule patient for first available appointment opening or reach out to the [facility].     Thank you!   "

## 2025-06-05 ENCOUNTER — TELEPHONE (OUTPATIENT)
Dept: MAMMOGRAPHY | Facility: CLINIC | Age: 40
End: 2025-06-05

## 2025-06-05 DIAGNOSIS — M54.12 CERVICAL RADICULOPATHY: ICD-10-CM

## 2025-06-05 DIAGNOSIS — F43.12 CHRONIC POST-TRAUMATIC STRESS DISORDER (PTSD): ICD-10-CM

## 2025-06-05 PROCEDURE — 88305 TISSUE EXAM BY PATHOLOGIST: CPT | Performed by: PATHOLOGY

## 2025-06-05 PROCEDURE — 88341 IMHCHEM/IMCYTCHM EA ADD ANTB: CPT | Performed by: PATHOLOGY

## 2025-06-05 PROCEDURE — 88342 IMHCHEM/IMCYTCHM 1ST ANTB: CPT | Performed by: PATHOLOGY

## 2025-06-05 PROCEDURE — 88312 SPECIAL STAINS GROUP 1: CPT | Performed by: PATHOLOGY

## 2025-06-05 RX ORDER — BUSPIRONE HYDROCHLORIDE 15 MG/1
15 TABLET ORAL 2 TIMES DAILY
Qty: 60 TABLET | Refills: 5 | Status: SHIPPED | OUTPATIENT
Start: 2025-06-05

## 2025-06-05 RX ORDER — GABAPENTIN 600 MG/1
600 TABLET ORAL 3 TIMES DAILY
Qty: 90 TABLET | Refills: 1 | Status: SHIPPED | OUTPATIENT
Start: 2025-06-05

## 2025-06-10 LAB
MYCOBACTERIUM SPEC CULT: NORMAL
RHODAMINE-AURAMINE STN SPEC: NORMAL

## 2025-06-13 ENCOUNTER — HOSPITAL ENCOUNTER (OUTPATIENT)
Dept: RADIOLOGY | Facility: HOSPITAL | Age: 40
Discharge: HOME | End: 2025-06-13
Attending: OBSTETRICS & GYNECOLOGY
Payer: COMMERCIAL

## 2025-06-13 VITALS — SYSTOLIC BLOOD PRESSURE: 125 MMHG | DIASTOLIC BLOOD PRESSURE: 72 MMHG

## 2025-06-13 DIAGNOSIS — R92.8 ABNORMAL MRI, BREAST: ICD-10-CM

## 2025-06-13 PROCEDURE — 88342 IMHCHEM/IMCYTCHM 1ST ANTB: CPT | Performed by: PATHOLOGY

## 2025-06-13 PROCEDURE — A4648 IMPLANTABLE TISSUE MARKER: HCPCS

## 2025-06-13 PROCEDURE — A9585 GADOBUTROL INJECTION: HCPCS | Performed by: OBSTETRICS & GYNECOLOGY

## 2025-06-13 PROCEDURE — 88344 IMHCHEM/IMCYTCHM EA MLT ANTB: CPT | Performed by: PATHOLOGY

## 2025-06-13 PROCEDURE — 88341 IMHCHEM/IMCYTCHM EA ADD ANTB: CPT | Performed by: PATHOLOGY

## 2025-06-13 PROCEDURE — 19085 BX BREAST 1ST LESION MR IMAG: CPT

## 2025-06-13 PROCEDURE — 88305 TISSUE EXAM BY PATHOLOGIST: CPT | Performed by: PATHOLOGY

## 2025-06-13 RX ORDER — LIDOCAINE HYDROCHLORIDE 10 MG/ML
5 INJECTION, SOLUTION EPIDURAL; INFILTRATION; INTRACAUDAL; PERINEURAL ONCE
Status: COMPLETED | OUTPATIENT
Start: 2025-06-13 | End: 2025-06-13

## 2025-06-13 RX ORDER — GADOBUTROL 604.72 MG/ML
5 INJECTION INTRAVENOUS
Status: COMPLETED | OUTPATIENT
Start: 2025-06-13 | End: 2025-06-13

## 2025-06-13 RX ORDER — LIDOCAINE HYDROCHLORIDE AND EPINEPHRINE BITARTRATE 20; .01 MG/ML; MG/ML
20 INJECTION, SOLUTION SUBCUTANEOUS ONCE
Status: COMPLETED | OUTPATIENT
Start: 2025-06-13 | End: 2025-06-13

## 2025-06-13 RX ADMIN — GADOBUTROL 5 ML: 604.72 INJECTION INTRAVENOUS at 09:51

## 2025-06-13 RX ADMIN — LIDOCAINE HYDROCHLORIDE,EPINEPHRINE BITARTRATE 20 ML: 20; .01 INJECTION, SOLUTION INFILTRATION; PERINEURAL at 10:09

## 2025-06-13 RX ADMIN — LIDOCAINE HYDROCHLORIDE 5 ML: 10 INJECTION, SOLUTION EPIDURAL; INFILTRATION; INTRACAUDAL; PERINEURAL at 10:08

## 2025-06-13 NOTE — DISCHARGE INSTR - OTHER ORDERS
POST MRI GUIDED BREAST BIOPSY PATIENT INFORMATION      Place an ice pack inside your bra over the top of the gauze dressing every hour for 20 minutes (20 minutes on, 60 minutes off). Do this until bedtime. The ice pack should be used whether pain is or is not present, as it significantly reduces the chance for bruising, bleeding, or hematoma development at home after your procedure. Please use as instructed.    Do not shower or bathe until the following morning. After bathing, you may remove the bandaid.    You may shower/bathe your breast carefully with the steri-strips in place.  Be careful not to loosen them.  The steri-strips should remain in place 3-5 days to allow adequate time for your body to heal the breast biopsy incision. If steri-strips have not fallen off on their own by 5 days it would be appropriate to remove them.    You may have mild discomfort, and you may have some bruising where the needle entered the skin. Following an MRI Guided breast biopsy, it can be very common to have bruising around the nipple & underside of the breast due to positioning during the procedure. This should clear within 1-2 weeks time.    If you need medicine for discomfort, take acetaminophen products such as Tylenol. You may also take Advil or Motrin products. Avoid using any aspirin based products (avoid aleve, avoid naproxen), as these medications can increase the risk for bleeding/ hematoma development at breast biopsy site fro the first 24 hours.    Do not participate in strenuous activities such as-tennis, aerobics, skiing, weight lifting > 10 lbs, etc. for 24 hours. Refrain from swimming/soaking for 72 hours (3 days) until biopsy incision is fully healed to reduce any risk for infection.    Wearing a bra for sleeping may be more comfortable for the first 24-48 hours.    Watch for continued bleeding, pain or fever over 101. If any of these symptoms occur, please contact our breast nurse navigator at the location where  your biopsy was performed.    During normal business hours (7:30 am-4:00 pm) please call the nurse navigator at the site where your   procedure was performed:               [facility]/ [place]:  [phone], [phone] or [phone]             [facility]:                                    [phone] or [phone]              [facility]/ [place]:      [phone]                          [facility]:               [phone]                          [facility]:                                           [phone]      After 4 PM - please call your physician or go to the nearest Emergency Department location.          9.         The final results of your biopsy are usually available within one week and will appear in the [facility] My Chart                           application should you have the application.         10.        You will receive a call from [facility] [place] the following days to see how you are healing.

## 2025-06-13 NOTE — NURSING NOTE
Patient's right breast biopsy completed by MD Bellamy at 1035, patient tolerated well with no complaints verbalized. After holding pressure to site, steri-strips applied and band aide. Post procedure discharge instructions and ice packs given to patient with verbal communication of understanding. Patient left MRI suite ambulating with no complaints.

## 2025-06-16 NOTE — PROGRESS NOTES
Post procedure call completed    Bleeding: _____yes __X___no (pt states scant oozing that ended Sunday evening. No further bleeding noted)    Pain: _____yes ___X___no (pt has mild discomfort, used ice and OTC pain meds)    Redness/Swelling: ______yes ___X___no (pt states mild swelling)    Band aid removed: __x___yes _____no     Steri-Strips intact: ___X___yes _____no (discussed with patient to remove steri strips on Wednesday if they have not come off on their own)    Pt with no questions at this time, adv will call when results available, adv to call with any questions or concerns, has name/# for contact  
ok for PT evaluation for OOB amb per RN Margot/yes

## 2025-06-17 ENCOUNTER — TELEPHONE (OUTPATIENT)
Dept: MAMMOGRAPHY | Facility: CLINIC | Age: 40
End: 2025-06-17

## 2025-06-17 DIAGNOSIS — N60.91 ATYPICAL LOBULAR HYPERPLASIA (ALH) OF RIGHT BREAST: Primary | ICD-10-CM

## 2025-06-17 PROCEDURE — 88341 IMHCHEM/IMCYTCHM EA ADD ANTB: CPT | Performed by: PATHOLOGY

## 2025-06-17 PROCEDURE — 88342 IMHCHEM/IMCYTCHM 1ST ANTB: CPT | Performed by: PATHOLOGY

## 2025-06-17 PROCEDURE — 88344 IMHCHEM/IMCYTCHM EA MLT ANTB: CPT | Performed by: PATHOLOGY

## 2025-06-17 PROCEDURE — 88305 TISSUE EXAM BY PATHOLOGIST: CPT | Performed by: PATHOLOGY

## 2025-06-17 NOTE — TELEPHONE ENCOUNTER
Spoke to pt to inform her of her high risk right breast MRI bx results of 6/13/25 and the recommendation of surgical consult. Pt has been seen by Stacey Cueva and would like to see her again. I will make a referral to [facility] and they will call the pt to schedule an appt for her w/Dr. Cueva. Pt's questions/concerns were addressed at this time.

## 2025-06-18 ENCOUNTER — TELEPHONE (OUTPATIENT)
Dept: HEMATOLOGY ONCOLOGY | Facility: CLINIC | Age: 40
End: 2025-06-18

## 2025-06-18 NOTE — TELEPHONE ENCOUNTER
Referral to Surgical Oncology  received.  Chart reviewed by  for Surgical oncology at this time.       Diagnosis: N60.91 (ICD-10-CM) - Atypical lobular hyperplasia (ALH) of right breast   Biopsy completed on 6-3-2025  Final Diagnosis   A. Breast, Right, us bx rt breast 100 5cmfn 3 passes 12g:  - Benign breast parenchyma with mixed inflammation including lymphocytes, plasma cells and neutrophils.  - Negative for atypia or malignancy.    Biopsy completed on 6-  Final Diagnosis   A. 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     -- Usual ductal hyperplasia, columnar cell change and hyperplasia, sclerosing         and apocrine adenosis, fibroadenomatoid changes).  - Focal fat necrosis.  - Fibrosclerosis.   - Microcalcifications: Present, associated with ALH and non-neoplastic breast tissue.   - Negative for malignancy, in-situ carcinoma and atypical ductal hyperplasia.     -- Pankeratin (CKAE1/3) and positive p63 immunostains confirm no invasive carcinoma     After review of chart, instructions for scheduling added to referral and sent to be scheduled as advised.

## 2025-06-27 DIAGNOSIS — F41.1 GENERALIZED ANXIETY DISORDER: ICD-10-CM

## 2025-06-27 DIAGNOSIS — G47.00 INSOMNIA, UNSPECIFIED TYPE: ICD-10-CM

## 2025-06-30 RX ORDER — TRAZODONE HYDROCHLORIDE 50 MG/1
50 TABLET ORAL
Qty: 90 TABLET | Refills: 1 | Status: SHIPPED | OUTPATIENT
Start: 2025-06-30

## 2025-07-01 ENCOUNTER — PATIENT MESSAGE (OUTPATIENT)
Dept: FAMILY MEDICINE CLINIC | Facility: CLINIC | Age: 40
End: 2025-07-01

## 2025-07-01 RX ORDER — CLONAZEPAM 1 MG/1
1 TABLET ORAL 2 TIMES DAILY
Qty: 60 TABLET | Refills: 0 | Status: SHIPPED | OUTPATIENT
Start: 2025-07-01

## 2025-07-02 ENCOUNTER — TELEPHONE (OUTPATIENT)
Age: 40
End: 2025-07-02

## 2025-07-02 DIAGNOSIS — H92.09 EARACHE: ICD-10-CM

## 2025-07-02 RX ORDER — IBUPROFEN 600 MG/1
600 TABLET, FILM COATED ORAL EVERY 6 HOURS PRN
Qty: 120 TABLET | Refills: 0 | Status: SHIPPED | OUTPATIENT
Start: 2025-07-02 | End: 2025-08-31

## 2025-07-02 NOTE — TELEPHONE ENCOUNTER
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

## 2025-07-03 NOTE — TELEPHONE ENCOUNTER
Spoke with patient, she will try doing the 1mg for a few weeks, and if still not feeling better will try .5mg before she decides to stop it completely.

## 2025-07-03 NOTE — TELEPHONE ENCOUNTER
Reviewed with Marily, Patient can try to cut back to 1mg of the estrodiol or even .5mg. Or she can come off of it completely. Will advise that her menopausal symptoms might be worse than the bloating and water retention she is feeling now.

## 2025-07-23 ENCOUNTER — TELEPHONE (OUTPATIENT)
Dept: OBGYN CLINIC | Facility: HOSPITAL | Age: 40
End: 2025-07-23

## 2025-07-23 NOTE — TELEPHONE ENCOUNTER
Tired to call pt to advise her of provider change for her post op appointment on 8/6. Call would not go through and I will send a [employer] message to pt.

## (undated) DEVICE — DRILL BIT 6975150 FLUTELESS DRILL BIT

## (undated) DEVICE — DRAPE SHEET THREE QUARTER

## (undated) DEVICE — PENCIL ELECTROSURG E-Z CLEAN -0035H

## (undated) DEVICE — 3M™ STERI-STRIP™ REINFORCED ADHESIVE SKIN CLOSURES, R1547, 1/2 IN X 4 IN (12 MM X 100 MM), 6 STRIPS/ENVELOPE: Brand: 3M™ STERI-STRIP™

## (undated) DEVICE — GAUZE SPONGES,8 PLY: Brand: CURITY

## (undated) DEVICE — BRA SURGICAL SZ LGE (36-39)

## (undated) DEVICE — GLOVE INDICATOR PI UNDERGLOVE SZ 7.5 BLUE

## (undated) DEVICE — JACKSON-PRATT 100CC BULB RESERVOIR: Brand: CARDINAL HEALTH

## (undated) DEVICE — INTENDED FOR TISSUE SEPARATION, AND OTHER PROCEDURES THAT REQUIRE A SHARP SURGICAL BLADE TO PUNCTURE OR CUT.: Brand: BARD-PARKER ® CARBON RIB-BACK BLADES

## (undated) DEVICE — SUPER SPONGES,MEDIUM: Brand: KERLIX

## (undated) DEVICE — BETHLEHEM UNIVERSAL SPINE, KIT: Brand: CARDINAL HEALTH

## (undated) DEVICE — PACKING VAGINAL 2 IN

## (undated) DEVICE — LIGHT HANDLE COVER SLEEVE DISP BLUE STELLAR

## (undated) DEVICE — SCD SEQUENTIAL COMPRESSION COMFORT SLEEVE MEDIUM KNEE LENGTH: Brand: KENDALL SCD

## (undated) DEVICE — INTENDED FOR TISSUE SEPARATION, AND OTHER PROCEDURES THAT REQUIRE A SHARP SURGICAL BLADE TO PUNCTURE OR CUT.: Brand: BARD-PARKER SAFETY BLADES SIZE 11, STERILE

## (undated) DEVICE — CHLORAPREP HI-LITE 26ML ORANGE

## (undated) DEVICE — SPONGE SCRUB 4 PCT CHLORHEXIDINE

## (undated) DEVICE — SURGI KIT INSTRUMENT ORGANIZER

## (undated) DEVICE — PLUMEPEN PRO 10FT

## (undated) DEVICE — SUT MONOCRYL 3-0 SH 27 IN Y416H

## (undated) DEVICE — DRAPE EQUIPMENT RF WAND

## (undated) DEVICE — SUT MONOCRYL 4-0 PS-2 27 IN Y426H

## (undated) DEVICE — TISSUE RETRIEVAL SYSTEM: Brand: INZII RETRIEVAL SYSTEM

## (undated) DEVICE — MASTISOL LIQ ADHESIVE 2/3ML

## (undated) DEVICE — INTENDED FOR TISSUE SEPARATION, AND OTHER PROCEDURES THAT REQUIRE A SHARP SURGICAL BLADE TO PUNCTURE OR CUT.: Brand: BARD-PARKER SAFETY BLADES SIZE 15, STERILE

## (undated) DEVICE — EXOFIN PRECISION PEN HIGH VISCOSITY TOPICAL SKIN ADHESIVE: Brand: EXOFIN PRECISION PEN, 1G

## (undated) DEVICE — DISPOSABLE EQUIPMENT COVER: Brand: SMALL TOWEL DRAPE

## (undated) DEVICE — DRAPE C-ARMOUR

## (undated) DEVICE — SURGICAL GOWN, XL SMARTSLEEVE: Brand: CONVERTORS

## (undated) DEVICE — C-ARM: Brand: UNBRANDED

## (undated) DEVICE — BETHLEHEM UNIVERSAL MINOR GEN: Brand: CARDINAL HEALTH

## (undated) DEVICE — ABS MED DISTRACTION PIN, 14MM PATIENT (INNER): Brand: ABS MED DISTRACTION PIN

## (undated) DEVICE — ELECTRODE BLADE MOD E-Z CLEAN 2.5IN 6.4CM -0012M

## (undated) DEVICE — NEEDLE COUNTER LG W/RULER

## (undated) DEVICE — GLOVE SRG LF STRL BGL SKNSNS 7.5 PF

## (undated) DEVICE — ROSEBUD DISSECTORS: Brand: DEROYAL

## (undated) DEVICE — TUBING SUCTION 5MM X 12 FT

## (undated) DEVICE — HEMOSTATIC MATRIX SURGIFLO 8ML W/THROMBIN

## (undated) DEVICE — SUT SILK 2-0 18 IN A185H

## (undated) DEVICE — DRESSING MEPORE FILM ADHESIVE 4 X 5IN

## (undated) DEVICE — MONITORING SPINAL IMPULSE CASE FEE

## (undated) DEVICE — TOOL MR8-10CY40BR MR8 10CM CYL BARL 4MM: Brand: MIDAS REX MR8

## (undated) DEVICE — SHEATH, GUIDE, SAVI SCOUT®: Brand: SAVI SCOUT®

## (undated) DEVICE — DRILL BIT 7080510 11 MM DRILL BIT S

## (undated) DEVICE — 3M™ IOBAN™ 2 ANTIMICROBIAL INCISE DRAPE 6650EZ: Brand: IOBAN™ 2

## (undated) DEVICE — INSUFFLATION NEEDLE TO ESTABLISH PNEUMOPERITONEUM.: Brand: INSUFFLATION NEEDLE

## (undated) DEVICE — SUT MONOCRYL PLUS 3-0 PS-2 27 IN MCP427H

## (undated) DEVICE — DRAIN JACKSON PRATT 10FR 1/8END: Brand: CARDINAL HEALTH

## (undated) DEVICE — GLOVE SRG LF STRL BGL SKNSNS 8 PF

## (undated) DEVICE — MEDI-VAC YANKAUER SUCTION HANDLE W/BULBOUS AND CONTROL VENT: Brand: CARDINAL HEALTH

## (undated) DEVICE — TROCAR: Brand: KII SLEEVE

## (undated) DEVICE — ENSEAL X1 STRAIGHT 37CM SHAFT: Brand: HARMONIC

## (undated) DEVICE — NEEDLE 25G X 1 1/2

## (undated) DEVICE — PLASTIC ADHESIVE BANDAGE: Brand: CURITY

## (undated) DEVICE — PROXIMATE SKIN STAPLERS (35 WIDE) CONTAINS 35 STAINLESS STEEL STAPLES (FIXED HEAD): Brand: PROXIMATE

## (undated) DEVICE — PVC URETHRAL CATHETER: Brand: DOVER

## (undated) DEVICE — [HIGH FLOW INSUFFLATOR,  DO NOT USE IF PACKAGE IS DAMAGED,  KEEP DRY,  KEEP AWAY FROM SUNLIGHT,  PROTECT FROM HEAT AND RADIOACTIVE SOURCES.]: Brand: PNEUMOSURE

## (undated) DEVICE — DRAPE MICROSCOPE OPMI PENTERO

## (undated) DEVICE — INSTRUMENT 7080902 PLATE HOLDING PIN

## (undated) DEVICE — 2000CC GUARDIAN II: Brand: GUARDIAN

## (undated) DEVICE — GLOVE PI ULTRA TOUCH SZ.7.0

## (undated) DEVICE — DRESSING MEPILEX AG BORDER 4 X 4 IN

## (undated) DEVICE — NEEDLE SPINAL18G X 3.5 IN QUINCKE

## (undated) DEVICE — SUPPLY FEE STD

## (undated) DEVICE — GLOVE SRG BIOGEL 6

## (undated) DEVICE — SUT VICRYL 2-0 CT-2 27 IN J269H

## (undated) DEVICE — DRAPE SHEET X-LG

## (undated) DEVICE — BETHLEHEM UNIVERSAL MINOR VAG: Brand: CARDINAL HEALTH

## (undated) DEVICE — MINOR PROCEDURE DRAPE: Brand: CONVERTORS

## (undated) DEVICE — CERVICAL COLLAR SOFT MED

## (undated) DEVICE — DRAPE C-ARM X-RAY

## (undated) DEVICE — REM POLYHESIVE ADULT PATIENT RETURN ELECTRODE: Brand: VALLEYLAB

## (undated) DEVICE — BETHLEHEM UNIVERSAL GYN LAP PK: Brand: CARDINAL HEALTH

## (undated) DEVICE — SPECIAMEN GRID KLINITRAY SM RT 3 PC KIT

## (undated) DEVICE — SWABSTCK, BENZOIN TINCTURE, 1/PK, STRL: Brand: APLICARE

## (undated) DEVICE — 3M™ STERI-DRAPE™ INSTRUMENT POUCH 1018: Brand: STERI-DRAPE™

## (undated) DEVICE — PREMIUM DRY TRAY LF: Brand: MEDLINE INDUSTRIES, INC.

## (undated) DEVICE — INSULATED BLADE ELECTRODE: Brand: EDGE

## (undated) DEVICE — PENCILETTE PUSH BUTTON COATED

## (undated) DEVICE — SUT SILK 2-0 SH 30 IN K833H

## (undated) DEVICE — DRAPE ADOLESCENT LAPAROTOMY